# Patient Record
Sex: FEMALE | Race: WHITE | NOT HISPANIC OR LATINO
[De-identification: names, ages, dates, MRNs, and addresses within clinical notes are randomized per-mention and may not be internally consistent; named-entity substitution may affect disease eponyms.]

---

## 2017-01-16 ENCOUNTER — APPOINTMENT (OUTPATIENT)
Dept: PULMONOLOGY | Facility: CLINIC | Age: 62
End: 2017-01-16

## 2017-04-11 ENCOUNTER — APPOINTMENT (OUTPATIENT)
Dept: HEMATOLOGY ONCOLOGY | Facility: CLINIC | Age: 62
End: 2017-04-11

## 2017-04-11 ENCOUNTER — OUTPATIENT (OUTPATIENT)
Dept: OUTPATIENT SERVICES | Facility: HOSPITAL | Age: 62
LOS: 1 days | Discharge: HOME | End: 2017-04-11

## 2017-04-11 DIAGNOSIS — D36.9 BENIGN NEOPLASM, UNSPECIFIED SITE: Chronic | ICD-10-CM

## 2017-04-11 DIAGNOSIS — Z90.2 ACQUIRED ABSENCE OF LUNG [PART OF]: Chronic | ICD-10-CM

## 2017-04-11 DIAGNOSIS — Z98.89 OTHER SPECIFIED POSTPROCEDURAL STATES: Chronic | ICD-10-CM

## 2017-04-11 DIAGNOSIS — C34.92 MALIGNANT NEOPLASM OF UNSPECIFIED PART OF LEFT BRONCHUS OR LUNG: ICD-10-CM

## 2017-04-17 ENCOUNTER — FORM ENCOUNTER (OUTPATIENT)
Age: 62
End: 2017-04-17

## 2017-04-18 ENCOUNTER — OUTPATIENT (OUTPATIENT)
Dept: OUTPATIENT SERVICES | Facility: HOSPITAL | Age: 62
LOS: 1 days | End: 2017-04-18
Payer: COMMERCIAL

## 2017-04-18 ENCOUNTER — APPOINTMENT (OUTPATIENT)
Dept: CT IMAGING | Facility: CLINIC | Age: 62
End: 2017-04-18

## 2017-04-18 ENCOUNTER — APPOINTMENT (OUTPATIENT)
Dept: HEMATOLOGY ONCOLOGY | Facility: CLINIC | Age: 62
End: 2017-04-18

## 2017-04-18 DIAGNOSIS — C34.92 MALIGNANT NEOPLASM OF UNSPECIFIED PART OF LEFT BRONCHUS OR LUNG: ICD-10-CM

## 2017-04-18 DIAGNOSIS — D36.9 BENIGN NEOPLASM, UNSPECIFIED SITE: Chronic | ICD-10-CM

## 2017-04-18 DIAGNOSIS — Z98.89 OTHER SPECIFIED POSTPROCEDURAL STATES: Chronic | ICD-10-CM

## 2017-04-18 DIAGNOSIS — Z90.2 ACQUIRED ABSENCE OF LUNG [PART OF]: Chronic | ICD-10-CM

## 2017-04-18 PROCEDURE — 71250 CT THORAX DX C-: CPT

## 2017-04-27 ENCOUNTER — RESULT REVIEW (OUTPATIENT)
Age: 62
End: 2017-04-27

## 2017-04-28 ENCOUNTER — LABORATORY RESULT (OUTPATIENT)
Age: 62
End: 2017-04-28

## 2017-04-28 ENCOUNTER — APPOINTMENT (OUTPATIENT)
Dept: HEMATOLOGY ONCOLOGY | Facility: CLINIC | Age: 62
End: 2017-04-28

## 2017-04-28 VITALS
HEART RATE: 82 BPM | TEMPERATURE: 97.6 F | OXYGEN SATURATION: 96 % | WEIGHT: 133.38 LBS | DIASTOLIC BLOOD PRESSURE: 78 MMHG | BODY MASS INDEX: 22.17 KG/M2 | SYSTOLIC BLOOD PRESSURE: 122 MMHG | RESPIRATION RATE: 16 BRPM

## 2017-04-28 LAB
BASOPHILS # BLD AUTO: 0.1 K/UL — SIGNIFICANT CHANGE UP (ref 0–0.2)
BASOPHILS NFR BLD AUTO: 0.8 % — SIGNIFICANT CHANGE UP (ref 0–2)
EOSINOPHIL # BLD AUTO: 0.1 K/UL — SIGNIFICANT CHANGE UP (ref 0–0.5)
EOSINOPHIL NFR BLD AUTO: 1 % — SIGNIFICANT CHANGE UP (ref 0–6)
HCT VFR BLD CALC: 40 % — SIGNIFICANT CHANGE UP (ref 34.5–45)
HGB BLD-MCNC: 13.7 G/DL — SIGNIFICANT CHANGE UP (ref 11.5–15.5)
LYMPHOCYTES # BLD AUTO: 2.5 K/UL — SIGNIFICANT CHANGE UP (ref 1–3.3)
LYMPHOCYTES # BLD AUTO: 31.7 % — SIGNIFICANT CHANGE UP (ref 13–44)
MCHC RBC-ENTMCNC: 33 PG — SIGNIFICANT CHANGE UP (ref 27–34)
MCHC RBC-ENTMCNC: 34.1 G/DL — SIGNIFICANT CHANGE UP (ref 32–36)
MCV RBC AUTO: 96.7 FL — SIGNIFICANT CHANGE UP (ref 80–100)
MONOCYTES # BLD AUTO: 0.6 K/UL — SIGNIFICANT CHANGE UP (ref 0–0.9)
MONOCYTES NFR BLD AUTO: 7.2 % — SIGNIFICANT CHANGE UP (ref 2–14)
NEUTROPHILS # BLD AUTO: 4.6 K/UL — SIGNIFICANT CHANGE UP (ref 1.8–7.4)
NEUTROPHILS NFR BLD AUTO: 59.3 % — SIGNIFICANT CHANGE UP (ref 43–77)
PLATELET # BLD AUTO: 302 K/UL — SIGNIFICANT CHANGE UP (ref 150–400)
RBC # BLD: 4.14 M/UL — SIGNIFICANT CHANGE UP (ref 3.8–5.2)
RBC # FLD: 10.9 % — SIGNIFICANT CHANGE UP (ref 10.3–14.5)
WBC # BLD: 7.8 K/UL — SIGNIFICANT CHANGE UP (ref 3.8–10.5)
WBC # FLD AUTO: 7.8 K/UL — SIGNIFICANT CHANGE UP (ref 3.8–10.5)

## 2017-05-03 ENCOUNTER — FORM ENCOUNTER (OUTPATIENT)
Age: 62
End: 2017-05-03

## 2017-05-04 ENCOUNTER — OUTPATIENT (OUTPATIENT)
Dept: OUTPATIENT SERVICES | Facility: HOSPITAL | Age: 62
LOS: 1 days | End: 2017-05-04
Payer: COMMERCIAL

## 2017-05-04 ENCOUNTER — APPOINTMENT (OUTPATIENT)
Dept: MAMMOGRAPHY | Facility: IMAGING CENTER | Age: 62
End: 2017-05-04

## 2017-05-04 DIAGNOSIS — Z90.2 ACQUIRED ABSENCE OF LUNG [PART OF]: Chronic | ICD-10-CM

## 2017-05-04 DIAGNOSIS — D36.9 BENIGN NEOPLASM, UNSPECIFIED SITE: Chronic | ICD-10-CM

## 2017-05-04 DIAGNOSIS — Z00.8 ENCOUNTER FOR OTHER GENERAL EXAMINATION: ICD-10-CM

## 2017-05-04 DIAGNOSIS — Z98.89 OTHER SPECIFIED POSTPROCEDURAL STATES: Chronic | ICD-10-CM

## 2017-05-04 PROCEDURE — 77063 BREAST TOMOSYNTHESIS BI: CPT

## 2017-05-04 PROCEDURE — 77067 SCR MAMMO BI INCL CAD: CPT

## 2017-05-31 ENCOUNTER — APPOINTMENT (OUTPATIENT)
Dept: SURGICAL ONCOLOGY | Facility: CLINIC | Age: 62
End: 2017-05-31

## 2017-05-31 VITALS
HEIGHT: 65 IN | DIASTOLIC BLOOD PRESSURE: 67 MMHG | RESPIRATION RATE: 15 BRPM | SYSTOLIC BLOOD PRESSURE: 115 MMHG | BODY MASS INDEX: 21.66 KG/M2 | HEART RATE: 67 BPM | WEIGHT: 130 LBS

## 2017-06-21 ENCOUNTER — APPOINTMENT (OUTPATIENT)
Dept: PULMONOLOGY | Facility: CLINIC | Age: 62
End: 2017-06-21

## 2017-08-16 ENCOUNTER — OUTPATIENT (OUTPATIENT)
Dept: OUTPATIENT SERVICES | Facility: HOSPITAL | Age: 62
LOS: 1 days | End: 2017-08-16
Payer: COMMERCIAL

## 2017-08-16 DIAGNOSIS — Z98.89 OTHER SPECIFIED POSTPROCEDURAL STATES: Chronic | ICD-10-CM

## 2017-08-16 DIAGNOSIS — Z90.2 ACQUIRED ABSENCE OF LUNG [PART OF]: Chronic | ICD-10-CM

## 2017-08-16 DIAGNOSIS — D36.9 BENIGN NEOPLASM, UNSPECIFIED SITE: Chronic | ICD-10-CM

## 2017-08-16 PROCEDURE — 93010 ELECTROCARDIOGRAM REPORT: CPT | Mod: NC

## 2017-08-16 PROCEDURE — 93005 ELECTROCARDIOGRAM TRACING: CPT

## 2017-08-16 PROCEDURE — G0463: CPT

## 2017-08-16 PROCEDURE — 85025 COMPLETE CBC W/AUTO DIFF WBC: CPT

## 2017-08-16 PROCEDURE — 80048 BASIC METABOLIC PNL TOTAL CA: CPT

## 2017-08-16 PROCEDURE — 36415 COLL VENOUS BLD VENIPUNCTURE: CPT

## 2017-08-22 ENCOUNTER — APPOINTMENT (OUTPATIENT)
Dept: SURGICAL ONCOLOGY | Facility: HOSPITAL | Age: 62
End: 2017-08-22

## 2017-08-22 ENCOUNTER — RESULT REVIEW (OUTPATIENT)
Age: 62
End: 2017-08-22

## 2017-08-22 ENCOUNTER — OUTPATIENT (OUTPATIENT)
Dept: OUTPATIENT SERVICES | Facility: HOSPITAL | Age: 62
LOS: 1 days | Discharge: HOME | End: 2017-08-22
Payer: COMMERCIAL

## 2017-08-22 DIAGNOSIS — D36.9 BENIGN NEOPLASM, UNSPECIFIED SITE: Chronic | ICD-10-CM

## 2017-08-22 DIAGNOSIS — Z90.2 ACQUIRED ABSENCE OF LUNG [PART OF]: Chronic | ICD-10-CM

## 2017-08-22 DIAGNOSIS — Z98.89 OTHER SPECIFIED POSTPROCEDURAL STATES: Chronic | ICD-10-CM

## 2017-08-22 PROCEDURE — 88307 TISSUE EXAM BY PATHOLOGIST: CPT | Mod: 26

## 2017-08-22 PROCEDURE — 21558 RESECT NECK TUMOR 5 CM/>: CPT

## 2017-08-23 PROCEDURE — C1889: CPT

## 2017-08-23 PROCEDURE — 11401 EXC TR-EXT B9+MARG 0.6-1 CM: CPT

## 2017-08-23 PROCEDURE — 88307 TISSUE EXAM BY PATHOLOGIST: CPT

## 2017-08-24 ENCOUNTER — TRANSCRIPTION ENCOUNTER (OUTPATIENT)
Age: 62
End: 2017-08-24

## 2017-08-30 ENCOUNTER — APPOINTMENT (OUTPATIENT)
Dept: SURGICAL ONCOLOGY | Facility: CLINIC | Age: 62
End: 2017-08-30
Payer: COMMERCIAL

## 2017-08-30 VITALS
WEIGHT: 131 LBS | HEIGHT: 65 IN | RESPIRATION RATE: 16 BRPM | OXYGEN SATURATION: 98 % | BODY MASS INDEX: 21.83 KG/M2 | SYSTOLIC BLOOD PRESSURE: 134 MMHG | HEART RATE: 65 BPM | DIASTOLIC BLOOD PRESSURE: 87 MMHG | TEMPERATURE: 98.2 F

## 2017-08-30 PROCEDURE — 10140 I&D HMTMA SEROMA/FLUID COLLJ: CPT | Mod: 79

## 2017-08-30 PROCEDURE — 99024 POSTOP FOLLOW-UP VISIT: CPT

## 2017-09-06 ENCOUNTER — APPOINTMENT (OUTPATIENT)
Dept: SURGICAL ONCOLOGY | Facility: CLINIC | Age: 62
End: 2017-09-06
Payer: COMMERCIAL

## 2017-09-06 VITALS
DIASTOLIC BLOOD PRESSURE: 81 MMHG | HEIGHT: 65 IN | BODY MASS INDEX: 21.83 KG/M2 | OXYGEN SATURATION: 94 % | HEART RATE: 74 BPM | WEIGHT: 131 LBS | SYSTOLIC BLOOD PRESSURE: 120 MMHG

## 2017-09-06 DIAGNOSIS — R22.32 LOCALIZED SWELLING, MASS AND LUMP, LEFT UPPER LIMB: ICD-10-CM

## 2017-09-06 PROCEDURE — 99024 POSTOP FOLLOW-UP VISIT: CPT

## 2017-09-06 PROCEDURE — 10140 I&D HMTMA SEROMA/FLUID COLLJ: CPT | Mod: 79

## 2017-09-06 RX ORDER — DULOXETINE HYDROCHLORIDE 60 MG/1
60 CAPSULE, DELAYED RELEASE PELLETS ORAL
Qty: 30 | Refills: 0 | Status: DISCONTINUED | COMMUNITY
Start: 2017-05-17 | End: 2017-09-06

## 2017-09-06 RX ORDER — TRETINOIN 0.8 MG/G
0.08 GEL TOPICAL
Qty: 50 | Refills: 0 | Status: DISCONTINUED | COMMUNITY
Start: 2016-12-30 | End: 2017-09-06

## 2017-09-06 RX ORDER — SUMATRIPTAN 100 MG/1
100 TABLET, FILM COATED ORAL
Qty: 9 | Refills: 0 | Status: DISCONTINUED | COMMUNITY
Start: 2016-11-16 | End: 2017-09-06

## 2017-09-06 RX ORDER — BUPROPION HYDROCHLORIDE 75 MG/1
75 TABLET, FILM COATED ORAL
Qty: 30 | Refills: 0 | Status: DISCONTINUED | COMMUNITY
Start: 2017-04-10 | End: 2017-09-06

## 2017-09-06 RX ORDER — DULOXETINE HYDROCHLORIDE 40 MG/1
40 CAPSULE, DELAYED RELEASE PELLETS ORAL
Qty: 30 | Refills: 0 | Status: DISCONTINUED | COMMUNITY
Start: 2017-04-10 | End: 2017-09-06

## 2017-09-06 RX ORDER — BUPROPION HYDROCHLORIDE 150 MG/1
150 TABLET, FILM COATED, EXTENDED RELEASE ORAL
Qty: 30 | Refills: 0 | Status: DISCONTINUED | COMMUNITY
Start: 2016-12-28 | End: 2017-09-06

## 2017-09-08 PROBLEM — R22.32 AXILLARY MASS, LEFT: Status: ACTIVE | Noted: 2017-05-31

## 2017-09-13 ENCOUNTER — APPOINTMENT (OUTPATIENT)
Dept: SURGICAL ONCOLOGY | Facility: CLINIC | Age: 62
End: 2017-09-13
Payer: COMMERCIAL

## 2017-09-13 VITALS
BODY MASS INDEX: 21.83 KG/M2 | OXYGEN SATURATION: 98 % | HEIGHT: 65 IN | DIASTOLIC BLOOD PRESSURE: 79 MMHG | RESPIRATION RATE: 16 BRPM | WEIGHT: 131 LBS | SYSTOLIC BLOOD PRESSURE: 118 MMHG | HEART RATE: 74 BPM

## 2017-09-13 DIAGNOSIS — D17.9 BENIGN LIPOMATOUS NEOPLASM, UNSPECIFIED: ICD-10-CM

## 2017-09-13 PROCEDURE — 99024 POSTOP FOLLOW-UP VISIT: CPT

## 2017-10-23 ENCOUNTER — OTHER (OUTPATIENT)
Age: 62
End: 2017-10-23

## 2017-11-12 ENCOUNTER — FORM ENCOUNTER (OUTPATIENT)
Age: 62
End: 2017-11-12

## 2017-11-13 ENCOUNTER — APPOINTMENT (OUTPATIENT)
Dept: CT IMAGING | Facility: IMAGING CENTER | Age: 62
End: 2017-11-13
Payer: COMMERCIAL

## 2017-11-13 ENCOUNTER — OUTPATIENT (OUTPATIENT)
Dept: OUTPATIENT SERVICES | Facility: HOSPITAL | Age: 62
LOS: 1 days | End: 2017-11-13
Payer: COMMERCIAL

## 2017-11-13 DIAGNOSIS — Z98.89 OTHER SPECIFIED POSTPROCEDURAL STATES: Chronic | ICD-10-CM

## 2017-11-13 DIAGNOSIS — C34.92 MALIGNANT NEOPLASM OF UNSPECIFIED PART OF LEFT BRONCHUS OR LUNG: ICD-10-CM

## 2017-11-13 DIAGNOSIS — Z90.2 ACQUIRED ABSENCE OF LUNG [PART OF]: Chronic | ICD-10-CM

## 2017-11-13 DIAGNOSIS — D36.9 BENIGN NEOPLASM, UNSPECIFIED SITE: Chronic | ICD-10-CM

## 2017-11-13 PROCEDURE — 71250 CT THORAX DX C-: CPT | Mod: 26

## 2017-11-13 PROCEDURE — 71250 CT THORAX DX C-: CPT

## 2017-11-16 ENCOUNTER — APPOINTMENT (OUTPATIENT)
Dept: HEMATOLOGY ONCOLOGY | Facility: CLINIC | Age: 62
End: 2017-11-16
Payer: COMMERCIAL

## 2017-11-16 ENCOUNTER — OUTPATIENT (OUTPATIENT)
Dept: OUTPATIENT SERVICES | Facility: HOSPITAL | Age: 62
LOS: 1 days | Discharge: HOME | End: 2017-11-16

## 2017-11-16 VITALS
TEMPERATURE: 98.1 F | BODY MASS INDEX: 22.01 KG/M2 | OXYGEN SATURATION: 97 % | DIASTOLIC BLOOD PRESSURE: 84 MMHG | WEIGHT: 132.28 LBS | HEART RATE: 83 BPM | SYSTOLIC BLOOD PRESSURE: 126 MMHG | RESPIRATION RATE: 16 BRPM

## 2017-11-16 DIAGNOSIS — Z98.89 OTHER SPECIFIED POSTPROCEDURAL STATES: Chronic | ICD-10-CM

## 2017-11-16 DIAGNOSIS — D36.9 BENIGN NEOPLASM, UNSPECIFIED SITE: Chronic | ICD-10-CM

## 2017-11-16 DIAGNOSIS — C34.92 MALIGNANT NEOPLASM OF UNSPECIFIED PART OF LEFT BRONCHUS OR LUNG: ICD-10-CM

## 2017-11-16 DIAGNOSIS — Z90.2 ACQUIRED ABSENCE OF LUNG [PART OF]: Chronic | ICD-10-CM

## 2017-11-16 PROCEDURE — 99214 OFFICE O/P EST MOD 30 MIN: CPT

## 2018-02-08 ENCOUNTER — APPOINTMENT (OUTPATIENT)
Dept: PULMONOLOGY | Facility: CLINIC | Age: 63
End: 2018-02-08
Payer: COMMERCIAL

## 2018-02-08 VITALS
WEIGHT: 132 LBS | DIASTOLIC BLOOD PRESSURE: 65 MMHG | HEIGHT: 65 IN | OXYGEN SATURATION: 96 % | HEART RATE: 75 BPM | RESPIRATION RATE: 17 BRPM | BODY MASS INDEX: 21.99 KG/M2 | SYSTOLIC BLOOD PRESSURE: 110 MMHG

## 2018-02-08 DIAGNOSIS — Z72.0 TOBACCO USE: ICD-10-CM

## 2018-02-08 PROCEDURE — 94010 BREATHING CAPACITY TEST: CPT | Mod: 59

## 2018-02-08 PROCEDURE — 99214 OFFICE O/P EST MOD 30 MIN: CPT | Mod: 25

## 2018-02-08 PROCEDURE — 71046 X-RAY EXAM CHEST 2 VIEWS: CPT

## 2018-02-08 PROCEDURE — 94618 PULMONARY STRESS TESTING: CPT

## 2018-04-10 ENCOUNTER — OTHER (OUTPATIENT)
Age: 63
End: 2018-04-10

## 2018-04-11 ENCOUNTER — OTHER (OUTPATIENT)
Age: 63
End: 2018-04-11

## 2018-05-13 ENCOUNTER — FORM ENCOUNTER (OUTPATIENT)
Age: 63
End: 2018-05-13

## 2018-05-14 ENCOUNTER — APPOINTMENT (OUTPATIENT)
Dept: CT IMAGING | Facility: IMAGING CENTER | Age: 63
End: 2018-05-14
Payer: COMMERCIAL

## 2018-05-14 ENCOUNTER — OUTPATIENT (OUTPATIENT)
Dept: OUTPATIENT SERVICES | Facility: HOSPITAL | Age: 63
LOS: 1 days | End: 2018-05-14
Payer: COMMERCIAL

## 2018-05-14 DIAGNOSIS — R91.8 OTHER NONSPECIFIC ABNORMAL FINDING OF LUNG FIELD: ICD-10-CM

## 2018-05-14 DIAGNOSIS — Z90.2 ACQUIRED ABSENCE OF LUNG [PART OF]: Chronic | ICD-10-CM

## 2018-05-14 DIAGNOSIS — D36.9 BENIGN NEOPLASM, UNSPECIFIED SITE: Chronic | ICD-10-CM

## 2018-05-14 DIAGNOSIS — Z98.89 OTHER SPECIFIED POSTPROCEDURAL STATES: Chronic | ICD-10-CM

## 2018-05-14 PROCEDURE — 71250 CT THORAX DX C-: CPT

## 2018-05-14 PROCEDURE — 71250 CT THORAX DX C-: CPT | Mod: 26

## 2018-05-18 ENCOUNTER — OUTPATIENT (OUTPATIENT)
Dept: OUTPATIENT SERVICES | Facility: HOSPITAL | Age: 63
LOS: 1 days | Discharge: HOME | End: 2018-05-18

## 2018-05-18 DIAGNOSIS — Z90.2 ACQUIRED ABSENCE OF LUNG [PART OF]: Chronic | ICD-10-CM

## 2018-05-18 DIAGNOSIS — Z98.89 OTHER SPECIFIED POSTPROCEDURAL STATES: Chronic | ICD-10-CM

## 2018-05-18 DIAGNOSIS — D36.9 BENIGN NEOPLASM, UNSPECIFIED SITE: Chronic | ICD-10-CM

## 2018-05-18 DIAGNOSIS — C34.92 MALIGNANT NEOPLASM OF UNSPECIFIED PART OF LEFT BRONCHUS OR LUNG: ICD-10-CM

## 2018-05-23 ENCOUNTER — APPOINTMENT (OUTPATIENT)
Dept: HEMATOLOGY ONCOLOGY | Facility: CLINIC | Age: 63
End: 2018-05-23
Payer: COMMERCIAL

## 2018-05-23 VITALS
WEIGHT: 131.84 LBS | BODY MASS INDEX: 21.94 KG/M2 | OXYGEN SATURATION: 96 % | TEMPERATURE: 97 F | RESPIRATION RATE: 16 BRPM | HEART RATE: 86 BPM | SYSTOLIC BLOOD PRESSURE: 118 MMHG | DIASTOLIC BLOOD PRESSURE: 70 MMHG

## 2018-05-23 PROCEDURE — 99214 OFFICE O/P EST MOD 30 MIN: CPT

## 2018-07-09 ENCOUNTER — MEDICATION RENEWAL (OUTPATIENT)
Age: 63
End: 2018-07-09

## 2018-08-08 ENCOUNTER — NON-APPOINTMENT (OUTPATIENT)
Age: 63
End: 2018-08-08

## 2018-08-08 ENCOUNTER — APPOINTMENT (OUTPATIENT)
Dept: PULMONOLOGY | Facility: CLINIC | Age: 63
End: 2018-08-08
Payer: COMMERCIAL

## 2018-08-08 VITALS
OXYGEN SATURATION: 98 % | HEIGHT: 64 IN | WEIGHT: 133 LBS | DIASTOLIC BLOOD PRESSURE: 80 MMHG | HEART RATE: 71 BPM | SYSTOLIC BLOOD PRESSURE: 120 MMHG | BODY MASS INDEX: 22.71 KG/M2 | RESPIRATION RATE: 14 BRPM

## 2018-08-08 DIAGNOSIS — J43.9 EMPHYSEMA, UNSPECIFIED: ICD-10-CM

## 2018-08-08 DIAGNOSIS — Z72.820 SLEEP DEPRIVATION: ICD-10-CM

## 2018-08-08 PROCEDURE — 99214 OFFICE O/P EST MOD 30 MIN: CPT | Mod: 25

## 2018-08-08 PROCEDURE — 94010 BREATHING CAPACITY TEST: CPT | Mod: 59

## 2018-08-08 PROCEDURE — 94618 PULMONARY STRESS TESTING: CPT

## 2018-10-01 ENCOUNTER — OTHER (OUTPATIENT)
Age: 63
End: 2018-10-01

## 2018-10-02 ENCOUNTER — OTHER (OUTPATIENT)
Age: 63
End: 2018-10-02

## 2018-10-03 ENCOUNTER — OTHER (OUTPATIENT)
Age: 63
End: 2018-10-03

## 2018-10-21 ENCOUNTER — TRANSCRIPTION ENCOUNTER (OUTPATIENT)
Age: 63
End: 2018-10-21

## 2018-11-12 ENCOUNTER — APPOINTMENT (OUTPATIENT)
Dept: PULMONOLOGY | Facility: CLINIC | Age: 63
End: 2018-11-12
Payer: COMMERCIAL

## 2018-11-12 VITALS
RESPIRATION RATE: 18 BRPM | WEIGHT: 133 LBS | DIASTOLIC BLOOD PRESSURE: 70 MMHG | BODY MASS INDEX: 22.71 KG/M2 | OXYGEN SATURATION: 94 % | SYSTOLIC BLOOD PRESSURE: 130 MMHG | HEART RATE: 99 BPM | HEIGHT: 64 IN

## 2018-11-12 DIAGNOSIS — J06.9 ACUTE UPPER RESPIRATORY INFECTION, UNSPECIFIED: ICD-10-CM

## 2018-11-12 PROCEDURE — 99214 OFFICE O/P EST MOD 30 MIN: CPT

## 2018-11-13 ENCOUNTER — FORM ENCOUNTER (OUTPATIENT)
Age: 63
End: 2018-11-13

## 2018-11-14 ENCOUNTER — OUTPATIENT (OUTPATIENT)
Dept: OUTPATIENT SERVICES | Facility: HOSPITAL | Age: 63
LOS: 1 days | End: 2018-11-14
Payer: COMMERCIAL

## 2018-11-14 ENCOUNTER — APPOINTMENT (OUTPATIENT)
Dept: CT IMAGING | Facility: IMAGING CENTER | Age: 63
End: 2018-11-14
Payer: COMMERCIAL

## 2018-11-14 DIAGNOSIS — Z98.89 OTHER SPECIFIED POSTPROCEDURAL STATES: Chronic | ICD-10-CM

## 2018-11-14 DIAGNOSIS — D36.9 BENIGN NEOPLASM, UNSPECIFIED SITE: Chronic | ICD-10-CM

## 2018-11-14 DIAGNOSIS — C34.92 MALIGNANT NEOPLASM OF UNSPECIFIED PART OF LEFT BRONCHUS OR LUNG: ICD-10-CM

## 2018-11-14 DIAGNOSIS — Z90.2 ACQUIRED ABSENCE OF LUNG [PART OF]: Chronic | ICD-10-CM

## 2018-11-14 PROCEDURE — 71250 CT THORAX DX C-: CPT

## 2018-11-14 PROCEDURE — 71250 CT THORAX DX C-: CPT | Mod: 26

## 2018-11-19 ENCOUNTER — OUTPATIENT (OUTPATIENT)
Dept: OUTPATIENT SERVICES | Facility: HOSPITAL | Age: 63
LOS: 1 days | Discharge: HOME | End: 2018-11-19

## 2018-11-19 DIAGNOSIS — C34.92 MALIGNANT NEOPLASM OF UNSPECIFIED PART OF LEFT BRONCHUS OR LUNG: ICD-10-CM

## 2018-11-19 DIAGNOSIS — D36.9 BENIGN NEOPLASM, UNSPECIFIED SITE: Chronic | ICD-10-CM

## 2018-11-19 DIAGNOSIS — Z90.2 ACQUIRED ABSENCE OF LUNG [PART OF]: Chronic | ICD-10-CM

## 2018-11-19 DIAGNOSIS — Z98.89 OTHER SPECIFIED POSTPROCEDURAL STATES: Chronic | ICD-10-CM

## 2018-11-20 ENCOUNTER — APPOINTMENT (OUTPATIENT)
Dept: HEMATOLOGY ONCOLOGY | Facility: CLINIC | Age: 63
End: 2018-11-20
Payer: COMMERCIAL

## 2018-11-20 VITALS
SYSTOLIC BLOOD PRESSURE: 149 MMHG | RESPIRATION RATE: 16 BRPM | WEIGHT: 134.48 LBS | DIASTOLIC BLOOD PRESSURE: 89 MMHG | OXYGEN SATURATION: 97 % | TEMPERATURE: 98 F | HEART RATE: 87 BPM | BODY MASS INDEX: 23.08 KG/M2

## 2018-11-20 PROCEDURE — 99214 OFFICE O/P EST MOD 30 MIN: CPT

## 2018-11-20 NOTE — HISTORY OF PRESENT ILLNESS
[Disease: _____________________] : Disease: [unfilled] [AJCC Stage: ____] : AJCC Stage: [unfilled] [de-identified] : MS. Andrea is a 64 yo woman who is s/p resection of stage II lung cancer followed by adjuvant chemo with cisplatin and pemetrexed x 4 cycles which completed in June of 2016. \eugene Was enrolled in ALCHEMIST, EGFR and ALK testing of tumor- tumor WT for both. Was offered adjuvant nivolumab as part of the study but declined. \par Surveillance scans showed YASMEEN. She just had a CT scan on 11/15 which showed GGO and tree-in-bud findings b/l upper lobes suggestive of PNA and endobronchial spread of infection. She was just seen by Dr. Arora for lower resp tract symptoms and tx with abx (azithromycin), and steroids (currently at 20 mg daily). She is feeling a little better. She still has dry cough. No fever, chills, rigors.  [de-identified] : adenoca

## 2018-11-20 NOTE — PHYSICAL EXAM
[Restricted in physically strenuous activity but ambulatory and able to carry out work of a light or sedentary nature] : Status 1- Restricted in physically strenuous activity but ambulatory and able to carry out work of a light or sedentary nature, e.g., light house work, office work [Thin] : thin [Normal] : affect appropriate

## 2018-11-20 NOTE — REVIEW OF SYSTEMS
[Fatigue] : fatigue [Shortness Of Breath] : no shortness of breath [Cough] : cough [SOB on Exertion] : shortness of breath during exertion [Negative] : Allergic/Immunologic

## 2018-11-20 NOTE — ASSESSMENT
[FreeTextEntry1] : 61 yo woman with recently diagnosed stage IIA lung ca s/p LUlobectomy .\par Completed 4 cycles of cis/gem in June 2016-did great and tolerated well. \par Surveillance CTs all t CTs show YASMEEN. However, most recent scan from 11/15 suggestive of b/l pneumonitis/brinchitis. She is completing a course of steroids and took zithro for 5 day, Still has b/l wheeze and cough. Will scribe another course of abx- levofloxacin. Discussed repeating scan in 6 weeks. \par Recommend f/u with  Dr. Arora\par Cancer screening protocols for breast and colon per primary care\par OV after next CT scan [Curative] : Goals of care discussed with patient: Curative

## 2018-12-16 ENCOUNTER — FORM ENCOUNTER (OUTPATIENT)
Age: 63
End: 2018-12-16

## 2018-12-17 ENCOUNTER — APPOINTMENT (OUTPATIENT)
Dept: CT IMAGING | Facility: IMAGING CENTER | Age: 63
End: 2018-12-17
Payer: COMMERCIAL

## 2018-12-17 ENCOUNTER — OUTPATIENT (OUTPATIENT)
Dept: OUTPATIENT SERVICES | Facility: HOSPITAL | Age: 63
LOS: 1 days | End: 2018-12-17
Payer: COMMERCIAL

## 2018-12-17 DIAGNOSIS — Z98.89 OTHER SPECIFIED POSTPROCEDURAL STATES: Chronic | ICD-10-CM

## 2018-12-17 DIAGNOSIS — D36.9 BENIGN NEOPLASM, UNSPECIFIED SITE: Chronic | ICD-10-CM

## 2018-12-17 DIAGNOSIS — Z90.2 ACQUIRED ABSENCE OF LUNG [PART OF]: Chronic | ICD-10-CM

## 2018-12-17 DIAGNOSIS — C34.92 MALIGNANT NEOPLASM OF UNSPECIFIED PART OF LEFT BRONCHUS OR LUNG: ICD-10-CM

## 2018-12-17 PROCEDURE — 71250 CT THORAX DX C-: CPT | Mod: 26

## 2018-12-17 PROCEDURE — 71250 CT THORAX DX C-: CPT

## 2019-01-01 ENCOUNTER — APPOINTMENT (OUTPATIENT)
Dept: INFUSION THERAPY | Facility: HOSPITAL | Age: 64
End: 2019-01-01

## 2019-01-01 ENCOUNTER — LABORATORY RESULT (OUTPATIENT)
Age: 64
End: 2019-01-01

## 2019-01-01 ENCOUNTER — MEDICATION RENEWAL (OUTPATIENT)
Age: 64
End: 2019-01-01

## 2019-01-01 ENCOUNTER — APPOINTMENT (OUTPATIENT)
Dept: PULMONOLOGY | Facility: CLINIC | Age: 64
End: 2019-01-01

## 2019-01-01 ENCOUNTER — RESULT REVIEW (OUTPATIENT)
Age: 64
End: 2019-01-01

## 2019-01-01 ENCOUNTER — APPOINTMENT (OUTPATIENT)
Dept: GASTROENTEROLOGY | Facility: CLINIC | Age: 64
End: 2019-01-01

## 2019-01-01 DIAGNOSIS — R91.8 OTHER NONSPECIFIC ABNORMAL FINDING OF LUNG FIELD: ICD-10-CM

## 2019-01-01 DIAGNOSIS — Z51.11 ENCOUNTER FOR ANTINEOPLASTIC CHEMOTHERAPY: ICD-10-CM

## 2019-01-01 LAB
BASOPHILS # BLD AUTO: 0.1 K/UL — SIGNIFICANT CHANGE UP (ref 0–0.2)
BASOPHILS NFR BLD AUTO: 1.2 % — SIGNIFICANT CHANGE UP (ref 0–2)
EOSINOPHIL # BLD AUTO: 0.1 K/UL — SIGNIFICANT CHANGE UP (ref 0–0.5)
EOSINOPHIL NFR BLD AUTO: 1.2 % — SIGNIFICANT CHANGE UP (ref 0–6)
FOLATE SERPL-MCNC: >20 NG/ML
HCT VFR BLD CALC: 40 % — SIGNIFICANT CHANGE UP (ref 34.5–45)
HGB BLD-MCNC: 13.1 G/DL — SIGNIFICANT CHANGE UP (ref 11.5–15.5)
LYMPHOCYTES # BLD AUTO: 1.8 K/UL — SIGNIFICANT CHANGE UP (ref 1–3.3)
LYMPHOCYTES # BLD AUTO: 28.6 % — SIGNIFICANT CHANGE UP (ref 13–44)
MCHC RBC-ENTMCNC: 26 PG — LOW (ref 27–34)
MCHC RBC-ENTMCNC: 32.7 G/DL — SIGNIFICANT CHANGE UP (ref 32–36)
MCV RBC AUTO: 79.7 FL — LOW (ref 80–100)
MONOCYTES # BLD AUTO: 0.4 K/UL — SIGNIFICANT CHANGE UP (ref 0–0.9)
MONOCYTES NFR BLD AUTO: 7 % — SIGNIFICANT CHANGE UP (ref 2–14)
NEUTROPHILS # BLD AUTO: 3.9 K/UL — SIGNIFICANT CHANGE UP (ref 1.8–7.4)
NEUTROPHILS NFR BLD AUTO: 62 % — SIGNIFICANT CHANGE UP (ref 43–77)
PLATELET # BLD AUTO: 293 K/UL — SIGNIFICANT CHANGE UP (ref 150–400)
RBC # BLD: 5.03 M/UL — SIGNIFICANT CHANGE UP (ref 3.8–5.2)
RBC # FLD: 27 % — HIGH (ref 10.3–14.5)
VIT B12 SERPL-MCNC: 802 PG/ML
WBC # BLD: 6.3 K/UL — SIGNIFICANT CHANGE UP (ref 3.8–10.5)
WBC # FLD AUTO: 6.3 K/UL — SIGNIFICANT CHANGE UP (ref 3.8–10.5)

## 2019-01-01 RX ORDER — FLUTICASONE FUROATE, UMECLIDINIUM BROMIDE AND VILANTEROL TRIFENATATE 100; 62.5; 25 UG/1; UG/1; UG/1
100-62.5-25 POWDER RESPIRATORY (INHALATION)
Qty: 1 | Refills: 3 | Status: COMPLETED | COMMUNITY
Start: 2019-06-27 | End: 2019-01-01

## 2019-01-09 ENCOUNTER — OUTPATIENT (OUTPATIENT)
Dept: OUTPATIENT SERVICES | Facility: HOSPITAL | Age: 64
LOS: 1 days | Discharge: HOME | End: 2019-01-09

## 2019-01-09 DIAGNOSIS — Z90.2 ACQUIRED ABSENCE OF LUNG [PART OF]: Chronic | ICD-10-CM

## 2019-01-09 DIAGNOSIS — C34.92 MALIGNANT NEOPLASM OF UNSPECIFIED PART OF LEFT BRONCHUS OR LUNG: ICD-10-CM

## 2019-01-09 DIAGNOSIS — D36.9 BENIGN NEOPLASM, UNSPECIFIED SITE: Chronic | ICD-10-CM

## 2019-01-09 DIAGNOSIS — Z98.89 OTHER SPECIFIED POSTPROCEDURAL STATES: Chronic | ICD-10-CM

## 2019-01-15 ENCOUNTER — APPOINTMENT (OUTPATIENT)
Dept: HEMATOLOGY ONCOLOGY | Facility: CLINIC | Age: 64
End: 2019-01-15

## 2019-01-30 ENCOUNTER — APPOINTMENT (OUTPATIENT)
Dept: GASTROENTEROLOGY | Facility: CLINIC | Age: 64
End: 2019-01-30
Payer: COMMERCIAL

## 2019-01-30 VITALS
BODY MASS INDEX: 26.31 KG/M2 | TEMPERATURE: 97.3 F | HEIGHT: 60 IN | HEART RATE: 89 BPM | SYSTOLIC BLOOD PRESSURE: 120 MMHG | OXYGEN SATURATION: 98 % | WEIGHT: 134 LBS | DIASTOLIC BLOOD PRESSURE: 79 MMHG | RESPIRATION RATE: 17 BRPM

## 2019-01-30 PROCEDURE — 99244 OFF/OP CNSLTJ NEW/EST MOD 40: CPT

## 2019-01-30 NOTE — ASSESSMENT
[FreeTextEntry1] : Patient with history of lung cancer treated with chemotherapy and now with no evidence of recurrent disease. She is status post a bout of pneumonia about 6 weeks ago treated with antibiotics and steroids. She does have underlying COPD.\par The patient has history of gastritis and irritable bowel but over the past 2 week she has had epigastric pain, gassiness, and some irregular bowel movements. Patient will be treated with a probiotic and fiber supplements. She will be given a proton pump inhibitor to take daily for her epigastric pain and gastritis.\par Patient is scheduled to undergo an abdominal sonogram. If the pain persists, she will need an upper endoscopy. She should also have a screening colonoscopy. FOBT will be sent to the lab.

## 2019-01-30 NOTE — REVIEW OF SYSTEMS
[Feeling Poorly] : feeling poorly [Feeling Tired] : feeling tired [Shortness Of Breath] : shortness of breath [Cough] : cough [SOB on Exertion] : shortness of breath during exertion [As Noted in HPI] : as noted in HPI [Negative] : Heme/Lymph

## 2019-01-30 NOTE — HISTORY OF PRESENT ILLNESS
[FreeTextEntry1] : Patient is a 63-year-old female referred for evaluation of gastrointestinal symptoms. Patient does have a history of gastritis and complains of gassiness. Last week she had a bout of constipation followed by diarrhea and vomiting. The symptoms lasted less than 24 hours. Her normal bowel movement pattern is alternating loose bowel movements with constipation. The patient continues to have epigastric pain with burning at times in gassiness.\par She was treated for a bout of pneumonia about 6 weeks ago. Her last CAT scan revealed improvement of the pneumonia.\par Patient does have a history of lung cancer with a left upper lobectomy. This was treated with subsequent chemotherapy and at this point has no evidence of recurrent disease.

## 2019-01-30 NOTE — PHYSICAL EXAM
[General Appearance - Alert] : alert [General Appearance - In No Acute Distress] : in no acute distress [Sclera] : the sclera and conjunctiva were normal [PERRL With Normal Accommodation] : pupils were equal in size, round, and reactive to light [Extraocular Movements] : extraocular movements were intact [Outer Ear] : the ears and nose were normal in appearance [Oropharynx] : the oropharynx was normal [Neck Appearance] : the appearance of the neck was normal [Neck Cervical Mass (___cm)] : no neck mass was observed [Jugular Venous Distention Increased] : there was no jugular-venous distention [Thyroid Diffuse Enlargement] : the thyroid was not enlarged [Thyroid Nodule] : there were no palpable thyroid nodules [Auscultation Breath Sounds / Voice Sounds] : lungs were clear to auscultation bilaterally [FreeTextEntry1] : D [Heart Rate And Rhythm] : heart rate was normal and rhythm regular [Heart Sounds] : normal S1 and S2 [Heart Sounds Gallop] : no gallops [Murmurs] : no murmurs [Heart Sounds Pericardial Friction Rub] : no pericardial rub [Bowel Sounds] : normal bowel sounds [Abdomen Soft] : soft [Abdomen Tenderness] : non-tender [] : no hepato-splenomegaly [Abdomen Mass (___ Cm)] : no abdominal mass palpated [No CVA Tenderness] : no ~M costovertebral angle tenderness [No Spinal Tenderness] : no spinal tenderness [Abnormal Walk] : normal gait [Nail Clubbing] : no clubbing  or cyanosis of the fingernails [Musculoskeletal - Swelling] : no joint swelling seen [Motor Tone] : muscle strength and tone were normal [Oriented To Time, Place, And Person] : oriented to person, place, and time [Impaired Insight] : insight and judgment were intact [Affect] : the affect was normal

## 2019-02-04 ENCOUNTER — FORM ENCOUNTER (OUTPATIENT)
Age: 64
End: 2019-02-04

## 2019-02-05 ENCOUNTER — OUTPATIENT (OUTPATIENT)
Dept: OUTPATIENT SERVICES | Facility: HOSPITAL | Age: 64
LOS: 1 days | End: 2019-02-05
Payer: COMMERCIAL

## 2019-02-05 ENCOUNTER — MEDICATION RENEWAL (OUTPATIENT)
Age: 64
End: 2019-02-05

## 2019-02-05 ENCOUNTER — APPOINTMENT (OUTPATIENT)
Dept: ULTRASOUND IMAGING | Facility: CLINIC | Age: 64
End: 2019-02-05
Payer: COMMERCIAL

## 2019-02-05 DIAGNOSIS — Z90.2 ACQUIRED ABSENCE OF LUNG [PART OF]: Chronic | ICD-10-CM

## 2019-02-05 DIAGNOSIS — D36.9 BENIGN NEOPLASM, UNSPECIFIED SITE: Chronic | ICD-10-CM

## 2019-02-05 DIAGNOSIS — Z98.89 OTHER SPECIFIED POSTPROCEDURAL STATES: Chronic | ICD-10-CM

## 2019-02-05 DIAGNOSIS — R10.13 EPIGASTRIC PAIN: ICD-10-CM

## 2019-02-05 PROCEDURE — 76700 US EXAM ABDOM COMPLETE: CPT | Mod: 26

## 2019-02-05 PROCEDURE — 76700 US EXAM ABDOM COMPLETE: CPT

## 2019-02-13 ENCOUNTER — APPOINTMENT (OUTPATIENT)
Dept: PULMONOLOGY | Facility: CLINIC | Age: 64
End: 2019-02-13

## 2019-03-20 ENCOUNTER — APPOINTMENT (OUTPATIENT)
Dept: PULMONOLOGY | Facility: CLINIC | Age: 64
End: 2019-03-20

## 2019-04-02 ENCOUNTER — APPOINTMENT (OUTPATIENT)
Dept: PULMONOLOGY | Facility: CLINIC | Age: 64
End: 2019-04-02
Payer: COMMERCIAL

## 2019-04-02 VITALS
HEART RATE: 76 BPM | OXYGEN SATURATION: 97 % | WEIGHT: 134 LBS | BODY MASS INDEX: 26.31 KG/M2 | HEIGHT: 60 IN | RESPIRATION RATE: 16 BRPM | SYSTOLIC BLOOD PRESSURE: 124 MMHG | DIASTOLIC BLOOD PRESSURE: 80 MMHG

## 2019-04-02 DIAGNOSIS — J44.1 CHRONIC OBSTRUCTIVE PULMONARY DISEASE WITH (ACUTE) EXACERBATION: ICD-10-CM

## 2019-04-02 DIAGNOSIS — R06.02 SHORTNESS OF BREATH: ICD-10-CM

## 2019-04-02 PROCEDURE — 99214 OFFICE O/P EST MOD 30 MIN: CPT

## 2019-04-02 PROCEDURE — 71046 X-RAY EXAM CHEST 2 VIEWS: CPT

## 2019-04-02 NOTE — ASSESSMENT
[FreeTextEntry1] : The plan is as follows: \par \par Her SOB is multifactorial:\par -COPD/ emphysema w/ current exacerbation\par -restrictive lung disease\par -poor mechanics of breathing \par -out of shape \par -cardiac disease\par \par #1. s/p URI\par -finished course of zithromax 500 mg PO daily x 5 days\par \par #2.COPD/emphysema exacerbation\par -continue trelegy 1 puff daily rinse and gargle\par -add Qvar 80 2 puffs am and pm rinse and gargle- she does not tolerate steroids due to insomnia\par -add nebulizer with albuterol BID-TID PRN SOB. Take treatment before dinner to decrease jittery feeling prior to bed- discussed need for compliance with this. \par -Inhaler technique reviewed as well as oral hygiene techniques reviewed with patient. Avoidance of cold air, extremes of temperature, rescue inhaler should be used before exercise. Order of medication reviewed with patient. \par \par #3. poor breathing mechanics\par -Proper breathing techniques were reviewed with an emphasis of exhalation. Patient instructed to breath in for 1 second and out for four seconds. Patient was encouraged to not talk while walking.\par \par #4. restrictive lung disease\par -due to her prior lobectomy\par \par #5. non small cell stage two lung cancer\par -f/u with Dr. Pugh\par -CT scan follow up 6 months from last/discuss with Dr. Arora at June follow up\par \par #6.gas pain\par -otc simethicone\par \par #7.GERD- persistent symptoms despite PPI\par -continue pantoprazole 40 mg daily\par -add ranitidine 300 mg PO QHS\par \par Follow up in June with Dr. Arora.\par The patient was encouraged to call with any changes, concerns, or questions.\par She was also advised to give me an update in 1-2 weeks to discuss progress. She was agreeable.

## 2019-04-02 NOTE — HISTORY OF PRESENT ILLNESS
[FreeTextEntry1] : Ms. Andrea is a 63 year old female with a history of adenocarcinoma of left lung, TORRES, COPD, lung mass, poor sleep, pulmonary emphysema, tobacco use and SOB  presenting to the office today for an acute  visit. Her chief complaint is SOB.\par \par Pt reports she had been feeling well until she came back from a vacation. Her symptoms of shortness of breath and feeling winded occurred in early march. She feels that she is not improving. She is not worse but not finding relief from her inhaler.  She reports that she was wheezing upon return and feels breathy with exertion, long conversations and has chest heaviness. She finished zithromax as was prescribed by Dr. Arora. She also was given a pred taper but she could not tolerate it due to her severe insomnia.  She was only able to get 2 days of it in. \par \par She admits to gas pains, severe heartburn for which she was recently started on a PPI with some improvement but not complete improvement. \par \par She has not been nebulizing- she has the meds at home.  She is taking her trelegy daily.\par \par She states she is not waking up at night from feeling sick or from SOB. Her symptoms are mainly during the day. \par \par She reports headaches on and off. She admits that she could drink more water. She is also on a new antidepressant- unknown name. She otherwise denies fever, chills, cough, mucus, sore throat, ear pain, chest pain, her nausea from reflux has improved and is no longer vomiting from her reflux. She denies rashes, muscle cramps.\par \par -she denies any headaches, nausea, vomiting,  sweats, chest pain, chest pressure, diarrhea, constipation, dysphagia, dizziness, leg swelling, leg pain, itchy eyes, itchy ears, heartburn, reflux, or sour taste in the mouth, or issues with cough at night.

## 2019-04-02 NOTE — REVIEW OF SYSTEMS
[Fatigue] : fatigue [Nasal Congestion] : nasal congestion [As Noted in HPI] : as noted in HPI [Cough] : no cough [Sputum] : not coughing up ~M sputum [Dyspnea] : dyspnea [Chest Tightness] : chest tightness [Wheezing] : no wheezing [Reflux] : reflux [Indigestion] : indigestion [Abdominal Pain] : abdominal pain [Negative] : Pulmonary Hypertension [de-identified] : hx of insomnia and poor sleep hx

## 2019-04-02 NOTE — PHYSICAL EXAM
[General Appearance - Well Developed] : well developed [Normal Appearance] : normal appearance [Well Groomed] : well groomed [General Appearance - Well Nourished] : well nourished [No Deformities] : no deformities [General Appearance - In No Acute Distress] : no acute distress [Normal Conjunctiva] : the conjunctiva exhibited no abnormalities [Eyelids - No Xanthelasma] : the eyelids demonstrated no xanthelasmas [Normal Oropharynx] : normal oropharynx [II] : II [Neck Appearance] : the appearance of the neck was normal [Heart Rate And Rhythm] : heart rate and rhythm were normal [Heart Sounds] : normal S1 and S2 [Murmurs] : no murmurs present [Arterial Pulses Normal] : the arterial pulses were normal [Edema] : no peripheral edema present [Respiration, Rhythm And Depth] : normal respiratory rhythm and effort [Exaggerated Use Of Accessory Muscles For Inspiration] : no accessory muscle use [Auscultation Breath Sounds / Voice Sounds] : lungs were clear to auscultation bilaterally [Abdomen Soft] : soft [Abdomen Tenderness] : non-tender [Abdomen Mass (___ Cm)] : no abdominal mass palpated [Abnormal Walk] : normal gait [Gait - Sufficient For Exercise Testing] : the gait was sufficient for exercise testing [Nail Clubbing] : no clubbing of the fingernails [Cyanosis, Localized] : no localized cyanosis [Petechial Hemorrhages (___cm)] : no petechial hemorrhages [Skin Color & Pigmentation] : normal skin color and pigmentation [] : no rash [No Venous Stasis] : no venous stasis [Deep Tendon Reflexes (DTR)] : deep tendon reflexes were 2+ and symmetric [Sensation] : the sensory exam was normal to light touch and pinprick [No Focal Deficits] : no focal deficits [Oriented To Time, Place, And Person] : oriented to person, place, and time [Impaired Insight] : insight and judgment were intact [Affect] : the affect was normal [Mood] : the mood was normal

## 2019-04-02 NOTE — PROCEDURE
[FreeTextEntry1] : CXR: Interpreted by \par -CXR reveals normal sized heart, s/p left lobectomy no evidence of infiltrate or effusion

## 2019-06-06 ENCOUNTER — APPOINTMENT (OUTPATIENT)
Dept: PULMONOLOGY | Facility: CLINIC | Age: 64
End: 2019-06-06
Payer: SELF-PAY

## 2019-06-06 PROCEDURE — SNS01: CPT

## 2019-06-27 ENCOUNTER — APPOINTMENT (OUTPATIENT)
Dept: PULMONOLOGY | Facility: CLINIC | Age: 64
End: 2019-06-27
Payer: COMMERCIAL

## 2019-06-27 VITALS
BODY MASS INDEX: 26.11 KG/M2 | SYSTOLIC BLOOD PRESSURE: 120 MMHG | HEIGHT: 60 IN | DIASTOLIC BLOOD PRESSURE: 80 MMHG | RESPIRATION RATE: 15 BRPM | WEIGHT: 133 LBS | OXYGEN SATURATION: 98 % | HEART RATE: 79 BPM

## 2019-06-27 DIAGNOSIS — R06.09 OTHER FORMS OF DYSPNEA: ICD-10-CM

## 2019-06-27 DIAGNOSIS — K21.9 GASTRO-ESOPHAGEAL REFLUX DISEASE W/OUT ESOPHAGITIS: ICD-10-CM

## 2019-06-27 PROCEDURE — 99214 OFFICE O/P EST MOD 30 MIN: CPT

## 2019-06-27 NOTE — PHYSICAL EXAM
[General Appearance - Well Developed] : well developed [Normal Appearance] : normal appearance [Well Groomed] : well groomed [General Appearance - Well Nourished] : well nourished [No Deformities] : no deformities [General Appearance - In No Acute Distress] : no acute distress [Normal Conjunctiva] : the conjunctiva exhibited no abnormalities [Eyelids - No Xanthelasma] : the eyelids demonstrated no xanthelasmas [II] : II [Heart Rate And Rhythm] : heart rate and rhythm were normal [Neck Appearance] : the appearance of the neck was normal [Heart Sounds] : normal S1 and S2 [Murmurs] : no murmurs present [Arterial Pulses Normal] : the arterial pulses were normal [Edema] : no peripheral edema present [Exaggerated Use Of Accessory Muscles For Inspiration] : no accessory muscle use [Respiration, Rhythm And Depth] : normal respiratory rhythm and effort [Auscultation Breath Sounds / Voice Sounds] : lungs were clear to auscultation bilaterally [Abdomen Soft] : soft [Abnormal Walk] : normal gait [Gait - Sufficient For Exercise Testing] : the gait was sufficient for exercise testing [Cyanosis, Localized] : no localized cyanosis [Nail Clubbing] : no clubbing of the fingernails [Skin Color & Pigmentation] : normal skin color and pigmentation [Oriented To Time, Place, And Person] : oriented to person, place, and time [No Focal Deficits] : no focal deficits [Impaired Insight] : insight and judgment were intact [Affect] : the affect was normal [Mood] : the mood was normal [FreeTextEntry1] : scant white patches on tongue, irritated tongue, dry mouth, corners of mouth cracking [] : the neck was supple [Jugular Venous Distention Increased] : there was no jugular-venous distention [Neck Cervical Mass (___cm)] : no neck mass was observed

## 2019-06-27 NOTE — HISTORY OF PRESENT ILLNESS
[FreeTextEntry1] : Ms. Andrea is a 63 year old female with a history of adenocarcinoma of left lung, TORRES, COPD, lung mass, poor sleep, pulmonary emphysema, tobacco use and SOB  presenting to the office today for an acute  visit. Her chief complaint is SOB.\par \par Pt reports she had been feeling well until a few weeks ago. She has recently felt more short of breath with exertion/exercise. She admits she is non compliant with her inhalers- uses is sporadically. She also admits that she has poor inhaler hygiene. She has run out of her inhalers vs cannot find them. Her SOB is worst with taking the stairs or when she exercises. She reports "pushing through" and doing every thing despite how she is feeling.\par \par Aside from that, pt has also been experiencing sore sore throat and her mouth has been hurting her. She has seen some white patches on her tongue as well. She had some recent dental work and she still has planned dental work for implants.\par \par She reports dealing with some gas as well and has been using gas x. She was on probiotics at one point for this but has discontinued. \par \par She has not been nebulizing- she has the meds at home.  She takes trelegy when she remembers to.\par \par She states she is not waking up at night from feeling sick or from SOB. Her symptoms are mainly during the day. \par \par  She otherwise denies fever, chills, cough, mucus, sore throat, ear pain, chest pain, her nausea from reflux has improved and is no longer vomiting from her reflux. She reports no GERD symptoms. She denies rashes, muscle cramps. \par \par She quit smoking 3 years ago.

## 2019-06-27 NOTE — ASSESSMENT
[FreeTextEntry1] : The plan is as follows: \par \par Her SOB is multifactorial:\par -COPD/ emphysema w/ current exacerbation\par -restrictive lung disease\par -poor mechanics of breathing \par -out of shape \par -cardiac disease\par \par #1.COPD/emphysema active due to non compliance on meds\par -add nebulizer with albuterol BID-TID PRN SOB. Take treatment before dinner to decrease jittery feeling prior to bed- discussed need for compliance with this. \par -restart trelegy 1 puff daily rinse and gargle\par -add Qvar 80 2 puffs am and pm rinse and gargle if not enough improvement in 1 week\par -Inhaler technique reviewed as well as oral hygiene techniques reviewed with patient. Avoidance of cold air, extremes of temperature, rescue inhaler should be used before exercise. Order of medication reviewed with patient. \par \par #2. poor breathing mechanics\par -Proper breathing techniques were reviewed with an emphasis of exhalation. Patient instructed to breath in for 1 second and out for four seconds. Patient was encouraged to not talk while walking.\par \par #3. restrictive lung disease\par -due to her prior lobectomy\par \par #4.Oral thrush\par -nystatin swish and swallow BID x 7-10 days\par -discussed inhaler hygiene with her in detail\par \par #5. non small cell stage two lung cancer\par -f/u with Dr. Pugh\par -CT scan follow up 6 months from last- due now\par \par #6.gas pain\par -otc simethicone, walking, fiber needed, hydrate\par -add on probiotics\par \par #7.GERD- reports controlled\par -continue pantoprazole 40 mg daily\par -add ranitidine 300 mg PO QHS PRN if needed\par \par Follow up in 3 months with [facility]\par The patient was encouraged to call with any changes, concerns, or questions.\par She was also advised to give me an update in 1-2 weeks to discuss progress. She was agreeable.

## 2019-06-27 NOTE — REVIEW OF SYSTEMS
[Fatigue] : fatigue [Dyspnea] : dyspnea [Cough] : no cough [Sputum] : not coughing up ~M sputum [Wheezing] : no wheezing [As Noted in HPI] : as noted in HPI [Heartburn] : no heartburn [Indigestion] : no indigestion [Reflux] : no reflux [Constipation] : no constipation [Abdominal Pain] : no abdominal pain [FreeTextEntry7] : bloating [de-identified] : hx of insomnia and poor sleep hx [Negative] : HEENT

## 2019-08-29 ENCOUNTER — OUTPATIENT (OUTPATIENT)
Dept: OUTPATIENT SERVICES | Facility: HOSPITAL | Age: 64
LOS: 1 days | Discharge: HOME | End: 2019-08-29

## 2019-08-29 DIAGNOSIS — D36.9 BENIGN NEOPLASM, UNSPECIFIED SITE: Chronic | ICD-10-CM

## 2019-08-29 DIAGNOSIS — Z90.2 ACQUIRED ABSENCE OF LUNG [PART OF]: Chronic | ICD-10-CM

## 2019-08-29 DIAGNOSIS — Z98.89 OTHER SPECIFIED POSTPROCEDURAL STATES: Chronic | ICD-10-CM

## 2019-08-29 DIAGNOSIS — C34.92 MALIGNANT NEOPLASM OF UNSPECIFIED PART OF LEFT BRONCHUS OR LUNG: ICD-10-CM

## 2019-09-03 ENCOUNTER — RESULT REVIEW (OUTPATIENT)
Age: 64
End: 2019-09-03

## 2019-09-03 ENCOUNTER — APPOINTMENT (OUTPATIENT)
Dept: HEMATOLOGY ONCOLOGY | Facility: CLINIC | Age: 64
End: 2019-09-03
Payer: COMMERCIAL

## 2019-09-03 VITALS
WEIGHT: 134.48 LBS | SYSTOLIC BLOOD PRESSURE: 118 MMHG | OXYGEN SATURATION: 98 % | TEMPERATURE: 98.8 F | BODY MASS INDEX: 26.26 KG/M2 | HEART RATE: 80 BPM | DIASTOLIC BLOOD PRESSURE: 79 MMHG | RESPIRATION RATE: 18 BRPM

## 2019-09-03 LAB
BASOPHILS # BLD AUTO: 0.1 K/UL — SIGNIFICANT CHANGE UP (ref 0–0.2)
BASOPHILS NFR BLD AUTO: 0.8 % — SIGNIFICANT CHANGE UP (ref 0–2)
EOSINOPHIL # BLD AUTO: 0.1 K/UL — SIGNIFICANT CHANGE UP (ref 0–0.5)
EOSINOPHIL NFR BLD AUTO: 0.7 % — SIGNIFICANT CHANGE UP (ref 0–6)
HCT VFR BLD CALC: 28.8 % — LOW (ref 34.5–45)
HGB BLD-MCNC: 9 G/DL — LOW (ref 11.5–15.5)
LYMPHOCYTES # BLD AUTO: 2.2 K/UL — SIGNIFICANT CHANGE UP (ref 1–3.3)
LYMPHOCYTES # BLD AUTO: 25.5 % — SIGNIFICANT CHANGE UP (ref 13–44)
MCHC RBC-ENTMCNC: 21.6 PG — LOW (ref 27–34)
MCHC RBC-ENTMCNC: 31.3 G/DL — LOW (ref 32–36)
MCV RBC AUTO: 69.2 FL — LOW (ref 80–100)
MONOCYTES # BLD AUTO: 0.6 K/UL — SIGNIFICANT CHANGE UP (ref 0–0.9)
MONOCYTES NFR BLD AUTO: 7.4 % — SIGNIFICANT CHANGE UP (ref 2–14)
NEUTROPHILS # BLD AUTO: 5.6 K/UL — SIGNIFICANT CHANGE UP (ref 1.8–7.4)
NEUTROPHILS NFR BLD AUTO: 65.5 % — SIGNIFICANT CHANGE UP (ref 43–77)
PLATELET # BLD AUTO: 439 K/UL — HIGH (ref 150–400)
RBC # BLD: 4.16 M/UL — SIGNIFICANT CHANGE UP (ref 3.8–5.2)
RBC # FLD: 17.8 % — HIGH (ref 10.3–14.5)
WBC # BLD: 8.6 K/UL — SIGNIFICANT CHANGE UP (ref 3.8–10.5)
WBC # FLD AUTO: 8.6 K/UL — SIGNIFICANT CHANGE UP (ref 3.8–10.5)

## 2019-09-03 PROCEDURE — 99214 OFFICE O/P EST MOD 30 MIN: CPT

## 2019-09-03 NOTE — HISTORY OF PRESENT ILLNESS
[Disease: _____________________] : Disease: [unfilled] [AJCC Stage: ____] : AJCC Stage: [unfilled] [de-identified] : MS. Andrea is a 62 yo woman who is s/p resection of stage II lung cancer followed by adjuvant chemo with cisplatin and pemetrexed x 4 cycles which completed in June of 2016. \par Was enrolled in ALCHEMIST, EGFR and ALK testing of tumor- tumor WT for both. Was offered adjuvant nivolumab as part of the study but declined. \par Surveillance scans showed YASMEEN. She just had a CT scan on 11/15 which showed GGO and tree-in-bud findings b/l upper lobes suggestive of PNA and endobronchial spread of infection. She was just seen by Dr. Arora for lower resp tract symptoms and tx with abx (azithromycin), and steroids (currently at 20 mg daily). She is feeling a little better. She still has dry cough. No fever, chills, rigors. \par \par 9/3/19: Persistent dyspnea. Hurt her back after rowing last week. She otherwise denies fever, chills, cough, mucus, sore throat, ear pain, chest pain, her nausea from reflux has improved and is no longer vomiting from her reflux. She reports no GERD symptoms. She is no longer smoking.  [de-identified] : adenoca

## 2019-09-03 NOTE — ASSESSMENT
[FreeTextEntry1] : 65 yo woman with recently diagnosed stage IIA lung ca s/p LLobectomy .\par Completed 4 cycles of cis/gem in June 2016- did great and tolerated well. \par Surveillance CTs all CTs show YASMEEN. However, most recent scan from last year showed some inflammation. She has not had any scans since then. \par She has continued to f/u with  Dr. Arora.  \par Cancer screening protocols for breast and colon per primary care\par OV after next CT scan in 1 month [Curative] : Goals of care discussed with patient: Curative

## 2019-09-08 ENCOUNTER — TRANSCRIPTION ENCOUNTER (OUTPATIENT)
Age: 64
End: 2019-09-08

## 2019-09-16 LAB
ALBUMIN SERPL ELPH-MCNC: 4.5 G/DL
ALP BLD-CCNC: 89 U/L
ALT SERPL-CCNC: 18 U/L
ANION GAP SERPL CALC-SCNC: 17 MMOL/L
AST SERPL-CCNC: 24 U/L
BILIRUB SERPL-MCNC: <0.2 MG/DL
BUN SERPL-MCNC: 24 MG/DL
CALCIUM SERPL-MCNC: 9.7 MG/DL
CEA SERPL-MCNC: 3.7 NG/ML
CHLORIDE SERPL-SCNC: 105 MMOL/L
CO2 SERPL-SCNC: 22 MMOL/L
CREAT SERPL-MCNC: 0.9 MG/DL
GLUCOSE SERPL-MCNC: 101 MG/DL
POTASSIUM SERPL-SCNC: 4.9 MMOL/L
PROT SERPL-MCNC: 7.3 G/DL
SODIUM SERPL-SCNC: 144 MMOL/L

## 2019-09-18 ENCOUNTER — FORM ENCOUNTER (OUTPATIENT)
Age: 64
End: 2019-09-18

## 2019-09-19 ENCOUNTER — OUTPATIENT (OUTPATIENT)
Dept: OUTPATIENT SERVICES | Facility: HOSPITAL | Age: 64
LOS: 1 days | End: 2019-09-19
Payer: COMMERCIAL

## 2019-09-19 ENCOUNTER — APPOINTMENT (OUTPATIENT)
Dept: CT IMAGING | Facility: IMAGING CENTER | Age: 64
End: 2019-09-19
Payer: COMMERCIAL

## 2019-09-19 DIAGNOSIS — D36.9 BENIGN NEOPLASM, UNSPECIFIED SITE: Chronic | ICD-10-CM

## 2019-09-19 DIAGNOSIS — Z90.2 ACQUIRED ABSENCE OF LUNG [PART OF]: Chronic | ICD-10-CM

## 2019-09-19 DIAGNOSIS — Z98.89 OTHER SPECIFIED POSTPROCEDURAL STATES: Chronic | ICD-10-CM

## 2019-09-19 DIAGNOSIS — C34.92 MALIGNANT NEOPLASM OF UNSPECIFIED PART OF LEFT BRONCHUS OR LUNG: ICD-10-CM

## 2019-09-19 PROCEDURE — 71250 CT THORAX DX C-: CPT | Mod: 26

## 2019-09-19 PROCEDURE — 71250 CT THORAX DX C-: CPT

## 2019-09-27 ENCOUNTER — OUTPATIENT (OUTPATIENT)
Dept: OUTPATIENT SERVICES | Facility: HOSPITAL | Age: 64
LOS: 1 days | Discharge: HOME | End: 2019-09-27

## 2019-09-27 DIAGNOSIS — Z90.2 ACQUIRED ABSENCE OF LUNG [PART OF]: Chronic | ICD-10-CM

## 2019-09-27 DIAGNOSIS — Z98.89 OTHER SPECIFIED POSTPROCEDURAL STATES: Chronic | ICD-10-CM

## 2019-09-27 DIAGNOSIS — C34.92 MALIGNANT NEOPLASM OF UNSPECIFIED PART OF LEFT BRONCHUS OR LUNG: ICD-10-CM

## 2019-09-27 DIAGNOSIS — D50.9 IRON DEFICIENCY ANEMIA, UNSPECIFIED: ICD-10-CM

## 2019-09-27 DIAGNOSIS — D36.9 BENIGN NEOPLASM, UNSPECIFIED SITE: Chronic | ICD-10-CM

## 2019-09-29 ENCOUNTER — FORM ENCOUNTER (OUTPATIENT)
Age: 64
End: 2019-09-29

## 2019-09-30 ENCOUNTER — OUTPATIENT (OUTPATIENT)
Dept: OUTPATIENT SERVICES | Facility: HOSPITAL | Age: 64
LOS: 1 days | End: 2019-09-30
Payer: COMMERCIAL

## 2019-09-30 ENCOUNTER — APPOINTMENT (OUTPATIENT)
Dept: CT IMAGING | Facility: IMAGING CENTER | Age: 64
End: 2019-09-30
Payer: COMMERCIAL

## 2019-09-30 DIAGNOSIS — D36.9 BENIGN NEOPLASM, UNSPECIFIED SITE: Chronic | ICD-10-CM

## 2019-09-30 DIAGNOSIS — Z98.89 OTHER SPECIFIED POSTPROCEDURAL STATES: Chronic | ICD-10-CM

## 2019-09-30 DIAGNOSIS — Z90.2 ACQUIRED ABSENCE OF LUNG [PART OF]: Chronic | ICD-10-CM

## 2019-09-30 DIAGNOSIS — C34.92 MALIGNANT NEOPLASM OF UNSPECIFIED PART OF LEFT BRONCHUS OR LUNG: ICD-10-CM

## 2019-09-30 PROCEDURE — 74150 CT ABDOMEN W/O CONTRAST: CPT | Mod: 26

## 2019-09-30 PROCEDURE — 74150 CT ABDOMEN W/O CONTRAST: CPT

## 2019-10-01 ENCOUNTER — APPOINTMENT (OUTPATIENT)
Dept: HEMATOLOGY ONCOLOGY | Facility: CLINIC | Age: 64
End: 2019-10-01
Payer: COMMERCIAL

## 2019-10-01 VITALS
OXYGEN SATURATION: 99 % | BODY MASS INDEX: 26.48 KG/M2 | HEART RATE: 73 BPM | WEIGHT: 135.56 LBS | RESPIRATION RATE: 16 BRPM | SYSTOLIC BLOOD PRESSURE: 122 MMHG | TEMPERATURE: 98.3 F | DIASTOLIC BLOOD PRESSURE: 79 MMHG

## 2019-10-01 PROCEDURE — 99214 OFFICE O/P EST MOD 30 MIN: CPT

## 2019-10-01 NOTE — REVIEW OF SYSTEMS
[Shortness Of Breath] : shortness of breath [Fatigue] : fatigue [Cough] : cough [SOB on Exertion] : no shortness of breath during exertion [FreeTextEntry7] : bloating [Negative] : Allergic/Immunologic

## 2019-10-01 NOTE — ASSESSMENT
[FreeTextEntry1] : 65 yo woman with recently diagnosed stage IIA lung ca s/p LLobectomy .\par Completed 4 cycles of cis/gem in June 2016- did great and tolerated well. \par Surveillance CTs all CTs show YASMEEN. \par She has continued to f/u with  Dr. Arora.  \par Recommend GI referral\par Cancer screening protocols for breast and colon per primary care\par OV 1 year [Curative] : Goals of care discussed with patient: Curative

## 2019-10-01 NOTE — HISTORY OF PRESENT ILLNESS
[Disease: _____________________] : Disease: [unfilled] [AJCC Stage: ____] : AJCC Stage: [unfilled] [de-identified] : adenoca [de-identified] : MS. Andrea is a 62 yo woman who is s/p resection of stage II lung cancer followed by adjuvant chemo with cisplatin and pemetrexed x 4 cycles which completed in June of 2016. \par Was enrolled in ALCHEMIST, EGFR and ALK testing of tumor- tumor WT for both. Was offered adjuvant nivolumab as part of the study but declined. \par Surveillance scans showed YASMEEN. She just had a CT scan on 11/15 which showed GGO and tree-in-bud findings b/l upper lobes suggestive of PNA and endobronchial spread of infection. She was just seen by Dr. Arora for lower resp tract symptoms and tx with abx (azithromycin), and steroids (currently at 20 mg daily). She is feeling a little better. She still has dry cough. No fever, chills, rigors. \par \par 9/3/19: Persistent dyspnea. Hurt her back after rowing last week. She otherwise denies fever, chills, cough, mucus, sore throat, ear pain, chest pain, her nausea from reflux has improved and is no longer vomiting from her reflux. She reports no GERD symptoms. She is no longer smoking.  \par \par 10/1/19: Pt returns for follow up for discussion following scans. Pt complains of abdominal bloating and back discomfort. No other complaints. Remaining ROS unremarkable

## 2019-10-03 ENCOUNTER — APPOINTMENT (OUTPATIENT)
Dept: GASTROENTEROLOGY | Facility: CLINIC | Age: 64
End: 2019-10-03
Payer: COMMERCIAL

## 2019-10-03 ENCOUNTER — LABORATORY RESULT (OUTPATIENT)
Age: 64
End: 2019-10-03

## 2019-10-03 VITALS
HEART RATE: 71 BPM | DIASTOLIC BLOOD PRESSURE: 76 MMHG | HEIGHT: 60 IN | WEIGHT: 136 LBS | BODY MASS INDEX: 26.7 KG/M2 | SYSTOLIC BLOOD PRESSURE: 126 MMHG

## 2019-10-03 DIAGNOSIS — Z12.11 ENCOUNTER FOR SCREENING FOR MALIGNANT NEOPLASM OF COLON: ICD-10-CM

## 2019-10-03 DIAGNOSIS — R10.13 EPIGASTRIC PAIN: ICD-10-CM

## 2019-10-03 DIAGNOSIS — K59.00 CONSTIPATION, UNSPECIFIED: ICD-10-CM

## 2019-10-03 PROCEDURE — 36415 COLL VENOUS BLD VENIPUNCTURE: CPT

## 2019-10-03 PROCEDURE — 99244 OFF/OP CNSLTJ NEW/EST MOD 40: CPT | Mod: 25

## 2019-10-03 PROCEDURE — 82274 ASSAY TEST FOR BLOOD FECAL: CPT | Mod: QW

## 2019-10-03 RX ORDER — LACTOBACILLUS RHAMNOSUS GG 10B CELL
CAPSULE ORAL
Qty: 30 | Refills: 0 | Status: DISCONTINUED | OUTPATIENT
Start: 2019-01-30 | End: 2019-10-03

## 2019-10-03 RX ORDER — FLUCONAZOLE 100 MG/1
100 TABLET ORAL
Qty: 14 | Refills: 0 | Status: DISCONTINUED | COMMUNITY
Start: 2018-03-21 | End: 2019-10-03

## 2019-10-03 RX ORDER — DULOXETINE HYDROCHLORIDE 20 MG/1
20 CAPSULE, DELAYED RELEASE PELLETS ORAL
Qty: 25 | Refills: 0 | Status: DISCONTINUED | COMMUNITY
Start: 2018-08-03 | End: 2019-10-03

## 2019-10-03 RX ORDER — NYSTATIN 100000 [USP'U]/ML
100000 SUSPENSION ORAL
Qty: 360 | Refills: 0 | Status: DISCONTINUED | COMMUNITY
Start: 2019-06-27 | End: 2019-10-03

## 2019-10-03 RX ORDER — PERMETHRIN 50 MG/G
5 CREAM TOPICAL
Qty: 60 | Refills: 0 | Status: DISCONTINUED | COMMUNITY
Start: 2016-12-26 | End: 2019-10-03

## 2019-10-03 RX ORDER — SERTRALINE HYDROCHLORIDE 25 MG/1
TABLET, FILM COATED ORAL
Refills: 0 | Status: DISCONTINUED | COMMUNITY
End: 2019-10-03

## 2019-10-03 RX ORDER — METHYLCELLULOSE 2 G/10.2G
POWDER, FOR SOLUTION ORAL
Qty: 1 | Refills: 0 | Status: DISCONTINUED | OUTPATIENT
Start: 2019-01-30 | End: 2019-10-03

## 2019-10-03 RX ORDER — PREDNISONE 10 MG/1
10 TABLET ORAL DAILY
Qty: 40 | Refills: 0 | Status: DISCONTINUED | COMMUNITY
Start: 2018-11-12 | End: 2019-10-03

## 2019-10-03 RX ORDER — ESCITALOPRAM OXALATE 10 MG/1
10 TABLET ORAL
Qty: 30 | Refills: 0 | Status: DISCONTINUED | COMMUNITY
Start: 2018-04-26 | End: 2019-10-03

## 2019-10-03 RX ORDER — SERTRALINE HYDROCHLORIDE 100 MG/1
100 TABLET, FILM COATED ORAL
Refills: 0 | Status: ACTIVE | COMMUNITY

## 2019-10-03 NOTE — REVIEW OF SYSTEMS
[Constipation] : constipation [Abdominal Pain] : abdominal pain [Diarrhea] : diarrhea [Sleep Disturbances] : sleep disturbances [Negative] : Heme/Lymph

## 2019-10-04 ENCOUNTER — EMERGENCY (EMERGENCY)
Facility: HOSPITAL | Age: 64
LOS: 1 days | Discharge: HOME | End: 2019-10-04
Attending: STUDENT IN AN ORGANIZED HEALTH CARE EDUCATION/TRAINING PROGRAM | Admitting: EMERGENCY MEDICINE
Payer: COMMERCIAL

## 2019-10-04 VITALS
DIASTOLIC BLOOD PRESSURE: 35 MMHG | RESPIRATION RATE: 16 BRPM | OXYGEN SATURATION: 100 % | SYSTOLIC BLOOD PRESSURE: 113 MMHG | HEART RATE: 72 BPM

## 2019-10-04 VITALS
TEMPERATURE: 97 F | RESPIRATION RATE: 18 BRPM | DIASTOLIC BLOOD PRESSURE: 60 MMHG | OXYGEN SATURATION: 100 % | HEART RATE: 78 BPM | SYSTOLIC BLOOD PRESSURE: 115 MMHG

## 2019-10-04 DIAGNOSIS — Z98.89 OTHER SPECIFIED POSTPROCEDURAL STATES: Chronic | ICD-10-CM

## 2019-10-04 DIAGNOSIS — Z90.2 ACQUIRED ABSENCE OF LUNG [PART OF]: Chronic | ICD-10-CM

## 2019-10-04 DIAGNOSIS — D36.9 BENIGN NEOPLASM, UNSPECIFIED SITE: Chronic | ICD-10-CM

## 2019-10-04 LAB
ALBUMIN SERPL ELPH-MCNC: 4.3 G/DL — SIGNIFICANT CHANGE UP (ref 3.3–5)
ALBUMIN SERPL ELPH-MCNC: 4.6 G/DL
ALP BLD-CCNC: 83 U/L
ALP SERPL-CCNC: 77 U/L — SIGNIFICANT CHANGE UP (ref 40–120)
ALT FLD-CCNC: 17 U/L — SIGNIFICANT CHANGE UP (ref 4–33)
ALT SERPL-CCNC: 19 U/L
AMYLASE/CREAT SERPL: 106 U/L
ANION GAP SERPL CALC-SCNC: 12 MMOL/L
ANION GAP SERPL CALC-SCNC: 14 MMO/L — SIGNIFICANT CHANGE UP (ref 7–14)
ANISOCYTOSIS BLD QL: SIGNIFICANT CHANGE UP
APPEARANCE UR: CLEAR — SIGNIFICANT CHANGE UP
AST SERPL-CCNC: 26 U/L — SIGNIFICANT CHANGE UP (ref 4–32)
AST SERPL-CCNC: 27 U/L
BACTERIA # UR AUTO: SIGNIFICANT CHANGE UP
BASOPHILS # BLD AUTO: 0.05 K/UL — SIGNIFICANT CHANGE UP (ref 0–0.2)
BASOPHILS # BLD AUTO: 0.1 K/UL
BASOPHILS NFR BLD AUTO: 0.3 % — SIGNIFICANT CHANGE UP (ref 0–2)
BASOPHILS NFR BLD AUTO: 1.2 %
BASOPHILS NFR SPEC: 0 % — SIGNIFICANT CHANGE UP (ref 0–2)
BILIRUB DIRECT SERPL-MCNC: 0 MG/DL
BILIRUB SERPL-MCNC: < 0.2 MG/DL — LOW (ref 0.2–1.2)
BILIRUB SERPL-MCNC: <0.2 MG/DL
BILIRUB UR-MCNC: NEGATIVE — SIGNIFICANT CHANGE UP
BLASTS # FLD: 0 % — SIGNIFICANT CHANGE UP (ref 0–0)
BLOOD UR QL VISUAL: NEGATIVE — SIGNIFICANT CHANGE UP
BUN SERPL-MCNC: 18 MG/DL — SIGNIFICANT CHANGE UP (ref 7–23)
BUN SERPL-MCNC: 25 MG/DL
CALCIUM SERPL-MCNC: 9.4 MG/DL — SIGNIFICANT CHANGE UP (ref 8.4–10.5)
CALCIUM SERPL-MCNC: 9.8 MG/DL
CHLORIDE SERPL-SCNC: 103 MMOL/L — SIGNIFICANT CHANGE UP (ref 98–107)
CHLORIDE SERPL-SCNC: 106 MMOL/L
CO2 SERPL-SCNC: 24 MMOL/L — SIGNIFICANT CHANGE UP (ref 22–31)
CO2 SERPL-SCNC: 25 MMOL/L
COLOR SPEC: COLORLESS — SIGNIFICANT CHANGE UP
CREAT SERPL-MCNC: 0.82 MG/DL — SIGNIFICANT CHANGE UP (ref 0.5–1.3)
CREAT SERPL-MCNC: 0.92 MG/DL
CRP SERPL-MCNC: 0.11 MG/DL
EOSINOPHIL # BLD AUTO: 0.01 K/UL — SIGNIFICANT CHANGE UP (ref 0–0.5)
EOSINOPHIL # BLD AUTO: 0.22 K/UL
EOSINOPHIL NFR BLD AUTO: 0.1 % — SIGNIFICANT CHANGE UP (ref 0–6)
EOSINOPHIL NFR BLD AUTO: 2.7 %
EOSINOPHIL NFR FLD: 0 % — SIGNIFICANT CHANGE UP (ref 0–6)
ERYTHROCYTE [SEDIMENTATION RATE] IN BLOOD BY WESTERGREN METHOD: 61 MM/HR
ESTIMATED AVERAGE GLUCOSE: 108 MG/DL
FERRITIN SERPL-MCNC: 6 NG/ML
GGT SERPL-CCNC: 12 U/L
GIANT PLATELETS BLD QL SMEAR: PRESENT — SIGNIFICANT CHANGE UP
GLUCOSE SERPL-MCNC: 100 MG/DL — HIGH (ref 70–99)
GLUCOSE SERPL-MCNC: 98 MG/DL
GLUCOSE UR-MCNC: NEGATIVE — SIGNIFICANT CHANGE UP
HBA1C MFR BLD HPLC: 5.4 %
HCT VFR BLD CALC: 26.7 % — LOW (ref 34.5–45)
HCT VFR BLD CALC: 27.9 %
HGB BLD-MCNC: 7.8 G/DL — LOW (ref 11.5–15.5)
HGB BLD-MCNC: 8.1 G/DL
HYALINE CASTS # UR AUTO: NEGATIVE — SIGNIFICANT CHANGE UP
IMM GRANULOCYTES NFR BLD AUTO: 0.3 % — SIGNIFICANT CHANGE UP (ref 0–1.5)
IMM GRANULOCYTES NFR BLD AUTO: 0.4 %
IRON SATN MFR SERPL: 4 %
IRON SERPL-MCNC: 18 UG/DL
KETONES UR-MCNC: NEGATIVE — SIGNIFICANT CHANGE UP
LEUKOCYTE ESTERASE UR-ACNC: SIGNIFICANT CHANGE UP
LPL SERPL-CCNC: 61 U/L
LYMPHOCYTES # BLD AUTO: 0.81 K/UL — LOW (ref 1–3.3)
LYMPHOCYTES # BLD AUTO: 2.21 K/UL
LYMPHOCYTES # BLD AUTO: 5.5 % — LOW (ref 13–44)
LYMPHOCYTES NFR BLD AUTO: 27.2 %
LYMPHOCYTES NFR SPEC AUTO: 0.9 % — LOW (ref 13–44)
MAGNESIUM SERPL-MCNC: 1.8 MG/DL — SIGNIFICANT CHANGE UP (ref 1.6–2.6)
MAGNESIUM SERPL-MCNC: 2 MG/DL
MAN DIFF?: NORMAL
MCHC RBC-ENTMCNC: 20 PG
MCHC RBC-ENTMCNC: 20.1 PG — LOW (ref 27–34)
MCHC RBC-ENTMCNC: 29 GM/DL
MCHC RBC-ENTMCNC: 29.2 % — LOW (ref 32–36)
MCV RBC AUTO: 68.8 FL — LOW (ref 80–100)
MCV RBC AUTO: 69.1 FL
METAMYELOCYTES # FLD: 0 % — SIGNIFICANT CHANGE UP (ref 0–1)
MICROCYTES BLD QL: SIGNIFICANT CHANGE UP
MONOCYTES # BLD AUTO: 0.43 K/UL — SIGNIFICANT CHANGE UP (ref 0–0.9)
MONOCYTES # BLD AUTO: 0.57 K/UL
MONOCYTES NFR BLD AUTO: 2.9 % — SIGNIFICANT CHANGE UP (ref 2–14)
MONOCYTES NFR BLD AUTO: 7 %
MONOCYTES NFR BLD: 0 % — LOW (ref 2–9)
MYELOCYTES NFR BLD: 0 % — SIGNIFICANT CHANGE UP (ref 0–0)
NEUTROPHIL AB SER-ACNC: 98.2 % — HIGH (ref 43–77)
NEUTROPHILS # BLD AUTO: 13.25 K/UL — HIGH (ref 1.8–7.4)
NEUTROPHILS # BLD AUTO: 4.99 K/UL
NEUTROPHILS NFR BLD AUTO: 61.5 %
NEUTROPHILS NFR BLD AUTO: 90.9 % — HIGH (ref 43–77)
NEUTS BAND # BLD: 0 % — SIGNIFICANT CHANGE UP (ref 0–6)
NITRITE UR-MCNC: NEGATIVE — SIGNIFICANT CHANGE UP
NRBC # FLD: 0 K/UL — SIGNIFICANT CHANGE UP (ref 0–0)
OTHER - HEMATOLOGY %: 0.9 — SIGNIFICANT CHANGE UP
OVALOCYTES BLD QL SMEAR: SLIGHT — SIGNIFICANT CHANGE UP
PH UR: 7.5 — SIGNIFICANT CHANGE UP (ref 5–8)
PHOSPHATE SERPL-MCNC: 2.8 MG/DL — SIGNIFICANT CHANGE UP (ref 2.5–4.5)
PHOSPHATE SERPL-MCNC: 3.6 MG/DL
PLATELET # BLD AUTO: 487 K/UL — HIGH (ref 150–400)
PLATELET # BLD AUTO: 535 K/UL
PLATELET COUNT - ESTIMATE: NORMAL — SIGNIFICANT CHANGE UP
PMV BLD: 8.7 FL — SIGNIFICANT CHANGE UP (ref 7–13)
POIKILOCYTOSIS BLD QL AUTO: SLIGHT — SIGNIFICANT CHANGE UP
POTASSIUM SERPL-MCNC: 3.2 MMOL/L — LOW (ref 3.5–5.3)
POTASSIUM SERPL-SCNC: 3.2 MMOL/L — LOW (ref 3.5–5.3)
POTASSIUM SERPL-SCNC: 5.5 MMOL/L
PROMYELOCYTES # FLD: 0 % — SIGNIFICANT CHANGE UP (ref 0–0)
PROT SERPL-MCNC: 7.1 G/DL
PROT SERPL-MCNC: 7.3 G/DL — SIGNIFICANT CHANGE UP (ref 6–8.3)
PROT UR-MCNC: NEGATIVE — SIGNIFICANT CHANGE UP
RBC # BLD: 3.88 M/UL — SIGNIFICANT CHANGE UP (ref 3.8–5.2)
RBC # BLD: 4.04 M/UL
RBC # FLD: 18.5 % — HIGH (ref 10.3–14.5)
RBC # FLD: 18.8 %
RBC CASTS # UR COMP ASSIST: SIGNIFICANT CHANGE UP (ref 0–?)
SODIUM SERPL-SCNC: 141 MMOL/L — SIGNIFICANT CHANGE UP (ref 135–145)
SODIUM SERPL-SCNC: 143 MMOL/L
SP GR SPEC: 1.01 — SIGNIFICANT CHANGE UP (ref 1–1.04)
SQUAMOUS # UR AUTO: SIGNIFICANT CHANGE UP
T3 SERPL-MCNC: 75 NG/DL
T3RU NFR SERPL: 1.2 TBI
T4 FREE SERPL-MCNC: 0.9 NG/DL
T4 SERPL-MCNC: 5.3 UG/DL
TIBC SERPL-MCNC: 476 UG/DL
TSH SERPL-ACNC: 2.26 UIU/ML
UIBC SERPL-MCNC: 458 UG/DL
UROBILINOGEN FLD QL: NORMAL — SIGNIFICANT CHANGE UP
VARIANT LYMPHS # BLD: 0 % — SIGNIFICANT CHANGE UP
WBC # BLD: 14.6 K/UL — HIGH (ref 3.8–10.5)
WBC # FLD AUTO: 14.6 K/UL — HIGH (ref 3.8–10.5)
WBC # FLD AUTO: 8.12 K/UL
WBC UR QL: HIGH (ref 0–?)

## 2019-10-04 PROCEDURE — 99285 EMERGENCY DEPT VISIT HI MDM: CPT

## 2019-10-04 PROCEDURE — 73562 X-RAY EXAM OF KNEE 3: CPT | Mod: 26,LT

## 2019-10-04 PROCEDURE — 71046 X-RAY EXAM CHEST 2 VIEWS: CPT | Mod: 26

## 2019-10-04 RX ORDER — FAMOTIDINE 10 MG/ML
20 INJECTION INTRAVENOUS ONCE
Refills: 0 | Status: DISCONTINUED | OUTPATIENT
Start: 2019-10-04 | End: 2019-10-04

## 2019-10-04 RX ORDER — SODIUM CHLORIDE 9 MG/ML
1000 INJECTION INTRAMUSCULAR; INTRAVENOUS; SUBCUTANEOUS ONCE
Refills: 0 | Status: COMPLETED | OUTPATIENT
Start: 2019-10-04 | End: 2019-10-04

## 2019-10-04 RX ORDER — DOCUSATE SODIUM 100 MG
1 CAPSULE ORAL
Qty: 28 | Refills: 0
Start: 2019-10-04 | End: 2019-10-17

## 2019-10-04 RX ORDER — PANTOPRAZOLE SODIUM 20 MG/1
40 TABLET, DELAYED RELEASE ORAL ONCE
Refills: 0 | Status: COMPLETED | OUTPATIENT
Start: 2019-10-04 | End: 2019-10-04

## 2019-10-04 RX ORDER — FERROUS SULFATE 325(65) MG
1 TABLET ORAL
Qty: 60 | Refills: 0
Start: 2019-10-04 | End: 2019-11-02

## 2019-10-04 RX ADMIN — PANTOPRAZOLE SODIUM 40 MILLIGRAM(S): 20 TABLET, DELAYED RELEASE ORAL at 18:12

## 2019-10-04 RX ADMIN — SODIUM CHLORIDE 1000 MILLILITER(S): 9 INJECTION INTRAMUSCULAR; INTRAVENOUS; SUBCUTANEOUS at 16:17

## 2019-10-04 NOTE — ED PROVIDER NOTE - PROGRESS NOTE DETAILS
Patient labs returned with microcytic anemia, review of prior labs displays likely subacute process, patient requesting to be discharge, understands medical concern for intracranial pathology s/p traumatic fall however patient stating "I understand the risks of discharging before recommended studies, and I would still like to go home." Patient is cooperative and requesting discharge.

## 2019-10-04 NOTE — ED PROVIDER NOTE - OBJECTIVE STATEMENT
Jose Luna MD: Pt is a 63 yo F PMH Lung Cx in remission s/p lobectomy, COPD p/w syncope x today.  Pt states that she had a blood done this week and PMD called and left a message stating that she has iron deficiency anemia today. Pt states after hearing the message she went back to class sat down on her chair and felt lightheadedness. Pt states that the next thing she remembers was waking up on the floor. Pt report that her colleague saw her fall on her face. + LOC. Denies HA, n/v, CP, SOB, blood in stool or urine, dysuria     GI: Dr Louis Singletaryberg [phone]

## 2019-10-04 NOTE — ED ADULT NURSE NOTE - NSSUHOSCREENINGYN_ED_ALL_ED
HPI Comments: patient has a long history of difficulty swallowing at times. Approximate 5-6 years ago he had what sounds like esophageal dilatation done. Yesterday he was Chante Mickey stated desire things were slightly difficult to swallow but did not have a charles obstructive sensation. Approximate 4 hours after eating he went to drink and stated that he vomited after this. Talked to Dr. Brandt Shay advised him to go to the emergency room for this. Patient is a 67 y.o. male presenting with aphagia. The history is provided by the patient. Aphagia    This is a recurrent problem. The current episode started yesterday. The problem has been gradually worsening. There has been no fever. Associated symptoms include trouble swallowing. Pertinent negatives include no diarrhea, no vomiting, no shortness of breath, no swollen glands, no stiff neck and no cough. He has tried nothing for the symptoms. Past Medical History:   Diagnosis Date    Hypertension        Past Surgical History:   Procedure Laterality Date    HX GI      Esophagus dilatation         History reviewed. No pertinent family history. Social History     Social History    Marital status: SINGLE     Spouse name: N/A    Number of children: N/A    Years of education: N/A     Occupational History    Not on file. Social History Main Topics    Smoking status: Never Smoker    Smokeless tobacco: Not on file    Alcohol use No    Drug use: Not on file    Sexual activity: Not on file     Other Topics Concern    Not on file     Social History Narrative         ALLERGIES: Review of patient's allergies indicates no known allergies. Review of Systems   Constitutional: Negative for chills and fever. HENT: Positive for trouble swallowing. Respiratory: Negative. Negative for cough and shortness of breath. Cardiovascular: Negative. Gastrointestinal: Negative for diarrhea and vomiting.    All other systems reviewed and are negative. Vitals:    05/03/18 1626 05/03/18 1813   BP: (!) 144/92 153/83   Pulse: 80 68   Resp: 16    Temp: 98 °F (36.7 °C)    SpO2: 98%    Weight: 83.9 kg (185 lb)             Physical Exam   Constitutional: He appears well-developed and well-nourished. No distress. HENT:   Head: Atraumatic. Eyes: No scleral icterus. Neck: Neck supple. Cardiovascular: Normal rate. Pulmonary/Chest: Effort normal. No respiratory distress. Abdominal: Soft. He exhibits no distension. There is no tenderness. There is no rebound and no guarding. Neurological: He is alert. Skin: Skin is warm and dry. Psychiatric: Thought content normal.   Nursing note and vitals reviewed. MDM  Number of Diagnoses or Management Options  Esophageal dysphagia:   Diagnosis management comments: Progressive issue and no patient reported provoking event. Ate at mid-day and noticed hours later. Discussed with GI and they will see in AM       Amount and/or Complexity of Data Reviewed  Clinical lab tests: ordered and reviewed  Discuss the patient with other providers: yes (Discussed with DR Skinny Mcgrath)    Risk of Complications, Morbidity, and/or Mortality  Presenting problems: moderate  Diagnostic procedures: low  Management options: moderate    Patient Progress  Patient progress: stable        ED Course       Procedures    Recent Results (from the past 12 hour(s))   CBC WITH AUTOMATED DIFF    Collection Time: 05/03/18  4:50 PM   Result Value Ref Range    WBC 8.4 4.3 - 11.1 K/uL    RBC 4.81 4.23 - 5.67 M/uL    HGB 15.8 13.6 - 17.2 g/dL    HCT 46.6 41.1 - 50.3 %    MCV 96.9 79.6 - 97.8 FL    MCH 32.8 26.1 - 32.9 PG    MCHC 33.9 31.4 - 35.0 g/dL    RDW 12.5 11.9 - 14.6 %    PLATELET 825 170 - 957 K/uL    MPV 9.5 (L) 10.8 - 14.1 FL    DF AUTOMATED      NEUTROPHILS 75 43 - 78 %    LYMPHOCYTES 16 13 - 44 %    MONOCYTES 7 4.0 - 12.0 %    EOSINOPHILS 2 0.5 - 7.8 %    BASOPHILS 0 0.0 - 2.0 %    IMMATURE GRANULOCYTES 0 0.0 - 5.0 %    ABS.  NEUTROPHILS 6. 2 1.7 - 8.2 K/UL    ABS. LYMPHOCYTES 1.4 0.5 - 4.6 K/UL    ABS. MONOCYTES 0.6 0.1 - 1.3 K/UL    ABS. EOSINOPHILS 0.1 0.0 - 0.8 K/UL    ABS. BASOPHILS 0.0 0.0 - 0.2 K/UL    ABS. IMM. GRANS. 0.0 0.0 - 0.5 K/UL   METABOLIC PANEL, COMPREHENSIVE    Collection Time: 05/03/18  4:50 PM   Result Value Ref Range    Sodium 141 136 - 145 mmol/L    Potassium 3.6 3.5 - 5.1 mmol/L    Chloride 103 98 - 107 mmol/L    CO2 28 21 - 32 mmol/L    Anion gap 10 7 - 16 mmol/L    Glucose 94 65 - 100 mg/dL    BUN 22 8 - 23 MG/DL    Creatinine 1.02 0.8 - 1.5 MG/DL    GFR est AA >60 >60 ml/min/1.73m2    GFR est non-AA >60 >60 ml/min/1.73m2    Calcium 8.8 8.3 - 10.4 MG/DL    Bilirubin, total 0.9 0.2 - 1.1 MG/DL    ALT (SGPT) 27 12 - 65 U/L    AST (SGOT) 19 15 - 37 U/L    Alk.  phosphatase 91 50 - 136 U/L    Protein, total 7.4 6.3 - 8.2 g/dL    Albumin 3.7 3.2 - 4.6 g/dL    Globulin 3.7 (H) 2.3 - 3.5 g/dL    A-G Ratio 1.0 (L) 1.2 - 3.5 Yes - the patient is able to be screened

## 2019-10-04 NOTE — ED PROVIDER NOTE - PATIENT PORTAL LINK FT
You can access the FollowMyHealth Patient Portal offered by [facility] by registering at the following website: [email or web address]. By joining [facility]’s FollowMyHealth portal, you will also be able to view your health information using other applications (apps) compatible with our system.

## 2019-10-04 NOTE — ED PROVIDER NOTE - CLINICAL SUMMARY MEDICAL DECISION MAKING FREE TEXT BOX
Patient presenting with syncope, likely vasovagal, however will obtain labs, CBC, NCHCT, Xray, and dispo pending results.

## 2019-10-04 NOTE — ED PROVIDER NOTE - PHYSICAL EXAMINATION
A & O x 3, NAD, HEENT WNL and no facial asymmetry; lungs CTAB, heart with reg rhythm without murmur; abdomen soft NTND; extremities with no edema; neuro exam non focal with no motor or sensory deficits.  Right upper brow ecchymosis

## 2019-10-04 NOTE — ED PROVIDER NOTE - ATTENDING CONTRIBUTION TO CARE
Patient presenting with syncope, likely vasovagal, however will obtain labs, CBC, NCHCT, Xray, and dispo pending results. Currently in no acute distress, no focal neuro deficits, dispo pending results.     RAAD Sheehan: I have personally performed a face to face bedside history and physical examination of this patient. I have discussed the history, examination, review of systems, assessment and plan of management with the resident. I have reviewed the electronic medical record and amended it to reflect my history, review of systems, physical exam, assessment and plan.

## 2019-10-04 NOTE — ED ADULT NURSE NOTE - OBJECTIVE STATEMENT
64yof a&ox4 amb presents to ed c/o s/p syncopal episode. pt states she remembers feeling dizzy, then waking up on the floor. pt reports someone told her she hit her head. pt w./ noted ecchymosis to periorbital area on R side of face. pt reports poor PO intake, does not eat/drink much. pt denies cp, sob, dizziness, lightheadedness, fever/chills. breathing even/unlabored. abdomen soft, nontender, nondistended. skin warm, dry, and intact.

## 2019-10-06 LAB
BACTERIA UR CULT: SIGNIFICANT CHANGE UP
SPECIMEN SOURCE: SIGNIFICANT CHANGE UP

## 2019-10-07 ENCOUNTER — APPOINTMENT (OUTPATIENT)
Dept: GASTROENTEROLOGY | Facility: CLINIC | Age: 64
End: 2019-10-07
Payer: COMMERCIAL

## 2019-10-07 ENCOUNTER — APPOINTMENT (OUTPATIENT)
Dept: PULMONOLOGY | Facility: CLINIC | Age: 64
End: 2019-10-07

## 2019-10-07 VITALS
DIASTOLIC BLOOD PRESSURE: 78 MMHG | HEART RATE: 84 BPM | WEIGHT: 136 LBS | BODY MASS INDEX: 26.7 KG/M2 | HEIGHT: 60 IN | SYSTOLIC BLOOD PRESSURE: 134 MMHG

## 2019-10-07 DIAGNOSIS — D64.9 ANEMIA, UNSPECIFIED: ICD-10-CM

## 2019-10-07 DIAGNOSIS — R13.10 DYSPHAGIA, UNSPECIFIED: ICD-10-CM

## 2019-10-07 DIAGNOSIS — D50.9 IRON DEFICIENCY ANEMIA, UNSPECIFIED: ICD-10-CM

## 2019-10-07 DIAGNOSIS — R19.4 CHANGE IN BOWEL HABIT: ICD-10-CM

## 2019-10-07 DIAGNOSIS — R11.0 NAUSEA: ICD-10-CM

## 2019-10-07 DIAGNOSIS — R14.0 ABDOMINAL DISTENSION (GASEOUS): ICD-10-CM

## 2019-10-07 LAB
ENDOMYSIUM IGA SER QL: NEGATIVE
ENDOMYSIUM IGA TITR SER: NORMAL
GLIADIN IGA SER QL: 20.1 UNITS
GLIADIN IGG SER QL: <5 UNITS
GLIADIN PEPTIDE IGA SER-ACNC: ABNORMAL
GLIADIN PEPTIDE IGG SER-ACNC: NEGATIVE
IGA SER QL IEP: 277 MG/DL
TTG IGA SER IA-ACNC: <1.2 U/ML
TTG IGA SER-ACNC: NEGATIVE
TTG IGG SER IA-ACNC: 1.4 U/ML
TTG IGG SER IA-ACNC: NEGATIVE

## 2019-10-07 PROCEDURE — 99214 OFFICE O/P EST MOD 30 MIN: CPT

## 2019-10-07 NOTE — CONSULT LETTER
[Courtesy Letter:] : I had the pleasure of seeing your patient, [unfilled], in my office today. [Dear  ___] : Dear  [unfilled], [Please see my note below.] : Please see my note below. [Consult Closing:] : Thank you very much for allowing me to participate in the care of this patient.  If you have any questions, please do not hesitate to contact me. [Sincerely,] : Sincerely, [DrDoretha  ___] : Dr. LORENZANA [FreeTextEntry3] : Louis Pal M.D.\par  [___] : [unfilled]

## 2019-10-07 NOTE — HISTORY OF PRESENT ILLNESS
[FreeTextEntry1] : Bloodwork from 10/3 consultation here revealed moderate iron deficiency anemia (Hgb 8.1/Hct 27.9, MCV 69, Ferritin 6) with ESR 61 and weakly + gliadin-deamidated IgA. 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 She continues to note gassiness, pain, although her bowels have improved a probiotic daily (and after half bottle of citrate of magnesia). She has taken Reglan from time to time for nausea. She has never undergone colonoscopy, with EGD in the remote past reportedly negative.

## 2019-10-09 PROBLEM — D64.9 ANEMIA, UNSPECIFIED TYPE: Status: ACTIVE | Noted: 2019-10-03

## 2019-10-09 RX ORDER — POLYETHYLENE GLYCOL 3350 AND ELECTROLYTES WITH LEMON FLAVOR 236; 22.74; 6.74; 5.86; 2.97 G/4L; G/4L; G/4L; G/4L; G/4L
236 POWDER, FOR SOLUTION ORAL
Qty: 1 | Refills: 0 | Status: ACTIVE | COMMUNITY
Start: 2019-10-09 | End: 1900-01-01

## 2019-10-10 ENCOUNTER — RX CHANGE (OUTPATIENT)
Age: 64
End: 2019-10-10

## 2019-10-10 RX ORDER — PANTOPRAZOLE 40 MG/1
40 TABLET, DELAYED RELEASE ORAL DAILY
Qty: 1 | Refills: 3 | Status: ACTIVE | COMMUNITY
Start: 2019-10-03 | End: 1900-01-01

## 2019-10-10 NOTE — HISTORY OF PRESENT ILLNESS
[FreeTextEntry1] : Over the years, Natalie has had frequent issues with intermittent abdominal pain, gas, and bowels, variably diagnosed with gastritis, or ulcer, and/or IBS. In February, she had vomiting associated to viral infection. She notes frequent burping and nausea with bloating and constipation lately. Over the past three weeks, she has frequently needed to be in a fetal position because of discomfort and gas, but since starting probiotic, she has been defecating somewhat better and gas has improved. She tried Gas-X, without much improvement, although she did feel better after taking PPI this past winter. CT scan abdomen 9/30/19 was essentially negative. Abdominal ultrasound 2/5/19 was normal. EGD many years ago was reportedly negative, but she has never undergone colonoscopy. She recently received amoxicillin for dental work. Last Hgb 9.0. 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

## 2019-10-10 NOTE — ASSESSMENT
[FreeTextEntry1] : 1. Intermittent abdominal pain, gas, bloating, nausea with erratic bowels--statistically likely to have IBS, now in constipated phase. However, with recently noted moderate anemia, could she have underlying GI neoplasm? Possible celiac disease, IBD, etc.\par 2. Status post left lung resection and chemotherapy for stage II adenocarcinoma.\par 3. COPD; ex-smoker.\par \par \par Plan:\par 1. Medical records reviewed.\par 2. Extensive bloodwork, including repeat H/H with iron studies, drawn by me this afternoon.\par 3. Mg citrate 1 bottle this PM to see what fraction of her GI symptoms improve.\par 4. Rechallenge with PPI, such as pantoprazole 40 mg, daily on awakening.\par 5. Return here within 4-6 weeks. Certainly, if evidence of iron deficiency anemia, then panendoscopy will be advised soon thereafter.\par 6. Other recommendations to follow.

## 2019-10-10 NOTE — PHYSICAL EXAM
[General Appearance - Alert] : alert [General Appearance - In No Acute Distress] : in no acute distress [General Appearance - Well Developed] : well developed [General Appearance - Well Nourished] : well nourished [Sclera] : the sclera and conjunctiva were normal [Outer Ear] : the ears and nose were normal in appearance [Jugular Venous Distention Increased] : there was no jugular-venous distention [Neck Appearance] : the appearance of the neck was normal [Neck Cervical Mass (___cm)] : no neck mass was observed [Thyroid Diffuse Enlargement] : the thyroid was not enlarged [Thyroid Nodule] : there were no palpable thyroid nodules [Auscultation Breath Sounds / Voice Sounds] : lungs were clear to auscultation bilaterally [Heart Rate And Rhythm] : heart rate was normal and rhythm regular [Heart Sounds] : normal S1 and S2 [Murmurs] : no murmurs [Heart Sounds Gallop] : no gallops [Heart Sounds Pericardial Friction Rub] : no pericardial rub [Edema] : there was no peripheral edema [Full Pulse] : the pedal pulses are present [Bowel Sounds] : normal bowel sounds [Abdomen Soft] : soft [Abdomen Tenderness] : non-tender [Abdomen Mass (___ Cm)] : no abdominal mass palpated [Normal Sphincter Tone] : normal sphincter tone [No Rectal Mass] : no rectal mass [Internal Hemorrhoid] : internal hemorrhoids [Cervical Lymph Nodes Enlarged Anterior Bilaterally] : anterior cervical [Cervical Lymph Nodes Enlarged Posterior Bilaterally] : posterior cervical [Supraclavicular Lymph Nodes Enlarged Bilaterally] : supraclavicular [Axillary Lymph Nodes Enlarged Bilaterally] : axillary [Femoral Lymph Nodes Enlarged Bilaterally] : femoral [Inguinal Lymph Nodes Enlarged Bilaterally] : inguinal [No CVA Tenderness] : no ~M costovertebral angle tenderness [No Spinal Tenderness] : no spinal tenderness [Abnormal Walk] : normal gait [Nail Clubbing] : no clubbing  or cyanosis of the fingernails [Motor Tone] : muscle strength and tone were normal [Musculoskeletal - Swelling] : no joint swelling seen [Skin Color & Pigmentation] : normal skin color and pigmentation [Skin Turgor] : normal skin turgor [Oriented To Time, Place, And Person] : oriented to person, place, and time [] : no rash [Affect] : the affect was normal [Impaired Insight] : insight and judgment were intact [External Hemorrhoid] : no external hemorrhoids [Occult Blood Positive] : stool was negative for occult blood [FreeTextEntry1] : FIT negative; large amount of scybalous brown stool

## 2019-10-10 NOTE — CONSULT LETTER
[Dear  ___] : Dear  [unfilled], [Consult Letter:] : I had the pleasure of evaluating your patient, [unfilled]. [Please see my note below.] : Please see my note below. [Consult Closing:] : Thank you very much for allowing me to participate in the care of this patient.  If you have any questions, please do not hesitate to contact me. [Sincerely,] : Sincerely, [DrDoretha  ___] : Dr. LORENZANA [___] : [unfilled] [FreeTextEntry3] : Louis Pal M.D.\par

## 2019-10-11 ENCOUNTER — APPOINTMENT (OUTPATIENT)
Dept: GASTROENTEROLOGY | Facility: CLINIC | Age: 64
End: 2019-10-11
Payer: COMMERCIAL

## 2019-10-11 ENCOUNTER — LABORATORY RESULT (OUTPATIENT)
Age: 64
End: 2019-10-11

## 2019-10-11 PROCEDURE — 43239 EGD BIOPSY SINGLE/MULTIPLE: CPT

## 2019-10-14 ENCOUNTER — LABORATORY RESULT (OUTPATIENT)
Age: 64
End: 2019-10-14

## 2019-10-15 ENCOUNTER — APPOINTMENT (OUTPATIENT)
Dept: GASTROENTEROLOGY | Facility: CLINIC | Age: 64
End: 2019-10-15
Payer: COMMERCIAL

## 2019-10-15 PROCEDURE — 45385 COLONOSCOPY W/LESION REMOVAL: CPT

## 2019-10-30 ENCOUNTER — RX CHANGE (OUTPATIENT)
Age: 64
End: 2019-10-30

## 2019-10-31 ENCOUNTER — CLINICAL ADVICE (OUTPATIENT)
Age: 64
End: 2019-10-31

## 2019-10-31 ENCOUNTER — OUTPATIENT (OUTPATIENT)
Dept: OUTPATIENT SERVICES | Facility: HOSPITAL | Age: 64
LOS: 1 days | End: 2019-10-31
Payer: COMMERCIAL

## 2019-10-31 ENCOUNTER — RESULT REVIEW (OUTPATIENT)
Age: 64
End: 2019-10-31

## 2019-10-31 ENCOUNTER — APPOINTMENT (OUTPATIENT)
Dept: HEMATOLOGY ONCOLOGY | Facility: CLINIC | Age: 64
End: 2019-10-31
Payer: COMMERCIAL

## 2019-10-31 ENCOUNTER — FORM ENCOUNTER (OUTPATIENT)
Age: 64
End: 2019-10-31

## 2019-10-31 ENCOUNTER — APPOINTMENT (OUTPATIENT)
Dept: HEMATOLOGY ONCOLOGY | Facility: CLINIC | Age: 64
End: 2019-10-31

## 2019-10-31 ENCOUNTER — OUTPATIENT (OUTPATIENT)
Dept: OUTPATIENT SERVICES | Facility: HOSPITAL | Age: 64
LOS: 1 days | Discharge: HOME | End: 2019-10-31

## 2019-10-31 VITALS
DIASTOLIC BLOOD PRESSURE: 81 MMHG | OXYGEN SATURATION: 98 % | RESPIRATION RATE: 16 BRPM | BODY MASS INDEX: 26.48 KG/M2 | TEMPERATURE: 97.5 F | WEIGHT: 135.58 LBS | SYSTOLIC BLOOD PRESSURE: 152 MMHG | HEART RATE: 72 BPM

## 2019-10-31 DIAGNOSIS — Z90.2 ACQUIRED ABSENCE OF LUNG [PART OF]: Chronic | ICD-10-CM

## 2019-10-31 DIAGNOSIS — D64.9 ANEMIA, UNSPECIFIED: ICD-10-CM

## 2019-10-31 DIAGNOSIS — D36.9 BENIGN NEOPLASM, UNSPECIFIED SITE: Chronic | ICD-10-CM

## 2019-10-31 DIAGNOSIS — Z98.89 OTHER SPECIFIED POSTPROCEDURAL STATES: Chronic | ICD-10-CM

## 2019-10-31 DIAGNOSIS — C34.92 MALIGNANT NEOPLASM OF UNSPECIFIED PART OF LEFT BRONCHUS OR LUNG: ICD-10-CM

## 2019-10-31 LAB
BASOPHILS # BLD AUTO: 0.1 K/UL — SIGNIFICANT CHANGE UP (ref 0–0.2)
BASOPHILS NFR BLD AUTO: 0.7 % — SIGNIFICANT CHANGE UP (ref 0–2)
BLD GP AB SCN SERPL QL: NEGATIVE — SIGNIFICANT CHANGE UP
EOSINOPHIL # BLD AUTO: 0.1 K/UL — SIGNIFICANT CHANGE UP (ref 0–0.5)
EOSINOPHIL NFR BLD AUTO: 0.5 % — SIGNIFICANT CHANGE UP (ref 0–6)
HCT VFR BLD CALC: 22.8 % — LOW (ref 34.5–45)
HGB BLD-MCNC: 7.7 G/DL — LOW (ref 11.5–15.5)
LYMPHOCYTES # BLD AUTO: 1.9 K/UL — SIGNIFICANT CHANGE UP (ref 1–3.3)
LYMPHOCYTES # BLD AUTO: 14.7 % — SIGNIFICANT CHANGE UP (ref 13–44)
MCHC RBC-ENTMCNC: 22.6 PG — LOW (ref 27–34)
MCHC RBC-ENTMCNC: 33.7 G/DL — SIGNIFICANT CHANGE UP (ref 32–36)
MCV RBC AUTO: 67 FL — LOW (ref 80–100)
MONOCYTES # BLD AUTO: 0.8 K/UL — SIGNIFICANT CHANGE UP (ref 0–0.9)
MONOCYTES NFR BLD AUTO: 6.1 % — SIGNIFICANT CHANGE UP (ref 2–14)
NEUTROPHILS # BLD AUTO: 9.9 K/UL — HIGH (ref 1.8–7.4)
NEUTROPHILS NFR BLD AUTO: 78 % — HIGH (ref 43–77)
PLATELET # BLD AUTO: 519 K/UL — HIGH (ref 150–400)
RBC # BLD: 3.41 M/UL — LOW (ref 3.8–5.2)
RBC # FLD: 17.2 % — HIGH (ref 10.3–14.5)
RH IG SCN BLD-IMP: POSITIVE — SIGNIFICANT CHANGE UP
RH IG SCN BLD-IMP: POSITIVE — SIGNIFICANT CHANGE UP
WBC # BLD: 12.7 K/UL — HIGH (ref 3.8–10.5)
WBC # FLD AUTO: 12.7 K/UL — HIGH (ref 3.8–10.5)

## 2019-10-31 PROCEDURE — 86850 RBC ANTIBODY SCREEN: CPT

## 2019-10-31 PROCEDURE — 99214 OFFICE O/P EST MOD 30 MIN: CPT

## 2019-10-31 PROCEDURE — 86923 COMPATIBILITY TEST ELECTRIC: CPT

## 2019-10-31 PROCEDURE — 86901 BLOOD TYPING SEROLOGIC RH(D): CPT

## 2019-10-31 PROCEDURE — 86900 BLOOD TYPING SEROLOGIC ABO: CPT

## 2019-10-31 NOTE — ASSESSMENT
[Curative] : Goals of care discussed with patient: Curative [FreeTextEntry1] : 65 yo woman with recently diagnosed stage IIA lung ca s/p LLobectomy .\par Completed 4 cycles of cis/gem in June 2016- did great and tolerated well. \par Surveillance CTs all CTs show YASMEEN. \par She has continued to f/u with  Dr. Arora.  \par \par Anemia\par -profound iron deficiency. Will treat with fereheme 510mg weekly x4\par -Transfuse tomorrow for hgb 7.7\par -Check B12 and folate.\par -G/U with GI. \par -Check retic

## 2019-10-31 NOTE — HISTORY OF PRESENT ILLNESS
[Disease: _____________________] : Disease: [unfilled] [AJCC Stage: ____] : AJCC Stage: [unfilled] [de-identified] : MS. Andrea is a 62 yo woman who is s/p resection of stage II lung cancer followed by adjuvant chemo with cisplatin and pemetrexed x 4 cycles which completed in June of 2016. \par Was enrolled in ALCHEMIST, EGFR and ALK testing of tumor- tumor WT for both. Was offered adjuvant nivolumab as part of the study but declined. \par Surveillance scans showed YASMEEN. She just had a CT scan on 11/15 which showed GGO and tree-in-bud findings b/l upper lobes suggestive of PNA and endobronchial spread of infection. She was just seen by Dr. Arora for lower resp tract symptoms and tx with abx (azithromycin), and steroids (currently at 20 mg daily). She is feeling a little better. She still has dry cough. No fever, chills, rigors. \par \par 9/3/19: Persistent dyspnea. Hurt her back after rowing last week. She otherwise denies fever, chills, cough, mucus, sore throat, ear pain, chest pain, her nausea from reflux has improved and is no longer vomiting from her reflux. She reports no GERD symptoms. She is no longer smoking.  \par \par 10/1/19: Pt returns for follow up for discussion following scans. Pt complains of abdominal bloating and back discomfort. No other complaints. Remaining ROS unremarkable\par \par 10/31/19: Pt seen today as urgent visit. Called this am stating that she feels terrible. She is c/o sob/ profound fatigue and occasional dizziness. She denies fever/chills. She recently underwent colonoscopy and EGD which did not reveal evidence of bleeding or reason for new worsening anemia.  [de-identified] : adenoca

## 2019-10-31 NOTE — PHYSICAL EXAM
[Restricted in physically strenuous activity but ambulatory and able to carry out work of a light or sedentary nature] : Status 1- Restricted in physically strenuous activity but ambulatory and able to carry out work of a light or sedentary nature, e.g., light house work, office work [Thin] : thin [Normal] : affect appropriate [de-identified] : (+) pallor

## 2019-10-31 NOTE — REVIEW OF SYSTEMS
[Fatigue] : fatigue [Shortness Of Breath] : shortness of breath [Cough] : cough [SOB on Exertion] : no shortness of breath during exertion [Dizziness] : dizziness [Negative] : Gastrointestinal

## 2019-11-01 ENCOUNTER — APPOINTMENT (OUTPATIENT)
Dept: INFUSION THERAPY | Facility: HOSPITAL | Age: 64
End: 2019-11-01

## 2019-11-01 ENCOUNTER — OUTPATIENT (OUTPATIENT)
Dept: OUTPATIENT SERVICES | Facility: HOSPITAL | Age: 64
LOS: 1 days | End: 2019-11-01
Payer: COMMERCIAL

## 2019-11-01 ENCOUNTER — APPOINTMENT (OUTPATIENT)
Dept: ULTRASOUND IMAGING | Facility: IMAGING CENTER | Age: 64
End: 2019-11-01
Payer: COMMERCIAL

## 2019-11-01 ENCOUNTER — OTHER (OUTPATIENT)
Age: 64
End: 2019-11-01

## 2019-11-01 DIAGNOSIS — D36.9 BENIGN NEOPLASM, UNSPECIFIED SITE: Chronic | ICD-10-CM

## 2019-11-01 DIAGNOSIS — C34.92 MALIGNANT NEOPLASM OF UNSPECIFIED PART OF LEFT BRONCHUS OR LUNG: ICD-10-CM

## 2019-11-01 DIAGNOSIS — Z90.2 ACQUIRED ABSENCE OF LUNG [PART OF]: Chronic | ICD-10-CM

## 2019-11-01 DIAGNOSIS — Z98.89 OTHER SPECIFIED POSTPROCEDURAL STATES: Chronic | ICD-10-CM

## 2019-11-01 PROCEDURE — 93970 EXTREMITY STUDY: CPT

## 2019-11-01 PROCEDURE — 93970 EXTREMITY STUDY: CPT | Mod: 26

## 2019-11-04 DIAGNOSIS — Z51.89 ENCOUNTER FOR OTHER SPECIFIED AFTERCARE: ICD-10-CM

## 2020-01-01 ENCOUNTER — RESULT REVIEW (OUTPATIENT)
Age: 65
End: 2020-01-01

## 2020-01-01 ENCOUNTER — TRANSCRIPTION ENCOUNTER (OUTPATIENT)
Age: 65
End: 2020-01-01

## 2020-01-01 ENCOUNTER — OUTPATIENT (OUTPATIENT)
Dept: OUTPATIENT SERVICES | Facility: HOSPITAL | Age: 65
LOS: 1 days | Discharge: HOME | End: 2020-01-01

## 2020-01-01 ENCOUNTER — LABORATORY RESULT (OUTPATIENT)
Age: 65
End: 2020-01-01

## 2020-01-01 ENCOUNTER — APPOINTMENT (OUTPATIENT)
Dept: HEMATOLOGY ONCOLOGY | Facility: CLINIC | Age: 65
End: 2020-01-01

## 2020-01-01 ENCOUNTER — OUTPATIENT (OUTPATIENT)
Dept: OUTPATIENT SERVICES | Facility: HOSPITAL | Age: 65
LOS: 1 days | End: 2020-01-01
Payer: COMMERCIAL

## 2020-01-01 ENCOUNTER — INPATIENT (INPATIENT)
Facility: HOSPITAL | Age: 65
LOS: 0 days | Discharge: HOME | DRG: 948 | End: 2020-09-19
Attending: PSYCHIATRY & NEUROLOGY | Admitting: PSYCHIATRY & NEUROLOGY
Payer: COMMERCIAL

## 2020-01-01 ENCOUNTER — APPOINTMENT (OUTPATIENT)
Dept: HEMATOLOGY ONCOLOGY | Facility: CLINIC | Age: 65
End: 2020-01-01
Payer: COMMERCIAL

## 2020-01-01 ENCOUNTER — OUTPATIENT (OUTPATIENT)
Dept: OUTPATIENT SERVICES | Facility: HOSPITAL | Age: 65
LOS: 1 days | End: 2020-01-01

## 2020-01-01 ENCOUNTER — APPOINTMENT (OUTPATIENT)
Dept: PULMONOLOGY | Facility: CLINIC | Age: 65
End: 2020-01-01

## 2020-01-01 ENCOUNTER — RECORD ABSTRACTING (OUTPATIENT)
Age: 65
End: 2020-01-01

## 2020-01-01 ENCOUNTER — INPATIENT (INPATIENT)
Facility: HOSPITAL | Age: 65
LOS: 3 days | Discharge: HOME | DRG: 25 | End: 2020-03-17
Attending: STUDENT IN AN ORGANIZED HEALTH CARE EDUCATION/TRAINING PROGRAM | Admitting: SPECIALIST
Payer: COMMERCIAL

## 2020-01-01 ENCOUNTER — APPOINTMENT (OUTPATIENT)
Dept: NEUROLOGY | Facility: CLINIC | Age: 65
End: 2020-01-01

## 2020-01-01 ENCOUNTER — INPATIENT (INPATIENT)
Facility: HOSPITAL | Age: 65
LOS: 4 days | Discharge: HOME | DRG: 99 | End: 2020-09-26
Attending: PSYCHIATRY & NEUROLOGY | Admitting: PSYCHIATRY & NEUROLOGY
Payer: COMMERCIAL

## 2020-01-01 ENCOUNTER — OUTPATIENT (OUTPATIENT)
Dept: OUTPATIENT SERVICES | Facility: HOSPITAL | Age: 65
LOS: 1 days | Discharge: HOME | End: 2020-01-01
Payer: COMMERCIAL

## 2020-01-01 ENCOUNTER — APPOINTMENT (OUTPATIENT)
Dept: INFUSION THERAPY | Facility: HOSPITAL | Age: 65
End: 2020-01-01

## 2020-01-01 ENCOUNTER — NON-APPOINTMENT (OUTPATIENT)
Age: 65
End: 2020-01-01

## 2020-01-01 ENCOUNTER — APPOINTMENT (OUTPATIENT)
Dept: RADIATION ONCOLOGY | Facility: CLINIC | Age: 65
End: 2020-01-01
Payer: COMMERCIAL

## 2020-01-01 ENCOUNTER — APPOINTMENT (OUTPATIENT)
Dept: NEUROSURGERY | Facility: CLINIC | Age: 65
End: 2020-01-01

## 2020-01-01 ENCOUNTER — APPOINTMENT (OUTPATIENT)
Dept: NEUROLOGY | Facility: CLINIC | Age: 65
End: 2020-01-01
Payer: COMMERCIAL

## 2020-01-01 ENCOUNTER — APPOINTMENT (OUTPATIENT)
Dept: NUCLEAR MEDICINE | Facility: IMAGING CENTER | Age: 65
End: 2020-01-01
Payer: COMMERCIAL

## 2020-01-01 ENCOUNTER — APPOINTMENT (OUTPATIENT)
Dept: PULMONOLOGY | Facility: CLINIC | Age: 65
End: 2020-01-01
Payer: COMMERCIAL

## 2020-01-01 ENCOUNTER — INPATIENT (INPATIENT)
Facility: HOSPITAL | Age: 65
LOS: 2 days | Discharge: HOME | DRG: 644 | End: 2020-09-06
Attending: PSYCHIATRY & NEUROLOGY | Admitting: HOSPITALIST
Payer: COMMERCIAL

## 2020-01-01 ENCOUNTER — APPOINTMENT (OUTPATIENT)
Dept: CT IMAGING | Facility: CLINIC | Age: 65
End: 2020-01-01

## 2020-01-01 ENCOUNTER — APPOINTMENT (OUTPATIENT)
Dept: MRI IMAGING | Facility: IMAGING CENTER | Age: 65
End: 2020-01-01
Payer: COMMERCIAL

## 2020-01-01 ENCOUNTER — APPOINTMENT (OUTPATIENT)
Dept: MRI IMAGING | Facility: IMAGING CENTER | Age: 65
End: 2020-01-01

## 2020-01-01 ENCOUNTER — INPATIENT (INPATIENT)
Facility: HOSPITAL | Age: 65
LOS: 4 days | Discharge: HOME | End: 2020-08-30
Attending: HOSPITALIST | Admitting: HOSPITALIST
Payer: COMMERCIAL

## 2020-01-01 ENCOUNTER — APPOINTMENT (OUTPATIENT)
Dept: NEUROSURGERY | Facility: CLINIC | Age: 65
End: 2020-01-01
Payer: COMMERCIAL

## 2020-01-01 ENCOUNTER — APPOINTMENT (OUTPATIENT)
Dept: ULTRASOUND IMAGING | Facility: CLINIC | Age: 65
End: 2020-01-01
Payer: COMMERCIAL

## 2020-01-01 VITALS
RESPIRATION RATE: 16 BRPM | SYSTOLIC BLOOD PRESSURE: 139 MMHG | OXYGEN SATURATION: 92 % | HEART RATE: 76 BPM | TEMPERATURE: 99 F | DIASTOLIC BLOOD PRESSURE: 81 MMHG

## 2020-01-01 VITALS
HEART RATE: 72 BPM | WEIGHT: 133 LBS | HEIGHT: 65 IN | DIASTOLIC BLOOD PRESSURE: 85 MMHG | BODY MASS INDEX: 22.16 KG/M2 | TEMPERATURE: 98.4 F | SYSTOLIC BLOOD PRESSURE: 142 MMHG | RESPIRATION RATE: 16 BRPM

## 2020-01-01 VITALS
RESPIRATION RATE: 20 BRPM | SYSTOLIC BLOOD PRESSURE: 130 MMHG | HEART RATE: 56 BPM | OXYGEN SATURATION: 98 % | TEMPERATURE: 98 F | DIASTOLIC BLOOD PRESSURE: 77 MMHG

## 2020-01-01 VITALS
RESPIRATION RATE: 18 BRPM | WEIGHT: 139.99 LBS | HEART RATE: 74 BPM | DIASTOLIC BLOOD PRESSURE: 82 MMHG | SYSTOLIC BLOOD PRESSURE: 124 MMHG | TEMPERATURE: 98 F | HEIGHT: 63 IN | OXYGEN SATURATION: 98 %

## 2020-01-01 VITALS
WEIGHT: 133 LBS | SYSTOLIC BLOOD PRESSURE: 136 MMHG | RESPIRATION RATE: 16 BRPM | BODY MASS INDEX: 22.16 KG/M2 | OXYGEN SATURATION: 97 % | DIASTOLIC BLOOD PRESSURE: 87 MMHG | HEART RATE: 69 BPM | HEIGHT: 65 IN

## 2020-01-01 VITALS
HEART RATE: 131 BPM | TEMPERATURE: 98 F | HEIGHT: 64 IN | SYSTOLIC BLOOD PRESSURE: 178 MMHG | RESPIRATION RATE: 100 BRPM | WEIGHT: 130.07 LBS | OXYGEN SATURATION: 100 % | DIASTOLIC BLOOD PRESSURE: 111 MMHG

## 2020-01-01 VITALS
RESPIRATION RATE: 16 BRPM | OXYGEN SATURATION: 97 % | WEIGHT: 134.92 LBS | TEMPERATURE: 99 F | SYSTOLIC BLOOD PRESSURE: 117 MMHG | HEART RATE: 78 BPM | DIASTOLIC BLOOD PRESSURE: 80 MMHG | HEIGHT: 65 IN

## 2020-01-01 VITALS
DIASTOLIC BLOOD PRESSURE: 83 MMHG | TEMPERATURE: 98 F | HEART RATE: 73 BPM | RESPIRATION RATE: 18 BRPM | SYSTOLIC BLOOD PRESSURE: 140 MMHG | OXYGEN SATURATION: 94 %

## 2020-01-01 VITALS
SYSTOLIC BLOOD PRESSURE: 142 MMHG | HEART RATE: 71 BPM | DIASTOLIC BLOOD PRESSURE: 81 MMHG | TEMPERATURE: 98 F | RESPIRATION RATE: 18 BRPM | WEIGHT: 130.07 LBS | OXYGEN SATURATION: 95 % | HEIGHT: 65 IN

## 2020-01-01 VITALS
HEIGHT: 65 IN | DIASTOLIC BLOOD PRESSURE: 76 MMHG | WEIGHT: 135 LBS | BODY MASS INDEX: 22.49 KG/M2 | SYSTOLIC BLOOD PRESSURE: 120 MMHG | HEART RATE: 84 BPM

## 2020-01-01 VITALS
HEART RATE: 72 BPM | HEIGHT: 65 IN | DIASTOLIC BLOOD PRESSURE: 82 MMHG | BODY MASS INDEX: 22.16 KG/M2 | WEIGHT: 133 LBS | SYSTOLIC BLOOD PRESSURE: 125 MMHG

## 2020-01-01 VITALS
DIASTOLIC BLOOD PRESSURE: 87 MMHG | SYSTOLIC BLOOD PRESSURE: 151 MMHG | RESPIRATION RATE: 18 BRPM | HEART RATE: 80 BPM | OXYGEN SATURATION: 97 % | TEMPERATURE: 98 F

## 2020-01-01 VITALS
WEIGHT: 131 LBS | OXYGEN SATURATION: 98 % | HEART RATE: 87 BPM | RESPIRATION RATE: 16 BRPM | SYSTOLIC BLOOD PRESSURE: 120 MMHG | BODY MASS INDEX: 21.83 KG/M2 | DIASTOLIC BLOOD PRESSURE: 78 MMHG | HEIGHT: 65 IN

## 2020-01-01 VITALS
RESPIRATION RATE: 18 BRPM | OXYGEN SATURATION: 95 % | DIASTOLIC BLOOD PRESSURE: 80 MMHG | TEMPERATURE: 98 F | SYSTOLIC BLOOD PRESSURE: 141 MMHG | HEART RATE: 66 BPM

## 2020-01-01 VITALS
HEART RATE: 69 BPM | DIASTOLIC BLOOD PRESSURE: 91 MMHG | TEMPERATURE: 98.1 F | RESPIRATION RATE: 16 BRPM | SYSTOLIC BLOOD PRESSURE: 149 MMHG

## 2020-01-01 VITALS
OXYGEN SATURATION: 98 % | SYSTOLIC BLOOD PRESSURE: 149 MMHG | RESPIRATION RATE: 17 BRPM | DIASTOLIC BLOOD PRESSURE: 92 MMHG | HEART RATE: 65 BPM | TEMPERATURE: 98 F

## 2020-01-01 VITALS — TEMPERATURE: 97.4 F

## 2020-01-01 VITALS — TEMPERATURE: 98 F

## 2020-01-01 DIAGNOSIS — R53.1 WEAKNESS: ICD-10-CM

## 2020-01-01 DIAGNOSIS — Z98.89 OTHER SPECIFIED POSTPROCEDURAL STATES: Chronic | ICD-10-CM

## 2020-01-01 DIAGNOSIS — R14.0 ABDOMINAL DISTENSION (GASEOUS): ICD-10-CM

## 2020-01-01 DIAGNOSIS — G93.89 OTHER SPECIFIED DISORDERS OF BRAIN: ICD-10-CM

## 2020-01-01 DIAGNOSIS — Z90.2 ACQUIRED ABSENCE OF LUNG [PART OF]: Chronic | ICD-10-CM

## 2020-01-01 DIAGNOSIS — C79.31 SECONDARY MALIGNANT NEOPLASM OF BRAIN: ICD-10-CM

## 2020-01-01 DIAGNOSIS — C34.92 MALIGNANT NEOPLASM OF UNSPECIFIED PART OF LEFT BRONCHUS OR LUNG: ICD-10-CM

## 2020-01-01 DIAGNOSIS — D36.9 BENIGN NEOPLASM, UNSPECIFIED SITE: Chronic | ICD-10-CM

## 2020-01-01 DIAGNOSIS — Z98.890 OTHER SPECIFIED POSTPROCEDURAL STATES: Chronic | ICD-10-CM

## 2020-01-01 DIAGNOSIS — G93.40 ENCEPHALOPATHY, UNSPECIFIED: ICD-10-CM

## 2020-01-01 DIAGNOSIS — J44.9 CHRONIC OBSTRUCTIVE PULMONARY DISEASE, UNSPECIFIED: ICD-10-CM

## 2020-01-01 DIAGNOSIS — Z86.19 PERSONAL HISTORY OF OTHER INFECTIOUS AND PARASITIC DISEASES: ICD-10-CM

## 2020-01-01 DIAGNOSIS — G40.901 EPILEPSY, UNSPECIFIED, NOT INTRACTABLE, WITH STATUS EPILEPTICUS: ICD-10-CM

## 2020-01-01 DIAGNOSIS — Z00.8 ENCOUNTER FOR OTHER GENERAL EXAMINATION: ICD-10-CM

## 2020-01-01 DIAGNOSIS — K21.9 GASTRO-ESOPHAGEAL REFLUX DISEASE WITHOUT ESOPHAGITIS: ICD-10-CM

## 2020-01-01 DIAGNOSIS — R41.0 DISORIENTATION, UNSPECIFIED: ICD-10-CM

## 2020-01-01 DIAGNOSIS — E03.9 HYPOTHYROIDISM, UNSPECIFIED: ICD-10-CM

## 2020-01-01 DIAGNOSIS — R11.2 NAUSEA WITH VOMITING, UNSPECIFIED: ICD-10-CM

## 2020-01-01 DIAGNOSIS — I62.9 NONTRAUMATIC INTRACRANIAL HEMORRHAGE, UNSPECIFIED: ICD-10-CM

## 2020-01-01 DIAGNOSIS — R53.82 CHRONIC FATIGUE, UNSPECIFIED: ICD-10-CM

## 2020-01-01 DIAGNOSIS — Z51.11 ENCOUNTER FOR ANTINEOPLASTIC CHEMOTHERAPY: ICD-10-CM

## 2020-01-01 DIAGNOSIS — Z02.9 ENCOUNTER FOR ADMINISTRATIVE EXAMINATIONS, UNSPECIFIED: ICD-10-CM

## 2020-01-01 DIAGNOSIS — K58.9 IRRITABLE BOWEL SYNDROME W/OUT DIARRHEA: ICD-10-CM

## 2020-01-01 DIAGNOSIS — Z71.89 OTHER SPECIFIED COUNSELING: ICD-10-CM

## 2020-01-01 DIAGNOSIS — N39.0 URINARY TRACT INFECTION, SITE NOT SPECIFIED: ICD-10-CM

## 2020-01-01 DIAGNOSIS — R56.9 UNSPECIFIED CONVULSIONS: ICD-10-CM

## 2020-01-01 DIAGNOSIS — G40.909 EPILEPSY, UNSPECIFIED, NOT INTRACTABLE, WITHOUT STATUS EPILEPTICUS: ICD-10-CM

## 2020-01-01 DIAGNOSIS — N17.9 ACUTE KIDNEY FAILURE, UNSPECIFIED: ICD-10-CM

## 2020-01-01 DIAGNOSIS — F41.9 ANXIETY DISORDER, UNSPECIFIED: ICD-10-CM

## 2020-01-01 DIAGNOSIS — K21.9 GASTRO-ESOPHAGEAL REFLUX DISEASE W/OUT ESOPHAGITIS: ICD-10-CM

## 2020-01-01 DIAGNOSIS — G43.909 MIGRAINE, UNSPECIFIED, NOT INTRACTABLE, WITHOUT STATUS MIGRAINOSUS: ICD-10-CM

## 2020-01-01 DIAGNOSIS — T88.7XXA UNSPECIFIED ADVERSE EFFECT OF DRUG OR MEDICAMENT, INITIAL ENCOUNTER: ICD-10-CM

## 2020-01-01 DIAGNOSIS — Z87.891 PERSONAL HISTORY OF NICOTINE DEPENDENCE: ICD-10-CM

## 2020-01-01 DIAGNOSIS — R14.1 GAS PAIN: ICD-10-CM

## 2020-01-01 DIAGNOSIS — Z85.118 PERSONAL HISTORY OF OTHER MALIGNANT NEOPLASM OF BRONCHUS AND LUNG: ICD-10-CM

## 2020-01-01 DIAGNOSIS — K29.50 UNSPECIFIED CHRONIC GASTRITIS W/OUT BLEEDING: ICD-10-CM

## 2020-01-01 DIAGNOSIS — Z29.9 ENCOUNTER FOR PROPHYLACTIC MEASURES, UNSPECIFIED: ICD-10-CM

## 2020-01-01 DIAGNOSIS — R41.82 ALTERED MENTAL STATUS, UNSPECIFIED: ICD-10-CM

## 2020-01-01 DIAGNOSIS — Z00.00 ENCOUNTER FOR GENERAL ADULT MEDICAL EXAMINATION W/OUT ABNORMAL FINDINGS: ICD-10-CM

## 2020-01-01 DIAGNOSIS — G40.109 LOCALIZATION-RELATED (FOCAL) (PARTIAL) SYMPTOMATIC EPILEPSY AND EPILEPTIC SYNDROMES WITH SIMPLE PARTIAL SEIZURES, NOT INTRACTABLE, W/OUT STATUS EPILEPTICUS: ICD-10-CM

## 2020-01-01 DIAGNOSIS — G04.81 OTHER ENCEPHALITIS AND ENCEPHALOMYELITIS: ICD-10-CM

## 2020-01-01 DIAGNOSIS — R59.0 LOCALIZED ENLARGED LYMPH NODES: ICD-10-CM

## 2020-01-01 DIAGNOSIS — G43.109 MIGRAINE WITH AURA, NOT INTRACTABLE, WITHOUT STATUS MIGRAINOSUS: ICD-10-CM

## 2020-01-01 DIAGNOSIS — Z91.14 PATIENT'S OTHER NONCOMPLIANCE WITH MEDICATION REGIMEN: ICD-10-CM

## 2020-01-01 LAB
-  AMIKACIN: SIGNIFICANT CHANGE UP
-  AMIKACIN: SIGNIFICANT CHANGE UP
-  AMOXICILLIN/CLAVULANIC ACID: SIGNIFICANT CHANGE UP
-  AMOXICILLIN/CLAVULANIC ACID: SIGNIFICANT CHANGE UP
-  AMPICILLIN/SULBACTAM: SIGNIFICANT CHANGE UP
-  AMPICILLIN/SULBACTAM: SIGNIFICANT CHANGE UP
-  AMPICILLIN: SIGNIFICANT CHANGE UP
-  AMPICILLIN: SIGNIFICANT CHANGE UP
-  AZTREONAM: SIGNIFICANT CHANGE UP
-  AZTREONAM: SIGNIFICANT CHANGE UP
-  CEFAZOLIN: SIGNIFICANT CHANGE UP
-  CEFAZOLIN: SIGNIFICANT CHANGE UP
-  CEFEPIME: SIGNIFICANT CHANGE UP
-  CEFEPIME: SIGNIFICANT CHANGE UP
-  CEFOXITIN: SIGNIFICANT CHANGE UP
-  CEFOXITIN: SIGNIFICANT CHANGE UP
-  CEFTRIAXONE: SIGNIFICANT CHANGE UP
-  CEFTRIAXONE: SIGNIFICANT CHANGE UP
-  CIPROFLOXACIN: SIGNIFICANT CHANGE UP
-  CIPROFLOXACIN: SIGNIFICANT CHANGE UP
-  ERTAPENEM: SIGNIFICANT CHANGE UP
-  ERTAPENEM: SIGNIFICANT CHANGE UP
-  GENTAMICIN: SIGNIFICANT CHANGE UP
-  GENTAMICIN: SIGNIFICANT CHANGE UP
-  IMIPENEM: SIGNIFICANT CHANGE UP
-  IMIPENEM: SIGNIFICANT CHANGE UP
-  LEVOFLOXACIN: SIGNIFICANT CHANGE UP
-  LEVOFLOXACIN: SIGNIFICANT CHANGE UP
-  MEROPENEM: SIGNIFICANT CHANGE UP
-  MEROPENEM: SIGNIFICANT CHANGE UP
-  NITROFURANTOIN: SIGNIFICANT CHANGE UP
-  NITROFURANTOIN: SIGNIFICANT CHANGE UP
-  PIPERACILLIN/TAZOBACTAM: SIGNIFICANT CHANGE UP
-  PIPERACILLIN/TAZOBACTAM: SIGNIFICANT CHANGE UP
-  TIGECYCLINE: SIGNIFICANT CHANGE UP
-  TIGECYCLINE: SIGNIFICANT CHANGE UP
-  TOBRAMYCIN: SIGNIFICANT CHANGE UP
-  TOBRAMYCIN: SIGNIFICANT CHANGE UP
-  TRIMETHOPRIM/SULFAMETHOXAZOLE: SIGNIFICANT CHANGE UP
-  TRIMETHOPRIM/SULFAMETHOXAZOLE: SIGNIFICANT CHANGE UP
24R-OH-CALCIDIOL SERPL-MCNC: 64.5 PG/ML
A1C WITH ESTIMATED AVERAGE GLUCOSE RESULT: 5.1 % — SIGNIFICANT CHANGE UP (ref 4–5.6)
ACTH STIM ACTH BASELINE: 11.9 PG/ML — SIGNIFICANT CHANGE UP (ref 7.2–63.3)
ACTH STIM CORTISOL BASELINE: 12.9 UG/DL — SIGNIFICANT CHANGE UP (ref 6–18.4)
ALBUMIN SERPL ELPH-MCNC: 3.3 G/DL — SIGNIFICANT CHANGE UP (ref 3.3–5)
ALBUMIN SERPL ELPH-MCNC: 3.4 G/DL — SIGNIFICANT CHANGE UP (ref 3.3–5)
ALBUMIN SERPL ELPH-MCNC: 3.4 G/DL — SIGNIFICANT CHANGE UP (ref 3.3–5)
ALBUMIN SERPL ELPH-MCNC: 3.5 G/DL — SIGNIFICANT CHANGE UP (ref 3.3–5)
ALBUMIN SERPL ELPH-MCNC: 3.6 G/DL — SIGNIFICANT CHANGE UP (ref 3.3–5)
ALBUMIN SERPL ELPH-MCNC: 3.8 G/DL — SIGNIFICANT CHANGE UP (ref 3.3–5)
ALBUMIN SERPL ELPH-MCNC: 4 G/DL — SIGNIFICANT CHANGE UP (ref 3.3–5)
ALBUMIN SERPL ELPH-MCNC: 4.3 G/DL — SIGNIFICANT CHANGE UP (ref 3.3–5)
ALBUMIN SERPL ELPH-MCNC: 4.3 G/DL — SIGNIFICANT CHANGE UP (ref 3.3–5)
ALBUMIN SERPL ELPH-MCNC: 4.4 G/DL — SIGNIFICANT CHANGE UP (ref 3.3–5)
ALBUMIN SERPL ELPH-MCNC: 4.5 G/DL
ALBUMIN SERPL ELPH-MCNC: 4.6 G/DL
ALBUMIN SERPL ELPH-MCNC: 5 G/DL — SIGNIFICANT CHANGE UP (ref 3.3–5)
ALP BLD-CCNC: 77 U/L
ALP BLD-CCNC: 90 U/L
ALP SERPL-CCNC: 55 U/L — SIGNIFICANT CHANGE UP (ref 40–120)
ALP SERPL-CCNC: 56 U/L — SIGNIFICANT CHANGE UP (ref 40–120)
ALP SERPL-CCNC: 57 U/L — SIGNIFICANT CHANGE UP (ref 40–120)
ALP SERPL-CCNC: 57 U/L — SIGNIFICANT CHANGE UP (ref 40–120)
ALP SERPL-CCNC: 60 U/L — SIGNIFICANT CHANGE UP (ref 40–120)
ALP SERPL-CCNC: 62 U/L — SIGNIFICANT CHANGE UP (ref 40–120)
ALP SERPL-CCNC: 65 U/L — SIGNIFICANT CHANGE UP (ref 40–120)
ALP SERPL-CCNC: 66 U/L — SIGNIFICANT CHANGE UP (ref 40–120)
ALP SERPL-CCNC: 67 U/L — SIGNIFICANT CHANGE UP (ref 40–120)
ALP SERPL-CCNC: 74 U/L — SIGNIFICANT CHANGE UP (ref 40–120)
ALP SERPL-CCNC: 77 U/L — SIGNIFICANT CHANGE UP (ref 40–120)
ALT FLD-CCNC: 16 U/L — SIGNIFICANT CHANGE UP (ref 10–45)
ALT FLD-CCNC: 18 U/L — SIGNIFICANT CHANGE UP (ref 10–45)
ALT FLD-CCNC: 21 U/L — SIGNIFICANT CHANGE UP (ref 10–45)
ALT FLD-CCNC: 21 U/L — SIGNIFICANT CHANGE UP (ref 10–45)
ALT FLD-CCNC: 23 U/L — SIGNIFICANT CHANGE UP (ref 4–33)
ALT FLD-CCNC: 23 U/L — SIGNIFICANT CHANGE UP (ref 4–33)
ALT FLD-CCNC: 27 U/L — SIGNIFICANT CHANGE UP (ref 4–33)
ALT FLD-CCNC: 28 U/L — SIGNIFICANT CHANGE UP (ref 4–33)
ALT FLD-CCNC: 29 U/L — SIGNIFICANT CHANGE UP (ref 10–45)
ALT FLD-CCNC: 29 U/L — SIGNIFICANT CHANGE UP (ref 4–33)
ALT FLD-CCNC: 31 U/L — SIGNIFICANT CHANGE UP (ref 10–45)
ALT FLD-CCNC: 33 U/L — SIGNIFICANT CHANGE UP (ref 4–33)
ALT FLD-CCNC: 34 U/L — SIGNIFICANT CHANGE UP (ref 10–45)
ALT SERPL-CCNC: 22 U/L
ALT SERPL-CCNC: 23 U/L
AMMONIA BLD-MCNC: 20 UMOL/L — SIGNIFICANT CHANGE UP (ref 11–55)
AMMONIA BLD-MCNC: 56 UMOL/L — HIGH (ref 11–55)
AMPHET UR-MCNC: NEGATIVE — SIGNIFICANT CHANGE UP
ANION GAP SERPL CALC-SCNC: 10 MMO/L — SIGNIFICANT CHANGE UP (ref 7–14)
ANION GAP SERPL CALC-SCNC: 10 MMO/L — SIGNIFICANT CHANGE UP (ref 7–14)
ANION GAP SERPL CALC-SCNC: 10 MMOL/L — SIGNIFICANT CHANGE UP (ref 5–17)
ANION GAP SERPL CALC-SCNC: 11 MMO/L — SIGNIFICANT CHANGE UP (ref 7–14)
ANION GAP SERPL CALC-SCNC: 11 MMOL/L — SIGNIFICANT CHANGE UP (ref 5–17)
ANION GAP SERPL CALC-SCNC: 12 MMOL/L — SIGNIFICANT CHANGE UP (ref 5–17)
ANION GAP SERPL CALC-SCNC: 12 MMOL/L — SIGNIFICANT CHANGE UP (ref 5–17)
ANION GAP SERPL CALC-SCNC: 13 MMOL/L
ANION GAP SERPL CALC-SCNC: 13 MMOL/L — SIGNIFICANT CHANGE UP (ref 5–17)
ANION GAP SERPL CALC-SCNC: 15 MMO/L — HIGH (ref 7–14)
ANION GAP SERPL CALC-SCNC: 15 MMOL/L
ANION GAP SERPL CALC-SCNC: 15 MMOL/L — SIGNIFICANT CHANGE UP (ref 5–17)
ANION GAP SERPL CALC-SCNC: 17 MMO/L — HIGH (ref 7–14)
APAP SERPL-MCNC: < 15 UG/ML — LOW (ref 15–25)
APAP SERPL-MCNC: <15 UG/ML — SIGNIFICANT CHANGE UP (ref 10–30)
APPEARANCE CSF: CLEAR — SIGNIFICANT CHANGE UP
APPEARANCE SPUN FLD: COLORLESS — SIGNIFICANT CHANGE UP
APPEARANCE UR: ABNORMAL
APPEARANCE UR: CLEAR — SIGNIFICANT CHANGE UP
APTT BLD: 27.4 SEC — LOW (ref 27.5–36.3)
APTT BLD: 28.8 SEC — SIGNIFICANT CHANGE UP (ref 27.5–35.5)
APTT BLD: 31 SEC — SIGNIFICANT CHANGE UP (ref 27.5–36.3)
AST SERPL-CCNC: 13 U/L — SIGNIFICANT CHANGE UP (ref 10–40)
AST SERPL-CCNC: 22 U/L
AST SERPL-CCNC: 23 U/L
AST SERPL-CCNC: 23 U/L — SIGNIFICANT CHANGE UP (ref 10–40)
AST SERPL-CCNC: 30 U/L — SIGNIFICANT CHANGE UP (ref 10–40)
AST SERPL-CCNC: 37 U/L — SIGNIFICANT CHANGE UP (ref 10–40)
AST SERPL-CCNC: 37 U/L — SIGNIFICANT CHANGE UP (ref 10–40)
AST SERPL-CCNC: 40 U/L — HIGH (ref 4–32)
AST SERPL-CCNC: 40 U/L — HIGH (ref 4–32)
AST SERPL-CCNC: 43 U/L — HIGH (ref 10–40)
AST SERPL-CCNC: 43 U/L — HIGH (ref 4–32)
AST SERPL-CCNC: 50 U/L — HIGH (ref 4–32)
AST SERPL-CCNC: 53 U/L — HIGH (ref 4–32)
AST SERPL-CCNC: 58 U/L — HIGH (ref 10–40)
AST SERPL-CCNC: 7 U/L — SIGNIFICANT CHANGE UP (ref 4–32)
B BURGDOR DNA SPEC QL NAA+PROBE: NEGATIVE — SIGNIFICANT CHANGE UP
BACTERIA # UR AUTO: ABNORMAL
BACTERIA # UR AUTO: NEGATIVE — SIGNIFICANT CHANGE UP
BARBITURATES UR SCN-MCNC: NEGATIVE — SIGNIFICANT CHANGE UP
BASE EXCESS BLDA CALC-SCNC: -0.6 MMOL/L — SIGNIFICANT CHANGE UP
BASE EXCESS BLDV CALC-SCNC: 0.3 MMOL/L — SIGNIFICANT CHANGE UP
BASE EXCESS BLDV CALC-SCNC: 2.7 MMOL/L — SIGNIFICANT CHANGE UP
BASE EXCESS BLDV CALC-SCNC: 3.2 MMOL/L — SIGNIFICANT CHANGE UP
BASE EXCESS BLDV CALC-SCNC: 7.7 MMOL/L — HIGH (ref -2–2)
BASE EXCESS BLDV CALC-SCNC: 7.7 MMOL/L — HIGH (ref -2–2)
BASOPHILS # BLD AUTO: 0.01 K/UL — SIGNIFICANT CHANGE UP (ref 0–0.2)
BASOPHILS # BLD AUTO: 0.03 K/UL — SIGNIFICANT CHANGE UP (ref 0–0.2)
BASOPHILS # BLD AUTO: 0.04 K/UL — SIGNIFICANT CHANGE UP (ref 0–0.2)
BASOPHILS # BLD AUTO: 0.04 K/UL — SIGNIFICANT CHANGE UP (ref 0–0.2)
BASOPHILS # BLD AUTO: 0.05 K/UL — SIGNIFICANT CHANGE UP (ref 0–0.2)
BASOPHILS # BLD AUTO: 0.06 K/UL — SIGNIFICANT CHANGE UP (ref 0–0.2)
BASOPHILS # BLD AUTO: 0.06 K/UL — SIGNIFICANT CHANGE UP (ref 0–0.2)
BASOPHILS # BLD AUTO: 0.07 K/UL — SIGNIFICANT CHANGE UP (ref 0–0.2)
BASOPHILS # BLD AUTO: 0.07 K/UL — SIGNIFICANT CHANGE UP (ref 0–0.2)
BASOPHILS # BLD AUTO: 0.08 K/UL — SIGNIFICANT CHANGE UP (ref 0–0.2)
BASOPHILS # BLD AUTO: 0.1 K/UL — SIGNIFICANT CHANGE UP (ref 0–0.2)
BASOPHILS NFR BLD AUTO: 0.1 % — SIGNIFICANT CHANGE UP (ref 0–2)
BASOPHILS NFR BLD AUTO: 0.3 % — SIGNIFICANT CHANGE UP (ref 0–2)
BASOPHILS NFR BLD AUTO: 0.4 % — SIGNIFICANT CHANGE UP (ref 0–2)
BASOPHILS NFR BLD AUTO: 0.5 % — SIGNIFICANT CHANGE UP (ref 0–2)
BASOPHILS NFR BLD AUTO: 0.5 % — SIGNIFICANT CHANGE UP (ref 0–2)
BASOPHILS NFR BLD AUTO: 0.6 % — SIGNIFICANT CHANGE UP (ref 0–2)
BASOPHILS NFR BLD AUTO: 0.7 % — SIGNIFICANT CHANGE UP (ref 0–2)
BASOPHILS NFR BLD AUTO: 0.8 % — SIGNIFICANT CHANGE UP (ref 0–2)
BASOPHILS NFR BLD AUTO: 0.9 % — SIGNIFICANT CHANGE UP (ref 0–2)
BASOPHILS NFR BLD AUTO: 1 % — SIGNIFICANT CHANGE UP (ref 0–2)
BASOPHILS NFR BLD AUTO: 1.2 % — SIGNIFICANT CHANGE UP (ref 0–2)
BENZODIAZ UR-MCNC: NEGATIVE — SIGNIFICANT CHANGE UP
BILIRUB DIRECT SERPL-MCNC: <0.1 MG/DL — SIGNIFICANT CHANGE UP (ref 0–0.2)
BILIRUB INDIRECT FLD-MCNC: >0.1 MG/DL — LOW (ref 0.2–1)
BILIRUB SERPL-MCNC: 0.2 MG/DL
BILIRUB SERPL-MCNC: 0.2 MG/DL — SIGNIFICANT CHANGE UP (ref 0.2–1.2)
BILIRUB SERPL-MCNC: 0.3 MG/DL — SIGNIFICANT CHANGE UP (ref 0.2–1.2)
BILIRUB SERPL-MCNC: 0.4 MG/DL
BILIRUB SERPL-MCNC: 0.4 MG/DL — SIGNIFICANT CHANGE UP (ref 0.2–1.2)
BILIRUB SERPL-MCNC: 0.5 MG/DL — SIGNIFICANT CHANGE UP (ref 0.2–1.2)
BILIRUB SERPL-MCNC: 0.5 MG/DL — SIGNIFICANT CHANGE UP (ref 0.2–1.2)
BILIRUB UR-MCNC: NEGATIVE — SIGNIFICANT CHANGE UP
BLD GP AB SCN SERPL QL: NEGATIVE — SIGNIFICANT CHANGE UP
BLOOD GAS VENOUS - CREATININE: 1.03 MG/DL — SIGNIFICANT CHANGE UP (ref 0.5–1.3)
BLOOD GAS VENOUS - CREATININE: 1.11 MG/DL — SIGNIFICANT CHANGE UP (ref 0.5–1.3)
BLOOD UR QL VISUAL: SIGNIFICANT CHANGE UP
BUN SERPL-MCNC: 10 MG/DL — SIGNIFICANT CHANGE UP (ref 7–23)
BUN SERPL-MCNC: 11 MG/DL — SIGNIFICANT CHANGE UP (ref 7–23)
BUN SERPL-MCNC: 12 MG/DL — SIGNIFICANT CHANGE UP (ref 7–23)
BUN SERPL-MCNC: 13 MG/DL — SIGNIFICANT CHANGE UP (ref 7–23)
BUN SERPL-MCNC: 16 MG/DL — SIGNIFICANT CHANGE UP (ref 7–23)
BUN SERPL-MCNC: 17 MG/DL — SIGNIFICANT CHANGE UP (ref 7–23)
BUN SERPL-MCNC: 17 MG/DL — SIGNIFICANT CHANGE UP (ref 7–23)
BUN SERPL-MCNC: 18 MG/DL — SIGNIFICANT CHANGE UP (ref 7–23)
BUN SERPL-MCNC: 18 MG/DL — SIGNIFICANT CHANGE UP (ref 7–23)
BUN SERPL-MCNC: 19 MG/DL
BUN SERPL-MCNC: 19 MG/DL — SIGNIFICANT CHANGE UP (ref 7–23)
BUN SERPL-MCNC: 21 MG/DL
BUN SERPL-MCNC: 21 MG/DL — SIGNIFICANT CHANGE UP (ref 7–23)
BUN SERPL-MCNC: 22 MG/DL — SIGNIFICANT CHANGE UP (ref 7–23)
BUN SERPL-MCNC: 22 MG/DL — SIGNIFICANT CHANGE UP (ref 7–23)
BUN SERPL-MCNC: 24 MG/DL — HIGH (ref 7–23)
BUN SERPL-MCNC: 26 MG/DL — HIGH (ref 7–23)
BUN SERPL-MCNC: 26 MG/DL — HIGH (ref 7–23)
BUN SERPL-MCNC: 8 MG/DL — SIGNIFICANT CHANGE UP (ref 7–23)
C NEOFORM RRNA SPEC NAA+PROBE-ACNC: SIGNIFICANT CHANGE UP
CA-I SERPL-SCNC: 1.13 MMOL/L — SIGNIFICANT CHANGE UP (ref 1.12–1.3)
CA-I SERPL-SCNC: 1.14 MMOL/L — SIGNIFICANT CHANGE UP (ref 1.12–1.3)
CALCIUM SERPL-MCNC: 10 MG/DL
CALCIUM SERPL-MCNC: 10.1 MG/DL
CALCIUM SERPL-MCNC: 10.1 MG/DL — SIGNIFICANT CHANGE UP (ref 8.4–10.5)
CALCIUM SERPL-MCNC: 8 MG/DL — LOW (ref 8.4–10.5)
CALCIUM SERPL-MCNC: 8.2 MG/DL — LOW (ref 8.4–10.5)
CALCIUM SERPL-MCNC: 8.3 MG/DL — LOW (ref 8.4–10.5)
CALCIUM SERPL-MCNC: 8.6 MG/DL — SIGNIFICANT CHANGE UP (ref 8.4–10.5)
CALCIUM SERPL-MCNC: 8.6 MG/DL — SIGNIFICANT CHANGE UP (ref 8.4–10.5)
CALCIUM SERPL-MCNC: 8.7 MG/DL — SIGNIFICANT CHANGE UP (ref 8.4–10.5)
CALCIUM SERPL-MCNC: 8.7 MG/DL — SIGNIFICANT CHANGE UP (ref 8.4–10.5)
CALCIUM SERPL-MCNC: 8.8 MG/DL — SIGNIFICANT CHANGE UP (ref 8.4–10.5)
CALCIUM SERPL-MCNC: 8.9 MG/DL — SIGNIFICANT CHANGE UP (ref 8.4–10.5)
CALCIUM SERPL-MCNC: 8.9 MG/DL — SIGNIFICANT CHANGE UP (ref 8.4–10.5)
CALCIUM SERPL-MCNC: 9.1 MG/DL — SIGNIFICANT CHANGE UP (ref 8.4–10.5)
CALCIUM SERPL-MCNC: 9.1 MG/DL — SIGNIFICANT CHANGE UP (ref 8.4–10.5)
CALCIUM SERPL-MCNC: 9.2 MG/DL — SIGNIFICANT CHANGE UP (ref 8.4–10.5)
CALCIUM SERPL-MCNC: 9.3 MG/DL — SIGNIFICANT CHANGE UP (ref 8.4–10.5)
CALCIUM SERPL-MCNC: 9.4 MG/DL — SIGNIFICANT CHANGE UP (ref 8.4–10.5)
CALCIUM SERPL-MCNC: 9.5 MG/DL — SIGNIFICANT CHANGE UP (ref 8.4–10.5)
CALCIUM SERPL-MCNC: 9.5 MG/DL — SIGNIFICANT CHANGE UP (ref 8.4–10.5)
CALCIUM SERPL-MCNC: 9.6 MG/DL — SIGNIFICANT CHANGE UP (ref 8.4–10.5)
CANNABINOIDS UR-MCNC: NEGATIVE — SIGNIFICANT CHANGE UP
CEA SERPL-MCNC: 4 NG/ML
CHLORIDE BLDV-SCNC: 104 MMOL/L — SIGNIFICANT CHANGE UP (ref 96–108)
CHLORIDE BLDV-SCNC: 104 MMOL/L — SIGNIFICANT CHANGE UP (ref 96–108)
CHLORIDE BLDV-SCNC: 107 MMOL/L — SIGNIFICANT CHANGE UP (ref 96–108)
CHLORIDE BLDV-SCNC: 107 MMOL/L — SIGNIFICANT CHANGE UP (ref 96–108)
CHLORIDE SERPL-SCNC: 101 MMOL/L — SIGNIFICANT CHANGE UP (ref 96–108)
CHLORIDE SERPL-SCNC: 101 MMOL/L — SIGNIFICANT CHANGE UP (ref 98–107)
CHLORIDE SERPL-SCNC: 101 MMOL/L — SIGNIFICANT CHANGE UP (ref 98–107)
CHLORIDE SERPL-SCNC: 102 MMOL/L — SIGNIFICANT CHANGE UP (ref 96–108)
CHLORIDE SERPL-SCNC: 103 MMOL/L
CHLORIDE SERPL-SCNC: 103 MMOL/L — SIGNIFICANT CHANGE UP (ref 96–108)
CHLORIDE SERPL-SCNC: 103 MMOL/L — SIGNIFICANT CHANGE UP (ref 96–108)
CHLORIDE SERPL-SCNC: 104 MMOL/L
CHLORIDE SERPL-SCNC: 104 MMOL/L — SIGNIFICANT CHANGE UP (ref 96–108)
CHLORIDE SERPL-SCNC: 104 MMOL/L — SIGNIFICANT CHANGE UP (ref 96–108)
CHLORIDE SERPL-SCNC: 105 MMOL/L — SIGNIFICANT CHANGE UP (ref 96–108)
CHLORIDE SERPL-SCNC: 105 MMOL/L — SIGNIFICANT CHANGE UP (ref 96–108)
CHLORIDE SERPL-SCNC: 109 MMOL/L — HIGH (ref 98–107)
CHLORIDE SERPL-SCNC: 109 MMOL/L — HIGH (ref 98–107)
CHLORIDE SERPL-SCNC: 110 MMOL/L — HIGH (ref 98–107)
CHLORIDE SERPL-SCNC: 112 MMOL/L — HIGH (ref 98–107)
CHLORIDE SERPL-SCNC: 112 MMOL/L — HIGH (ref 98–107)
CHLORIDE SERPL-SCNC: 99 MMOL/L — SIGNIFICANT CHANGE UP (ref 96–108)
CHOLEST SERPL-MCNC: 249 MG/DL — HIGH (ref 10–199)
CK MB CFR SERPL CALC: 2.2 NG/ML — SIGNIFICANT CHANGE UP (ref 0–3.8)
CK SERPL-CCNC: 22 U/L — LOW (ref 25–170)
CK SERPL-CCNC: 73 U/L — SIGNIFICANT CHANGE UP (ref 25–170)
CMV DNA # UR NAA DL=200: SIGNIFICANT CHANGE UP IU/ML
CMV DNA CSF QL NAA+PROBE: SIGNIFICANT CHANGE UP
CO2 BLDV-SCNC: 34 MMOL/L — HIGH (ref 22–30)
CO2 BLDV-SCNC: 35 MMOL/L — HIGH (ref 22–30)
CO2 SERPL-SCNC: 19 MMOL/L — LOW (ref 22–31)
CO2 SERPL-SCNC: 20 MMOL/L — LOW (ref 22–31)
CO2 SERPL-SCNC: 20 MMOL/L — LOW (ref 22–31)
CO2 SERPL-SCNC: 21 MMOL/L — LOW (ref 22–31)
CO2 SERPL-SCNC: 21 MMOL/L — LOW (ref 22–31)
CO2 SERPL-SCNC: 22 MMOL/L — SIGNIFICANT CHANGE UP (ref 22–31)
CO2 SERPL-SCNC: 23 MMOL/L
CO2 SERPL-SCNC: 23 MMOL/L — SIGNIFICANT CHANGE UP (ref 22–31)
CO2 SERPL-SCNC: 23 MMOL/L — SIGNIFICANT CHANGE UP (ref 22–31)
CO2 SERPL-SCNC: 24 MMOL/L — SIGNIFICANT CHANGE UP (ref 22–31)
CO2 SERPL-SCNC: 24 MMOL/L — SIGNIFICANT CHANGE UP (ref 22–31)
CO2 SERPL-SCNC: 25 MMOL/L — SIGNIFICANT CHANGE UP (ref 22–31)
CO2 SERPL-SCNC: 26 MMOL/L
CO2 SERPL-SCNC: 26 MMOL/L — SIGNIFICANT CHANGE UP (ref 22–31)
CO2 SERPL-SCNC: 27 MMOL/L — SIGNIFICANT CHANGE UP (ref 22–31)
CO2 SERPL-SCNC: 27 MMOL/L — SIGNIFICANT CHANGE UP (ref 22–31)
CO2 SERPL-SCNC: 29 MMOL/L — SIGNIFICANT CHANGE UP (ref 22–31)
CO2 SERPL-SCNC: 30 MMOL/L — SIGNIFICANT CHANGE UP (ref 22–31)
COCAINE METAB.OTHER UR-MCNC: NEGATIVE — SIGNIFICANT CHANGE UP
COLOR CSF: SIGNIFICANT CHANGE UP
COLOR SPEC: SIGNIFICANT CHANGE UP
COLOR SPEC: YELLOW — SIGNIFICANT CHANGE UP
COLOR SPEC: YELLOW — SIGNIFICANT CHANGE UP
CORTICOSTEROID BINDING GLOBULIN RESULT: 2.8 MG/DL — SIGNIFICANT CHANGE UP
CORTIS F/TOTAL MFR SERPL: 5 % — SIGNIFICANT CHANGE UP
CORTIS SERPL-MCNC: 13 UG/DL — SIGNIFICANT CHANGE UP
CORTISOL, FREE RESULT: 0.65 UG/DL — SIGNIFICANT CHANGE UP
CREAT SERPL-MCNC: 0.63 MG/DL — SIGNIFICANT CHANGE UP (ref 0.5–1.3)
CREAT SERPL-MCNC: 0.66 MG/DL — SIGNIFICANT CHANGE UP (ref 0.5–1.3)
CREAT SERPL-MCNC: 0.8 MG/DL — SIGNIFICANT CHANGE UP (ref 0.5–1.3)
CREAT SERPL-MCNC: 0.83 MG/DL — SIGNIFICANT CHANGE UP (ref 0.5–1.3)
CREAT SERPL-MCNC: 0.84 MG/DL — SIGNIFICANT CHANGE UP (ref 0.5–1.3)
CREAT SERPL-MCNC: 0.84 MG/DL — SIGNIFICANT CHANGE UP (ref 0.5–1.3)
CREAT SERPL-MCNC: 0.88 MG/DL — SIGNIFICANT CHANGE UP (ref 0.5–1.3)
CREAT SERPL-MCNC: 0.9 MG/DL
CREAT SERPL-MCNC: 0.92 MG/DL
CREAT SERPL-MCNC: 0.93 MG/DL — SIGNIFICANT CHANGE UP (ref 0.5–1.3)
CREAT SERPL-MCNC: 0.95 MG/DL — SIGNIFICANT CHANGE UP (ref 0.5–1.3)
CREAT SERPL-MCNC: 0.98 MG/DL — SIGNIFICANT CHANGE UP (ref 0.5–1.3)
CREAT SERPL-MCNC: 0.98 MG/DL — SIGNIFICANT CHANGE UP (ref 0.5–1.3)
CREAT SERPL-MCNC: 1.09 MG/DL — SIGNIFICANT CHANGE UP (ref 0.5–1.3)
CREAT SERPL-MCNC: 1.14 MG/DL — SIGNIFICANT CHANGE UP (ref 0.5–1.3)
CREAT SERPL-MCNC: 1.15 MG/DL — SIGNIFICANT CHANGE UP (ref 0.5–1.3)
CREAT SERPL-MCNC: 1.21 MG/DL — SIGNIFICANT CHANGE UP (ref 0.5–1.3)
CREAT SERPL-MCNC: 1.22 MG/DL — SIGNIFICANT CHANGE UP (ref 0.5–1.3)
CREAT SERPL-MCNC: 1.23 MG/DL — SIGNIFICANT CHANGE UP (ref 0.5–1.3)
CREAT SERPL-MCNC: 1.25 MG/DL — SIGNIFICANT CHANGE UP (ref 0.5–1.3)
CREAT SERPL-MCNC: 1.26 MG/DL — SIGNIFICANT CHANGE UP (ref 0.5–1.3)
CREAT SERPL-MCNC: 1.38 MG/DL — HIGH (ref 0.5–1.3)
CREAT SERPL-MCNC: 1.43 MG/DL — HIGH (ref 0.5–1.3)
CRYPTOC AG CSF-ACNC: NEGATIVE — SIGNIFICANT CHANGE UP
CSF PCR RESULT: SIGNIFICANT CHANGE UP
CULTURE RESULTS: SIGNIFICANT CHANGE UP
D DIMER BLD IA.RAPID-MCNC: 245 NG/ML DDU — HIGH
DESMETHYL LACOSAMIDE: 1 UG/ML — SIGNIFICANT CHANGE UP
DESMETHYL LACOSAMIDE: 1.3 UG/ML — SIGNIFICANT CHANGE UP
DIFF PNL FLD: ABNORMAL
DIFF PNL FLD: NEGATIVE — SIGNIFICANT CHANGE UP
E COLI K1 DNA CSF QL NAA+NON-PROBE: SIGNIFICANT CHANGE UP
EBV PCR: SIGNIFICANT CHANGE UP IU/ML
EOSINOPHIL # BLD AUTO: 0 K/UL — SIGNIFICANT CHANGE UP (ref 0–0.5)
EOSINOPHIL # BLD AUTO: 0.06 K/UL — SIGNIFICANT CHANGE UP (ref 0–0.5)
EOSINOPHIL # BLD AUTO: 0.07 K/UL — SIGNIFICANT CHANGE UP (ref 0–0.5)
EOSINOPHIL # BLD AUTO: 0.09 K/UL — SIGNIFICANT CHANGE UP (ref 0–0.5)
EOSINOPHIL # BLD AUTO: 0.1 K/UL — SIGNIFICANT CHANGE UP (ref 0–0.5)
EOSINOPHIL # BLD AUTO: 0.1 K/UL — SIGNIFICANT CHANGE UP (ref 0–0.5)
EOSINOPHIL # BLD AUTO: 0.12 K/UL — SIGNIFICANT CHANGE UP (ref 0–0.5)
EOSINOPHIL # BLD AUTO: 0.13 K/UL — SIGNIFICANT CHANGE UP (ref 0–0.5)
EOSINOPHIL # BLD AUTO: 0.14 K/UL — SIGNIFICANT CHANGE UP (ref 0–0.5)
EOSINOPHIL # BLD AUTO: 0.15 K/UL — SIGNIFICANT CHANGE UP (ref 0–0.5)
EOSINOPHIL # BLD AUTO: 0.16 K/UL — SIGNIFICANT CHANGE UP (ref 0–0.5)
EOSINOPHIL # BLD AUTO: 0.19 K/UL — SIGNIFICANT CHANGE UP (ref 0–0.5)
EOSINOPHIL # BLD AUTO: 0.19 K/UL — SIGNIFICANT CHANGE UP (ref 0–0.5)
EOSINOPHIL # BLD AUTO: 0.2 K/UL — SIGNIFICANT CHANGE UP (ref 0–0.5)
EOSINOPHIL # BLD AUTO: 0.23 K/UL — SIGNIFICANT CHANGE UP (ref 0–0.5)
EOSINOPHIL # BLD AUTO: 0.24 K/UL — SIGNIFICANT CHANGE UP (ref 0–0.5)
EOSINOPHIL NFR BLD AUTO: 0 % — SIGNIFICANT CHANGE UP (ref 0–6)
EOSINOPHIL NFR BLD AUTO: 0.3 % — SIGNIFICANT CHANGE UP (ref 0–6)
EOSINOPHIL NFR BLD AUTO: 0.6 % — SIGNIFICANT CHANGE UP (ref 0–6)
EOSINOPHIL NFR BLD AUTO: 1.1 % — SIGNIFICANT CHANGE UP (ref 0–6)
EOSINOPHIL NFR BLD AUTO: 1.3 % — SIGNIFICANT CHANGE UP (ref 0–6)
EOSINOPHIL NFR BLD AUTO: 1.4 % — SIGNIFICANT CHANGE UP (ref 0–6)
EOSINOPHIL NFR BLD AUTO: 1.5 % — SIGNIFICANT CHANGE UP (ref 0–6)
EOSINOPHIL NFR BLD AUTO: 1.8 % — SIGNIFICANT CHANGE UP (ref 0–6)
EOSINOPHIL NFR BLD AUTO: 1.8 % — SIGNIFICANT CHANGE UP (ref 0–6)
EOSINOPHIL NFR BLD AUTO: 1.9 % — SIGNIFICANT CHANGE UP (ref 0–6)
EOSINOPHIL NFR BLD AUTO: 2.3 % — SIGNIFICANT CHANGE UP (ref 0–6)
EOSINOPHIL NFR BLD AUTO: 2.5 % — SIGNIFICANT CHANGE UP (ref 0–6)
EOSINOPHIL NFR BLD AUTO: 2.9 % — SIGNIFICANT CHANGE UP (ref 0–6)
EOSINOPHIL NFR BLD AUTO: 3.1 % — SIGNIFICANT CHANGE UP (ref 0–6)
EPI CELLS # UR: 1 /HPF — SIGNIFICANT CHANGE UP
EPI CELLS # UR: 1 /HPF — SIGNIFICANT CHANGE UP
EPI CELLS # UR: 8 /HPF — HIGH
EPI CELLS # UR: 9 /HPF — HIGH
ESCHERICHIA COLI K1: SIGNIFICANT CHANGE UP
ESTIMATED AVERAGE GLUCOSE: 100 MG/DL — SIGNIFICANT CHANGE UP (ref 68–114)
ETHANOL BLD-MCNC: < 10 MG/DL — SIGNIFICANT CHANGE UP
ETHANOL SERPL-MCNC: SIGNIFICANT CHANGE UP MG/DL (ref 0–10)
EV RNA CSF QL NAA+PROBE: SIGNIFICANT CHANGE UP
FERRITIN SERPL-MCNC: 590 NG/ML
FOLATE SERPL-MCNC: 15.6 NG/ML — SIGNIFICANT CHANGE UP
FOLATE SERPL-MCNC: 17.8 NG/ML — SIGNIFICANT CHANGE UP
FOLATE SERPL-MCNC: 19.4 NG/ML
FOLATE SERPL-MCNC: >20 NG/ML
GAS PNL BLDV: 138 MMOL/L — SIGNIFICANT CHANGE UP (ref 136–146)
GAS PNL BLDV: 141 MMOL/L — SIGNIFICANT CHANGE UP (ref 135–145)
GAS PNL BLDV: 141 MMOL/L — SIGNIFICANT CHANGE UP (ref 136–146)
GAS PNL BLDV: 142 MMOL/L — SIGNIFICANT CHANGE UP (ref 135–145)
GAS PNL BLDV: 142 MMOL/L — SIGNIFICANT CHANGE UP (ref 136–146)
GAS PNL BLDV: SIGNIFICANT CHANGE UP
GLUCOSE BLDA-MCNC: 120 MG/DL — HIGH (ref 70–99)
GLUCOSE BLDC GLUCOMTR-MCNC: 107 MG/DL — HIGH (ref 70–99)
GLUCOSE BLDC GLUCOMTR-MCNC: 113 MG/DL — HIGH (ref 70–99)
GLUCOSE BLDC GLUCOMTR-MCNC: 119 MG/DL — HIGH (ref 70–99)
GLUCOSE BLDC GLUCOMTR-MCNC: 120 MG/DL — HIGH (ref 70–99)
GLUCOSE BLDC GLUCOMTR-MCNC: 129 MG/DL — HIGH (ref 70–99)
GLUCOSE BLDC GLUCOMTR-MCNC: 131 MG/DL — HIGH (ref 70–99)
GLUCOSE BLDC GLUCOMTR-MCNC: 134 MG/DL — HIGH (ref 70–99)
GLUCOSE BLDC GLUCOMTR-MCNC: 153 MG/DL — HIGH (ref 70–99)
GLUCOSE BLDC GLUCOMTR-MCNC: 158 MG/DL — HIGH (ref 70–99)
GLUCOSE BLDC GLUCOMTR-MCNC: 175 MG/DL — HIGH (ref 70–99)
GLUCOSE BLDC GLUCOMTR-MCNC: 175 MG/DL — HIGH (ref 70–99)
GLUCOSE BLDC GLUCOMTR-MCNC: 192 MG/DL — HIGH (ref 70–99)
GLUCOSE BLDC GLUCOMTR-MCNC: 84 MG/DL — SIGNIFICANT CHANGE UP (ref 70–99)
GLUCOSE BLDC GLUCOMTR-MCNC: 90 MG/DL — SIGNIFICANT CHANGE UP (ref 70–99)
GLUCOSE BLDC GLUCOMTR-MCNC: 90 MG/DL — SIGNIFICANT CHANGE UP (ref 70–99)
GLUCOSE BLDC GLUCOMTR-MCNC: 93 MG/DL — SIGNIFICANT CHANGE UP (ref 70–99)
GLUCOSE BLDC GLUCOMTR-MCNC: 95 MG/DL — SIGNIFICANT CHANGE UP (ref 70–99)
GLUCOSE BLDC GLUCOMTR-MCNC: 99 MG/DL — SIGNIFICANT CHANGE UP (ref 70–99)
GLUCOSE BLDC GLUCOMTR-MCNC: 99 MG/DL — SIGNIFICANT CHANGE UP (ref 70–99)
GLUCOSE BLDV-MCNC: 100 MG/DL — HIGH (ref 70–99)
GLUCOSE BLDV-MCNC: 117 MG/DL — HIGH (ref 70–99)
GLUCOSE BLDV-MCNC: 132 MG/DL — HIGH (ref 70–99)
GLUCOSE BLDV-MCNC: 145 MG/DL — HIGH (ref 70–99)
GLUCOSE BLDV-MCNC: 90 MG/DL — SIGNIFICANT CHANGE UP (ref 70–99)
GLUCOSE CSF-MCNC: 61 MG/DL — SIGNIFICANT CHANGE UP (ref 40–70)
GLUCOSE SERPL-MCNC: 100 MG/DL
GLUCOSE SERPL-MCNC: 103 MG/DL — HIGH (ref 70–99)
GLUCOSE SERPL-MCNC: 104 MG/DL — HIGH (ref 70–99)
GLUCOSE SERPL-MCNC: 107 MG/DL — HIGH (ref 70–99)
GLUCOSE SERPL-MCNC: 112 MG/DL — HIGH (ref 70–99)
GLUCOSE SERPL-MCNC: 120 MG/DL — HIGH (ref 70–99)
GLUCOSE SERPL-MCNC: 123 MG/DL — HIGH (ref 70–99)
GLUCOSE SERPL-MCNC: 125 MG/DL — HIGH (ref 70–99)
GLUCOSE SERPL-MCNC: 126 MG/DL — HIGH (ref 70–99)
GLUCOSE SERPL-MCNC: 134 MG/DL — HIGH (ref 70–99)
GLUCOSE SERPL-MCNC: 146 MG/DL — HIGH (ref 70–99)
GLUCOSE SERPL-MCNC: 155 MG/DL — HIGH (ref 70–99)
GLUCOSE SERPL-MCNC: 79 MG/DL — SIGNIFICANT CHANGE UP (ref 70–99)
GLUCOSE SERPL-MCNC: 83 MG/DL — SIGNIFICANT CHANGE UP (ref 70–99)
GLUCOSE SERPL-MCNC: 89 MG/DL — SIGNIFICANT CHANGE UP (ref 70–99)
GLUCOSE SERPL-MCNC: 92 MG/DL — SIGNIFICANT CHANGE UP (ref 70–99)
GLUCOSE SERPL-MCNC: 93 MG/DL — SIGNIFICANT CHANGE UP (ref 70–99)
GLUCOSE SERPL-MCNC: 96 MG/DL
GLUCOSE SERPL-MCNC: 96 MG/DL — SIGNIFICANT CHANGE UP (ref 70–99)
GLUCOSE SERPL-MCNC: 96 MG/DL — SIGNIFICANT CHANGE UP (ref 70–99)
GLUCOSE SERPL-MCNC: 97 MG/DL — SIGNIFICANT CHANGE UP (ref 70–99)
GLUCOSE UR QL: NEGATIVE — SIGNIFICANT CHANGE UP
GLUCOSE UR-MCNC: NEGATIVE — SIGNIFICANT CHANGE UP
GP B STREP DNA SPEC QL NAA+PROBE: SIGNIFICANT CHANGE UP
GRAM STN FLD: SIGNIFICANT CHANGE UP
HAEM INFLU DNA SPEC QL NAA+PROBE: SIGNIFICANT CHANGE UP
HBA1C BLD-MCNC: 5.3 % — SIGNIFICANT CHANGE UP (ref 4–5.6)
HCO3 BLDA-SCNC: 24 MMOL/L — SIGNIFICANT CHANGE UP (ref 22–26)
HCO3 BLDV-SCNC: 23 MMOL/L — SIGNIFICANT CHANGE UP (ref 20–27)
HCO3 BLDV-SCNC: 24 MMOL/L — SIGNIFICANT CHANGE UP (ref 20–27)
HCO3 BLDV-SCNC: 25 MMOL/L — SIGNIFICANT CHANGE UP (ref 20–27)
HCO3 BLDV-SCNC: 32 MMOL/L — HIGH (ref 21–29)
HCO3 BLDV-SCNC: 33 MMOL/L — HIGH (ref 21–29)
HCT VFR BLD CALC: 34.2 % — LOW (ref 34.5–45)
HCT VFR BLD CALC: 35 % — SIGNIFICANT CHANGE UP (ref 34.5–45)
HCT VFR BLD CALC: 35.1 % — SIGNIFICANT CHANGE UP (ref 34.5–45)
HCT VFR BLD CALC: 35.5 % — SIGNIFICANT CHANGE UP (ref 34.5–45)
HCT VFR BLD CALC: 35.7 % — SIGNIFICANT CHANGE UP (ref 34.5–45)
HCT VFR BLD CALC: 36.2 % — SIGNIFICANT CHANGE UP (ref 34.5–45)
HCT VFR BLD CALC: 36.4 % — SIGNIFICANT CHANGE UP (ref 34.5–45)
HCT VFR BLD CALC: 36.6 % — SIGNIFICANT CHANGE UP (ref 34.5–45)
HCT VFR BLD CALC: 36.8 % — SIGNIFICANT CHANGE UP (ref 34.5–45)
HCT VFR BLD CALC: 37.7 % — SIGNIFICANT CHANGE UP (ref 34.5–45)
HCT VFR BLD CALC: 38 % — SIGNIFICANT CHANGE UP (ref 34.5–45)
HCT VFR BLD CALC: 38.3 % — SIGNIFICANT CHANGE UP (ref 34.5–45)
HCT VFR BLD CALC: 38.6 % — SIGNIFICANT CHANGE UP (ref 34.5–45)
HCT VFR BLD CALC: 39 % — SIGNIFICANT CHANGE UP (ref 34.5–45)
HCT VFR BLD CALC: 40.2 % — SIGNIFICANT CHANGE UP (ref 34.5–45)
HCT VFR BLD CALC: 40.8 % — SIGNIFICANT CHANGE UP (ref 34.5–45)
HCT VFR BLD CALC: 41.7 % — SIGNIFICANT CHANGE UP (ref 34.5–45)
HCT VFR BLD CALC: 42.1 % — SIGNIFICANT CHANGE UP (ref 34.5–45)
HCT VFR BLD CALC: 42.1 % — SIGNIFICANT CHANGE UP (ref 34.5–45)
HCT VFR BLD CALC: 42.2 % — SIGNIFICANT CHANGE UP (ref 34.5–45)
HCT VFR BLD CALC: 42.4 % — SIGNIFICANT CHANGE UP (ref 34.5–45)
HCT VFR BLD CALC: 43.1 % — SIGNIFICANT CHANGE UP (ref 34.5–45)
HCT VFR BLD CALC: 43.7 % — SIGNIFICANT CHANGE UP (ref 34.5–45)
HCT VFR BLD CALC: 44 % — SIGNIFICANT CHANGE UP (ref 34.5–45)
HCT VFR BLDA CALC: 32 % — LOW (ref 39–50)
HCT VFR BLDA CALC: 36 % — LOW (ref 39–50)
HCT VFR BLDA CALC: 48 % — HIGH (ref 34.5–46.5)
HCT VFR BLDV CALC: 42.3 % — SIGNIFICANT CHANGE UP (ref 34.5–45)
HCT VFR BLDV CALC: 46.6 % — HIGH (ref 34.5–45)
HCT VFR BLDV CALC: 47.9 % — HIGH (ref 34.5–45)
HCV AB S/CO SERPL IA: 0.34 S/CO — SIGNIFICANT CHANGE UP (ref 0–0.99)
HCV AB SERPL-IMP: SIGNIFICANT CHANGE UP
HCYS SERPL-MCNC: 12.1 UMOL/L — SIGNIFICANT CHANGE UP
HDLC SERPL-MCNC: 29 MG/DL — LOW
HGB BLD CALC-MCNC: 10.4 G/DL — LOW (ref 11.5–15.5)
HGB BLD CALC-MCNC: 11.7 G/DL — SIGNIFICANT CHANGE UP (ref 11.5–15.5)
HGB BLD-MCNC: 11.2 G/DL — LOW (ref 11.5–15.5)
HGB BLD-MCNC: 11.4 G/DL — LOW (ref 11.5–15.5)
HGB BLD-MCNC: 11.5 G/DL — SIGNIFICANT CHANGE UP (ref 11.5–15.5)
HGB BLD-MCNC: 11.5 G/DL — SIGNIFICANT CHANGE UP (ref 11.5–15.5)
HGB BLD-MCNC: 11.9 G/DL — SIGNIFICANT CHANGE UP (ref 11.5–15.5)
HGB BLD-MCNC: 12.1 G/DL — SIGNIFICANT CHANGE UP (ref 11.5–15.5)
HGB BLD-MCNC: 12.2 G/DL — SIGNIFICANT CHANGE UP (ref 11.5–15.5)
HGB BLD-MCNC: 12.2 G/DL — SIGNIFICANT CHANGE UP (ref 11.5–15.5)
HGB BLD-MCNC: 12.5 G/DL — SIGNIFICANT CHANGE UP (ref 11.5–15.5)
HGB BLD-MCNC: 12.5 G/DL — SIGNIFICANT CHANGE UP (ref 11.5–15.5)
HGB BLD-MCNC: 12.6 G/DL — SIGNIFICANT CHANGE UP (ref 11.5–15.5)
HGB BLD-MCNC: 12.7 G/DL — SIGNIFICANT CHANGE UP (ref 11.5–15.5)
HGB BLD-MCNC: 12.8 G/DL — SIGNIFICANT CHANGE UP (ref 11.5–15.5)
HGB BLD-MCNC: 13.2 G/DL — SIGNIFICANT CHANGE UP (ref 11.5–15.5)
HGB BLD-MCNC: 13.5 G/DL — SIGNIFICANT CHANGE UP (ref 11.5–15.5)
HGB BLD-MCNC: 13.6 G/DL — SIGNIFICANT CHANGE UP (ref 11.5–15.5)
HGB BLD-MCNC: 13.8 G/DL — SIGNIFICANT CHANGE UP (ref 11.5–15.5)
HGB BLD-MCNC: 14 G/DL — SIGNIFICANT CHANGE UP (ref 11.5–15.5)
HGB BLD-MCNC: 14.3 G/DL — SIGNIFICANT CHANGE UP (ref 11.5–15.5)
HGB BLD-MCNC: 14.4 G/DL — SIGNIFICANT CHANGE UP (ref 11.5–15.5)
HGB BLD-MCNC: 14.6 G/DL — SIGNIFICANT CHANGE UP (ref 11.5–15.5)
HGB BLD-MCNC: 14.7 G/DL — SIGNIFICANT CHANGE UP (ref 11.5–15.5)
HGB BLD-MCNC: 14.8 G/DL — SIGNIFICANT CHANGE UP (ref 11.5–15.5)
HGB BLD-MCNC: 14.8 G/DL — SIGNIFICANT CHANGE UP (ref 11.5–15.5)
HGB BLDA-MCNC: 15.7 G/DL — HIGH (ref 11.5–15.5)
HGB BLDV-MCNC: 13.8 G/DL — SIGNIFICANT CHANGE UP (ref 11.5–15.5)
HGB BLDV-MCNC: 15.2 G/DL — SIGNIFICANT CHANGE UP (ref 11.5–15.5)
HGB BLDV-MCNC: 15.6 G/DL — HIGH (ref 11.5–15.5)
HHV6 DNA CSF QL NAA+PROBE: SIGNIFICANT CHANGE UP
HSV1 DNA CSF QL NAA+PROBE: SIGNIFICANT CHANGE UP
HSV2 DNA CSF QL NAA+PROBE: SIGNIFICANT CHANGE UP
HYALINE CASTS # UR AUTO: 0 /LPF — SIGNIFICANT CHANGE UP (ref 0–2)
HYALINE CASTS # UR AUTO: 0 /LPF — SIGNIFICANT CHANGE UP (ref 0–2)
HYALINE CASTS # UR AUTO: 1 /LPF — SIGNIFICANT CHANGE UP (ref 0–2)
HYALINE CASTS # UR AUTO: 3 /LPF — HIGH (ref 0–2)
HYALINE CASTS # UR AUTO: NEGATIVE — SIGNIFICANT CHANGE UP
IMM GRANULOCYTES NFR BLD AUTO: 0.3 % — SIGNIFICANT CHANGE UP (ref 0–1.5)
IMM GRANULOCYTES NFR BLD AUTO: 0.3 % — SIGNIFICANT CHANGE UP (ref 0–1.5)
IMM GRANULOCYTES NFR BLD AUTO: 0.4 % — SIGNIFICANT CHANGE UP (ref 0–1.5)
IMM GRANULOCYTES NFR BLD AUTO: 0.4 % — SIGNIFICANT CHANGE UP (ref 0–1.5)
IMM GRANULOCYTES NFR BLD AUTO: 0.5 % — SIGNIFICANT CHANGE UP (ref 0–1.5)
IMM GRANULOCYTES NFR BLD AUTO: 0.6 % — SIGNIFICANT CHANGE UP (ref 0–1.5)
IMM GRANULOCYTES NFR BLD AUTO: 0.6 % — SIGNIFICANT CHANGE UP (ref 0–1.5)
IMM GRANULOCYTES NFR BLD AUTO: 0.7 % — SIGNIFICANT CHANGE UP (ref 0–1.5)
IMM GRANULOCYTES NFR BLD AUTO: 0.8 % — SIGNIFICANT CHANGE UP (ref 0–1.5)
IMM GRANULOCYTES NFR BLD AUTO: 0.9 % — SIGNIFICANT CHANGE UP (ref 0–1.5)
IMM GRANULOCYTES NFR BLD AUTO: 1.1 % — SIGNIFICANT CHANGE UP (ref 0–1.5)
IMM GRANULOCYTES NFR BLD AUTO: 1.2 % — SIGNIFICANT CHANGE UP (ref 0–1.5)
IMM GRANULOCYTES NFR BLD AUTO: 1.3 % — SIGNIFICANT CHANGE UP (ref 0–1.5)
IMM GRANULOCYTES NFR BLD AUTO: 1.4 % — SIGNIFICANT CHANGE UP (ref 0–1.5)
IMMUNOLOGIST REVIEW: SIGNIFICANT CHANGE UP
INR BLD: 0.97 RATIO — SIGNIFICANT CHANGE UP (ref 0.88–1.16)
INR BLD: 0.99 RATIO — SIGNIFICANT CHANGE UP (ref 0.88–1.16)
INR BLD: 1.12 RATIO — SIGNIFICANT CHANGE UP (ref 0.88–1.16)
IRON SATN MFR SERPL: 29 %
IRON SERPL-MCNC: 82 UG/DL
KETONES UR-MCNC: NEGATIVE — SIGNIFICANT CHANGE UP
L MONOCYTOG DNA SPEC QL NAA+PROBE: SIGNIFICANT CHANGE UP
LABORATORY COMMENT REPORT: SIGNIFICANT CHANGE UP
LACOSAMIDE (VIMPAT) RESULT: 5.3 UG/ML — SIGNIFICANT CHANGE UP (ref 1–10)
LACOSAMIDE (VIMPAT) RESULT: 5.7 UG/ML — SIGNIFICANT CHANGE UP (ref 1–10)
LACTATE BLDV-MCNC: 1.2 MMOL/L — SIGNIFICANT CHANGE UP (ref 0.7–2)
LACTATE BLDV-MCNC: 1.3 MMOL/L — SIGNIFICANT CHANGE UP (ref 0.7–2)
LACTATE BLDV-MCNC: 1.4 MMOL/L — SIGNIFICANT CHANGE UP (ref 0.5–2)
LACTATE BLDV-MCNC: 1.5 MMOL/L — SIGNIFICANT CHANGE UP (ref 0.5–2)
LACTATE SERPL-SCNC: 2.2 MMOL/L — HIGH (ref 0.7–2)
LEUKOCYTE ESTERASE UR-ACNC: ABNORMAL
LEUKOCYTE ESTERASE UR-ACNC: ABNORMAL
LEUKOCYTE ESTERASE UR-ACNC: NEGATIVE — SIGNIFICANT CHANGE UP
LEVETIRACETAM SERPL-MCNC: 22.4 MCG/ML — SIGNIFICANT CHANGE UP (ref 12–46)
LEVETIRACETAM SERPL-MCNC: 39.9 MCG/ML — SIGNIFICANT CHANGE UP (ref 12–46)
LIPID PNL WITH DIRECT LDL SERPL: 171 MG/DL — HIGH
LYMPHOCYTES # BLD AUTO: 0.77 K/UL — LOW (ref 1–3.3)
LYMPHOCYTES # BLD AUTO: 1.03 K/UL — SIGNIFICANT CHANGE UP (ref 1–3.3)
LYMPHOCYTES # BLD AUTO: 1.17 K/UL — SIGNIFICANT CHANGE UP (ref 1–3.3)
LYMPHOCYTES # BLD AUTO: 1.42 K/UL — SIGNIFICANT CHANGE UP (ref 1–3.3)
LYMPHOCYTES # BLD AUTO: 1.43 K/UL — SIGNIFICANT CHANGE UP (ref 1–3.3)
LYMPHOCYTES # BLD AUTO: 1.44 K/UL — SIGNIFICANT CHANGE UP (ref 1–3.3)
LYMPHOCYTES # BLD AUTO: 1.54 K/UL — SIGNIFICANT CHANGE UP (ref 1–3.3)
LYMPHOCYTES # BLD AUTO: 1.56 K/UL — SIGNIFICANT CHANGE UP (ref 1–3.3)
LYMPHOCYTES # BLD AUTO: 1.56 K/UL — SIGNIFICANT CHANGE UP (ref 1–3.3)
LYMPHOCYTES # BLD AUTO: 1.62 K/UL — SIGNIFICANT CHANGE UP (ref 1–3.3)
LYMPHOCYTES # BLD AUTO: 1.68 K/UL — SIGNIFICANT CHANGE UP (ref 1–3.3)
LYMPHOCYTES # BLD AUTO: 1.73 K/UL — SIGNIFICANT CHANGE UP (ref 1–3.3)
LYMPHOCYTES # BLD AUTO: 1.76 K/UL — SIGNIFICANT CHANGE UP (ref 1–3.3)
LYMPHOCYTES # BLD AUTO: 1.76 K/UL — SIGNIFICANT CHANGE UP (ref 1–3.3)
LYMPHOCYTES # BLD AUTO: 1.78 K/UL — SIGNIFICANT CHANGE UP (ref 1–3.3)
LYMPHOCYTES # BLD AUTO: 1.92 K/UL — SIGNIFICANT CHANGE UP (ref 1–3.3)
LYMPHOCYTES # BLD AUTO: 12 % — LOW (ref 13–44)
LYMPHOCYTES # BLD AUTO: 15.8 % — SIGNIFICANT CHANGE UP (ref 13–44)
LYMPHOCYTES # BLD AUTO: 17 % — SIGNIFICANT CHANGE UP (ref 13–44)
LYMPHOCYTES # BLD AUTO: 17.2 % — SIGNIFICANT CHANGE UP (ref 13–44)
LYMPHOCYTES # BLD AUTO: 18.6 % — SIGNIFICANT CHANGE UP (ref 13–44)
LYMPHOCYTES # BLD AUTO: 18.7 % — SIGNIFICANT CHANGE UP (ref 13–44)
LYMPHOCYTES # BLD AUTO: 20.3 % — SIGNIFICANT CHANGE UP (ref 13–44)
LYMPHOCYTES # BLD AUTO: 21.1 % — SIGNIFICANT CHANGE UP (ref 13–44)
LYMPHOCYTES # BLD AUTO: 21.3 % — SIGNIFICANT CHANGE UP (ref 13–44)
LYMPHOCYTES # BLD AUTO: 22.7 % — SIGNIFICANT CHANGE UP (ref 13–44)
LYMPHOCYTES # BLD AUTO: 22.9 % — SIGNIFICANT CHANGE UP (ref 13–44)
LYMPHOCYTES # BLD AUTO: 23.8 % — SIGNIFICANT CHANGE UP (ref 13–44)
LYMPHOCYTES # BLD AUTO: 24.7 % — SIGNIFICANT CHANGE UP (ref 13–44)
LYMPHOCYTES # BLD AUTO: 4.8 % — LOW (ref 13–44)
LYMPHOCYTES # BLD AUTO: 6 % — LOW (ref 13–44)
LYMPHOCYTES # BLD AUTO: 9.4 % — LOW (ref 13–44)
LYMPHOCYTES # CSF: 89 % — HIGH (ref 40–80)
MAGNESIUM SERPL-MCNC: 1.6 MG/DL — SIGNIFICANT CHANGE UP (ref 1.6–2.6)
MAGNESIUM SERPL-MCNC: 1.7 MG/DL — SIGNIFICANT CHANGE UP (ref 1.6–2.6)
MAGNESIUM SERPL-MCNC: 1.8 MG/DL — SIGNIFICANT CHANGE UP (ref 1.6–2.6)
MAGNESIUM SERPL-MCNC: 1.8 MG/DL — SIGNIFICANT CHANGE UP (ref 1.6–2.6)
MAGNESIUM SERPL-MCNC: 1.9 MG/DL
MAGNESIUM SERPL-MCNC: 1.9 MG/DL — SIGNIFICANT CHANGE UP (ref 1.6–2.6)
MAGNESIUM SERPL-MCNC: 2 MG/DL — SIGNIFICANT CHANGE UP (ref 1.6–2.6)
MAGNESIUM SERPL-MCNC: 2 MG/DL — SIGNIFICANT CHANGE UP (ref 1.6–2.6)
MAGNESIUM SERPL-MCNC: 2.3 MG/DL — SIGNIFICANT CHANGE UP (ref 1.6–2.6)
MAGNESIUM SERPL-MCNC: 2.4 MG/DL — SIGNIFICANT CHANGE UP (ref 1.6–2.6)
MCHC RBC-ENTMCNC: 31.4 PG — SIGNIFICANT CHANGE UP (ref 27–34)
MCHC RBC-ENTMCNC: 31.4 PG — SIGNIFICANT CHANGE UP (ref 27–34)
MCHC RBC-ENTMCNC: 31.7 PG — SIGNIFICANT CHANGE UP (ref 27–34)
MCHC RBC-ENTMCNC: 31.8 PG — SIGNIFICANT CHANGE UP (ref 27–34)
MCHC RBC-ENTMCNC: 31.9 PG — SIGNIFICANT CHANGE UP (ref 27–34)
MCHC RBC-ENTMCNC: 31.9 PG — SIGNIFICANT CHANGE UP (ref 27–34)
MCHC RBC-ENTMCNC: 32.1 PG — SIGNIFICANT CHANGE UP (ref 27–34)
MCHC RBC-ENTMCNC: 32.2 PG — SIGNIFICANT CHANGE UP (ref 27–34)
MCHC RBC-ENTMCNC: 32.2 PG — SIGNIFICANT CHANGE UP (ref 27–34)
MCHC RBC-ENTMCNC: 32.3 PG — SIGNIFICANT CHANGE UP (ref 27–34)
MCHC RBC-ENTMCNC: 32.4 GM/DL — SIGNIFICANT CHANGE UP (ref 32–36)
MCHC RBC-ENTMCNC: 32.4 PG — SIGNIFICANT CHANGE UP (ref 27–34)
MCHC RBC-ENTMCNC: 32.5 PG — SIGNIFICANT CHANGE UP (ref 27–34)
MCHC RBC-ENTMCNC: 32.6 % — SIGNIFICANT CHANGE UP (ref 32–36)
MCHC RBC-ENTMCNC: 32.6 GM/DL — SIGNIFICANT CHANGE UP (ref 32–36)
MCHC RBC-ENTMCNC: 32.6 GM/DL — SIGNIFICANT CHANGE UP (ref 32–36)
MCHC RBC-ENTMCNC: 32.6 PG — SIGNIFICANT CHANGE UP (ref 27–34)
MCHC RBC-ENTMCNC: 32.7 GM/DL — SIGNIFICANT CHANGE UP (ref 32–36)
MCHC RBC-ENTMCNC: 32.7 PG — SIGNIFICANT CHANGE UP (ref 27–34)
MCHC RBC-ENTMCNC: 32.7 PG — SIGNIFICANT CHANGE UP (ref 27–34)
MCHC RBC-ENTMCNC: 32.8 % — SIGNIFICANT CHANGE UP (ref 32–36)
MCHC RBC-ENTMCNC: 32.8 GM/DL — SIGNIFICANT CHANGE UP (ref 32–36)
MCHC RBC-ENTMCNC: 32.9 GM/DL — SIGNIFICANT CHANGE UP (ref 32–36)
MCHC RBC-ENTMCNC: 32.9 PG — SIGNIFICANT CHANGE UP (ref 27–34)
MCHC RBC-ENTMCNC: 32.9 PG — SIGNIFICANT CHANGE UP (ref 27–34)
MCHC RBC-ENTMCNC: 33 PG — SIGNIFICANT CHANGE UP (ref 27–34)
MCHC RBC-ENTMCNC: 33.1 GM/DL — SIGNIFICANT CHANGE UP (ref 32–36)
MCHC RBC-ENTMCNC: 33.1 GM/DL — SIGNIFICANT CHANGE UP (ref 32–36)
MCHC RBC-ENTMCNC: 33.1 PG — SIGNIFICANT CHANGE UP (ref 27–34)
MCHC RBC-ENTMCNC: 33.2 GM/DL — SIGNIFICANT CHANGE UP (ref 32–36)
MCHC RBC-ENTMCNC: 33.3 GM/DL — SIGNIFICANT CHANGE UP (ref 32–36)
MCHC RBC-ENTMCNC: 33.3 GM/DL — SIGNIFICANT CHANGE UP (ref 32–36)
MCHC RBC-ENTMCNC: 33.4 GM/DL — SIGNIFICANT CHANGE UP (ref 32–36)
MCHC RBC-ENTMCNC: 33.4 PG — SIGNIFICANT CHANGE UP (ref 27–34)
MCHC RBC-ENTMCNC: 33.5 % — SIGNIFICANT CHANGE UP (ref 32–36)
MCHC RBC-ENTMCNC: 33.6 % — SIGNIFICANT CHANGE UP (ref 32–36)
MCHC RBC-ENTMCNC: 33.7 GM/DL — SIGNIFICANT CHANGE UP (ref 32–36)
MCHC RBC-ENTMCNC: 34 GM/DL — SIGNIFICANT CHANGE UP (ref 32–36)
MCHC RBC-ENTMCNC: 34.1 GM/DL — SIGNIFICANT CHANGE UP (ref 32–36)
MCHC RBC-ENTMCNC: 34.2 GM/DL — SIGNIFICANT CHANGE UP (ref 32–36)
MCHC RBC-ENTMCNC: 34.3 % — SIGNIFICANT CHANGE UP (ref 32–36)
MCHC RBC-ENTMCNC: 34.3 GM/DL — SIGNIFICANT CHANGE UP (ref 32–36)
MCHC RBC-ENTMCNC: 34.7 GM/DL — SIGNIFICANT CHANGE UP (ref 32–36)
MCHC RBC-ENTMCNC: 34.8 % — SIGNIFICANT CHANGE UP (ref 32–36)
MCV RBC AUTO: 100 FL — SIGNIFICANT CHANGE UP (ref 80–100)
MCV RBC AUTO: 93.8 FL — SIGNIFICANT CHANGE UP (ref 80–100)
MCV RBC AUTO: 94.8 FL — SIGNIFICANT CHANGE UP (ref 80–100)
MCV RBC AUTO: 94.9 FL — SIGNIFICANT CHANGE UP (ref 80–100)
MCV RBC AUTO: 95 FL — SIGNIFICANT CHANGE UP (ref 80–100)
MCV RBC AUTO: 95 FL — SIGNIFICANT CHANGE UP (ref 80–100)
MCV RBC AUTO: 95.2 FL — SIGNIFICANT CHANGE UP (ref 80–100)
MCV RBC AUTO: 95.3 FL — SIGNIFICANT CHANGE UP (ref 80–100)
MCV RBC AUTO: 95.5 FL — SIGNIFICANT CHANGE UP (ref 80–100)
MCV RBC AUTO: 95.6 FL — SIGNIFICANT CHANGE UP (ref 80–100)
MCV RBC AUTO: 95.8 FL — SIGNIFICANT CHANGE UP (ref 80–100)
MCV RBC AUTO: 95.8 FL — SIGNIFICANT CHANGE UP (ref 80–100)
MCV RBC AUTO: 95.9 FL — SIGNIFICANT CHANGE UP (ref 80–100)
MCV RBC AUTO: 96.3 FL — SIGNIFICANT CHANGE UP (ref 80–100)
MCV RBC AUTO: 96.4 FL — SIGNIFICANT CHANGE UP (ref 80–100)
MCV RBC AUTO: 96.7 FL — SIGNIFICANT CHANGE UP (ref 80–100)
MCV RBC AUTO: 97.4 FL — SIGNIFICANT CHANGE UP (ref 80–100)
MCV RBC AUTO: 97.7 FL — SIGNIFICANT CHANGE UP (ref 80–100)
MCV RBC AUTO: 97.8 FL — SIGNIFICANT CHANGE UP (ref 80–100)
MCV RBC AUTO: 98.1 FL — SIGNIFICANT CHANGE UP (ref 80–100)
MCV RBC AUTO: 98.3 FL — SIGNIFICANT CHANGE UP (ref 80–100)
MCV RBC AUTO: 98.6 FL — SIGNIFICANT CHANGE UP (ref 80–100)
MCV RBC AUTO: 98.8 FL — SIGNIFICANT CHANGE UP (ref 80–100)
MCV RBC AUTO: 98.8 FL — SIGNIFICANT CHANGE UP (ref 80–100)
METHADONE UR-MCNC: NEGATIVE — SIGNIFICANT CHANGE UP
METHOD TYPE: SIGNIFICANT CHANGE UP
METHOD TYPE: SIGNIFICANT CHANGE UP
METHYLMALONATE SERPL-SCNC: 150 NMOL/L — SIGNIFICANT CHANGE UP (ref 0–378)
MONOCYTES # BLD AUTO: 0.28 K/UL — SIGNIFICANT CHANGE UP (ref 0–0.9)
MONOCYTES # BLD AUTO: 0.4 K/UL — SIGNIFICANT CHANGE UP (ref 0–0.9)
MONOCYTES # BLD AUTO: 0.5 K/UL — SIGNIFICANT CHANGE UP (ref 0–0.9)
MONOCYTES # BLD AUTO: 0.56 K/UL — SIGNIFICANT CHANGE UP (ref 0–0.9)
MONOCYTES # BLD AUTO: 0.57 K/UL — SIGNIFICANT CHANGE UP (ref 0–0.9)
MONOCYTES # BLD AUTO: 0.58 K/UL — SIGNIFICANT CHANGE UP (ref 0–0.9)
MONOCYTES # BLD AUTO: 0.66 K/UL — SIGNIFICANT CHANGE UP (ref 0–0.9)
MONOCYTES # BLD AUTO: 0.67 K/UL — SIGNIFICANT CHANGE UP (ref 0–0.9)
MONOCYTES # BLD AUTO: 0.67 K/UL — SIGNIFICANT CHANGE UP (ref 0–0.9)
MONOCYTES # BLD AUTO: 0.69 K/UL — SIGNIFICANT CHANGE UP (ref 0–0.9)
MONOCYTES # BLD AUTO: 0.69 K/UL — SIGNIFICANT CHANGE UP (ref 0–0.9)
MONOCYTES # BLD AUTO: 0.7 K/UL — SIGNIFICANT CHANGE UP (ref 0–0.9)
MONOCYTES # BLD AUTO: 0.72 K/UL — SIGNIFICANT CHANGE UP (ref 0–0.9)
MONOCYTES # BLD AUTO: 0.73 K/UL — SIGNIFICANT CHANGE UP (ref 0–0.9)
MONOCYTES # BLD AUTO: 0.9 K/UL — SIGNIFICANT CHANGE UP (ref 0–0.9)
MONOCYTES # BLD AUTO: 1.15 K/UL — HIGH (ref 0–0.9)
MONOCYTES NFR BLD AUTO: 1.8 % — LOW (ref 2–14)
MONOCYTES NFR BLD AUTO: 13.7 % — SIGNIFICANT CHANGE UP (ref 2–14)
MONOCYTES NFR BLD AUTO: 13.9 % — SIGNIFICANT CHANGE UP (ref 2–14)
MONOCYTES NFR BLD AUTO: 2.9 % — SIGNIFICANT CHANGE UP (ref 2–14)
MONOCYTES NFR BLD AUTO: 3.4 % — SIGNIFICANT CHANGE UP (ref 2–14)
MONOCYTES NFR BLD AUTO: 5.8 % — SIGNIFICANT CHANGE UP (ref 2–14)
MONOCYTES NFR BLD AUTO: 6.2 % — SIGNIFICANT CHANGE UP (ref 2–14)
MONOCYTES NFR BLD AUTO: 6.5 % — SIGNIFICANT CHANGE UP (ref 2–14)
MONOCYTES NFR BLD AUTO: 6.8 % — SIGNIFICANT CHANGE UP (ref 2–14)
MONOCYTES NFR BLD AUTO: 7.2 % — SIGNIFICANT CHANGE UP (ref 2–14)
MONOCYTES NFR BLD AUTO: 7.5 % — SIGNIFICANT CHANGE UP (ref 2–14)
MONOCYTES NFR BLD AUTO: 8.5 % — SIGNIFICANT CHANGE UP (ref 2–14)
MONOCYTES NFR BLD AUTO: 8.7 % — SIGNIFICANT CHANGE UP (ref 2–14)
MONOCYTES NFR BLD AUTO: 8.9 % — SIGNIFICANT CHANGE UP (ref 2–14)
MONOCYTES NFR BLD AUTO: 9.2 % — SIGNIFICANT CHANGE UP (ref 2–14)
MONOCYTES NFR BLD AUTO: 9.3 % — SIGNIFICANT CHANGE UP (ref 2–14)
MONOS+MACROS NFR CSF: 8 % — LOW (ref 15–45)
N MEN DNA SPEC QL NAA+PROBE: SIGNIFICANT CHANGE UP
NEUTROPHILS # BLD AUTO: 10.23 K/UL — HIGH (ref 1.8–7.4)
NEUTROPHILS # BLD AUTO: 14.79 K/UL — HIGH (ref 1.8–7.4)
NEUTROPHILS # BLD AUTO: 15.4 K/UL — HIGH (ref 1.8–7.4)
NEUTROPHILS # BLD AUTO: 3.68 K/UL — SIGNIFICANT CHANGE UP (ref 1.8–7.4)
NEUTROPHILS # BLD AUTO: 3.98 K/UL — SIGNIFICANT CHANGE UP (ref 1.8–7.4)
NEUTROPHILS # BLD AUTO: 4.7 K/UL — SIGNIFICANT CHANGE UP (ref 1.8–7.4)
NEUTROPHILS # BLD AUTO: 5.09 K/UL — SIGNIFICANT CHANGE UP (ref 1.8–7.4)
NEUTROPHILS # BLD AUTO: 5.2 K/UL — SIGNIFICANT CHANGE UP (ref 1.8–7.4)
NEUTROPHILS # BLD AUTO: 5.28 K/UL — SIGNIFICANT CHANGE UP (ref 1.8–7.4)
NEUTROPHILS # BLD AUTO: 5.69 K/UL — SIGNIFICANT CHANGE UP (ref 1.8–7.4)
NEUTROPHILS # BLD AUTO: 6.22 K/UL — SIGNIFICANT CHANGE UP (ref 1.8–7.4)
NEUTROPHILS # BLD AUTO: 6.57 K/UL — SIGNIFICANT CHANGE UP (ref 1.8–7.4)
NEUTROPHILS # BLD AUTO: 6.59 K/UL — SIGNIFICANT CHANGE UP (ref 1.8–7.4)
NEUTROPHILS # BLD AUTO: 6.79 K/UL — SIGNIFICANT CHANGE UP (ref 1.8–7.4)
NEUTROPHILS # BLD AUTO: 7.28 K/UL — SIGNIFICANT CHANGE UP (ref 1.8–7.4)
NEUTROPHILS # BLD AUTO: 9.9 K/UL — HIGH (ref 1.8–7.4)
NEUTROPHILS # CSF: 3 % — SIGNIFICANT CHANGE UP (ref 0–6)
NEUTROPHILS NFR BLD AUTO: 56.2 % — SIGNIFICANT CHANGE UP (ref 43–77)
NEUTROPHILS NFR BLD AUTO: 62.8 % — SIGNIFICANT CHANGE UP (ref 43–77)
NEUTROPHILS NFR BLD AUTO: 64.1 % — SIGNIFICANT CHANGE UP (ref 43–77)
NEUTROPHILS NFR BLD AUTO: 64.6 % — SIGNIFICANT CHANGE UP (ref 43–77)
NEUTROPHILS NFR BLD AUTO: 65.6 % — SIGNIFICANT CHANGE UP (ref 43–77)
NEUTROPHILS NFR BLD AUTO: 67.2 % — SIGNIFICANT CHANGE UP (ref 43–77)
NEUTROPHILS NFR BLD AUTO: 68 % — SIGNIFICANT CHANGE UP (ref 43–77)
NEUTROPHILS NFR BLD AUTO: 70.8 % — SIGNIFICANT CHANGE UP (ref 43–77)
NEUTROPHILS NFR BLD AUTO: 70.9 % — SIGNIFICANT CHANGE UP (ref 43–77)
NEUTROPHILS NFR BLD AUTO: 71.9 % — SIGNIFICANT CHANGE UP (ref 43–77)
NEUTROPHILS NFR BLD AUTO: 72.8 % — SIGNIFICANT CHANGE UP (ref 43–77)
NEUTROPHILS NFR BLD AUTO: 74.7 % — SIGNIFICANT CHANGE UP (ref 43–77)
NEUTROPHILS NFR BLD AUTO: 82.2 % — HIGH (ref 43–77)
NEUTROPHILS NFR BLD AUTO: 83.2 % — HIGH (ref 43–77)
NEUTROPHILS NFR BLD AUTO: 89.2 % — HIGH (ref 43–77)
NEUTROPHILS NFR BLD AUTO: 92.8 % — HIGH (ref 43–77)
NIGHT BLUE STAIN TISS: SIGNIFICANT CHANGE UP
NITRITE UR-MCNC: NEGATIVE — SIGNIFICANT CHANGE UP
NMDAR IGG TITR CSF IF: SIGNIFICANT CHANGE UP
NON-GYNECOLOGICAL CYTOLOGY STUDY: SIGNIFICANT CHANGE UP
NRBC # BLD: 0 /100 WBCS — SIGNIFICANT CHANGE UP (ref 0–0)
NRBC # FLD: 0 K/UL — SIGNIFICANT CHANGE UP (ref 0–0)
NRBC NFR CSF: 3 /UL — SIGNIFICANT CHANGE UP (ref 0–5)
NT-PROBNP SERPL-SCNC: 220 PG/ML — SIGNIFICANT CHANGE UP (ref 0–300)
NT-PROBNP SERPL-SCNC: 296 PG/ML — SIGNIFICANT CHANGE UP (ref 0–300)
OPIATES UR-MCNC: NEGATIVE — SIGNIFICANT CHANGE UP
ORGANISM # SPEC MICROSCOPIC CNT: SIGNIFICANT CHANGE UP
OXYCODONE UR-MCNC: NEGATIVE — SIGNIFICANT CHANGE UP
PARECHOVIRUS A RNA SPEC QL NAA+PROBE: SIGNIFICANT CHANGE UP
PCO2 BLDA: 41 MMHG — SIGNIFICANT CHANGE UP (ref 32–48)
PCO2 BLDV: 38 MMHG — LOW (ref 41–51)
PCO2 BLDV: 47 MMHG — SIGNIFICANT CHANGE UP (ref 35–50)
PCO2 BLDV: 53 MMHG — HIGH (ref 35–50)
PCO2 BLDV: 63 MMHG — HIGH (ref 41–51)
PCO2 BLDV: 67 MMHG — HIGH (ref 41–51)
PCP UR-MCNC: NEGATIVE — SIGNIFICANT CHANGE UP
PH BLDA: 7.38 PH — SIGNIFICANT CHANGE UP (ref 7.35–7.45)
PH BLDV: 7.27 PH — LOW (ref 7.32–7.43)
PH BLDV: 7.28 PH — LOW (ref 7.32–7.43)
PH BLDV: 7.41 — SIGNIFICANT CHANGE UP (ref 7.35–7.45)
PH BLDV: 7.42 PH — SIGNIFICANT CHANGE UP (ref 7.32–7.43)
PH BLDV: 7.45 — SIGNIFICANT CHANGE UP (ref 7.35–7.45)
PH UR: 6 — SIGNIFICANT CHANGE UP (ref 5–8)
PH UR: 6.5 — SIGNIFICANT CHANGE UP (ref 5–8)
PH UR: 6.5 — SIGNIFICANT CHANGE UP (ref 5–8)
PH UR: 7 — SIGNIFICANT CHANGE UP (ref 5–8)
PH UR: 8 — SIGNIFICANT CHANGE UP (ref 5–8)
PHOSPHATE SERPL-MCNC: 2.7 MG/DL — SIGNIFICANT CHANGE UP (ref 2.5–4.5)
PHOSPHATE SERPL-MCNC: 2.8 MG/DL — SIGNIFICANT CHANGE UP (ref 2.5–4.5)
PHOSPHATE SERPL-MCNC: 2.8 MG/DL — SIGNIFICANT CHANGE UP (ref 2.5–4.5)
PHOSPHATE SERPL-MCNC: 3 MG/DL — SIGNIFICANT CHANGE UP (ref 2.5–4.5)
PHOSPHATE SERPL-MCNC: 3.1 MG/DL — SIGNIFICANT CHANGE UP (ref 2.5–4.5)
PHOSPHATE SERPL-MCNC: 3.2 MG/DL — SIGNIFICANT CHANGE UP (ref 2.5–4.5)
PHOSPHATE SERPL-MCNC: 3.3 MG/DL — SIGNIFICANT CHANGE UP (ref 2.5–4.5)
PHOSPHATE SERPL-MCNC: 3.3 MG/DL — SIGNIFICANT CHANGE UP (ref 2.5–4.5)
PLATELET # BLD AUTO: 195 K/UL — SIGNIFICANT CHANGE UP (ref 150–400)
PLATELET # BLD AUTO: 202 K/UL — SIGNIFICANT CHANGE UP (ref 150–400)
PLATELET # BLD AUTO: 215 K/UL — SIGNIFICANT CHANGE UP (ref 150–400)
PLATELET # BLD AUTO: 215 K/UL — SIGNIFICANT CHANGE UP (ref 150–400)
PLATELET # BLD AUTO: 225 K/UL — SIGNIFICANT CHANGE UP (ref 150–400)
PLATELET # BLD AUTO: 231 K/UL — SIGNIFICANT CHANGE UP (ref 150–400)
PLATELET # BLD AUTO: 244 K/UL — SIGNIFICANT CHANGE UP (ref 150–400)
PLATELET # BLD AUTO: 257 K/UL — SIGNIFICANT CHANGE UP (ref 150–400)
PLATELET # BLD AUTO: 260 K/UL — SIGNIFICANT CHANGE UP (ref 150–400)
PLATELET # BLD AUTO: 261 K/UL — SIGNIFICANT CHANGE UP (ref 150–400)
PLATELET # BLD AUTO: 263 K/UL — SIGNIFICANT CHANGE UP (ref 150–400)
PLATELET # BLD AUTO: 275 K/UL — SIGNIFICANT CHANGE UP (ref 150–400)
PLATELET # BLD AUTO: 277 K/UL — SIGNIFICANT CHANGE UP (ref 150–400)
PLATELET # BLD AUTO: 285 K/UL — SIGNIFICANT CHANGE UP (ref 150–400)
PLATELET # BLD AUTO: 287 K/UL — SIGNIFICANT CHANGE UP (ref 150–400)
PLATELET # BLD AUTO: 296 K/UL — SIGNIFICANT CHANGE UP (ref 150–400)
PLATELET # BLD AUTO: 309 K/UL — SIGNIFICANT CHANGE UP (ref 150–400)
PLATELET # BLD AUTO: 327 K/UL — SIGNIFICANT CHANGE UP (ref 150–400)
PLATELET # BLD AUTO: 331 K/UL — SIGNIFICANT CHANGE UP (ref 150–400)
PLATELET # BLD AUTO: 332 K/UL — SIGNIFICANT CHANGE UP (ref 150–400)
PLATELET # BLD AUTO: 335 K/UL — SIGNIFICANT CHANGE UP (ref 150–400)
PLATELET # BLD AUTO: 395 K/UL — SIGNIFICANT CHANGE UP (ref 150–400)
PLATELET # BLD AUTO: 404 K/UL — HIGH (ref 150–400)
PLATELET # BLD AUTO: 426 K/UL — HIGH (ref 150–400)
PMV BLD: 9.3 FL — SIGNIFICANT CHANGE UP (ref 7–13)
PMV BLD: 9.3 FL — SIGNIFICANT CHANGE UP (ref 7–13)
PMV BLD: 9.5 FL — SIGNIFICANT CHANGE UP (ref 7–13)
PMV BLD: 9.9 FL — SIGNIFICANT CHANGE UP (ref 7–13)
PO2 BLDA: 82 MMHG — LOW (ref 83–108)
PO2 BLDV: 24 MMHG — LOW (ref 25–45)
PO2 BLDV: 33 MMHG — LOW (ref 35–40)
PO2 BLDV: 43 MMHG — SIGNIFICANT CHANGE UP (ref 25–45)
PO2 BLDV: 79 MMHG — HIGH (ref 35–40)
PO2 BLDV: < 24 MMHG — LOW (ref 35–40)
POTASSIUM BLDA-SCNC: 3.5 MMOL/L — SIGNIFICANT CHANGE UP (ref 3.4–4.5)
POTASSIUM BLDV-SCNC: 2.7 MMOL/L — CRITICAL LOW (ref 3.4–4.5)
POTASSIUM BLDV-SCNC: 2.7 MMOL/L — CRITICAL LOW (ref 3.5–5.3)
POTASSIUM BLDV-SCNC: 2.9 MMOL/L — CRITICAL LOW (ref 3.5–5.3)
POTASSIUM BLDV-SCNC: 3.4 MMOL/L — SIGNIFICANT CHANGE UP (ref 3.4–4.5)
POTASSIUM BLDV-SCNC: 3.6 MMOL/L — SIGNIFICANT CHANGE UP (ref 3.4–4.5)
POTASSIUM SERPL-MCNC: 2.8 MMOL/L — CRITICAL LOW (ref 3.5–5.3)
POTASSIUM SERPL-MCNC: 2.9 MMOL/L — CRITICAL LOW (ref 3.5–5.3)
POTASSIUM SERPL-MCNC: 3 MMOL/L — LOW (ref 3.5–5.3)
POTASSIUM SERPL-MCNC: 3.2 MMOL/L — LOW (ref 3.5–5.3)
POTASSIUM SERPL-MCNC: 3.2 MMOL/L — LOW (ref 3.5–5.3)
POTASSIUM SERPL-MCNC: 3.3 MMOL/L — LOW (ref 3.5–5.3)
POTASSIUM SERPL-MCNC: 3.4 MMOL/L — LOW (ref 3.5–5.3)
POTASSIUM SERPL-MCNC: 3.5 MMOL/L — SIGNIFICANT CHANGE UP (ref 3.5–5.3)
POTASSIUM SERPL-MCNC: 3.6 MMOL/L — SIGNIFICANT CHANGE UP (ref 3.5–5.3)
POTASSIUM SERPL-MCNC: 3.7 MMOL/L — SIGNIFICANT CHANGE UP (ref 3.5–5.3)
POTASSIUM SERPL-MCNC: 3.8 MMOL/L — SIGNIFICANT CHANGE UP (ref 3.5–5.3)
POTASSIUM SERPL-MCNC: 3.9 MMOL/L — SIGNIFICANT CHANGE UP (ref 3.5–5.3)
POTASSIUM SERPL-MCNC: 3.9 MMOL/L — SIGNIFICANT CHANGE UP (ref 3.5–5.3)
POTASSIUM SERPL-MCNC: 4 MMOL/L — SIGNIFICANT CHANGE UP (ref 3.5–5.3)
POTASSIUM SERPL-MCNC: 4.2 MMOL/L — SIGNIFICANT CHANGE UP (ref 3.5–5.3)
POTASSIUM SERPL-SCNC: 2.8 MMOL/L — CRITICAL LOW (ref 3.5–5.3)
POTASSIUM SERPL-SCNC: 2.9 MMOL/L — CRITICAL LOW (ref 3.5–5.3)
POTASSIUM SERPL-SCNC: 3 MMOL/L — LOW (ref 3.5–5.3)
POTASSIUM SERPL-SCNC: 3.2 MMOL/L — LOW (ref 3.5–5.3)
POTASSIUM SERPL-SCNC: 3.2 MMOL/L — LOW (ref 3.5–5.3)
POTASSIUM SERPL-SCNC: 3.3 MMOL/L — LOW (ref 3.5–5.3)
POTASSIUM SERPL-SCNC: 3.4 MMOL/L — LOW (ref 3.5–5.3)
POTASSIUM SERPL-SCNC: 3.5 MMOL/L — SIGNIFICANT CHANGE UP (ref 3.5–5.3)
POTASSIUM SERPL-SCNC: 3.6 MMOL/L — SIGNIFICANT CHANGE UP (ref 3.5–5.3)
POTASSIUM SERPL-SCNC: 3.7 MMOL/L — SIGNIFICANT CHANGE UP (ref 3.5–5.3)
POTASSIUM SERPL-SCNC: 3.8 MMOL/L — SIGNIFICANT CHANGE UP (ref 3.5–5.3)
POTASSIUM SERPL-SCNC: 3.9 MMOL/L — SIGNIFICANT CHANGE UP (ref 3.5–5.3)
POTASSIUM SERPL-SCNC: 3.9 MMOL/L — SIGNIFICANT CHANGE UP (ref 3.5–5.3)
POTASSIUM SERPL-SCNC: 4 MMOL/L — SIGNIFICANT CHANGE UP (ref 3.5–5.3)
POTASSIUM SERPL-SCNC: 4.2 MMOL/L — SIGNIFICANT CHANGE UP (ref 3.5–5.3)
POTASSIUM SERPL-SCNC: 4.4 MMOL/L
POTASSIUM SERPL-SCNC: 4.7 MMOL/L
PROCALCITONIN SERPL-MCNC: 0.17 NG/ML — HIGH (ref 0.02–0.1)
PROT CSF-MCNC: 67 MG/DL — HIGH (ref 15–45)
PROT SERPL-MCNC: 5.5 G/DL — LOW (ref 6–8.3)
PROT SERPL-MCNC: 5.9 G/DL — LOW (ref 6–8.3)
PROT SERPL-MCNC: 6 G/DL — SIGNIFICANT CHANGE UP (ref 6–8.3)
PROT SERPL-MCNC: 6 G/DL — SIGNIFICANT CHANGE UP (ref 6–8.3)
PROT SERPL-MCNC: 6.2 G/DL — SIGNIFICANT CHANGE UP (ref 6–8.3)
PROT SERPL-MCNC: 6.4 G/DL — SIGNIFICANT CHANGE UP (ref 6–8.3)
PROT SERPL-MCNC: 6.5 G/DL — SIGNIFICANT CHANGE UP (ref 6–8.3)
PROT SERPL-MCNC: 6.6 G/DL — SIGNIFICANT CHANGE UP (ref 6–8.3)
PROT SERPL-MCNC: 6.7 G/DL
PROT SERPL-MCNC: 7.3 G/DL
PROT SERPL-MCNC: 7.4 G/DL — SIGNIFICANT CHANGE UP (ref 6–8.3)
PROT SERPL-MCNC: 7.5 G/DL — SIGNIFICANT CHANGE UP (ref 6–8.3)
PROT SERPL-MCNC: 8 G/DL — SIGNIFICANT CHANGE UP (ref 6–8.3)
PROT UR-MCNC: 30 — SIGNIFICANT CHANGE UP
PROT UR-MCNC: ABNORMAL
PROT UR-MCNC: ABNORMAL
PROT UR-MCNC: SIGNIFICANT CHANGE UP
PROT UR-MCNC: SIGNIFICANT CHANGE UP
PROTHROM AB SERPL-ACNC: 11.2 SEC — SIGNIFICANT CHANGE UP (ref 10–13.1)
PROTHROM AB SERPL-ACNC: 11.3 SEC — SIGNIFICANT CHANGE UP (ref 10–12.9)
PROTHROM AB SERPL-ACNC: 13.3 SEC — SIGNIFICANT CHANGE UP (ref 10.6–13.6)
PYRIDOXAL PHOS SERPL-MCNC: 24.7 UG/L — SIGNIFICANT CHANGE UP (ref 2–32.8)
RBC # BLD: 3.46 M/UL — LOW (ref 3.8–5.2)
RBC # BLD: 3.57 M/UL — LOW (ref 3.8–5.2)
RBC # BLD: 3.58 M/UL — LOW (ref 3.8–5.2)
RBC # BLD: 3.6 M/UL — LOW (ref 3.8–5.2)
RBC # BLD: 3.64 M/UL — LOW (ref 3.8–5.2)
RBC # BLD: 3.8 M/UL — SIGNIFICANT CHANGE UP (ref 3.8–5.2)
RBC # BLD: 3.82 M/UL — SIGNIFICANT CHANGE UP (ref 3.8–5.2)
RBC # BLD: 3.84 M/UL — SIGNIFICANT CHANGE UP (ref 3.8–5.2)
RBC # BLD: 3.88 M/UL — SIGNIFICANT CHANGE UP (ref 3.8–5.2)
RBC # BLD: 3.92 M/UL — SIGNIFICANT CHANGE UP (ref 3.8–5.2)
RBC # BLD: 3.93 M/UL — SIGNIFICANT CHANGE UP (ref 3.8–5.2)
RBC # BLD: 3.96 M/UL — SIGNIFICANT CHANGE UP (ref 3.8–5.2)
RBC # BLD: 4.04 M/UL — SIGNIFICANT CHANGE UP (ref 3.8–5.2)
RBC # BLD: 4.08 M/UL — SIGNIFICANT CHANGE UP (ref 3.8–5.2)
RBC # BLD: 4.23 M/UL — SIGNIFICANT CHANGE UP (ref 3.8–5.2)
RBC # BLD: 4.26 M/UL — SIGNIFICANT CHANGE UP (ref 3.8–5.2)
RBC # BLD: 4.26 M/UL — SIGNIFICANT CHANGE UP (ref 3.8–5.2)
RBC # BLD: 4.28 M/UL — SIGNIFICANT CHANGE UP (ref 3.8–5.2)
RBC # BLD: 4.37 M/UL — SIGNIFICANT CHANGE UP (ref 3.8–5.2)
RBC # BLD: 4.43 M/UL — SIGNIFICANT CHANGE UP (ref 3.8–5.2)
RBC # BLD: 4.43 M/UL — SIGNIFICANT CHANGE UP (ref 3.8–5.2)
RBC # BLD: 4.47 M/UL — SIGNIFICANT CHANGE UP (ref 3.8–5.2)
RBC # BLD: 4.47 M/UL — SIGNIFICANT CHANGE UP (ref 3.8–5.2)
RBC # BLD: 4.59 M/UL — SIGNIFICANT CHANGE UP (ref 3.8–5.2)
RBC # CSF: 0 /UL — SIGNIFICANT CHANGE UP (ref 0–0)
RBC # FLD: 11.4 % — SIGNIFICANT CHANGE UP (ref 10.3–14.5)
RBC # FLD: 11.4 % — SIGNIFICANT CHANGE UP (ref 10.3–14.5)
RBC # FLD: 11.7 % — SIGNIFICANT CHANGE UP (ref 10.3–14.5)
RBC # FLD: 11.9 % — SIGNIFICANT CHANGE UP (ref 10.3–14.5)
RBC # FLD: 12 % — SIGNIFICANT CHANGE UP (ref 10.3–14.5)
RBC # FLD: 12.1 % — SIGNIFICANT CHANGE UP (ref 10.3–14.5)
RBC # FLD: 12.4 % — SIGNIFICANT CHANGE UP (ref 10.3–14.5)
RBC # FLD: 12.6 % — SIGNIFICANT CHANGE UP (ref 10.3–14.5)
RBC # FLD: 12.7 % — SIGNIFICANT CHANGE UP (ref 10.3–14.5)
RBC # FLD: 13.2 % — SIGNIFICANT CHANGE UP (ref 10.3–14.5)
RBC # FLD: 13.3 % — SIGNIFICANT CHANGE UP (ref 10.3–14.5)
RBC # FLD: 13.5 % — SIGNIFICANT CHANGE UP (ref 10.3–14.5)
RBC # FLD: 13.7 % — SIGNIFICANT CHANGE UP (ref 10.3–14.5)
RBC # FLD: 13.8 % — SIGNIFICANT CHANGE UP (ref 10.3–14.5)
RBC # FLD: 14 % — SIGNIFICANT CHANGE UP (ref 10.3–14.5)
RBC # FLD: 14 % — SIGNIFICANT CHANGE UP (ref 10.3–14.5)
RBC # FLD: 14.1 % — SIGNIFICANT CHANGE UP (ref 10.3–14.5)
RBC # FLD: 14.1 % — SIGNIFICANT CHANGE UP (ref 10.3–14.5)
RBC # FLD: 14.3 % — SIGNIFICANT CHANGE UP (ref 10.3–14.5)
RBC # FLD: 14.6 % — HIGH (ref 10.3–14.5)
RBC CASTS # UR COMP ASSIST: 10 /HPF — HIGH (ref 0–4)
RBC CASTS # UR COMP ASSIST: 23 /HPF — HIGH (ref 0–4)
RBC CASTS # UR COMP ASSIST: 6 /HPF — HIGH (ref 0–4)
RBC CASTS # UR COMP ASSIST: 7 /HPF — HIGH (ref 0–4)
RBC CASTS # UR COMP ASSIST: SIGNIFICANT CHANGE UP (ref 0–?)
RH IG SCN BLD-IMP: POSITIVE — SIGNIFICANT CHANGE UP
S PNEUM DNA SPEC QL NAA+PROBE: SIGNIFICANT CHANGE UP
SALICYLATES SERPL-MCNC: < 5 MG/DL — LOW (ref 15–30)
SALICYLATES SERPL-MCNC: <2 MG/DL — LOW (ref 15–30)
SAO2 % BLDA: 96.1 % — SIGNIFICANT CHANGE UP (ref 95–99)
SAO2 % BLDV: 18 % — LOW (ref 60–85)
SAO2 % BLDV: 37 % — LOW (ref 67–88)
SAO2 % BLDV: 52.2 % — LOW (ref 60–85)
SAO2 % BLDV: 78 % — SIGNIFICANT CHANGE UP (ref 67–88)
SAO2 % BLDV: 96.3 % — HIGH (ref 60–85)
SARS-COV-2 IGG SERPL QL IA: NEGATIVE — SIGNIFICANT CHANGE UP
SARS-COV-2 IGM SERPL IA-ACNC: 0.13 INDEX — SIGNIFICANT CHANGE UP
SARS-COV-2 IGM SERPL IA-ACNC: 0.32 INDEX — SIGNIFICANT CHANGE UP
SARS-COV-2 IGM SERPL IA-ACNC: <3.8 AU/ML — SIGNIFICANT CHANGE UP
SARS-COV-2 N GENE NPH QL NAA+PROBE: NOT DETECTED
SARS-COV-2 RNA SPEC QL NAA+PROBE: SIGNIFICANT CHANGE UP
SODIUM BLDA-SCNC: 138 MMOL/L — SIGNIFICANT CHANGE UP (ref 136–146)
SODIUM SERPL-SCNC: 138 MMOL/L — SIGNIFICANT CHANGE UP (ref 135–145)
SODIUM SERPL-SCNC: 138 MMOL/L — SIGNIFICANT CHANGE UP (ref 135–145)
SODIUM SERPL-SCNC: 139 MMOL/L — SIGNIFICANT CHANGE UP (ref 135–145)
SODIUM SERPL-SCNC: 140 MMOL/L — SIGNIFICANT CHANGE UP (ref 135–145)
SODIUM SERPL-SCNC: 141 MMOL/L
SODIUM SERPL-SCNC: 141 MMOL/L — SIGNIFICANT CHANGE UP (ref 135–145)
SODIUM SERPL-SCNC: 142 MMOL/L
SODIUM SERPL-SCNC: 142 MMOL/L — SIGNIFICANT CHANGE UP (ref 135–145)
SODIUM SERPL-SCNC: 142 MMOL/L — SIGNIFICANT CHANGE UP (ref 135–145)
SODIUM SERPL-SCNC: 143 MMOL/L — SIGNIFICANT CHANGE UP (ref 135–145)
SODIUM SERPL-SCNC: 143 MMOL/L — SIGNIFICANT CHANGE UP (ref 135–145)
SODIUM SERPL-SCNC: 144 MMOL/L — SIGNIFICANT CHANGE UP (ref 135–145)
SODIUM SERPL-SCNC: 144 MMOL/L — SIGNIFICANT CHANGE UP (ref 135–145)
SOURCE HSV 1/2: SIGNIFICANT CHANGE UP
SP GR SPEC: 1.01 — SIGNIFICANT CHANGE UP (ref 1.01–1.02)
SP GR SPEC: 1.02 — SIGNIFICANT CHANGE UP (ref 1.01–1.02)
SP GR SPEC: 1.02 — SIGNIFICANT CHANGE UP (ref 1–1.04)
SP GR SPEC: 1.04 — HIGH (ref 1.01–1.02)
SP GR SPEC: >1.05 (ref 1.01–1.02)
SPECIMEN SOURCE: SIGNIFICANT CHANGE UP
SQUAMOUS # UR AUTO: SIGNIFICANT CHANGE UP
T3 SERPL-MCNC: 24 NG/DL — LOW (ref 80–200)
T3FREE SERPL-MCNC: 3.3 PG/ML
T4 AB SER-ACNC: 1.1 UG/DL — LOW (ref 4.6–12)
T4 FREE SERPL-MCNC: 0.1 NG/DL — LOW (ref 0.9–1.8)
T4 FREE SERPL-MCNC: 0.6 NG/DL — LOW (ref 0.9–1.8)
T4 FREE SERPL-MCNC: 1.2 NG/DL
TIBC SERPL-MCNC: 282 UG/DL
TM INTERPRETATION: SIGNIFICANT CHANGE UP
TOTAL CHOLESTEROL/HDL RATIO MEASUREMENT: 8.5 RATIO — HIGH (ref 3.3–7.1)
TRIGL SERPL-MCNC: 245 MG/DL — HIGH (ref 10–149)
TROPONIN T, HIGH SENSITIVITY RESULT: <6 NG/L — SIGNIFICANT CHANGE UP (ref 0–51)
TSH SERPL-ACNC: 0.02 UIU/ML
TSH SERPL-MCNC: 27 UIU/ML — HIGH (ref 0.27–4.2)
TSH SERPL-MCNC: 29.5 UIU/ML — HIGH (ref 0.27–4.2)
TSH SERPL-MCNC: 8.18 UIU/ML — HIGH (ref 0.27–4.2)
TSH SERPL-MCNC: 82.8 UIU/ML — HIGH (ref 0.27–4.2)
TSH SERPL-MCNC: 97.9 UIU/ML — HIGH (ref 0.27–4.2)
TUBE TYPE: SIGNIFICANT CHANGE UP
UIBC SERPL-MCNC: 200 UG/DL
UROBILINOGEN FLD QL: NEGATIVE — SIGNIFICANT CHANGE UP
UROBILINOGEN FLD QL: NORMAL — SIGNIFICANT CHANGE UP
VALPROATE SERPL-MCNC: 44 UG/ML — LOW (ref 50–100)
VALPROATE SERPL-MCNC: 47 UG/ML — LOW (ref 50–100)
VALPROATE SERPL-MCNC: 64 UG/ML — SIGNIFICANT CHANGE UP (ref 50–100)
VALPROATE SERPL-MCNC: 77 UG/ML — SIGNIFICANT CHANGE UP (ref 50–100)
VALPROATE SERPL-MCNC: 94 UG/ML — SIGNIFICANT CHANGE UP (ref 50–100)
VANCOMYCIN FLD-MCNC: 20 UG/ML — SIGNIFICANT CHANGE UP
VANCOMYCIN TROUGH SERPL-MCNC: 31.8 UG/ML — CRITICAL HIGH (ref 10–20)
VDRL CSF-TITR: NEGATIVE — SIGNIFICANT CHANGE UP
VIT B12 SERPL-MCNC: 1157 PG/ML — SIGNIFICANT CHANGE UP (ref 232–1245)
VIT B12 SERPL-MCNC: 1341 PG/ML — HIGH (ref 232–1245)
VIT B12 SERPL-MCNC: 816 PG/ML
VIT B12 SERPL-MCNC: 829 PG/ML
VZV DNA CSF QL NAA+PROBE: SIGNIFICANT CHANGE UP
WBC # BLD: 10.28 K/UL — SIGNIFICANT CHANGE UP (ref 3.8–10.5)
WBC # BLD: 11.15 K/UL — HIGH (ref 3.8–10.5)
WBC # BLD: 11.9 K/UL — HIGH (ref 3.8–10.5)
WBC # BLD: 12.44 K/UL — HIGH (ref 3.8–10.5)
WBC # BLD: 12.93 K/UL — HIGH (ref 3.8–10.5)
WBC # BLD: 15.69 K/UL — HIGH (ref 3.8–10.5)
WBC # BLD: 15.93 K/UL — HIGH (ref 3.8–10.5)
WBC # BLD: 16.09 K/UL — HIGH (ref 3.8–10.5)
WBC # BLD: 17.28 K/UL — HIGH (ref 3.8–10.5)
WBC # BLD: 6.21 K/UL — SIGNIFICANT CHANGE UP (ref 3.8–10.5)
WBC # BLD: 6.55 K/UL — SIGNIFICANT CHANGE UP (ref 3.8–10.5)
WBC # BLD: 7.27 K/UL — SIGNIFICANT CHANGE UP (ref 3.8–10.5)
WBC # BLD: 7.44 K/UL — SIGNIFICANT CHANGE UP (ref 3.8–10.5)
WBC # BLD: 7.46 K/UL — SIGNIFICANT CHANGE UP (ref 3.8–10.5)
WBC # BLD: 7.76 K/UL — SIGNIFICANT CHANGE UP (ref 3.8–10.5)
WBC # BLD: 7.87 K/UL — SIGNIFICANT CHANGE UP (ref 3.8–10.5)
WBC # BLD: 8.28 K/UL — SIGNIFICANT CHANGE UP (ref 3.8–10.5)
WBC # BLD: 8.36 K/UL — SIGNIFICANT CHANGE UP (ref 3.8–10.5)
WBC # BLD: 8.77 K/UL — SIGNIFICANT CHANGE UP (ref 3.8–10.5)
WBC # BLD: 8.82 K/UL — SIGNIFICANT CHANGE UP (ref 3.8–10.5)
WBC # BLD: 9.05 K/UL — SIGNIFICANT CHANGE UP (ref 3.8–10.5)
WBC # BLD: 9.11 K/UL — SIGNIFICANT CHANGE UP (ref 3.8–10.5)
WBC # BLD: 9.14 K/UL — SIGNIFICANT CHANGE UP (ref 3.8–10.5)
WBC # BLD: 9.15 K/UL — SIGNIFICANT CHANGE UP (ref 3.8–10.5)
WBC # FLD AUTO: 10.28 K/UL — SIGNIFICANT CHANGE UP (ref 3.8–10.5)
WBC # FLD AUTO: 11.15 K/UL — HIGH (ref 3.8–10.5)
WBC # FLD AUTO: 11.9 K/UL — HIGH (ref 3.8–10.5)
WBC # FLD AUTO: 12.44 K/UL — HIGH (ref 3.8–10.5)
WBC # FLD AUTO: 12.93 K/UL — HIGH (ref 3.8–10.5)
WBC # FLD AUTO: 15.69 K/UL — HIGH (ref 3.8–10.5)
WBC # FLD AUTO: 15.93 K/UL — HIGH (ref 3.8–10.5)
WBC # FLD AUTO: 16.09 K/UL — HIGH (ref 3.8–10.5)
WBC # FLD AUTO: 17.28 K/UL — HIGH (ref 3.8–10.5)
WBC # FLD AUTO: 6.21 K/UL — SIGNIFICANT CHANGE UP (ref 3.8–10.5)
WBC # FLD AUTO: 6.55 K/UL — SIGNIFICANT CHANGE UP (ref 3.8–10.5)
WBC # FLD AUTO: 7.27 K/UL — SIGNIFICANT CHANGE UP (ref 3.8–10.5)
WBC # FLD AUTO: 7.44 K/UL — SIGNIFICANT CHANGE UP (ref 3.8–10.5)
WBC # FLD AUTO: 7.46 K/UL — SIGNIFICANT CHANGE UP (ref 3.8–10.5)
WBC # FLD AUTO: 7.76 K/UL — SIGNIFICANT CHANGE UP (ref 3.8–10.5)
WBC # FLD AUTO: 7.87 K/UL — SIGNIFICANT CHANGE UP (ref 3.8–10.5)
WBC # FLD AUTO: 8.28 K/UL — SIGNIFICANT CHANGE UP (ref 3.8–10.5)
WBC # FLD AUTO: 8.36 K/UL — SIGNIFICANT CHANGE UP (ref 3.8–10.5)
WBC # FLD AUTO: 8.77 K/UL — SIGNIFICANT CHANGE UP (ref 3.8–10.5)
WBC # FLD AUTO: 8.82 K/UL — SIGNIFICANT CHANGE UP (ref 3.8–10.5)
WBC # FLD AUTO: 9.05 K/UL — SIGNIFICANT CHANGE UP (ref 3.8–10.5)
WBC # FLD AUTO: 9.11 K/UL — SIGNIFICANT CHANGE UP (ref 3.8–10.5)
WBC # FLD AUTO: 9.14 K/UL — SIGNIFICANT CHANGE UP (ref 3.8–10.5)
WBC # FLD AUTO: 9.15 K/UL — SIGNIFICANT CHANGE UP (ref 3.8–10.5)
WBC UR QL: 2 /HPF — SIGNIFICANT CHANGE UP (ref 0–5)
WBC UR QL: 2 /HPF — SIGNIFICANT CHANGE UP (ref 0–5)
WBC UR QL: 3 /HPF — SIGNIFICANT CHANGE UP (ref 0–5)
WBC UR QL: 7 /HPF — HIGH (ref 0–5)
WBC UR QL: SIGNIFICANT CHANGE UP (ref 0–?)
WNV IGG CSF IA-ACNC: NEGATIVE — SIGNIFICANT CHANGE UP
WNV IGM CSF IA-ACNC: NEGATIVE — SIGNIFICANT CHANGE UP

## 2020-01-01 PROCEDURE — 70553 MRI BRAIN STEM W/O & W/DYE: CPT

## 2020-01-01 PROCEDURE — 95720 EEG PHY/QHP EA INCR W/VEEG: CPT

## 2020-01-01 PROCEDURE — 88307 TISSUE EXAM BY PATHOLOGIST: CPT | Mod: 26

## 2020-01-01 PROCEDURE — 70450 CT HEAD/BRAIN W/O DYE: CPT | Mod: 26

## 2020-01-01 PROCEDURE — 80177 DRUG SCRN QUAN LEVETIRACETAM: CPT

## 2020-01-01 PROCEDURE — 78815 PET IMAGE W/CT SKULL-THIGH: CPT

## 2020-01-01 PROCEDURE — 99254 IP/OBS CNSLTJ NEW/EST MOD 60: CPT

## 2020-01-01 PROCEDURE — 83036 HEMOGLOBIN GLYCOSYLATED A1C: CPT

## 2020-01-01 PROCEDURE — 81001 URINALYSIS AUTO W/SCOPE: CPT

## 2020-01-01 PROCEDURE — 87086 URINE CULTURE/COLONY COUNT: CPT

## 2020-01-01 PROCEDURE — 99223 1ST HOSP IP/OBS HIGH 75: CPT | Mod: AI

## 2020-01-01 PROCEDURE — 80235 DRUG ASSAY LACOSAMIDE: CPT

## 2020-01-01 PROCEDURE — 97166 OT EVAL MOD COMPLEX 45 MIN: CPT

## 2020-01-01 PROCEDURE — 80164 ASSAY DIPROPYLACETIC ACD TOT: CPT

## 2020-01-01 PROCEDURE — 74177 CT ABD & PELVIS W/CONTRAST: CPT

## 2020-01-01 PROCEDURE — 80061 LIPID PANEL: CPT

## 2020-01-01 PROCEDURE — 70553 MRI BRAIN STEM W/O & W/DYE: CPT | Mod: 26

## 2020-01-01 PROCEDURE — 99223 1ST HOSP IP/OBS HIGH 75: CPT

## 2020-01-01 PROCEDURE — 93010 ELECTROCARDIOGRAM REPORT: CPT

## 2020-01-01 PROCEDURE — 99285 EMERGENCY DEPT VISIT HI MDM: CPT

## 2020-01-01 PROCEDURE — 99254 IP/OBS CNSLTJ NEW/EST MOD 60: CPT | Mod: GC

## 2020-01-01 PROCEDURE — 82803 BLOOD GASES ANY COMBINATION: CPT

## 2020-01-01 PROCEDURE — 71260 CT THORAX DX C+: CPT | Mod: 26

## 2020-01-01 PROCEDURE — 95718 EEG PHYS/QHP 2-12 HR W/VEEG: CPT

## 2020-01-01 PROCEDURE — 82962 GLUCOSE BLOOD TEST: CPT

## 2020-01-01 PROCEDURE — 99214 OFFICE O/P EST MOD 30 MIN: CPT | Mod: 95

## 2020-01-01 PROCEDURE — 80307 DRUG TEST PRSMV CHEM ANLYZR: CPT

## 2020-01-01 PROCEDURE — 88108 CYTOPATH CONCENTRATE TECH: CPT

## 2020-01-01 PROCEDURE — 78815 PET IMAGE W/CT SKULL-THIGH: CPT | Mod: 26,PS

## 2020-01-01 PROCEDURE — 86788 WEST NILE VIRUS AB IGM: CPT

## 2020-01-01 PROCEDURE — 85027 COMPLETE CBC AUTOMATED: CPT

## 2020-01-01 PROCEDURE — 93970 EXTREMITY STUDY: CPT

## 2020-01-01 PROCEDURE — 82330 ASSAY OF CALCIUM: CPT

## 2020-01-01 PROCEDURE — 82040 ASSAY OF SERUM ALBUMIN: CPT

## 2020-01-01 PROCEDURE — C9254: CPT

## 2020-01-01 PROCEDURE — 71260 CT THORAX DX C+: CPT

## 2020-01-01 PROCEDURE — 80053 COMPREHEN METABOLIC PANEL: CPT

## 2020-01-01 PROCEDURE — 88360 TUMOR IMMUNOHISTOCHEM/MANUAL: CPT | Mod: 26

## 2020-01-01 PROCEDURE — U0003: CPT

## 2020-01-01 PROCEDURE — 88185 FLOWCYTOMETRY/TC ADD-ON: CPT

## 2020-01-01 PROCEDURE — 85014 HEMATOCRIT: CPT

## 2020-01-01 PROCEDURE — 85025 COMPLETE CBC W/AUTO DIFF WBC: CPT

## 2020-01-01 PROCEDURE — 86255 FLUORESCENT ANTIBODY SCREEN: CPT

## 2020-01-01 PROCEDURE — 99423 OL DIG E/M SVC 21+ MIN: CPT

## 2020-01-01 PROCEDURE — 99233 SBSQ HOSP IP/OBS HIGH 50: CPT

## 2020-01-01 PROCEDURE — 88342 IMHCHEM/IMCYTCHM 1ST ANTB: CPT

## 2020-01-01 PROCEDURE — 85730 THROMBOPLASTIN TIME PARTIAL: CPT

## 2020-01-01 PROCEDURE — 83090 ASSAY OF HOMOCYSTEINE: CPT

## 2020-01-01 PROCEDURE — 95816 EEG AWAKE AND DROWSY: CPT | Mod: 26

## 2020-01-01 PROCEDURE — 93005 ELECTROCARDIOGRAM TRACING: CPT

## 2020-01-01 PROCEDURE — 99233 SBSQ HOSP IP/OBS HIGH 50: CPT | Mod: GC

## 2020-01-01 PROCEDURE — 0042T: CPT

## 2020-01-01 PROCEDURE — 84439 ASSAY OF FREE THYROXINE: CPT

## 2020-01-01 PROCEDURE — 88188 FLOWCYTOMETRY/READ 9-15: CPT

## 2020-01-01 PROCEDURE — 76498 UNLISTED MR PROCEDURE: CPT

## 2020-01-01 PROCEDURE — 87476 LYME DIS DNA AMP PROBE: CPT

## 2020-01-01 PROCEDURE — 92610 EVALUATE SWALLOWING FUNCTION: CPT

## 2020-01-01 PROCEDURE — 99496 TRANSJ CARE MGMT HIGH F2F 7D: CPT

## 2020-01-01 PROCEDURE — 86900 BLOOD TYPING SEROLOGIC ABO: CPT

## 2020-01-01 PROCEDURE — 88307 TISSUE EXAM BY PATHOLOGIST: CPT

## 2020-01-01 PROCEDURE — 85018 HEMOGLOBIN: CPT

## 2020-01-01 PROCEDURE — 82550 ASSAY OF CK (CPK): CPT

## 2020-01-01 PROCEDURE — 99203 OFFICE O/P NEW LOW 30 MIN: CPT

## 2020-01-01 PROCEDURE — 84145 PROCALCITONIN (PCT): CPT

## 2020-01-01 PROCEDURE — 99291 CRITICAL CARE FIRST HOUR: CPT

## 2020-01-01 PROCEDURE — 87116 MYCOBACTERIA CULTURE: CPT

## 2020-01-01 PROCEDURE — 77334 RADIATION TREATMENT AID(S): CPT | Mod: 26

## 2020-01-01 PROCEDURE — 83735 ASSAY OF MAGNESIUM: CPT

## 2020-01-01 PROCEDURE — 97129 THER IVNTJ 1ST 15 MIN: CPT

## 2020-01-01 PROCEDURE — 97110 THERAPEUTIC EXERCISES: CPT

## 2020-01-01 PROCEDURE — C1889: CPT

## 2020-01-01 PROCEDURE — 80076 HEPATIC FUNCTION PANEL: CPT

## 2020-01-01 PROCEDURE — 82746 ASSAY OF FOLIC ACID SERUM: CPT

## 2020-01-01 PROCEDURE — 82945 GLUCOSE OTHER FLUID: CPT

## 2020-01-01 PROCEDURE — 71045 X-RAY EXAM CHEST 1 VIEW: CPT | Mod: 26

## 2020-01-01 PROCEDURE — 82553 CREATINE MB FRACTION: CPT

## 2020-01-01 PROCEDURE — 88341 IMHCHEM/IMCYTCHM EA ADD ANTB: CPT | Mod: 26,59

## 2020-01-01 PROCEDURE — 88108 CYTOPATH CONCENTRATE TECH: CPT | Mod: 26

## 2020-01-01 PROCEDURE — 99223 1ST HOSP IP/OBS HIGH 75: CPT | Mod: GC

## 2020-01-01 PROCEDURE — 71045 X-RAY EXAM CHEST 1 VIEW: CPT

## 2020-01-01 PROCEDURE — 99239 HOSP IP/OBS DSCHRG MGMT >30: CPT

## 2020-01-01 PROCEDURE — ZZZZZ: CPT

## 2020-01-01 PROCEDURE — A9552: CPT

## 2020-01-01 PROCEDURE — 93306 TTE W/DOPPLER COMPLETE: CPT | Mod: 26

## 2020-01-01 PROCEDURE — 95700 EEG CONT REC W/VID EEG TECH: CPT

## 2020-01-01 PROCEDURE — 84443 ASSAY THYROID STIM HORMONE: CPT

## 2020-01-01 PROCEDURE — 87040 BLOOD CULTURE FOR BACTERIA: CPT

## 2020-01-01 PROCEDURE — 99214 OFFICE O/P EST MOD 30 MIN: CPT | Mod: 25

## 2020-01-01 PROCEDURE — 84484 ASSAY OF TROPONIN QUANT: CPT

## 2020-01-01 PROCEDURE — 97161 PT EVAL LOW COMPLEX 20 MIN: CPT

## 2020-01-01 PROCEDURE — 82533 TOTAL CORTISOL: CPT

## 2020-01-01 PROCEDURE — 99291 CRITICAL CARE FIRST HOUR: CPT | Mod: 25

## 2020-01-01 PROCEDURE — 87102 FUNGUS ISOLATION CULTURE: CPT

## 2020-01-01 PROCEDURE — 99253 IP/OBS CNSLTJ NEW/EST LOW 45: CPT | Mod: GC

## 2020-01-01 PROCEDURE — 77432 STEREOTACTIC RADIATION TRMT: CPT

## 2020-01-01 PROCEDURE — 84436 ASSAY OF TOTAL THYROXINE: CPT

## 2020-01-01 PROCEDURE — A9585: CPT

## 2020-01-01 PROCEDURE — 84100 ASSAY OF PHOSPHORUS: CPT

## 2020-01-01 PROCEDURE — 70498 CT ANGIOGRAPHY NECK: CPT

## 2020-01-01 PROCEDURE — 97116 GAIT TRAINING THERAPY: CPT

## 2020-01-01 PROCEDURE — 85610 PROTHROMBIN TIME: CPT

## 2020-01-01 PROCEDURE — 87070 CULTURE OTHR SPECIMN AEROBIC: CPT

## 2020-01-01 PROCEDURE — 94640 AIRWAY INHALATION TREATMENT: CPT

## 2020-01-01 PROCEDURE — 76604 US EXAM CHEST: CPT | Mod: 26,GC

## 2020-01-01 PROCEDURE — 99214 OFFICE O/P EST MOD 30 MIN: CPT

## 2020-01-01 PROCEDURE — 99239 HOSP IP/OBS DSCHRG MGMT >30: CPT | Mod: GC

## 2020-01-01 PROCEDURE — 77263 THER RADIOLOGY TX PLNG CPLX: CPT

## 2020-01-01 PROCEDURE — 80048 BASIC METABOLIC PNL TOTAL CA: CPT

## 2020-01-01 PROCEDURE — 82140 ASSAY OF AMMONIA: CPT

## 2020-01-01 PROCEDURE — 86341 ISLET CELL ANTIBODY: CPT

## 2020-01-01 PROCEDURE — 82947 ASSAY GLUCOSE BLOOD QUANT: CPT

## 2020-01-01 PROCEDURE — 93306 TTE W/DOPPLER COMPLETE: CPT

## 2020-01-01 PROCEDURE — 74177 CT ABD & PELVIS W/CONTRAST: CPT | Mod: 26

## 2020-01-01 PROCEDURE — 86901 BLOOD TYPING SEROLOGIC RH(D): CPT

## 2020-01-01 PROCEDURE — 88360 TUMOR IMMUNOHISTOCHEM/MANUAL: CPT

## 2020-01-01 PROCEDURE — 82435 ASSAY OF BLOOD CHLORIDE: CPT

## 2020-01-01 PROCEDURE — 95715 VEEG EA 12-26HR INTMT MNTR: CPT

## 2020-01-01 PROCEDURE — 95816 EEG AWAKE AND DROWSY: CPT

## 2020-01-01 PROCEDURE — 84480 ASSAY TRIIODOTHYRONINE (T3): CPT

## 2020-01-01 PROCEDURE — 84207 ASSAY OF VITAMIN B-6: CPT

## 2020-01-01 PROCEDURE — 85379 FIBRIN DEGRADATION QUANT: CPT

## 2020-01-01 PROCEDURE — 61781 SCAN PROC CRANIAL INTRA: CPT

## 2020-01-01 PROCEDURE — 83921 ORGANIC ACID SINGLE QUANT: CPT

## 2020-01-01 PROCEDURE — 61510 CRNEC TREPH EXC BRN TUM STTL: CPT

## 2020-01-01 PROCEDURE — 96365 THER/PROPH/DIAG IV INF INIT: CPT

## 2020-01-01 PROCEDURE — 70498 CT ANGIOGRAPHY NECK: CPT | Mod: 26

## 2020-01-01 PROCEDURE — 86592 SYPHILIS TEST NON-TREP QUAL: CPT

## 2020-01-01 PROCEDURE — 97130 THER IVNTJ EA ADDL 15 MIN: CPT

## 2020-01-01 PROCEDURE — 86769 SARS-COV-2 COVID-19 ANTIBODY: CPT

## 2020-01-01 PROCEDURE — 82024 ASSAY OF ACTH: CPT

## 2020-01-01 PROCEDURE — 99215 OFFICE O/P EST HI 40 MIN: CPT

## 2020-01-01 PROCEDURE — 94010 BREATHING CAPACITY TEST: CPT

## 2020-01-01 PROCEDURE — 83519 RIA NONANTIBODY: CPT

## 2020-01-01 PROCEDURE — 77290 THER RAD SIMULAJ FIELD CPLX: CPT | Mod: 26

## 2020-01-01 PROCEDURE — 84295 ASSAY OF SERUM SODIUM: CPT

## 2020-01-01 PROCEDURE — 99443: CPT

## 2020-01-01 PROCEDURE — 97530 THERAPEUTIC ACTIVITIES: CPT

## 2020-01-01 PROCEDURE — 70450 CT HEAD/BRAIN W/O DYE: CPT | Mod: 26,59

## 2020-01-01 PROCEDURE — 99215 OFFICE O/P EST HI 40 MIN: CPT | Mod: 95

## 2020-01-01 PROCEDURE — 70496 CT ANGIOGRAPHY HEAD: CPT | Mod: 26

## 2020-01-01 PROCEDURE — 99222 1ST HOSP IP/OBS MODERATE 55: CPT | Mod: GC

## 2020-01-01 PROCEDURE — 89051 BODY FLUID CELL COUNT: CPT

## 2020-01-01 PROCEDURE — 76536 US EXAM OF HEAD AND NECK: CPT

## 2020-01-01 PROCEDURE — 77300 RADIATION THERAPY DOSE PLAN: CPT | Mod: 26

## 2020-01-01 PROCEDURE — C1769: CPT

## 2020-01-01 PROCEDURE — 86789 WEST NILE VIRUS ANTIBODY: CPT

## 2020-01-01 PROCEDURE — 88342 IMHCHEM/IMCYTCHM 1ST ANTB: CPT | Mod: 26,59

## 2020-01-01 PROCEDURE — 99285 EMERGENCY DEPT VISIT HI MDM: CPT | Mod: 25

## 2020-01-01 PROCEDURE — 82607 VITAMIN B-12: CPT

## 2020-01-01 PROCEDURE — 86850 RBC ANTIBODY SCREEN: CPT

## 2020-01-01 PROCEDURE — 86803 HEPATITIS C AB TEST: CPT

## 2020-01-01 PROCEDURE — 88341 IMHCHEM/IMCYTCHM EA ADD ANTB: CPT

## 2020-01-01 PROCEDURE — C1713: CPT

## 2020-01-01 PROCEDURE — 95714 VEEG EA 12-26 HR UNMNTR: CPT

## 2020-01-01 PROCEDURE — 99221 1ST HOSP IP/OBS SF/LOW 40: CPT | Mod: AI,GC

## 2020-01-01 PROCEDURE — 83880 ASSAY OF NATRIURETIC PEPTIDE: CPT

## 2020-01-01 PROCEDURE — 87529 HSV DNA AMP PROBE: CPT

## 2020-01-01 PROCEDURE — 93308 TTE F-UP OR LMTD: CPT | Mod: 26,GC

## 2020-01-01 PROCEDURE — 87799 DETECT AGENT NOS DNA QUANT: CPT

## 2020-01-01 PROCEDURE — 99213 OFFICE O/P EST LOW 20 MIN: CPT | Mod: 95

## 2020-01-01 PROCEDURE — 70450 CT HEAD/BRAIN W/O DYE: CPT

## 2020-01-01 PROCEDURE — 70496 CT ANGIOGRAPHY HEAD: CPT

## 2020-01-01 PROCEDURE — 87205 SMEAR GRAM STAIN: CPT

## 2020-01-01 PROCEDURE — 77295 3-D RADIOTHERAPY PLAN: CPT | Mod: 26

## 2020-01-01 PROCEDURE — 99232 SBSQ HOSP IP/OBS MODERATE 35: CPT

## 2020-01-01 PROCEDURE — 87483 CNS DNA AMP PROBE TYPE 12-25: CPT

## 2020-01-01 PROCEDURE — 76536 US EXAM OF HEAD AND NECK: CPT | Mod: 26

## 2020-01-01 PROCEDURE — 87496 CYTOMEG DNA AMP PROBE: CPT

## 2020-01-01 PROCEDURE — 84157 ASSAY OF PROTEIN OTHER: CPT

## 2020-01-01 PROCEDURE — 87186 SC STD MICRODIL/AGAR DIL: CPT

## 2020-01-01 PROCEDURE — 61796 SRS CRANIAL LESION SIMPLE: CPT | Mod: 58

## 2020-01-01 PROCEDURE — 86403 PARTICLE AGGLUT ANTBDY SCRN: CPT

## 2020-01-01 PROCEDURE — 84132 ASSAY OF SERUM POTASSIUM: CPT

## 2020-01-01 PROCEDURE — 95711 VEEG 2-12 HR UNMONITORED: CPT

## 2020-01-01 PROCEDURE — 83605 ASSAY OF LACTIC ACID: CPT

## 2020-01-01 PROCEDURE — 93970 EXTREMITY STUDY: CPT | Mod: 26

## 2020-01-01 PROCEDURE — 97162 PT EVAL MOD COMPLEX 30 MIN: CPT

## 2020-01-01 PROCEDURE — 99024 POSTOP FOLLOW-UP VISIT: CPT

## 2020-01-01 PROCEDURE — 88184 FLOWCYTOMETRY/ TC 1 MARKER: CPT

## 2020-01-01 RX ORDER — LACOSAMIDE 50 MG/1
1 TABLET ORAL
Qty: 0 | Refills: 0 | DISCHARGE
Start: 2020-01-01

## 2020-01-01 RX ORDER — SUMATRIPTAN SUCCINATE 4 MG/.5ML
100 INJECTION, SOLUTION SUBCUTANEOUS ONCE
Refills: 0 | Status: DISCONTINUED | OUTPATIENT
Start: 2020-01-01 | End: 2020-01-01

## 2020-01-01 RX ORDER — LEVOTHYROXINE SODIUM 125 MCG
100 TABLET ORAL DAILY
Refills: 0 | Status: DISCONTINUED | OUTPATIENT
Start: 2020-01-01 | End: 2020-01-01

## 2020-01-01 RX ORDER — VALPROIC ACID (AS SODIUM SALT) 250 MG/5ML
750 SOLUTION, ORAL ORAL ONCE
Refills: 0 | Status: COMPLETED | OUTPATIENT
Start: 2020-01-01 | End: 2020-01-01

## 2020-01-01 RX ORDER — ACETAMINOPHEN 500 MG
650 TABLET ORAL EVERY 6 HOURS
Refills: 0 | Status: DISCONTINUED | OUTPATIENT
Start: 2020-01-01 | End: 2020-01-01

## 2020-01-01 RX ORDER — VALPROIC ACID (AS SODIUM SALT) 250 MG/5ML
500 SOLUTION, ORAL ORAL ONCE
Refills: 0 | Status: DISCONTINUED | OUTPATIENT
Start: 2020-01-01 | End: 2020-01-01

## 2020-01-01 RX ORDER — LEVETIRACETAM 250 MG/1
1000 TABLET, FILM COATED ORAL EVERY 12 HOURS
Refills: 0 | Status: DISCONTINUED | OUTPATIENT
Start: 2020-01-01 | End: 2020-01-01

## 2020-01-01 RX ORDER — HYDROCORTISONE 1 %
1 OINTMENT (GRAM) TOPICAL THREE TIMES A DAY
Refills: 0 | Status: DISCONTINUED | OUTPATIENT
Start: 2020-01-01 | End: 2020-01-01

## 2020-01-01 RX ORDER — ALPRAZOLAM 0.25 MG
0.25 TABLET ORAL ONCE
Refills: 0 | Status: DISCONTINUED | OUTPATIENT
Start: 2020-01-01 | End: 2020-01-01

## 2020-01-01 RX ORDER — LACOSAMIDE 50 MG/1
150 TABLET ORAL
Refills: 0 | Status: DISCONTINUED | OUTPATIENT
Start: 2020-01-01 | End: 2020-01-01

## 2020-01-01 RX ORDER — SODIUM CHLORIDE 9 MG/ML
500 INJECTION INTRAMUSCULAR; INTRAVENOUS; SUBCUTANEOUS ONCE
Refills: 0 | Status: COMPLETED | OUTPATIENT
Start: 2020-01-01 | End: 2020-01-01

## 2020-01-01 RX ORDER — TIOTROPIUM BROMIDE 18 UG/1
1 CAPSULE ORAL; RESPIRATORY (INHALATION)
Qty: 0 | Refills: 0 | DISCHARGE
Start: 2020-01-01

## 2020-01-01 RX ORDER — VANCOMYCIN HCL 1 G
1000 VIAL (EA) INTRAVENOUS ONCE
Refills: 0 | Status: COMPLETED | OUTPATIENT
Start: 2020-01-01 | End: 2020-01-01

## 2020-01-01 RX ORDER — ALPRAZOLAM 0.25 MG
0.25 TABLET ORAL EVERY 12 HOURS
Refills: 0 | Status: DISCONTINUED | OUTPATIENT
Start: 2020-01-01 | End: 2020-01-01

## 2020-01-01 RX ORDER — TIOTROPIUM BROMIDE 18 UG/1
1 CAPSULE ORAL; RESPIRATORY (INHALATION) DAILY
Refills: 0 | Status: DISCONTINUED | OUTPATIENT
Start: 2020-01-01 | End: 2020-01-01

## 2020-01-01 RX ORDER — DEXTROSE MONOHYDRATE, SODIUM CHLORIDE, AND POTASSIUM CHLORIDE 50; .745; 4.5 G/1000ML; G/1000ML; G/1000ML
1000 INJECTION, SOLUTION INTRAVENOUS
Refills: 0 | Status: DISCONTINUED | OUTPATIENT
Start: 2020-01-01 | End: 2020-01-01

## 2020-01-01 RX ORDER — LACOSAMIDE 50 MG/1
200 TABLET ORAL ONCE
Refills: 0 | Status: DISCONTINUED | OUTPATIENT
Start: 2020-01-01 | End: 2020-01-01

## 2020-01-01 RX ORDER — ONDANSETRON 4 MG/1
4 TABLET, ORALLY DISINTEGRATING ORAL EVERY 8 HOURS
Qty: 6 | Refills: 0 | Status: DISCONTINUED | COMMUNITY
Start: 2019-10-11 | End: 2020-01-01

## 2020-01-01 RX ORDER — ENOXAPARIN SODIUM 100 MG/ML
40 INJECTION SUBCUTANEOUS DAILY
Refills: 0 | Status: DISCONTINUED | OUTPATIENT
Start: 2020-01-01 | End: 2020-01-01

## 2020-01-01 RX ORDER — LEVETIRACETAM 250 MG/1
500 TABLET, FILM COATED ORAL
Refills: 0 | Status: DISCONTINUED | OUTPATIENT
Start: 2020-01-01 | End: 2020-01-01

## 2020-01-01 RX ORDER — MAGNESIUM OXIDE 400 MG ORAL TABLET 241.3 MG
1 TABLET ORAL
Qty: 30 | Refills: 0
Start: 2020-01-01 | End: 2020-01-01

## 2020-01-01 RX ORDER — LEVETIRACETAM 250 MG/1
1000 TABLET, FILM COATED ORAL ONCE
Refills: 0 | Status: COMPLETED | OUTPATIENT
Start: 2020-01-01 | End: 2020-01-01

## 2020-01-01 RX ORDER — PANTOPRAZOLE SODIUM 20 MG/1
40 TABLET, DELAYED RELEASE ORAL
Refills: 0 | Status: DISCONTINUED | OUTPATIENT
Start: 2020-01-01 | End: 2020-01-01

## 2020-01-01 RX ORDER — POTASSIUM CHLORIDE 20 MEQ
40 PACKET (EA) ORAL ONCE
Refills: 0 | Status: COMPLETED | OUTPATIENT
Start: 2020-01-01 | End: 2020-01-01

## 2020-01-01 RX ORDER — DEXAMETHASONE 0.5 MG/5ML
4 ELIXIR ORAL EVERY 6 HOURS
Refills: 0 | Status: DISCONTINUED | OUTPATIENT
Start: 2020-01-01 | End: 2020-01-01

## 2020-01-01 RX ORDER — OXYCODONE HYDROCHLORIDE 5 MG/1
2.5 TABLET ORAL ONCE
Refills: 0 | Status: DISCONTINUED | OUTPATIENT
Start: 2020-01-01 | End: 2020-01-01

## 2020-01-01 RX ORDER — SODIUM CHLORIDE 9 MG/ML
1000 INJECTION INTRAMUSCULAR; INTRAVENOUS; SUBCUTANEOUS
Refills: 0 | Status: DISCONTINUED | OUTPATIENT
Start: 2020-01-01 | End: 2020-01-01

## 2020-01-01 RX ORDER — ACETAMINOPHEN 500 MG
1000 TABLET ORAL ONCE
Refills: 0 | Status: COMPLETED | OUTPATIENT
Start: 2020-01-01 | End: 2020-01-01

## 2020-01-01 RX ORDER — GABAPENTIN 400 MG/1
1 CAPSULE ORAL
Qty: 60 | Refills: 0
Start: 2020-01-01 | End: 2020-01-01

## 2020-01-01 RX ORDER — ONDANSETRON 8 MG/1
4 TABLET, FILM COATED ORAL EVERY 8 HOURS
Refills: 0 | Status: DISCONTINUED | OUTPATIENT
Start: 2020-01-01 | End: 2020-01-01

## 2020-01-01 RX ORDER — SUMATRIPTAN SUCCINATE 4 MG/.5ML
100 INJECTION, SOLUTION SUBCUTANEOUS ONCE
Refills: 0 | Status: COMPLETED | OUTPATIENT
Start: 2020-01-01 | End: 2020-01-01

## 2020-01-01 RX ORDER — LEVOTHYROXINE SODIUM 125 MCG
1 TABLET ORAL
Qty: 30 | Refills: 1
Start: 2020-01-01 | End: 2020-01-01

## 2020-01-01 RX ORDER — PROPOFOL 10 MG/ML
20 INJECTION, EMULSION INTRAVENOUS
Qty: 1000 | Refills: 0 | Status: DISCONTINUED | OUTPATIENT
Start: 2020-01-01 | End: 2020-01-01

## 2020-01-01 RX ORDER — VALPROIC ACID (AS SODIUM SALT) 250 MG/5ML
2 SOLUTION, ORAL ORAL
Qty: 0 | Refills: 0 | DISCHARGE
Start: 2020-01-01

## 2020-01-01 RX ORDER — MAGNESIUM OXIDE 400 MG ORAL TABLET 241.3 MG
400 TABLET ORAL DAILY
Refills: 0 | Status: DISCONTINUED | OUTPATIENT
Start: 2020-01-01 | End: 2020-01-01

## 2020-01-01 RX ORDER — GLYCOPYRROLATE AND FORMOTEROL FUMARATE 9; 4.8 UG/1; UG/1
9-4.8 AEROSOL, METERED RESPIRATORY (INHALATION)
Qty: 1 | Refills: 3 | Status: DISCONTINUED | COMMUNITY
Start: 2019-10-30 | End: 2020-01-01

## 2020-01-01 RX ORDER — SERTRALINE 25 MG/1
150 TABLET, FILM COATED ORAL DAILY
Refills: 0 | Status: DISCONTINUED | OUTPATIENT
Start: 2020-01-01 | End: 2020-01-01

## 2020-01-01 RX ORDER — ALPRAZOLAM 0.25 MG
1 TABLET ORAL
Qty: 0 | Refills: 0 | DISCHARGE
Start: 2020-01-01

## 2020-01-01 RX ORDER — DEXTROSE 50 % IN WATER 50 %
15 SYRINGE (ML) INTRAVENOUS ONCE
Refills: 0 | Status: DISCONTINUED | OUTPATIENT
Start: 2020-01-01 | End: 2020-01-01

## 2020-01-01 RX ORDER — DEXAMETHASONE 0.5 MG/5ML
10 ELIXIR ORAL ONCE
Refills: 0 | Status: COMPLETED | OUTPATIENT
Start: 2020-01-01 | End: 2020-01-01

## 2020-01-01 RX ORDER — ACETAMINOPHEN 500 MG
975 TABLET ORAL ONCE
Refills: 0 | Status: COMPLETED | OUTPATIENT
Start: 2020-01-01 | End: 2020-01-01

## 2020-01-01 RX ORDER — INSULIN LISPRO 100/ML
VIAL (ML) SUBCUTANEOUS AT BEDTIME
Refills: 0 | Status: DISCONTINUED | OUTPATIENT
Start: 2020-01-01 | End: 2020-01-01

## 2020-01-01 RX ORDER — ONDANSETRON 8 MG/1
4 TABLET, FILM COATED ORAL ONCE
Refills: 0 | Status: COMPLETED | OUTPATIENT
Start: 2020-01-01 | End: 2020-01-01

## 2020-01-01 RX ORDER — MIDAZOLAM HYDROCHLORIDE 1 MG/ML
4 INJECTION, SOLUTION INTRAMUSCULAR; INTRAVENOUS ONCE
Refills: 0 | Status: DISCONTINUED | OUTPATIENT
Start: 2020-01-01 | End: 2020-01-01

## 2020-01-01 RX ORDER — SODIUM CHLORIDE 9 MG/ML
1000 INJECTION, SOLUTION INTRAVENOUS
Refills: 0 | Status: DISCONTINUED | OUTPATIENT
Start: 2020-01-01 | End: 2020-01-01

## 2020-01-01 RX ORDER — DEXTROSE 50 % IN WATER 50 %
25 SYRINGE (ML) INTRAVENOUS ONCE
Refills: 0 | Status: DISCONTINUED | OUTPATIENT
Start: 2020-01-01 | End: 2020-01-01

## 2020-01-01 RX ORDER — OXYCODONE HYDROCHLORIDE 5 MG/1
5 TABLET ORAL EVERY 4 HOURS
Refills: 0 | Status: DISCONTINUED | OUTPATIENT
Start: 2020-01-01 | End: 2020-01-01

## 2020-01-01 RX ORDER — ALBUTEROL 90 UG/1
2.5 AEROSOL, METERED ORAL ONCE
Refills: 0 | Status: COMPLETED | OUTPATIENT
Start: 2020-01-01 | End: 2020-01-01

## 2020-01-01 RX ORDER — LEVETIRACETAM 1000 MG/1
1000 TABLET, FILM COATED ORAL TWICE DAILY
Qty: 60 | Refills: 6 | Status: DISCONTINUED | COMMUNITY
Start: 2020-01-01 | End: 2020-01-01

## 2020-01-01 RX ORDER — GABAPENTIN 400 MG/1
100 CAPSULE ORAL
Refills: 0 | Status: DISCONTINUED | OUTPATIENT
Start: 2020-01-01 | End: 2020-01-01

## 2020-01-01 RX ORDER — SERTRALINE 25 MG/1
150 TABLET, FILM COATED ORAL AT BEDTIME
Refills: 0 | Status: DISCONTINUED | OUTPATIENT
Start: 2020-01-01 | End: 2020-01-01

## 2020-01-01 RX ORDER — SUMATRIPTAN SUCCINATE 4 MG/.5ML
1 INJECTION, SOLUTION SUBCUTANEOUS
Qty: 0 | Refills: 0 | DISCHARGE

## 2020-01-01 RX ORDER — ERTAPENEM SODIUM 1 G/1
1000 INJECTION, POWDER, LYOPHILIZED, FOR SOLUTION INTRAMUSCULAR; INTRAVENOUS EVERY 24 HOURS
Refills: 0 | Status: DISCONTINUED | OUTPATIENT
Start: 2020-01-01 | End: 2020-01-01

## 2020-01-01 RX ORDER — CHLORHEXIDINE GLUCONATE 213 G/1000ML
1 SOLUTION TOPICAL
Refills: 0 | Status: DISCONTINUED | OUTPATIENT
Start: 2020-01-01 | End: 2020-01-01

## 2020-01-01 RX ORDER — ACETAMINOPHEN 500 MG
1000 TABLET ORAL ONCE
Refills: 0 | Status: DISCONTINUED | OUTPATIENT
Start: 2020-01-01 | End: 2020-01-01

## 2020-01-01 RX ORDER — MORPHINE SULFATE 50 MG/1
2 CAPSULE, EXTENDED RELEASE ORAL EVERY 6 HOURS
Refills: 0 | Status: DISCONTINUED | OUTPATIENT
Start: 2020-01-01 | End: 2020-01-01

## 2020-01-01 RX ORDER — ALBUTEROL 90 UG/1
2 AEROSOL, METERED ORAL
Qty: 0 | Refills: 0 | DISCHARGE
Start: 2020-01-01

## 2020-01-01 RX ORDER — VALPROIC ACID (AS SODIUM SALT) 250 MG/5ML
3 SOLUTION, ORAL ORAL
Qty: 180 | Refills: 1
Start: 2020-01-01 | End: 2020-01-01

## 2020-01-01 RX ORDER — SODIUM CHLORIDE 9 MG/ML
1000 INJECTION, SOLUTION INTRAVENOUS ONCE
Refills: 0 | Status: COMPLETED | OUTPATIENT
Start: 2020-01-01 | End: 2020-01-01

## 2020-01-01 RX ORDER — BUDESONIDE AND FORMOTEROL FUMARATE DIHYDRATE 160; 4.5 UG/1; UG/1
2 AEROSOL RESPIRATORY (INHALATION)
Refills: 0 | Status: DISCONTINUED | OUTPATIENT
Start: 2020-01-01 | End: 2020-01-01

## 2020-01-01 RX ORDER — NOREPINEPHRINE BITARTRATE/D5W 8 MG/250ML
0.05 PLASTIC BAG, INJECTION (ML) INTRAVENOUS
Qty: 8 | Refills: 0 | Status: DISCONTINUED | OUTPATIENT
Start: 2020-01-01 | End: 2020-01-01

## 2020-01-01 RX ORDER — MORPHINE SULFATE 50 MG/1
2 CAPSULE, EXTENDED RELEASE ORAL ONCE
Refills: 0 | Status: DISCONTINUED | OUTPATIENT
Start: 2020-01-01 | End: 2020-01-01

## 2020-01-01 RX ORDER — POTASSIUM CHLORIDE 20 MEQ
20 PACKET (EA) ORAL ONCE
Refills: 0 | Status: COMPLETED | OUTPATIENT
Start: 2020-01-01 | End: 2020-01-01

## 2020-01-01 RX ORDER — VALPROIC ACID (AS SODIUM SALT) 250 MG/5ML
500 SOLUTION, ORAL ORAL
Refills: 0 | Status: DISCONTINUED | OUTPATIENT
Start: 2020-01-01 | End: 2020-01-01

## 2020-01-01 RX ORDER — LACOSAMIDE 50 MG/1
150 TABLET ORAL ONCE
Refills: 0 | Status: DISCONTINUED | OUTPATIENT
Start: 2020-01-01 | End: 2020-01-01

## 2020-01-01 RX ORDER — MAGNESIUM SULFATE 500 MG/ML
2 VIAL (ML) INJECTION ONCE
Refills: 0 | Status: COMPLETED | OUTPATIENT
Start: 2020-01-01 | End: 2020-01-01

## 2020-01-01 RX ORDER — METOCLOPRAMIDE 10 MG/1
10 TABLET ORAL
Qty: 10 | Refills: 1 | Status: DISCONTINUED | COMMUNITY
Start: 2019-10-07 | End: 2020-01-01

## 2020-01-01 RX ORDER — MORPHINE SULFATE 50 MG/1
0.5 CAPSULE, EXTENDED RELEASE ORAL ONCE
Refills: 0 | Status: DISCONTINUED | OUTPATIENT
Start: 2020-01-01 | End: 2020-01-01

## 2020-01-01 RX ORDER — LACOSAMIDE 50 MG/1
100 TABLET ORAL EVERY 12 HOURS
Refills: 0 | Status: DISCONTINUED | OUTPATIENT
Start: 2020-01-01 | End: 2020-01-01

## 2020-01-01 RX ORDER — LEVETIRACETAM 250 MG/1
1 TABLET, FILM COATED ORAL
Qty: 0 | Refills: 0 | DISCHARGE

## 2020-01-01 RX ORDER — SERTRALINE 25 MG/1
100 TABLET, FILM COATED ORAL DAILY
Refills: 0 | Status: DISCONTINUED | OUTPATIENT
Start: 2020-01-01 | End: 2020-01-01

## 2020-01-01 RX ORDER — INSULIN LISPRO 100/ML
VIAL (ML) SUBCUTANEOUS
Refills: 0 | Status: DISCONTINUED | OUTPATIENT
Start: 2020-01-01 | End: 2020-01-01

## 2020-01-01 RX ORDER — DIPHENHYDRAMINE HCL 50 MG
25 CAPSULE ORAL ONCE
Refills: 0 | Status: DISCONTINUED | OUTPATIENT
Start: 2020-01-01 | End: 2020-01-01

## 2020-01-01 RX ORDER — ENOXAPARIN SODIUM 100 MG/ML
40 INJECTION SUBCUTANEOUS AT BEDTIME
Refills: 0 | Status: DISCONTINUED | OUTPATIENT
Start: 2020-01-01 | End: 2020-01-01

## 2020-01-01 RX ORDER — PANTOPRAZOLE SODIUM 20 MG/1
1 TABLET, DELAYED RELEASE ORAL
Qty: 8 | Refills: 0
Start: 2020-01-01 | End: 2020-01-01

## 2020-01-01 RX ORDER — VANCOMYCIN HCL 1 G
VIAL (EA) INTRAVENOUS
Refills: 0 | Status: DISCONTINUED | OUTPATIENT
Start: 2020-01-01 | End: 2020-01-01

## 2020-01-01 RX ORDER — VALPROIC ACID (AS SODIUM SALT) 250 MG/5ML
15 SOLUTION, ORAL ORAL
Qty: 0 | Refills: 0 | DISCHARGE
Start: 2020-01-01

## 2020-01-01 RX ORDER — FENTANYL CITRATE 50 UG/ML
100 INJECTION INTRAVENOUS ONCE
Refills: 0 | Status: DISCONTINUED | OUTPATIENT
Start: 2020-01-01 | End: 2020-01-01

## 2020-01-01 RX ORDER — MIDAZOLAM HYDROCHLORIDE 1 MG/ML
2 INJECTION, SOLUTION INTRAMUSCULAR; INTRAVENOUS ONCE
Refills: 0 | Status: DISCONTINUED | OUTPATIENT
Start: 2020-01-01 | End: 2020-01-01

## 2020-01-01 RX ORDER — OXYCODONE HYDROCHLORIDE 5 MG/1
10 TABLET ORAL EVERY 4 HOURS
Refills: 0 | Status: DISCONTINUED | OUTPATIENT
Start: 2020-01-01 | End: 2020-01-01

## 2020-01-01 RX ORDER — ACETAMINOPHEN 500 MG
650 TABLET ORAL ONCE
Refills: 0 | Status: COMPLETED | OUTPATIENT
Start: 2020-01-01 | End: 2020-01-01

## 2020-01-01 RX ORDER — ALBUTEROL 90 UG/1
2 AEROSOL, METERED ORAL EVERY 6 HOURS
Refills: 0 | Status: DISCONTINUED | OUTPATIENT
Start: 2020-01-01 | End: 2020-01-01

## 2020-01-01 RX ORDER — GLUCAGON INJECTION, SOLUTION 0.5 MG/.1ML
1 INJECTION, SOLUTION SUBCUTANEOUS ONCE
Refills: 0 | Status: DISCONTINUED | OUTPATIENT
Start: 2020-01-01 | End: 2020-01-01

## 2020-01-01 RX ORDER — DEXAMETHASONE 0.5 MG/5ML
1 ELIXIR ORAL
Qty: 24 | Refills: 0
Start: 2020-01-01 | End: 2020-01-01

## 2020-01-01 RX ORDER — ATORVASTATIN CALCIUM 80 MG/1
1 TABLET, FILM COATED ORAL
Qty: 30 | Refills: 0
Start: 2020-01-01 | End: 2020-01-01

## 2020-01-01 RX ORDER — VALPROIC ACID 250 MG/1
250 CAPSULE, LIQUID FILLED ORAL TWICE DAILY
Qty: 180 | Refills: 0 | Status: ACTIVE | COMMUNITY
Start: 2020-01-01

## 2020-01-01 RX ORDER — LACOSAMIDE 100 MG/1
100 TABLET, FILM COATED ORAL TWICE DAILY
Qty: 60 | Refills: 1 | Status: DISCONTINUED | COMMUNITY
Start: 2020-01-01 | End: 2020-01-01

## 2020-01-01 RX ORDER — SERTRALINE 25 MG/1
3 TABLET, FILM COATED ORAL
Qty: 0 | Refills: 0 | DISCHARGE
Start: 2020-01-01

## 2020-01-01 RX ORDER — VALPROIC ACID (AS SODIUM SALT) 250 MG/5ML
750 SOLUTION, ORAL ORAL
Refills: 0 | Status: DISCONTINUED | OUTPATIENT
Start: 2020-01-01 | End: 2020-01-01

## 2020-01-01 RX ORDER — DEXTROSE 50 % IN WATER 50 %
12.5 SYRINGE (ML) INTRAVENOUS ONCE
Refills: 0 | Status: DISCONTINUED | OUTPATIENT
Start: 2020-01-01 | End: 2020-01-01

## 2020-01-01 RX ORDER — LACOSAMIDE 100 MG/1
100 TABLET, FILM COATED ORAL
Qty: 60 | Refills: 0 | Status: ACTIVE | COMMUNITY
Start: 2020-01-01 | End: 1900-01-01

## 2020-01-01 RX ORDER — LANOLIN ALCOHOL/MO/W.PET/CERES
5 CREAM (GRAM) TOPICAL ONCE
Refills: 0 | Status: COMPLETED | OUTPATIENT
Start: 2020-01-01 | End: 2020-01-01

## 2020-01-01 RX ORDER — ATORVASTATIN CALCIUM 80 MG/1
80 TABLET, FILM COATED ORAL AT BEDTIME
Refills: 0 | Status: DISCONTINUED | OUTPATIENT
Start: 2020-01-01 | End: 2020-01-01

## 2020-01-01 RX ORDER — LEVETIRACETAM 250 MG/1
1 TABLET, FILM COATED ORAL
Qty: 60 | Refills: 0
Start: 2020-01-01 | End: 2020-01-01

## 2020-01-01 RX ORDER — PIPERACILLIN AND TAZOBACTAM 4; .5 G/20ML; G/20ML
3.38 INJECTION, POWDER, LYOPHILIZED, FOR SOLUTION INTRAVENOUS EVERY 8 HOURS
Refills: 0 | Status: DISCONTINUED | OUTPATIENT
Start: 2020-01-01 | End: 2020-01-01

## 2020-01-01 RX ORDER — SODIUM,POTASSIUM PHOSPHATES 278-250MG
1 POWDER IN PACKET (EA) ORAL ONCE
Refills: 0 | Status: COMPLETED | OUTPATIENT
Start: 2020-01-01 | End: 2020-01-01

## 2020-01-01 RX ORDER — POTASSIUM CHLORIDE 20 MEQ
10 PACKET (EA) ORAL
Refills: 0 | Status: COMPLETED | OUTPATIENT
Start: 2020-01-01 | End: 2020-01-01

## 2020-01-01 RX ORDER — LEVETIRACETAM 250 MG/1
500 TABLET, FILM COATED ORAL EVERY 12 HOURS
Refills: 0 | Status: DISCONTINUED | OUTPATIENT
Start: 2020-01-01 | End: 2020-01-01

## 2020-01-01 RX ORDER — ERTAPENEM SODIUM 1 G/1
1 INJECTION, POWDER, LYOPHILIZED, FOR SOLUTION INTRAMUSCULAR; INTRAVENOUS EVERY 24 HOURS
Refills: 0 | Status: DISCONTINUED | OUTPATIENT
Start: 2020-01-01 | End: 2020-01-01

## 2020-01-01 RX ORDER — OXYCODONE HYDROCHLORIDE 5 MG/1
1 TABLET ORAL
Qty: 30 | Refills: 0
Start: 2020-01-01 | End: 2020-01-01

## 2020-01-01 RX ORDER — DIAZEPAM 5 MG
5 TABLET ORAL ONCE
Refills: 0 | Status: DISCONTINUED | OUTPATIENT
Start: 2020-01-01 | End: 2020-01-01

## 2020-01-01 RX ORDER — LIDOCAINE HCL 20 MG/ML
30 VIAL (ML) INJECTION ONCE
Refills: 0 | Status: COMPLETED | OUTPATIENT
Start: 2020-01-01 | End: 2020-01-01

## 2020-01-01 RX ORDER — ACETAMINOPHEN 500 MG
2 TABLET ORAL
Qty: 0 | Refills: 0 | DISCHARGE
Start: 2020-01-01

## 2020-01-01 RX ORDER — VALPROIC ACID (AS SODIUM SALT) 250 MG/5ML
1750 SOLUTION, ORAL ORAL ONCE
Refills: 0 | Status: COMPLETED | OUTPATIENT
Start: 2020-01-01 | End: 2020-01-01

## 2020-01-01 RX ORDER — PIPERACILLIN AND TAZOBACTAM 4; .5 G/20ML; G/20ML
3.38 INJECTION, POWDER, LYOPHILIZED, FOR SOLUTION INTRAVENOUS ONCE
Refills: 0 | Status: COMPLETED | OUTPATIENT
Start: 2020-01-01 | End: 2020-01-01

## 2020-01-01 RX ORDER — VALPROIC ACID (AS SODIUM SALT) 250 MG/5ML
1500 SOLUTION, ORAL ORAL ONCE
Refills: 0 | Status: DISCONTINUED | OUTPATIENT
Start: 2020-01-01 | End: 2020-01-01

## 2020-01-01 RX ORDER — ALPRAZOLAM 0.25 MG
0.25 TABLET ORAL AT BEDTIME
Refills: 0 | Status: DISCONTINUED | OUTPATIENT
Start: 2020-01-01 | End: 2020-01-01

## 2020-01-01 RX ORDER — DEXMEDETOMIDINE HYDROCHLORIDE IN 0.9% SODIUM CHLORIDE 4 UG/ML
1 INJECTION INTRAVENOUS
Qty: 400 | Refills: 0 | Status: DISCONTINUED | OUTPATIENT
Start: 2020-01-01 | End: 2020-01-01

## 2020-01-01 RX ORDER — LANOLIN ALCOHOL/MO/W.PET/CERES
6 CREAM (GRAM) TOPICAL AT BEDTIME
Refills: 0 | Status: DISCONTINUED | OUTPATIENT
Start: 2020-01-01 | End: 2020-01-01

## 2020-01-01 RX ORDER — LACOSAMIDE 50 MG/1
250 TABLET ORAL ONCE
Refills: 0 | Status: DISCONTINUED | OUTPATIENT
Start: 2020-01-01 | End: 2020-01-01

## 2020-01-01 RX ORDER — POTASSIUM CHLORIDE 20 MEQ
40 PACKET (EA) ORAL ONCE
Refills: 0 | Status: DISCONTINUED | OUTPATIENT
Start: 2020-01-01 | End: 2020-01-01

## 2020-01-01 RX ORDER — ALPRAZOLAM 0.25 MG
0.5 TABLET ORAL EVERY 12 HOURS
Refills: 0 | Status: DISCONTINUED | OUTPATIENT
Start: 2020-01-01 | End: 2020-01-01

## 2020-01-01 RX ORDER — CHLORHEXIDINE GLUCONATE 213 G/1000ML
15 SOLUTION TOPICAL EVERY 12 HOURS
Refills: 0 | Status: DISCONTINUED | OUTPATIENT
Start: 2020-01-01 | End: 2020-01-01

## 2020-01-01 RX ORDER — LACOSAMIDE 50 MG/1
1 TABLET ORAL
Qty: 30 | Refills: 0
Start: 2020-01-01 | End: 2020-01-01

## 2020-01-01 RX ORDER — LACOSAMIDE 50 MG/1
1 TABLET ORAL
Qty: 0 | Refills: 0 | DISCHARGE

## 2020-01-01 RX ORDER — ARIPIPRAZOLE 15 MG/1
1 TABLET ORAL
Qty: 0 | Refills: 0 | DISCHARGE

## 2020-01-01 RX ORDER — ALPRAZOLAM 0.25 MG
0.5 TABLET ORAL DAILY
Refills: 0 | Status: DISCONTINUED | OUTPATIENT
Start: 2020-01-01 | End: 2020-01-01

## 2020-01-01 RX ORDER — LACOSAMIDE 50 MG/1
1 TABLET ORAL
Qty: 30 | Refills: 1
Start: 2020-01-01 | End: 2020-01-01

## 2020-01-01 RX ORDER — METOCLOPRAMIDE HCL 10 MG
10 TABLET ORAL EVERY 8 HOURS
Refills: 0 | Status: DISCONTINUED | OUTPATIENT
Start: 2020-01-01 | End: 2020-01-01

## 2020-01-01 RX ORDER — SENNA PLUS 8.6 MG/1
2 TABLET ORAL AT BEDTIME
Refills: 0 | Status: DISCONTINUED | OUTPATIENT
Start: 2020-01-01 | End: 2020-01-01

## 2020-01-01 RX ORDER — IMMUNE GLOBULIN INFUSION (HUMAN) 100 MG/ML
30 INJECTION, SOLUTION INTRAVENOUS; SUBCUTANEOUS
Qty: 5 | Refills: 12 | Status: ACTIVE | COMMUNITY
Start: 2020-01-01 | End: 1900-01-01

## 2020-01-01 RX ORDER — VANCOMYCIN HCL 1 G
1000 VIAL (EA) INTRAVENOUS EVERY 12 HOURS
Refills: 0 | Status: DISCONTINUED | OUTPATIENT
Start: 2020-01-01 | End: 2020-01-01

## 2020-01-01 RX ORDER — LACOSAMIDE 150 MG/1
150 TABLET, FILM COATED ORAL
Qty: 60 | Refills: 3 | Status: ACTIVE | COMMUNITY
Start: 2020-01-01 | End: 1900-01-01

## 2020-01-01 RX ORDER — VALPROIC ACID (AS SODIUM SALT) 250 MG/5ML
250 SOLUTION, ORAL ORAL
Refills: 0 | Status: DISCONTINUED | OUTPATIENT
Start: 2020-01-01 | End: 2020-01-01

## 2020-01-01 RX ORDER — POLYETHYLENE GLYCOL 3350 17 G/17G
17 POWDER, FOR SOLUTION ORAL DAILY
Refills: 0 | Status: DISCONTINUED | OUTPATIENT
Start: 2020-01-01 | End: 2020-01-01

## 2020-01-01 RX ADMIN — Medication 40 MILLIEQUIVALENT(S): at 04:48

## 2020-01-01 RX ADMIN — SERTRALINE 150 MILLIGRAM(S): 25 TABLET, FILM COATED ORAL at 11:02

## 2020-01-01 RX ADMIN — Medication 650 MILLIGRAM(S): at 12:35

## 2020-01-01 RX ADMIN — SODIUM CHLORIDE 75 MILLILITER(S): 9 INJECTION INTRAMUSCULAR; INTRAVENOUS; SUBCUTANEOUS at 00:34

## 2020-01-01 RX ADMIN — MIDAZOLAM HYDROCHLORIDE 2 MILLIGRAM(S): 1 INJECTION, SOLUTION INTRAMUSCULAR; INTRAVENOUS at 10:02

## 2020-01-01 RX ADMIN — Medication 100 MICROGRAM(S): at 05:29

## 2020-01-01 RX ADMIN — OXYCODONE HYDROCHLORIDE 10 MILLIGRAM(S): 5 TABLET ORAL at 18:40

## 2020-01-01 RX ADMIN — ENOXAPARIN SODIUM 40 MILLIGRAM(S): 100 INJECTION SUBCUTANEOUS at 11:26

## 2020-01-01 RX ADMIN — SODIUM CHLORIDE 1000 MILLILITER(S): 9 INJECTION, SOLUTION INTRAVENOUS at 19:01

## 2020-01-01 RX ADMIN — SUMATRIPTAN SUCCINATE 100 MILLIGRAM(S): 4 INJECTION, SOLUTION SUBCUTANEOUS at 16:00

## 2020-01-01 RX ADMIN — PIPERACILLIN AND TAZOBACTAM 25 GRAM(S): 4; .5 INJECTION, POWDER, LYOPHILIZED, FOR SOLUTION INTRAVENOUS at 07:53

## 2020-01-01 RX ADMIN — SODIUM CHLORIDE 75 MILLILITER(S): 9 INJECTION INTRAMUSCULAR; INTRAVENOUS; SUBCUTANEOUS at 06:33

## 2020-01-01 RX ADMIN — Medication 10 MILLIGRAM(S): at 15:59

## 2020-01-01 RX ADMIN — BUDESONIDE AND FORMOTEROL FUMARATE DIHYDRATE 2 PUFF(S): 160; 4.5 AEROSOL RESPIRATORY (INHALATION) at 06:17

## 2020-01-01 RX ADMIN — Medication 400 MILLIGRAM(S): at 22:42

## 2020-01-01 RX ADMIN — OXYCODONE HYDROCHLORIDE 10 MILLIGRAM(S): 5 TABLET ORAL at 13:48

## 2020-01-01 RX ADMIN — LEVETIRACETAM 400 MILLIGRAM(S): 250 TABLET, FILM COATED ORAL at 21:53

## 2020-01-01 RX ADMIN — Medication 58 MILLIGRAM(S): at 06:26

## 2020-01-01 RX ADMIN — PROPOFOL 7.8 MICROGRAM(S)/KG/MIN: 10 INJECTION, EMULSION INTRAVENOUS at 07:52

## 2020-01-01 RX ADMIN — PANTOPRAZOLE SODIUM 40 MILLIGRAM(S): 20 TABLET, DELAYED RELEASE ORAL at 05:24

## 2020-01-01 RX ADMIN — Medication 4 MILLIGRAM(S): at 06:17

## 2020-01-01 RX ADMIN — DEXMEDETOMIDINE HYDROCHLORIDE IN 0.9% SODIUM CHLORIDE 16.3 MICROGRAM(S)/KG/HR: 4 INJECTION INTRAVENOUS at 03:10

## 2020-01-01 RX ADMIN — Medication 25 MILLIGRAM(S): at 16:49

## 2020-01-01 RX ADMIN — Medication 400 MILLIGRAM(S): at 05:15

## 2020-01-01 RX ADMIN — LEVETIRACETAM 400 MILLIGRAM(S): 250 TABLET, FILM COATED ORAL at 20:36

## 2020-01-01 RX ADMIN — SERTRALINE 150 MILLIGRAM(S): 25 TABLET, FILM COATED ORAL at 13:15

## 2020-01-01 RX ADMIN — Medication 20 MILLIEQUIVALENT(S): at 12:58

## 2020-01-01 RX ADMIN — Medication 100 MICROGRAM(S): at 11:15

## 2020-01-01 RX ADMIN — ERTAPENEM SODIUM 120 MILLIGRAM(S): 1 INJECTION, POWDER, LYOPHILIZED, FOR SOLUTION INTRAMUSCULAR; INTRAVENOUS at 10:40

## 2020-01-01 RX ADMIN — Medication 4 MILLIGRAM(S): at 05:20

## 2020-01-01 RX ADMIN — Medication 2 MILLIGRAM(S): at 20:30

## 2020-01-01 RX ADMIN — MORPHINE SULFATE 2 MILLIGRAM(S): 50 CAPSULE, EXTENDED RELEASE ORAL at 20:15

## 2020-01-01 RX ADMIN — SERTRALINE 150 MILLIGRAM(S): 25 TABLET, FILM COATED ORAL at 12:59

## 2020-01-01 RX ADMIN — Medication 4 MILLIGRAM(S): at 05:50

## 2020-01-01 RX ADMIN — Medication 40 MILLIEQUIVALENT(S): at 09:07

## 2020-01-01 RX ADMIN — Medication 250 MILLIGRAM(S): at 17:57

## 2020-01-01 RX ADMIN — LEVETIRACETAM 400 MILLIGRAM(S): 250 TABLET, FILM COATED ORAL at 09:06

## 2020-01-01 RX ADMIN — Medication 1000 MILLIGRAM(S): at 15:44

## 2020-01-01 RX ADMIN — PIPERACILLIN AND TAZOBACTAM 25 GRAM(S): 4; .5 INJECTION, POWDER, LYOPHILIZED, FOR SOLUTION INTRAVENOUS at 00:01

## 2020-01-01 RX ADMIN — Medication 1000 MILLIGRAM(S): at 05:01

## 2020-01-01 RX ADMIN — Medication 650 MILLIGRAM(S): at 09:37

## 2020-01-01 RX ADMIN — LACOSAMIDE 150 MILLIGRAM(S): 50 TABLET ORAL at 21:21

## 2020-01-01 RX ADMIN — LACOSAMIDE 150 MILLIGRAM(S): 50 TABLET ORAL at 17:07

## 2020-01-01 RX ADMIN — Medication 250 MILLIGRAM(S): at 05:31

## 2020-01-01 RX ADMIN — ENOXAPARIN SODIUM 40 MILLIGRAM(S): 100 INJECTION SUBCUTANEOUS at 12:58

## 2020-01-01 RX ADMIN — OXYCODONE HYDROCHLORIDE 10 MILLIGRAM(S): 5 TABLET ORAL at 23:45

## 2020-01-01 RX ADMIN — Medication 750 MILLIGRAM(S): at 22:07

## 2020-01-01 RX ADMIN — Medication 500 MILLIGRAM(S): at 05:25

## 2020-01-01 RX ADMIN — SERTRALINE 100 MILLIGRAM(S): 25 TABLET, FILM COATED ORAL at 13:17

## 2020-01-01 RX ADMIN — LEVETIRACETAM 400 MILLIGRAM(S): 250 TABLET, FILM COATED ORAL at 09:07

## 2020-01-01 RX ADMIN — POLYETHYLENE GLYCOL 3350 17 GRAM(S): 17 POWDER, FOR SOLUTION ORAL at 17:19

## 2020-01-01 RX ADMIN — Medication 250 MILLIGRAM(S): at 00:01

## 2020-01-01 RX ADMIN — Medication 40 MILLIEQUIVALENT(S): at 19:02

## 2020-01-01 RX ADMIN — Medication 650 MILLIGRAM(S): at 00:20

## 2020-01-01 RX ADMIN — Medication 0.5 MILLIGRAM(S): at 21:38

## 2020-01-01 RX ADMIN — PANTOPRAZOLE SODIUM 40 MILLIGRAM(S): 20 TABLET, DELAYED RELEASE ORAL at 08:00

## 2020-01-01 RX ADMIN — DEXTROSE MONOHYDRATE, SODIUM CHLORIDE, AND POTASSIUM CHLORIDE 75 MILLILITER(S): 50; .745; 4.5 INJECTION, SOLUTION INTRAVENOUS at 16:40

## 2020-01-01 RX ADMIN — LEVETIRACETAM 500 MILLIGRAM(S): 250 TABLET, FILM COATED ORAL at 17:58

## 2020-01-01 RX ADMIN — LACOSAMIDE 150 MILLIGRAM(S): 50 TABLET ORAL at 17:28

## 2020-01-01 RX ADMIN — LEVETIRACETAM 1000 MILLIGRAM(S): 250 TABLET, FILM COATED ORAL at 21:31

## 2020-01-01 RX ADMIN — TIOTROPIUM BROMIDE 1 CAPSULE(S): 18 CAPSULE ORAL; RESPIRATORY (INHALATION) at 11:55

## 2020-01-01 RX ADMIN — OXYCODONE HYDROCHLORIDE 10 MILLIGRAM(S): 5 TABLET ORAL at 19:10

## 2020-01-01 RX ADMIN — SERTRALINE 100 MILLIGRAM(S): 25 TABLET, FILM COATED ORAL at 11:24

## 2020-01-01 RX ADMIN — Medication 100 MILLIEQUIVALENT(S): at 06:40

## 2020-01-01 RX ADMIN — SODIUM CHLORIDE 50 MILLILITER(S): 9 INJECTION, SOLUTION INTRAVENOUS at 01:31

## 2020-01-01 RX ADMIN — Medication 100 MICROGRAM(S): at 05:41

## 2020-01-01 RX ADMIN — Medication 4 MILLIGRAM(S): at 17:58

## 2020-01-01 RX ADMIN — Medication 40 MILLIEQUIVALENT(S): at 19:01

## 2020-01-01 RX ADMIN — SERTRALINE 150 MILLIGRAM(S): 25 TABLET, FILM COATED ORAL at 11:54

## 2020-01-01 RX ADMIN — TIOTROPIUM BROMIDE 1 CAPSULE(S): 18 CAPSULE ORAL; RESPIRATORY (INHALATION) at 12:59

## 2020-01-01 RX ADMIN — LEVETIRACETAM 400 MILLIGRAM(S): 250 TABLET, FILM COATED ORAL at 07:53

## 2020-01-01 RX ADMIN — SUMATRIPTAN SUCCINATE 100 MILLIGRAM(S): 4 INJECTION, SOLUTION SUBCUTANEOUS at 15:53

## 2020-01-01 RX ADMIN — Medication 10 MILLIGRAM(S): at 09:53

## 2020-01-01 RX ADMIN — FENTANYL CITRATE 100 MICROGRAM(S): 50 INJECTION INTRAVENOUS at 22:05

## 2020-01-01 RX ADMIN — SODIUM CHLORIDE 50 MILLILITER(S): 9 INJECTION, SOLUTION INTRAVENOUS at 22:21

## 2020-01-01 RX ADMIN — PANTOPRAZOLE SODIUM 40 MILLIGRAM(S): 20 TABLET, DELAYED RELEASE ORAL at 06:30

## 2020-01-01 RX ADMIN — PIPERACILLIN AND TAZOBACTAM 25 GRAM(S): 4; .5 INJECTION, POWDER, LYOPHILIZED, FOR SOLUTION INTRAVENOUS at 15:54

## 2020-01-01 RX ADMIN — SODIUM CHLORIDE 75 MILLILITER(S): 9 INJECTION INTRAMUSCULAR; INTRAVENOUS; SUBCUTANEOUS at 06:04

## 2020-01-01 RX ADMIN — DEXMEDETOMIDINE HYDROCHLORIDE IN 0.9% SODIUM CHLORIDE 16.3 MICROGRAM(S)/KG/HR: 4 INJECTION INTRAVENOUS at 07:19

## 2020-01-01 RX ADMIN — Medication 1000 MILLIGRAM(S): at 23:22

## 2020-01-01 RX ADMIN — Medication 500 MILLIGRAM(S): at 05:29

## 2020-01-01 RX ADMIN — TIOTROPIUM BROMIDE 1 CAPSULE(S): 18 CAPSULE ORAL; RESPIRATORY (INHALATION) at 00:23

## 2020-01-01 RX ADMIN — Medication 1: at 12:57

## 2020-01-01 RX ADMIN — PIPERACILLIN AND TAZOBACTAM 25 GRAM(S): 4; .5 INJECTION, POWDER, LYOPHILIZED, FOR SOLUTION INTRAVENOUS at 13:01

## 2020-01-01 RX ADMIN — LEVETIRACETAM 400 MILLIGRAM(S): 250 TABLET, FILM COATED ORAL at 20:20

## 2020-01-01 RX ADMIN — SERTRALINE 150 MILLIGRAM(S): 25 TABLET, FILM COATED ORAL at 21:34

## 2020-01-01 RX ADMIN — Medication 250 MILLIGRAM(S): at 12:44

## 2020-01-01 RX ADMIN — MORPHINE SULFATE 0.5 MILLIGRAM(S): 50 CAPSULE, EXTENDED RELEASE ORAL at 02:40

## 2020-01-01 RX ADMIN — OXYCODONE HYDROCHLORIDE 10 MILLIGRAM(S): 5 TABLET ORAL at 17:58

## 2020-01-01 RX ADMIN — ENOXAPARIN SODIUM 40 MILLIGRAM(S): 100 INJECTION SUBCUTANEOUS at 11:17

## 2020-01-01 RX ADMIN — PIPERACILLIN AND TAZOBACTAM 25 GRAM(S): 4; .5 INJECTION, POWDER, LYOPHILIZED, FOR SOLUTION INTRAVENOUS at 06:38

## 2020-01-01 RX ADMIN — Medication 4 MILLIGRAM(S): at 23:45

## 2020-01-01 RX ADMIN — Medication 100 MICROGRAM(S): at 05:44

## 2020-01-01 RX ADMIN — Medication 650 MILLIGRAM(S): at 22:37

## 2020-01-01 RX ADMIN — Medication 1 MILLIGRAM(S): at 16:25

## 2020-01-01 RX ADMIN — Medication 4 MILLIGRAM(S): at 05:03

## 2020-01-01 RX ADMIN — Medication 5 MILLIGRAM(S): at 23:28

## 2020-01-01 RX ADMIN — GABAPENTIN 100 MILLIGRAM(S): 400 CAPSULE ORAL at 05:28

## 2020-01-01 RX ADMIN — Medication 100 MILLIEQUIVALENT(S): at 04:48

## 2020-01-01 RX ADMIN — CHLORHEXIDINE GLUCONATE 1 APPLICATION(S): 213 SOLUTION TOPICAL at 10:03

## 2020-01-01 RX ADMIN — PANTOPRAZOLE SODIUM 40 MILLIGRAM(S): 20 TABLET, DELAYED RELEASE ORAL at 08:49

## 2020-01-01 RX ADMIN — Medication 650 MILLIGRAM(S): at 06:15

## 2020-01-01 RX ADMIN — Medication 1 APPLICATION(S): at 05:51

## 2020-01-01 RX ADMIN — MAGNESIUM OXIDE 400 MG ORAL TABLET 400 MILLIGRAM(S): 241.3 TABLET ORAL at 13:25

## 2020-01-01 RX ADMIN — LEVETIRACETAM 400 MILLIGRAM(S): 250 TABLET, FILM COATED ORAL at 08:48

## 2020-01-01 RX ADMIN — Medication 650 MILLIGRAM(S): at 13:35

## 2020-01-01 RX ADMIN — Medication 500 MILLIGRAM(S): at 17:28

## 2020-01-01 RX ADMIN — SERTRALINE 150 MILLIGRAM(S): 25 TABLET, FILM COATED ORAL at 13:07

## 2020-01-01 RX ADMIN — LEVETIRACETAM 500 MILLIGRAM(S): 250 TABLET, FILM COATED ORAL at 05:03

## 2020-01-01 RX ADMIN — ONDANSETRON 4 MILLIGRAM(S): 8 TABLET, FILM COATED ORAL at 14:24

## 2020-01-01 RX ADMIN — PANTOPRAZOLE SODIUM 40 MILLIGRAM(S): 20 TABLET, DELAYED RELEASE ORAL at 08:45

## 2020-01-01 RX ADMIN — FENTANYL CITRATE 100 MICROGRAM(S): 50 INJECTION INTRAVENOUS at 18:59

## 2020-01-01 RX ADMIN — OXYCODONE HYDROCHLORIDE 10 MILLIGRAM(S): 5 TABLET ORAL at 18:45

## 2020-01-01 RX ADMIN — ALBUTEROL 2 PUFF(S): 90 AEROSOL, METERED ORAL at 05:24

## 2020-01-01 RX ADMIN — TIOTROPIUM BROMIDE 1 CAPSULE(S): 18 CAPSULE ORAL; RESPIRATORY (INHALATION) at 14:57

## 2020-01-01 RX ADMIN — CHLORHEXIDINE GLUCONATE 15 MILLILITER(S): 213 SOLUTION TOPICAL at 18:32

## 2020-01-01 RX ADMIN — SERTRALINE 150 MILLIGRAM(S): 25 TABLET, FILM COATED ORAL at 11:26

## 2020-01-01 RX ADMIN — Medication 100 MILLIEQUIVALENT(S): at 08:48

## 2020-01-01 RX ADMIN — PIPERACILLIN AND TAZOBACTAM 200 GRAM(S): 4; .5 INJECTION, POWDER, LYOPHILIZED, FOR SOLUTION INTRAVENOUS at 00:34

## 2020-01-01 RX ADMIN — Medication 100 MILLIEQUIVALENT(S): at 06:00

## 2020-01-01 RX ADMIN — Medication 500 MILLIGRAM(S): at 17:13

## 2020-01-01 RX ADMIN — ONDANSETRON 4 MILLIGRAM(S): 8 TABLET, FILM COATED ORAL at 15:45

## 2020-01-01 RX ADMIN — Medication 1: at 13:07

## 2020-01-01 RX ADMIN — TIOTROPIUM BROMIDE 1 CAPSULE(S): 18 CAPSULE ORAL; RESPIRATORY (INHALATION) at 11:01

## 2020-01-01 RX ADMIN — PANTOPRAZOLE SODIUM 40 MILLIGRAM(S): 20 TABLET, DELAYED RELEASE ORAL at 09:19

## 2020-01-01 RX ADMIN — OXYCODONE HYDROCHLORIDE 2.5 MILLIGRAM(S): 5 TABLET ORAL at 08:17

## 2020-01-01 RX ADMIN — Medication 975 MILLIGRAM(S): at 19:00

## 2020-01-01 RX ADMIN — Medication 400 MILLIGRAM(S): at 15:29

## 2020-01-01 RX ADMIN — ATORVASTATIN CALCIUM 80 MILLIGRAM(S): 80 TABLET, FILM COATED ORAL at 21:17

## 2020-01-01 RX ADMIN — PANTOPRAZOLE SODIUM 40 MILLIGRAM(S): 20 TABLET, DELAYED RELEASE ORAL at 05:20

## 2020-01-01 RX ADMIN — Medication 50 MILLIEQUIVALENT(S): at 08:11

## 2020-01-01 RX ADMIN — Medication 0.25 MILLIGRAM(S): at 21:17

## 2020-01-01 RX ADMIN — FENTANYL CITRATE 100 MICROGRAM(S): 50 INJECTION INTRAVENOUS at 04:30

## 2020-01-01 RX ADMIN — TIOTROPIUM BROMIDE 1 CAPSULE(S): 18 CAPSULE ORAL; RESPIRATORY (INHALATION) at 11:24

## 2020-01-01 RX ADMIN — Medication 100 MICROGRAM(S): at 06:26

## 2020-01-01 RX ADMIN — OXYCODONE HYDROCHLORIDE 2.5 MILLIGRAM(S): 5 TABLET ORAL at 08:50

## 2020-01-01 RX ADMIN — LEVETIRACETAM 400 MILLIGRAM(S): 250 TABLET, FILM COATED ORAL at 08:50

## 2020-01-01 RX ADMIN — OXYCODONE HYDROCHLORIDE 10 MILLIGRAM(S): 5 TABLET ORAL at 00:15

## 2020-01-01 RX ADMIN — POLYETHYLENE GLYCOL 3350 17 GRAM(S): 17 POWDER, FOR SOLUTION ORAL at 11:17

## 2020-01-01 RX ADMIN — Medication 650 MILLIGRAM(S): at 03:10

## 2020-01-01 RX ADMIN — CHLORHEXIDINE GLUCONATE 15 MILLILITER(S): 213 SOLUTION TOPICAL at 05:02

## 2020-01-01 RX ADMIN — LEVETIRACETAM 500 MILLIGRAM(S): 250 TABLET, FILM COATED ORAL at 17:38

## 2020-01-01 RX ADMIN — PANTOPRAZOLE SODIUM 40 MILLIGRAM(S): 20 TABLET, DELAYED RELEASE ORAL at 11:01

## 2020-01-01 RX ADMIN — GABAPENTIN 100 MILLIGRAM(S): 400 CAPSULE ORAL at 05:24

## 2020-01-01 RX ADMIN — SENNA PLUS 2 TABLET(S): 8.6 TABLET ORAL at 21:17

## 2020-01-01 RX ADMIN — Medication 58 MILLIGRAM(S): at 16:15

## 2020-01-01 RX ADMIN — Medication 1000 MILLIGRAM(S): at 23:00

## 2020-01-01 RX ADMIN — OXYCODONE HYDROCHLORIDE 5 MILLIGRAM(S): 5 TABLET ORAL at 05:20

## 2020-01-01 RX ADMIN — Medication 10 MILLIGRAM(S): at 01:28

## 2020-01-01 RX ADMIN — SODIUM CHLORIDE 2000 MILLILITER(S): 9 INJECTION, SOLUTION INTRAVENOUS at 18:31

## 2020-01-01 RX ADMIN — Medication 100 MICROGRAM(S): at 05:32

## 2020-01-01 RX ADMIN — Medication 1 MILLIGRAM(S): at 19:52

## 2020-01-01 RX ADMIN — Medication 58 MILLIGRAM(S): at 05:32

## 2020-01-01 RX ADMIN — Medication 50 GRAM(S): at 04:48

## 2020-01-01 RX ADMIN — Medication 650 MILLIGRAM(S): at 21:59

## 2020-01-01 RX ADMIN — BUDESONIDE AND FORMOTEROL FUMARATE DIHYDRATE 2 PUFF(S): 160; 4.5 AEROSOL RESPIRATORY (INHALATION) at 17:41

## 2020-01-01 RX ADMIN — LACOSAMIDE 150 MILLIGRAM(S): 50 TABLET ORAL at 22:07

## 2020-01-01 RX ADMIN — Medication 102 MILLIGRAM(S): at 00:42

## 2020-01-01 RX ADMIN — Medication 25 MILLIGRAM(S): at 05:43

## 2020-01-01 RX ADMIN — MAGNESIUM OXIDE 400 MG ORAL TABLET 400 MILLIGRAM(S): 241.3 TABLET ORAL at 11:14

## 2020-01-01 RX ADMIN — FENTANYL CITRATE 100 MICROGRAM(S): 50 INJECTION INTRAVENOUS at 03:45

## 2020-01-01 RX ADMIN — Medication 4 MILLIGRAM(S): at 13:17

## 2020-01-01 RX ADMIN — LACOSAMIDE 150 MILLIGRAM(S): 50 TABLET ORAL at 06:25

## 2020-01-01 RX ADMIN — LACOSAMIDE 150 MILLIGRAM(S): 50 TABLET ORAL at 17:19

## 2020-01-01 RX ADMIN — GABAPENTIN 100 MILLIGRAM(S): 400 CAPSULE ORAL at 00:23

## 2020-01-01 RX ADMIN — ONDANSETRON 4 MILLIGRAM(S): 8 TABLET, FILM COATED ORAL at 13:23

## 2020-01-01 RX ADMIN — ENOXAPARIN SODIUM 40 MILLIGRAM(S): 100 INJECTION SUBCUTANEOUS at 11:02

## 2020-01-01 RX ADMIN — LEVETIRACETAM 400 MILLIGRAM(S): 250 TABLET, FILM COATED ORAL at 19:49

## 2020-01-01 RX ADMIN — DEXMEDETOMIDINE HYDROCHLORIDE IN 0.9% SODIUM CHLORIDE 16.3 MICROGRAM(S)/KG/HR: 4 INJECTION INTRAVENOUS at 23:05

## 2020-01-01 RX ADMIN — SODIUM CHLORIDE 1000 MILLILITER(S): 9 INJECTION INTRAMUSCULAR; INTRAVENOUS; SUBCUTANEOUS at 16:00

## 2020-01-01 RX ADMIN — Medication 1 APPLICATION(S): at 20:25

## 2020-01-01 RX ADMIN — LACOSAMIDE 150 MILLIGRAM(S): 50 TABLET ORAL at 05:41

## 2020-01-01 RX ADMIN — Medication 25 MILLIGRAM(S): at 18:40

## 2020-01-01 RX ADMIN — LACOSAMIDE 150 MILLIGRAM(S): 50 TABLET ORAL at 17:13

## 2020-01-01 RX ADMIN — LEVETIRACETAM 500 MILLIGRAM(S): 250 TABLET, FILM COATED ORAL at 05:50

## 2020-01-01 RX ADMIN — TIOTROPIUM BROMIDE 1 CAPSULE(S): 18 CAPSULE ORAL; RESPIRATORY (INHALATION) at 12:36

## 2020-01-01 RX ADMIN — Medication 400 MILLIGRAM(S): at 16:00

## 2020-01-01 RX ADMIN — LACOSAMIDE 150 MILLIGRAM(S): 50 TABLET ORAL at 17:57

## 2020-01-01 RX ADMIN — Medication 30 MILLILITER(S): at 11:19

## 2020-01-01 RX ADMIN — SERTRALINE 150 MILLIGRAM(S): 25 TABLET, FILM COATED ORAL at 11:16

## 2020-01-01 RX ADMIN — OXYCODONE HYDROCHLORIDE 2.5 MILLIGRAM(S): 5 TABLET ORAL at 20:48

## 2020-01-01 RX ADMIN — POLYETHYLENE GLYCOL 3350 17 GRAM(S): 17 POWDER, FOR SOLUTION ORAL at 11:25

## 2020-01-01 RX ADMIN — Medication 650 MILLIGRAM(S): at 05:37

## 2020-01-01 RX ADMIN — OXYCODONE HYDROCHLORIDE 5 MILLIGRAM(S): 5 TABLET ORAL at 05:50

## 2020-01-01 RX ADMIN — ENOXAPARIN SODIUM 40 MILLIGRAM(S): 100 INJECTION SUBCUTANEOUS at 11:54

## 2020-01-01 RX ADMIN — Medication 5 MILLIGRAM(S): at 03:25

## 2020-01-01 RX ADMIN — TIOTROPIUM BROMIDE 1 CAPSULE(S): 18 CAPSULE ORAL; RESPIRATORY (INHALATION) at 11:26

## 2020-01-01 RX ADMIN — OXYCODONE HYDROCHLORIDE 2.5 MILLIGRAM(S): 5 TABLET ORAL at 00:24

## 2020-01-01 RX ADMIN — TIOTROPIUM BROMIDE 1 CAPSULE(S): 18 CAPSULE ORAL; RESPIRATORY (INHALATION) at 11:17

## 2020-01-01 RX ADMIN — BUDESONIDE AND FORMOTEROL FUMARATE DIHYDRATE 2 PUFF(S): 160; 4.5 AEROSOL RESPIRATORY (INHALATION) at 05:07

## 2020-01-01 RX ADMIN — PROPOFOL 7.8 MICROGRAM(S)/KG/MIN: 10 INJECTION, EMULSION INTRAVENOUS at 00:34

## 2020-01-01 RX ADMIN — GABAPENTIN 100 MILLIGRAM(S): 400 CAPSULE ORAL at 17:12

## 2020-01-01 RX ADMIN — Medication 100 MILLIEQUIVALENT(S): at 11:03

## 2020-01-01 RX ADMIN — ENOXAPARIN SODIUM 40 MILLIGRAM(S): 100 INJECTION SUBCUTANEOUS at 11:40

## 2020-01-01 RX ADMIN — MORPHINE SULFATE 2 MILLIGRAM(S): 50 CAPSULE, EXTENDED RELEASE ORAL at 01:40

## 2020-01-01 RX ADMIN — Medication 650 MILLIGRAM(S): at 09:07

## 2020-01-01 RX ADMIN — PANTOPRAZOLE SODIUM 40 MILLIGRAM(S): 20 TABLET, DELAYED RELEASE ORAL at 06:11

## 2020-01-01 RX ADMIN — PROPOFOL 7.8 MICROGRAM(S)/KG/MIN: 10 INJECTION, EMULSION INTRAVENOUS at 21:38

## 2020-01-01 RX ADMIN — ERTAPENEM SODIUM 120 MILLIGRAM(S): 1 INJECTION, POWDER, LYOPHILIZED, FOR SOLUTION INTRAMUSCULAR; INTRAVENOUS at 11:58

## 2020-01-01 RX ADMIN — Medication 100 MICROGRAM(S): at 05:24

## 2020-01-01 RX ADMIN — ALBUTEROL 2.5 MILLIGRAM(S): 90 AEROSOL, METERED ORAL at 11:36

## 2020-01-01 RX ADMIN — LEVETIRACETAM 400 MILLIGRAM(S): 250 TABLET, FILM COATED ORAL at 19:35

## 2020-01-01 RX ADMIN — DEXTROSE MONOHYDRATE, SODIUM CHLORIDE, AND POTASSIUM CHLORIDE 75 MILLILITER(S): 50; .745; 4.5 INJECTION, SOLUTION INTRAVENOUS at 21:44

## 2020-01-01 RX ADMIN — Medication 6.12 MICROGRAM(S)/KG/MIN: at 03:45

## 2020-01-01 RX ADMIN — CHLORHEXIDINE GLUCONATE 15 MILLILITER(S): 213 SOLUTION TOPICAL at 05:17

## 2020-01-01 RX ADMIN — GABAPENTIN 100 MILLIGRAM(S): 400 CAPSULE ORAL at 17:08

## 2020-01-01 RX ADMIN — Medication 1: at 12:46

## 2020-01-01 RX ADMIN — Medication 400 MILLIGRAM(S): at 22:08

## 2020-01-01 RX ADMIN — LACOSAMIDE 120 MILLIGRAM(S): 50 TABLET ORAL at 05:42

## 2020-01-01 RX ADMIN — SERTRALINE 150 MILLIGRAM(S): 25 TABLET, FILM COATED ORAL at 11:17

## 2020-01-01 RX ADMIN — SERTRALINE 150 MILLIGRAM(S): 25 TABLET, FILM COATED ORAL at 11:40

## 2020-01-01 RX ADMIN — SODIUM CHLORIDE 1000 MILLILITER(S): 9 INJECTION INTRAMUSCULAR; INTRAVENOUS; SUBCUTANEOUS at 05:03

## 2020-01-01 RX ADMIN — MIDAZOLAM HYDROCHLORIDE 4 MILLIGRAM(S): 1 INJECTION, SOLUTION INTRAMUSCULAR; INTRAVENOUS at 21:50

## 2020-01-01 RX ADMIN — CHLORHEXIDINE GLUCONATE 1 APPLICATION(S): 213 SOLUTION TOPICAL at 12:45

## 2020-01-01 RX ADMIN — OXYCODONE HYDROCHLORIDE 10 MILLIGRAM(S): 5 TABLET ORAL at 13:18

## 2020-01-01 RX ADMIN — Medication 0.25 MILLIGRAM(S): at 21:33

## 2020-01-01 RX ADMIN — TIOTROPIUM BROMIDE 1 CAPSULE(S): 18 CAPSULE ORAL; RESPIRATORY (INHALATION) at 13:06

## 2020-01-01 RX ADMIN — LACOSAMIDE 150 MILLIGRAM(S): 50 TABLET ORAL at 05:32

## 2020-01-01 RX ADMIN — Medication 650 MILLIGRAM(S): at 23:32

## 2020-01-01 RX ADMIN — MORPHINE SULFATE 2 MILLIGRAM(S): 50 CAPSULE, EXTENDED RELEASE ORAL at 18:35

## 2020-01-01 RX ADMIN — Medication 1: at 17:56

## 2020-01-01 RX ADMIN — MORPHINE SULFATE 0.5 MILLIGRAM(S): 50 CAPSULE, EXTENDED RELEASE ORAL at 01:00

## 2020-01-01 RX ADMIN — Medication 0.25 MILLIGRAM(S): at 00:16

## 2020-01-01 RX ADMIN — Medication 1 APPLICATION(S): at 05:17

## 2020-01-01 RX ADMIN — Medication 0.25 MILLIGRAM(S): at 21:52

## 2020-01-01 RX ADMIN — Medication 650 MILLIGRAM(S): at 02:15

## 2020-01-01 RX ADMIN — Medication 4 MILLIGRAM(S): at 18:41

## 2020-01-01 RX ADMIN — LACOSAMIDE 120 MILLIGRAM(S): 50 TABLET ORAL at 05:12

## 2020-01-01 RX ADMIN — TIOTROPIUM BROMIDE 1 CAPSULE(S): 18 CAPSULE ORAL; RESPIRATORY (INHALATION) at 13:49

## 2020-01-01 RX ADMIN — PIPERACILLIN AND TAZOBACTAM 25 GRAM(S): 4; .5 INJECTION, POWDER, LYOPHILIZED, FOR SOLUTION INTRAVENOUS at 21:16

## 2020-01-01 RX ADMIN — SODIUM CHLORIDE 100 MILLILITER(S): 9 INJECTION, SOLUTION INTRAVENOUS at 05:40

## 2020-01-01 RX ADMIN — Medication 500 MILLIGRAM(S): at 05:41

## 2020-01-01 RX ADMIN — GABAPENTIN 100 MILLIGRAM(S): 400 CAPSULE ORAL at 06:26

## 2020-01-01 RX ADMIN — Medication 250 MILLIGRAM(S): at 00:54

## 2020-01-01 RX ADMIN — LACOSAMIDE 150 MILLIGRAM(S): 50 TABLET ORAL at 05:26

## 2020-01-01 RX ADMIN — PANTOPRAZOLE SODIUM 40 MILLIGRAM(S): 20 TABLET, DELAYED RELEASE ORAL at 09:10

## 2020-01-01 RX ADMIN — OXYCODONE HYDROCHLORIDE 10 MILLIGRAM(S): 5 TABLET ORAL at 06:10

## 2020-01-01 RX ADMIN — Medication 4 MILLIGRAM(S): at 12:50

## 2020-01-01 RX ADMIN — ENOXAPARIN SODIUM 40 MILLIGRAM(S): 100 INJECTION SUBCUTANEOUS at 22:10

## 2020-01-01 RX ADMIN — LACOSAMIDE 120 MILLIGRAM(S): 50 TABLET ORAL at 17:55

## 2020-01-01 RX ADMIN — Medication 1 APPLICATION(S): at 18:33

## 2020-01-01 RX ADMIN — LEVETIRACETAM 500 MILLIGRAM(S): 250 TABLET, FILM COATED ORAL at 18:41

## 2020-01-01 RX ADMIN — PANTOPRAZOLE SODIUM 40 MILLIGRAM(S): 20 TABLET, DELAYED RELEASE ORAL at 05:42

## 2020-01-01 RX ADMIN — SODIUM CHLORIDE 75 MILLILITER(S): 9 INJECTION INTRAMUSCULAR; INTRAVENOUS; SUBCUTANEOUS at 07:53

## 2020-01-01 RX ADMIN — ONDANSETRON 4 MILLIGRAM(S): 8 TABLET, FILM COATED ORAL at 00:26

## 2020-01-01 RX ADMIN — Medication 400 MILLIGRAM(S): at 03:45

## 2020-01-01 RX ADMIN — Medication 6.12 MICROGRAM(S)/KG/MIN: at 07:53

## 2020-01-01 RX ADMIN — LEVETIRACETAM 400 MILLIGRAM(S): 250 TABLET, FILM COATED ORAL at 19:40

## 2020-01-01 RX ADMIN — Medication 25 MILLIGRAM(S): at 05:13

## 2020-01-01 RX ADMIN — Medication 975 MILLIGRAM(S): at 21:29

## 2020-01-01 RX ADMIN — PANTOPRAZOLE SODIUM 40 MILLIGRAM(S): 20 TABLET, DELAYED RELEASE ORAL at 05:03

## 2020-01-01 RX ADMIN — MORPHINE SULFATE 2 MILLIGRAM(S): 50 CAPSULE, EXTENDED RELEASE ORAL at 19:34

## 2020-01-01 RX ADMIN — Medication 4 MILLIGRAM(S): at 11:24

## 2020-01-01 RX ADMIN — TIOTROPIUM BROMIDE 1 CAPSULE(S): 18 CAPSULE ORAL; RESPIRATORY (INHALATION) at 12:49

## 2020-01-01 RX ADMIN — PANTOPRAZOLE SODIUM 40 MILLIGRAM(S): 20 TABLET, DELAYED RELEASE ORAL at 05:44

## 2020-01-01 RX ADMIN — MORPHINE SULFATE 2 MILLIGRAM(S): 50 CAPSULE, EXTENDED RELEASE ORAL at 18:50

## 2020-01-01 RX ADMIN — ALBUTEROL 2 PUFF(S): 90 AEROSOL, METERED ORAL at 03:16

## 2020-01-01 RX ADMIN — Medication 4 MILLIGRAM(S): at 00:16

## 2020-01-01 RX ADMIN — Medication 1 PACKET(S): at 10:04

## 2020-01-01 RX ADMIN — Medication 25 MILLIGRAM(S): at 05:42

## 2020-01-01 RX ADMIN — Medication 650 MILLIGRAM(S): at 22:34

## 2020-01-01 RX ADMIN — LEVETIRACETAM 500 MILLIGRAM(S): 250 TABLET, FILM COATED ORAL at 05:20

## 2020-01-01 RX ADMIN — Medication 5 MILLIGRAM(S): at 13:25

## 2020-01-01 RX ADMIN — Medication 0.25 MILLIGRAM(S): at 22:10

## 2020-01-01 RX ADMIN — LEVETIRACETAM 400 MILLIGRAM(S): 250 TABLET, FILM COATED ORAL at 21:01

## 2020-01-01 RX ADMIN — ENOXAPARIN SODIUM 40 MILLIGRAM(S): 100 INJECTION SUBCUTANEOUS at 13:06

## 2020-01-01 RX ADMIN — OXYCODONE HYDROCHLORIDE 10 MILLIGRAM(S): 5 TABLET ORAL at 05:50

## 2020-01-01 RX ADMIN — SERTRALINE 150 MILLIGRAM(S): 25 TABLET, FILM COATED ORAL at 21:53

## 2020-01-01 RX ADMIN — Medication 250 MILLIGRAM(S): at 05:18

## 2020-01-01 RX ADMIN — LACOSAMIDE 150 MILLIGRAM(S): 50 TABLET ORAL at 05:24

## 2020-01-01 RX ADMIN — Medication 100 MILLIEQUIVALENT(S): at 10:03

## 2020-01-01 RX ADMIN — PIPERACILLIN AND TAZOBACTAM 25 GRAM(S): 4; .5 INJECTION, POWDER, LYOPHILIZED, FOR SOLUTION INTRAVENOUS at 06:37

## 2020-01-01 RX ADMIN — Medication 100 MICROGRAM(S): at 05:14

## 2020-01-01 RX ADMIN — Medication 650 MILLIGRAM(S): at 23:00

## 2020-01-01 RX ADMIN — SERTRALINE 150 MILLIGRAM(S): 25 TABLET, FILM COATED ORAL at 00:23

## 2020-01-01 RX ADMIN — Medication 0.25 MILLIGRAM(S): at 02:48

## 2020-01-01 RX ADMIN — Medication 58 MILLIGRAM(S): at 05:24

## 2020-01-01 RX ADMIN — Medication 400 MILLIGRAM(S): at 05:43

## 2020-01-01 RX ADMIN — PANTOPRAZOLE SODIUM 40 MILLIGRAM(S): 20 TABLET, DELAYED RELEASE ORAL at 18:41

## 2020-01-01 RX ADMIN — Medication 4 MILLIGRAM(S): at 17:37

## 2020-01-01 RX ADMIN — MORPHINE SULFATE 2 MILLIGRAM(S): 50 CAPSULE, EXTENDED RELEASE ORAL at 01:09

## 2020-01-01 RX ADMIN — LACOSAMIDE 150 MILLIGRAM(S): 50 TABLET ORAL at 05:29

## 2020-01-01 RX ADMIN — Medication 975 MILLIGRAM(S): at 21:55

## 2020-01-01 RX ADMIN — PANTOPRAZOLE SODIUM 40 MILLIGRAM(S): 20 TABLET, DELAYED RELEASE ORAL at 05:41

## 2020-02-20 PROBLEM — Z91.14 NON COMPLIANCE W MEDICATION REGIMEN: Status: ACTIVE | Noted: 2019-06-27

## 2020-02-20 PROBLEM — R14.1 GAS PAIN: Status: RESOLVED | Noted: 2019-04-02 | Resolved: 2020-01-01

## 2020-02-20 PROBLEM — Z86.19 HISTORY OF CANDIDIASIS OF MOUTH: Status: RESOLVED | Noted: 2019-06-27 | Resolved: 2020-01-01

## 2020-02-20 PROBLEM — R14.0 ABDOMINAL BLOATING: Status: ACTIVE | Noted: 2020-01-01

## 2020-02-20 PROBLEM — K21.9 GASTROESOPHAGEAL REFLUX DISEASE, ESOPHAGITIS PRESENCE NOT SPECIFIED: Status: ACTIVE | Noted: 2019-04-02

## 2020-02-20 NOTE — HISTORY OF PRESENT ILLNESS
[FreeTextEntry1] : Ms. Andrea is a 64 year old female with a history of adenocarcinoma of left lung, TORRES, COPD, lung mass, poor sleep, pulmonary emphysema, tobacco use and SOB  presenting to the office today for an acute  visit. Her chief complaint is SOB.\par \par She reports she was diagnosed with iron def anemia and has needed iron supplementation and blood transfusion for the diagnosis. She underwent colonoscopy and EGD which did not reveal evidence of bleeding or reason for new worsening anemia. She reports feeling really "awful" while all of this was being diagnosed/worked up with extreme fatigue and SOB/TORRES. \par \par Since the treatments/transfusions- she reports she feels she is back to her baseline SOB. 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 She exercises at least 5x a week. She forgets to use her rescue inhaler prior to exercising. \par She would like to ensure she is using her inhalers properly.  She only restarted her Bevespi this week. She is not on Qvar. \par She has been dealing with more GERD symptoms recently and went back on pantoprazole. She is not on famotidine. \par \par She has been dealing with stomach bloating, gas and fullness. She feel probiotics help her greatly. \par No recent illnesses. \par Remaining ROS unremarkable\par Pt reports she had been feeling well until a few weeks ago. She has recently felt more short of breath with exertion/exercise. She admits she is non compliant with her inhalers- uses is sporadically. She also admits that she has poor inhaler hygiene. She has run out of her inhalers vs cannot find them. Her SOB is worst with taking the stairs or when she exercises. She reports "pushing through" and doing every thing despite how she is feeling.\par \par Aside from that, pt has also been experiencing sore sore throat and her mouth has been hurting her. She has seen some white patches on her tongue as well. She had some recent dental work and she still has planned dental work for implants.\par \par She reports dealing with some gas as well and has been using gas x. She was on probiotics at one point for this but has discontinued. \par \par She has not been nebulizing- she has the meds at home.  She takes trelegy when she remembers to.\par \par She states she is not waking up at night from feeling sick or from SOB. Her symptoms are mainly during the day. \par \par  She otherwise denies fever, chills, cough, mucus, sore throat, ear pain, chest pain, her nausea from reflux has improved and is no longer vomiting from her reflux. She reports no GERD symptoms. She denies rashes, muscle cramps. \par \par She quit smoking 3 years ago.

## 2020-02-20 NOTE — ASSESSMENT
[FreeTextEntry1] : The plan is as follows: \par \par Her SOB is multifactorial:\par -COPD/ emphysema w/ current exacerbation\par -restrictive lung disease\par -poor mechanics of breathing \par -out of shape \par -cardiac disease\par \par #1.COPD/emphysema active due to non compliance on meds\par -add nebulizer with albuterol BID-TID PRN SOB. Take treatment before dinner to decrease jittery feeling prior to bed- discussed need for compliance with this. \par -ok to restart Bevespi 2 puffs in am and pm\par -add Qvar 80 2 puffs am and pm rinse and gargle\par -Proair rescue inhaler for acute sob or prior to exercise 2 puffs Q 6 hours PRN\par -Inhaler technique reviewed as well as oral hygiene techniques reviewed with patient. Avoidance of cold air, extremes of temperature, rescue inhaler should be used before exercise. Order of medication reviewed with patient. \par \par #2. poor breathing mechanics\par -Proper breathing techniques were reviewed with an emphasis of exhalation. Patient instructed to breath in for 1 second and out for four seconds. Patient was encouraged to not talk while walking.\par \par #3. restrictive lung disease\par -due to her prior lobectomy\par \par #4.Oral thrush hx\par -discussed inhaler hygiene with her in detail\par \par #5. non small cell stage two lung cancer\par -f/u with Dr. Pugh/Maximino\par -most recent surveillance CT from 9/2019- stable , follow up CT 9/2020\par \par #6. Abdominal complaints\par -add on probiotics\par \par #7.GERD- reports controlled\par -continue pantoprazole 40 mg daily\par \par Follow up in 3 months with [facility]\par The patient was encouraged to call with any changes, concerns, or questions.\par She was also advised to give me an update in 1-2 weeks to discuss progress. She was agreeable.

## 2020-02-20 NOTE — REVIEW OF SYSTEMS
[Fatigue] : fatigue [Cough] : no cough [Sputum] : not coughing up ~M sputum [Dyspnea] : dyspnea [Wheezing] : no wheezing [As Noted in HPI] : as noted in HPI [Heartburn] : no heartburn [Reflux] : no reflux [Indigestion] : no indigestion [Constipation] : no constipation [Abdominal Pain] : no abdominal pain [Negative] : Pulmonary Hypertension [FreeTextEntry7] : bloating [de-identified] : hx of insomnia and poor sleep hx

## 2020-02-20 NOTE — PHYSICAL EXAM
[General Appearance - Well Developed] : well developed [Normal Appearance] : normal appearance [Well Groomed] : well groomed [General Appearance - Well Nourished] : well nourished [No Deformities] : no deformities [General Appearance - In No Acute Distress] : no acute distress [Normal Conjunctiva] : the conjunctiva exhibited no abnormalities [Eyelids - No Xanthelasma] : the eyelids demonstrated no xanthelasmas [II] : II [FreeTextEntry1] : dry mouth, corners of mouth cracking [Neck Appearance] : the appearance of the neck was normal [Neck Cervical Mass (___cm)] : no neck mass was observed [Jugular Venous Distention Increased] : there was no jugular-venous distention [Heart Rate And Rhythm] : heart rate and rhythm were normal [Heart Sounds] : normal S1 and S2 [Murmurs] : no murmurs present [Arterial Pulses Normal] : the arterial pulses were normal [Edema] : no peripheral edema present [Respiration, Rhythm And Depth] : normal respiratory rhythm and effort [Exaggerated Use Of Accessory Muscles For Inspiration] : no accessory muscle use [Auscultation Breath Sounds / Voice Sounds] : lungs were clear to auscultation bilaterally [Abdomen Soft] : soft [Abnormal Walk] : normal gait [Gait - Sufficient For Exercise Testing] : the gait was sufficient for exercise testing [Nail Clubbing] : no clubbing of the fingernails [Cyanosis, Localized] : no localized cyanosis [Skin Color & Pigmentation] : normal skin color and pigmentation [] : no rash [No Focal Deficits] : no focal deficits [Oriented To Time, Place, And Person] : oriented to person, place, and time [Impaired Insight] : insight and judgment were intact [Affect] : the affect was normal [Mood] : the mood was normal

## 2020-03-13 NOTE — ED ADULT TRIAGE NOTE - AS HEIGHT TYPE
Subjective   Mr. Joshua Power is a 43 y.o. male who presents to the ED with complaints of abdominal pain. He reports that around an hour ago he developed severe lower abdominal pain, which prompted presentation to the ED. He states that the pain is worse on the right side and radiates into groin. His pain has improved since onset and he currently rates his pain an 8/10. He also complains of chills but he denies nausea, vomiting, a fever, dysuria, frequency, and urgency. He has a history of diverticulitis, HTN, and DM. He does not have a history of abdominal surgery. He does not smoke, drink alcohol, or use illicit drugs. No other acute complaints at this time.         History provided by:  Patient  Abdominal Pain   Pain location:  RLQ and LLQ  Pain radiates to:  Groin  Pain severity:  Severe  Duration:  1 hour  Timing:  Constant  Progression:  Improving  Chronicity:  New  Associated symptoms: chills and fatigue    Associated symptoms: no chest pain, no diarrhea, no dysuria, no nausea, no shortness of breath and no vomiting        Review of Systems   Constitutional: Positive for chills and fatigue.   Respiratory: Negative for shortness of breath.    Cardiovascular: Negative for chest pain.   Gastrointestinal: Positive for abdominal pain. Negative for diarrhea, nausea and vomiting.   Genitourinary: Negative for dysuria, frequency and urgency.   Neurological: Negative for dizziness and weakness.   All other systems reviewed and are negative.      Past Medical History:   Diagnosis Date   • Anemia    • Arthritis    • Diabetes mellitus (CMS/HCC)    • DISH (diffuse idiopathic skeletal hyperostosis)    • Headache    • Hypertension    • Injury of back        Allergies   Allergen Reactions   • Nsaids GI Bleeding       History reviewed. No pertinent surgical history.    Family History   Problem Relation Age of Onset   • Diabetes Father    • Stroke Father    • Kidney failure Father        Social History     Socioeconomic  History   • Marital status:      Spouse name: Not on file   • Number of children: Not on file   • Years of education: Not on file   • Highest education level: Not on file   Tobacco Use   • Smoking status: Never Smoker   • Smokeless tobacco: Never Used   Substance and Sexual Activity   • Alcohol use: No   • Drug use: No   • Sexual activity: Defer         Objective   Physical Exam   Constitutional: He is oriented to person, place, and time. He appears well-developed and well-nourished. He is cooperative.  Non-toxic appearance.   Appears uncomfortable   HENT:   Head: Normocephalic and atraumatic.   Mouth/Throat: Oropharynx is clear and moist. No oropharyngeal exudate.   Eyes: Conjunctivae, EOM and lids are normal. Pupils are equal, round, and reactive to light.   Neck: Trachea normal, normal range of motion and full passive range of motion without pain.   Cardiovascular: Regular rhythm, normal heart sounds, intact distal pulses and normal pulses.   Pulmonary/Chest: Effort normal and breath sounds normal. No respiratory distress. He has no decreased breath sounds. He has no wheezes. He has no rhonchi. He has no rales.   Abdominal: Soft. Normal appearance and bowel sounds are normal. There is tenderness in the right lower quadrant and left lower quadrant.   Musculoskeletal: Normal range of motion.   Neurological: He is alert and oriented to person, place, and time. He has normal strength. No cranial nerve deficit.   Skin: Skin is warm, dry and intact. No rash noted.   Psychiatric: He has a normal mood and affect. His speech is normal and behavior is normal.   Nursing note and vitals reviewed.      Procedures         ED Course  ED Course as of Mar 18 0345   Sun Mar 17, 2019   2143 WBC: (!) 14.09 [KG]   2143 Glucose: (!) 253 [KG]   2144 Lipase: (!) 63 [KG]   2311 Julia Pearson contacted at this time agrees with treatment plan.  Hospitalist to admit the patient. He will see the patient.   [KG]   2322 Spoke with   Lindsay, hospitalist, who agrees to admit the patient. -KCG  [AT]      ED Course User Index  [AT] AlexMikki  [KG] Leidy Osborne APRN       Recent Results (from the past 24 hour(s))   Comprehensive Metabolic Panel    Collection Time: 03/17/19  8:42 PM   Result Value Ref Range    Glucose 253 (H) 70 - 100 mg/dL    BUN 15 9 - 23 mg/dL    Creatinine 1.13 0.60 - 1.30 mg/dL    Sodium 132 132 - 146 mmol/L    Potassium 5.5 3.5 - 5.5 mmol/L    Chloride 98 (L) 99 - 109 mmol/L    CO2 23.0 20.0 - 31.0 mmol/L    Calcium 8.8 8.7 - 10.4 mg/dL    Total Protein 7.8 5.7 - 8.2 g/dL    Albumin 5.04 (H) 3.20 - 4.80 g/dL    ALT (SGPT) 53 (H) 7 - 40 U/L    AST (SGOT) 137 (H) 0 - 33 U/L    Alkaline Phosphatase 47 25 - 100 U/L    Total Bilirubin 0.5 0.3 - 1.2 mg/dL    eGFR Non African Amer 70 >60 mL/min/1.73    Globulin 2.8 gm/dL    A/G Ratio 1.8 1.5 - 2.5 g/dL    BUN/Creatinine Ratio 13.3 7.0 - 25.0    Anion Gap 11.0 3.0 - 11.0 mmol/L   Lipase    Collection Time: 03/17/19  8:42 PM   Result Value Ref Range    Lipase 63 (H) 6 - 51 U/L   CBC Auto Differential    Collection Time: 03/17/19  8:42 PM   Result Value Ref Range    WBC 14.09 (H) 3.50 - 10.80 10*3/mm3    RBC 4.70 4.20 - 5.76 10*6/mm3    Hemoglobin 15.6 13.1 - 17.5 g/dL    Hematocrit 44.6 38.9 - 50.9 %    MCV 94.9 80.0 - 99.0 fL    MCH 33.2 (H) 27.0 - 31.0 pg    MCHC 35.0 32.0 - 36.0 g/dL    RDW 13.8 11.3 - 14.5 %    RDW-SD 47.7 37.0 - 54.0 fl    MPV 10.8 6.0 - 12.0 fL    Platelets 179 150 - 450 10*3/mm3    Neutrophil % 72.0 (H) 41.0 - 71.0 %    Lymphocyte % 22.0 (L) 24.0 - 44.0 %    Monocyte % 5.5 0.0 - 12.0 %    Eosinophil % 0.4 0.0 - 3.0 %    Basophil % 0.1 0.0 - 1.0 %    Immature Grans % 0.6 0.0 - 0.6 %    Neutrophils, Absolute 10.16 (H) 1.50 - 8.30 10*3/mm3    Lymphocytes, Absolute 3.10 0.60 - 4.80 10*3/mm3    Monocytes, Absolute 0.77 0.00 - 1.00 10*3/mm3    Eosinophils, Absolute 0.05 0.00 - 0.30 10*3/mm3    Basophils, Absolute 0.01 0.00 - 0.20 10*3/mm3    Immature  Grans, Absolute 0.08 (H) 0.00 - 0.05 10*3/mm3   Lactic Acid, Plasma    Collection Time: 03/17/19 11:25 PM   Result Value Ref Range    Lactate 3.2 (C) 0.5 - 2.0 mmol/L   Procalcitonin    Collection Time: 03/17/19 11:25 PM   Result Value Ref Range    Procalcitonin <0.05 <=0.25 ng/mL   Lactic Acid, Reflex Timer (This will reflex a repeat order 3-3:15 hours after ordered.)    Collection Time: 03/17/19 11:25 PM   Result Value Ref Range    Extra Tube Hold for add-ons.    Urinalysis With Microscopic If Indicated (No Culture) - Urine, Clean Catch    Collection Time: 03/17/19 11:38 PM   Result Value Ref Range    Color, UA Yellow Yellow, Straw    Appearance, UA Clear Clear    pH, UA 6.5 5.0 - 8.0    Specific Gravity, UA 1.029 1.001 - 1.030    Glucose, UA >=1000 mg/dL (3+) (A) Negative    Ketones, UA Negative Negative    Bilirubin, UA Negative Negative    Blood, UA Negative Negative    Protein, UA Negative Negative    Leuk Esterase, UA Negative Negative    Nitrite, UA Negative Negative    Urobilinogen, UA 0.2 E.U./dL 0.2 - 1.0 E.U./dL   POC Glucose Once    Collection Time: 03/18/19 12:45 AM   Result Value Ref Range    Glucose 200 (H) 70 - 130 mg/dL     Note: In addition to lab results from this visit, the labs listed above may include labs taken at another facility or during a different encounter within the last 24 hours. Please correlate lab times with ED admission and discharge times for further clarification of the services performed during this visit.    CT Abdomen Pelvis With Contrast   Final Result   Acute perforated diverticulitis at the junction of the sigmoid and descending    colon. No abscess.      THIS DOCUMENT HAS BEEN ELECTRONICALLY SIGNED BY JOSE G TREVIZO MD        Vitals:    03/17/19 2130 03/18/19 0002 03/18/19 0020 03/18/19 0037   BP: 124/79 122/82 122/81 140/80   BP Location:   Left arm Left arm   Patient Position:   Lying Lying   Pulse: 89 110 107 105   Resp:   16 18   Temp:   98.1 °F (36.7 °C) 99 °F (37.2 °C)  "  TempSrc:   Oral Oral   SpO2: 98% 96% 96% 97%   Weight:    91.2 kg (201 lb 2 oz)   Height:    175.3 cm (69\")     Medications   sodium chloride 0.9 % flush 10 mL (not administered)   HYDROmorphone (DILAUDID) injection 0.5 mg (not administered)   sodium chloride 0.9 % infusion (125 mL/hr Intravenous New Bag 3/18/19 0135)   dextrose (GLUTOSE) oral gel 15 g (not administered)   dextrose (D50W) 25 g/ 50mL Intravenous Solution 25 g (not administered)   glucagon (human recombinant) (GLUCAGEN DIAGNOSTIC) injection 1 mg (not administered)   LORazepam (ATIVAN) injection 0.5 mg (0.5 mg Intravenous Given 3/18/19 0328)   piperacillin-tazobactam (ZOSYN) 3.375 g in iso-osmotic dextrose 50 ml (premix) (not administered)   insulin lispro (humaLOG) injection 0-7 Units (3 Units Subcutaneous Given 3/18/19 0143)   ! Home medication stored in [facility] (not administered)   sodium chloride 0.9 % bolus 1,000 mL (0 mL Intravenous Stopped 3/17/19 2147)   ondansetron (ZOFRAN) injection 4 mg (4 mg Intravenous Given 3/17/19 2047)   Morphine sulfate (PF) injection 4 mg (4 mg Intravenous Given 3/17/19 2057)   iopamidol (ISOVUE-300) 61 % injection 100 mL (100 mL Intravenous Given 3/17/19 2151)   piperacillin-tazobactam (ZOSYN) 4.5 g in iso-osmotic dextrose 100 mL IVPB (premix) (0 g Intravenous Stopped 3/18/19 0032)   HYDROmorphone (DILAUDID) injection 1 mg (1 mg Intravenous Given 3/17/19 2322)   insulin detemir (LEVEMIR) injection 15 Units (15 Units Subcutaneous Given 3/18/19 0133)   sodium chloride 0.9 % bolus 1,000 mL (1,000 mL Intravenous New Bag 3/18/19 0135)     ECG/EMG Results (last 24 hours)     ** No results found for the last 24 hours. **        No orders to display                     MDM    Final diagnoses:   Perforated diverticulum       Documentation assistance provided by thierry Johnson.  Information recorded by the scribe was done at my direction and has been verified and validated by me.     Mikki Johnson  03/17/19 " 2055       Leidy Osborne, APRN  03/18/19 7932     stated

## 2020-03-13 NOTE — H&P ADULT - NSHPPHYSICALEXAM_GEN_ALL_CORE
T(C): 36.8 (03-13-20 @ 20:09), Max: 37.1 (03-13-20 @ 18:06)  HR: 86 (03-13-20 @ 20:09) (81 - 131)  BP: 145/87 (03-13-20 @ 20:09) (119/76 - 178/111)  RR: 18 (03-13-20 @ 20:09) (18 - 100)  SpO2: 100% (03-13-20 @ 20:09) (97% - 100%)  Wt(kg): --    PHYSICAL EXAM:  GENERAL: visibly anxious, well-groomed, well-developed  HEAD:  Atraumatic, Normocephalic  EYES: EOMI, PERRLA, conjunctiva and sclera clear  ENMT: No oropharyngeal exudates, erythema or lesions,  Moist mucous membranes  NECK: Supple, no cervical lymphadenopathy  NERVOUS SYSTEM:  Alert & Oriented X3, CN II-XII intact, 5/5 BUE and BLE motor strength, full sensation to light touch, Left hand finger-to-nose is impaired   CHEST/LUNG: Clear to auscultation bilaterally; No rales, no  rhonchi, no wheezing  HEART: Regular rate and rhythm; No murmurs, rubs, or gallops  ABDOMEN: Soft, Nontender, Nondistended  EXTREMITIES:  2+ radial Pulses, No cyanosis or edema  SKIN: warm, dry negative...

## 2020-03-13 NOTE — H&P ADULT - NSICDXPASTMEDICALHX_GEN_ALL_CORE_FT
PAST MEDICAL HISTORY:  COPD (chronic obstructive pulmonary disease)     Iron deficiency anemia underwent endoscopy and colonoscopy, reportedly fine    Lung cancer     Migraine

## 2020-03-13 NOTE — ED PROVIDER NOTE - CLINICAL SUMMARY MEDICAL DECISION MAKING FREE TEXT BOX
GERARDOATEL: 63 y/o female with hx of lung cancer, COPD, BIB daughter after pt started having loss of vision from her L eye, pt woke up with headache this morning but at 3Pm started having visual changes and speech changes, here pt with visual defect, and altered behavior,  no fever, no chills, no vomiting, no abdominal pain, no sick contacts, code stroke, EKG, CBC, CMP, PT/PTT, U/A,

## 2020-03-13 NOTE — H&P ADULT - NSHPLABSRESULTS_GEN_ALL_CORE
Labs, imaging  personally reviewed by me.   CBC found elevated WBC of 11.9.  UA positive for bacteria.      LABS:                        13.6   11.90 )-----------( 285      ( 13 Mar 2020 18:24 )             41.7     Hgb Trend: 13.6<--  03-13    139  |  104  |  24<H>  ----------------------------<  155<H>  3.8   |  24  |  0.80    Ca    9.6      13 Mar 2020 18:24    TPro  7.4  /  Alb  4.4  /  TBili  0.3  /  DBili  x   /  AST  23  /  ALT  21  /  AlkPhos  77  03-13    Creatinine Trend: 0.80<--  PT/INR - ( 13 Mar 2020 18:24 )   PT: 11.3 sec;   INR: 0.99 ratio         PTT - ( 13 Mar 2020 18:24 )  PTT:31.0 sec  Urinalysis Basic - ( 13 Mar 2020 19:35 )    Color: Light Yellow / Appearance: Clear / SG: >1.050 / pH: x  Gluc: x / Ketone: Negative  / Bili: Negative / Urobili: Negative   Blood: x / Protein: Trace / Nitrite: Negative   Leuk Esterase: Negative / RBC: 10 /hpf / WBC 2 /HPF   Sq Epi: x / Non Sq Epi: 1 /hpf / Bacteria: Many    < from: CT Brain Stroke Protocol (03.13.20 @ 18:33) >    FINDINGS:     New 1.8 x 1.6 cm acute hemorrhage with surrounding vasogenic edema within the right parietal lobe. No extra-axial fluid collection, hydrocephalus or midline shift. The basal cisterns are patent.    Ventricles and sulci are normal in size for the patient's age.     Paranasal sinuses and mastoid air cells are clear. Calvarium is intact.     IMPRESSION:     New 1.8 x 1.6 cm acute hemorrhage with surrounding vasogenic edema within the right parietal lobe. Findings are concerning for metastatic lesion with hemorrhage given history of lung cancer.    No acute intracranial hemorrhage,extra-axial fluid collection, hydrocephalus, mass effect or midline shift.    Dr. Lemon discussed these findings with Dr. Mondragon on 3/13/2020 6:30 PM, with read back.  < end of copied text >    < from: CT Angio Neck w/ IV Cont (03.13.20 @ 18:50) >    FINDINGS:     CT ANGIOGRAPHY NECK:     There is no evidence for significant stenosis or major vessel occlusion involving the bilateral carotid arteries.     Origins of the bilateral vertebral arteries are unremarkable. There is no evidence for significant stenosis, major vessel occlusion or dissection of the intracranial vertebral arteries. Carotid bifurcations are unremarkable.    The soft tissues of the neck are unremarkable. Evaluation of the bilateral lung apices demonstrates emphysema.     The visualized skeletal structures are unremarkable.     CT ANGIOGRAPHY BRAIN:     There is good opacification of the intracranial internal carotid arties, basilar artery and [place].     Bilateral anterior, middle and posterior cerebral arteries are unremarkable. There is no evidence for significant stenosis, major vessel occlusion, or aneurysm.    No enlarged vascular lesions or clusters of abnormal vessels are noted to suggest an arterial venous malformation.    The visualized portions of the superficial and deep venous systems are unremarkable.     CT PERFUSION:     RAPID values are as follows:     CBF < 30%: 0cc   CBV <34%volume: 0 cc   Tmax > 6.0s: 0cc   Mismatch Volume: 0cc     IMPRESSION:   CTA NECK: No evidence of hemodynamically significant stenosis using NASCET criteria. No evidence of arterial dissection.     CTA BRAIN: No evidence of flow-limiting stenosis, major vessel occlusion or aneurysm.  Again appreciated is the hemorrhagic lesion in the right parietal occipital region with surrounding vasogenic edema likely a hemorrhagic metastasis given the patient's history of  lung cancer. No vascular abnormality in this region to suggest an underlying AVM or peripheral mycotic type aneurysm.  CT PERFUSION: Unremarkable CT perfusion scan.  < end of copied text > Labs, imaging, neurology and neurosurgery team notes personally reviewed by me.  CBC found elevated WBC of 11.9.  UA positive for bacteria.      LABS:                        13.6   11.90 )-----------( 285      ( 13 Mar 2020 18:24 )             41.7     Hgb Trend: 13.6<--  03-13    139  |  104  |  24<H>  ----------------------------<  155<H>  3.8   |  24  |  0.80    Ca    9.6      13 Mar 2020 18:24    TPro  7.4  /  Alb  4.4  /  TBili  0.3  /  DBili  x   /  AST  23  /  ALT  21  /  AlkPhos  77  03-13    Creatinine Trend: 0.80<--  PT/INR - ( 13 Mar 2020 18:24 )   PT: 11.3 sec;   INR: 0.99 ratio         PTT - ( 13 Mar 2020 18:24 )  PTT:31.0 sec  Urinalysis Basic - ( 13 Mar 2020 19:35 )    Color: Light Yellow / Appearance: Clear / SG: >1.050 / pH: x  Gluc: x / Ketone: Negative  / Bili: Negative / Urobili: Negative   Blood: x / Protein: Trace / Nitrite: Negative   Leuk Esterase: Negative / RBC: 10 /hpf / WBC 2 /HPF   Sq Epi: x / Non Sq Epi: 1 /hpf / Bacteria: Many    < from: CT Brain Stroke Protocol (03.13.20 @ 18:33) >    FINDINGS:     New 1.8 x 1.6 cm acute hemorrhage with surrounding vasogenic edema within the right parietal lobe. No extra-axial fluid collection, hydrocephalus or midline shift. The basal cisterns are patent.    Ventricles and sulci are normal in size for the patient's age.     Paranasal sinuses and mastoid air cells are clear. Calvarium is intact.     IMPRESSION:     New 1.8 x 1.6 cm acute hemorrhage with surrounding vasogenic edema within the right parietal lobe. Findings are concerning for metastatic lesion with hemorrhage given history of lung cancer.    No acute intracranial hemorrhage,extra-axial fluid collection, hydrocephalus, mass effect or midline shift.    Dr. Lemon discussed these findings with Dr. Mondragon on 3/13/2020 6:30 PM, with read back.  < end of copied text >    < from: CT Angio Neck w/ IV Cont (03.13.20 @ 18:50) >    FINDINGS:     CT ANGIOGRAPHY NECK:     There is no evidence for significant stenosis or major vessel occlusion involving the bilateral carotid arteries.     Origins of the bilateral vertebral arteries are unremarkable. There is no evidence for significant stenosis, major vessel occlusion or dissection of the intracranial vertebral arteries. Carotid bifurcations are unremarkable.    The soft tissues of the neck are unremarkable. Evaluation of the bilateral lung apices demonstrates emphysema.     The visualized skeletal structures are unremarkable.     CT ANGIOGRAPHY BRAIN:     There is good opacification of the intracranial internal carotid arties, basilar artery and [place].     Bilateral anterior, middle and posterior cerebral arteries are unremarkable. There is no evidence for significant stenosis, major vessel occlusion, or aneurysm.    No enlarged vascular lesions or clusters of abnormal vessels are noted to suggest an arterial venous malformation.    The visualized portions of the superficial and deep venous systems are unremarkable.     CT PERFUSION:     RAPID values are as follows:     CBF < 30%: 0cc   CBV <34%volume: 0 cc   Tmax > 6.0s: 0cc   Mismatch Volume: 0cc     IMPRESSION:   CTA NECK: No evidence of hemodynamically significant stenosis using NASCET criteria. No evidence of arterial dissection.     CTA BRAIN: No evidence of flow-limiting stenosis, major vessel occlusion or aneurysm.  Again appreciated is the hemorrhagic lesion in the right parietal occipital region with surrounding vasogenic edema likely a hemorrhagic metastasis given the patient's history of  lung cancer. No vascular abnormality in this region to suggest an underlying AVM or peripheral mycotic type aneurysm.  CT PERFUSION: Unremarkable CT perfusion scan.  < end of copied text >

## 2020-03-13 NOTE — CONSULT NOTE ADULT - ASSESSMENT
63 y/o LH female with past medical history of COPD, Migraine, Stage II Lung Ca (diagnosed in 2016) s/p chemo (treated at [facility]) s/p lobectomy presents to the ED as code stroke. Per patient and patient's daughter, patient was at her baseline until 3pm when she reported dizziness and difficulty utilizing her phone. Patient's friend then called the daughter stating she was repeating herself and not acting like herself. Patient's daughter then picked her up and noted her to be staring off into space for about 2 minutes and was then disoriented. Patient's daughter also noted patient to not be able to look to her left. Denied any overt weakness, numbness, problems speaking.   At baseline, patient is independent in all IADL's.     Neurologic exam demonstrates +LHH, +Left visual neglect, +R gaze preference, +L nasolabial fold flattening, +Extinction to bilateral simultaneous stimuli.     NIHSS: 5  MRS: 0    Impression: Left sided neglect a/w subtle L hemiparesis (f) 2/2 R parietal hemorrhage; suspect metastases given borderzone (gray-white junction) location and amount of edema; primary hemorrhage and hemorraghic stroke considered less likely.  Transient disorientation preceded by unresponsiveness likely 2/2 Focal seizure with impaired awareness    Recommendations:   Maintain SBP <160/90  Load with Keppra 1g IV x1  Continue Keppra 500mg BID  rEEG (would prolong study if patient's mental status fluctuates)  Hold ASA and DVT prophylaxis for now  MRI brain with and without contrast  MRA head without contrast and neck with contrast  Tele monitor  NPO until passes dysphagia screen  PT/OT/S&S abigail 65 y/o LH female with past medical history of COPD, Migraine, Depression, Iron deficiency anemia, Stage II Lung Adenocarcinoma (diagnosed in 2016) s/p resection followed by adjuvant chemotherapy with cisplatin and pemetrexed x 4 cycles completed in June 2016 (treated at [facility]), presents to the ED as code stroke. Per patient and patient's daughter, patient was at her baseline until 3pm when she saw a flash in her left eye a/w reported dizziness and difficulty utilizing her phone while at work. Patient's friend then called the daughter stating she was repeating herself, not acting like herself, and had problems with depth perception that almost led her to crash into another vehicle when driving. Patient's daughter then picked her up and noted her to be staring off into space for about 2 minutes with unresponsiveness after which patient was then noted to be disoriented. Patient's daughter also noted patient to not be able to look to her left. Denied any overt weakness, numbness, problems speaking.   At baseline, patient is independent in all IADL's.     Oncologist: Melita Pugh    Neurologic exam demonstrates +LHH, +Left visual neglect, +R gaze preference, +L nasolabial fold flattening, +Extinction to bilateral simultaneous stimuli.     NIHSS: 5  MRS: 0    Impression: Left sided neglect a/w subtle L hemiparesis (f) 2/2 R parietal hemorrhage; suspect hemorrhagic metastasis given borderzone (gray-white junction) location and amount of edema; primary hemorrhage and hemorraghic stroke considered less likely.  Transient disorientation preceded by unresponsiveness likely 2/2 Focal seizure with impaired awareness    Recommendations:   Maintain SBP <160/90  Load with Keppra 1g IV x1  Continue Keppra 500mg BID  rEEG (would prolong study if patient's mental status fluctuates)  Hold ASA and DVT prophylaxis for now  MRI brain with and without contrast  MRA head without contrast and neck with contrast  Tele monitor  NPO until passes dysphagia screen  PT/OT/S&S eval

## 2020-03-13 NOTE — H&P ADULT - HISTORY OF PRESENT ILLNESS
This patient This patient is a 63yo with PMH of left lung adenocarcinoma s/p resection and adjuvant chemo with cisplatin/gem in June 2016, COPD, depression and anxiety, iron deficiency anemia s/p blood and iron transfusions who presents to the ED with complaint of flashing in left eye. The patient felt fine last night and this morning. At the end of her work day, she had spontaneous flashing light in left eye, and pain. She experienced problems with depth perception while driving and got too close to another , and could not dial a phone number. She felt disequilibrium while walking, but denies focal numbness, weakness or dysphagia.  The patient notified her daughter and was brought to the ED.  Patient has persistent headache and nausea.    In the ED, code stroke was called for the patient. Patient had NIH stroke scale of 5. This patient is a 63yo with PMH of left lung adenocarcinoma s/p resection and adjuvant chemo with cisplatin/gem in June 2016, COPD, depression and anxiety, iron deficiency anemia s/p blood and iron transfusions who presents to the ED with complaint of flashing in left eye. The patient felt fine last night and this morning. At the end of her work day, she had spontaneous flashing light in left eye, and pain. She experienced problems with depth perception while driving and got too close to another , and could not dial a phone number. She felt disequilibrium while walking, but denies focal numbness, weakness or dysphagia.  The patient notified her daughter and was brought to the ED.  Patient has persistent headache and nausea.    The patient does NOT have her medications with her and is not able to confirm all of her home medications.    In the ED, code stroke was called for the patient. Patient had NIH stroke scale of 5.

## 2020-03-13 NOTE — H&P ADULT - NSHPREVIEWOFSYSTEMS_GEN_ALL_CORE
REVIEW OF SYSTEMS  CONSTITUTIONAL: No fever, no chills, + fatigue  EYES: No eye pain, + vision changes  ENMT:  No difficulty hearing, no throat pain  RESPIRATORY: No cough, No shortness of breath  CARDIOVASCULAR: No chest pain, no palpitations,   GASTROINTESTINAL: No abdominal pain, + nausea, no vomiting, no bloody stool  GENITOURINARY: No dysuria, no hematuria  NEUROLOGICAL: + headaches, + left eye vision change, + depth perception problems, no loss of strength, no numbness  SKIN: No itching, no rashes, no lesions   MUSCULOSKELETAL: No joint pain, no joint swelling; No muscle pain  HEME/LYMPH: No easy bruising, bleeding

## 2020-03-13 NOTE — H&P ADULT - PROBLEM SELECTOR PLAN 3
Patient had CT C/AP in Sept 2019  CT C- left upper lobe lobectomy. RLL GGO and tree in bud opacity resolved. Lungs clear

## 2020-03-13 NOTE — CONSULT NOTE ADULT - SUBJECTIVE AND OBJECTIVE BOX
p ([id number])     HPI: 65 yo female with pmh lung CA (in remission), COPD presenting for concern "lightening bolts" shooting in the left eye and HA x 3 hours. Pt states she tried to drive to urgent care but felt like she lost her depth perception so pulled over and called her daughter. Daughter came to pick her up believed she appeared at baseline, then states she started to repeat herself and appeared agitated. Per daughter pt had difficulty with answering questions and following directions. denies recent fevers, chills, vomiting, abdominal pain, sick contacts.       Imaging:    Exam: AAOx3, FC, AGUILLON 5/5, L homonymous hemianopia.    --Anticoagulation:     =====================  PAST MEDICAL HISTORY   Lung cancer  Migraine  COPD (chronic obstructive pulmonary disease)    PAST SURGICAL HISTORY   S/P lobectomy of lung  Benign tumor  S/P         MEDICATIONS:  Antibiotics:    Neuro:  levETIRAcetam  IVPB 1000 milliGRAM(s) IV Intermittent once    Other:  dexAMETHasone  IVPB 10 milliGRAM(s) IV Intermittent Once      SOCIAL HISTORY:   Occupation:   Marital Status:     FAMILY HISTORY:  No pertinent family history in first degree relatives      ROS: Negative except per HPI    LABS:  PT/INR - ( 13 Mar 2020 18:24 )   PT: 11.3 sec;   INR: 0.99 ratio         PTT - ( 13 Mar 2020 18:24 )  PTT:31.0 sec                        13.6   11.90 )-----------( 285      ( 13 Mar 2020 18:24 )             41.7     03-13    139  |  104  |  24<H>  ----------------------------<  155<H>  3.8   |  24  |  0.80    Ca    9.6      13 Mar 2020 18:24    TPro  7.4  /  Alb  4.4  /  TBili  0.3  /  DBili  x   /  AST  23  /  ALT  21  /  AlkPhos  77  03-13

## 2020-03-13 NOTE — ED PROVIDER NOTE - ADMIT DISPOSITION PRESENT ON ADMISSION SEPSIS
Billing Type: Third-Party Bill Consent: Verbal consent was obtained and risks were reviewed including but not limited to scarring, infection, bleeding, scabbing, incomplete removal, nerve damage and allergy to anesthesia. Anesthesia Volume In Cc: 0.5 Body Location Override (Optional - Billing Will Still Be Based On Selected Body Map Location If Applicable): left anterior shoulder Electrodesiccation And Curettage Text: The wound bed was treated with electrodesiccation and curettage after the biopsy was performed. Notification Instructions: Call for results in one week. Detail Level: Detailed Bill For Surgical Tray: no Was A Bandage Applied: Yes Silver Nitrate Text: The wound bed was treated with silver nitrate after the biopsy was performed. Depth Of Biopsy: dermis Wound Care: Vaseline Cryotherapy Text: The wound bed was treated with cryotherapy after the biopsy was performed. X Size Of Lesion In Cm: 0 Anesthesia Type: 1% lidocaine with epinephrine Electrodesiccation Text: The wound bed was treated with electrodesiccation after the biopsy was performed. Dressing: bandage No Biopsy Method: Dermablade Biopsy Type: H and E Type Of Destruction Used: Curettage Post-Care Instructions: Keep the wound clean. Bandage can be removed the next day and a small layer of Vaseline should be applied.  If the wound is oozing, a small dressing or band-aid can be reapplied. Watch for signs of infection, i.e, yellowish drainage, extreme redness, or hot to the touch.  Call the office if infection is suspected. Size Of Lesion In Cm: 0.7 Hemostasis: Electrocautery

## 2020-03-13 NOTE — ED ADULT NURSE NOTE - OBJECTIVE STATEMENT
64y female arrived to ED [facility] for headache, visual impairment. Patient PMHx lung CA (in remission), COPD. Patient reports that she went to work today, towards the end of the day developed a headache and noticed "lightening bolt" out of the left eye. Patient began to drive to urgent care when she lost depth perception and called her daughter for help. Pts daughter reports that patient was confused, repetitive, and agitated when she found her. Upon arrival to urgent care patient was assessed and EMS called. On arrival pt was agitated, repetitive, confused. After assessment, pt deemed to have stable vitals, code stroke initiated for visual sx and confusion. Patient denies CP, SOB, n/v/d, abd pain, chills, fever. Patient endorses headache. 64y female arrived to ED [facility] for headache, visual impairment. Patient PMHx lung CA (in remission), COPD. Patient reports that she went to work today, towards the end of the day developed a headache and noticed "lightening bolt" out of the left eye. Patient began to drive to urgent care when she lost depth perception and called her daughter for help. Pts daughter reports that patient was confused, repetitive, and agitated when she found her. Upon arrival to urgent care patient was assessed and EMS called. On arrival pt was agitated, repetitive, confused. After assessment, pt deemed to have stable vitals, code stroke initiated for visual sx and confusion. Patient denies CP, SOB, n/v/d, abd pain, chills, fever. Patient endorses headache. Patient daughter reports that orientation appears to be waxing and waning since onset of sx.

## 2020-03-13 NOTE — H&P ADULT - ASSESSMENT
This patient is a 65yo with PMH of left lung adenocarcinoma s/p resection and adjuvant chemo with cisplatin/gem in June 2016, COPD, depression and anxiety, iron deficiency anemia s/p blood and iron transfusions who presents to the ED with complaint of flashing in left eye and problems with depth perception, found to have R parietal ICH with surrounding vasogenic edema concerning for potential hemorrhagic metastasis. This patient is a 63yo with PMH of left lung adenocarcinoma s/p resection and adjuvant chemo with cisplatin/gem in June 2016, COPD, depression and anxiety, iron deficiency anemia s/p blood and iron transfusions who presents to the ED with complaint of flashing in left eye and problems with depth perception, found to have R parietal ICH with surrounding vasogenic edema concerning for potential hemorrhagic metastasis.     The patient does NOT have her medications with her and is not able to confirm all of her home medications. Her home medications will need to be confirmed by the daytime hospitalist.

## 2020-03-13 NOTE — H&P ADULT - PROBLEM SELECTOR PLAN 7
Transitions of Care Status:  1.  Name of PCP: Dr. Layne  2.  PCP Contacted on Admission: [ ] Y    [x ] N    3.  PCP contacted at Discharge: [ ] Y    [ ] N    [ ] N/A  4.  Post-Discharge Appointment Date and Location:  5.  Summary of Handoff given to PCP:

## 2020-03-13 NOTE — H&P ADULT - PROBLEM SELECTOR PLAN 2
The patient follows with Dr. Arora  Patient is currently breathing well on room air.  Start albuterol PRN. The patient follows with Dr. Arora  Patient is currently breathing well on room air.  Start albuterol PRN. Start spiriva and symbicort- bevespi and qvar not on formulary

## 2020-03-13 NOTE — ED PROVIDER NOTE - PHYSICAL EXAMINATION
A&Ox3, appears agitated, NCAT. PERRL, EOMI. Neck supple, no LAD. Lungs CTAB. +S1S2, RRR, No m/r/g. Abd soft, NT/ND, +BS, no rebound or guarding. Extremities: cap refill <2, pulses in distal extremities 4+, no edema. Skin without rash. CN II-XII intact. Strength 5/5 UE/LE, having difficulty following commands, Sensations intact throughout. no pronator drift.

## 2020-03-13 NOTE — CONSULT NOTE ADULT - SUBJECTIVE AND OBJECTIVE BOX
Neurology Consult Note    HPI: 63 y/o LH female with past medical history of COPD, Migraine, Stage II Lung Ca (diagnosed in 2016) s/p chemo (treated at [facility]) s/p lobectomy presents to the ED as code stroke. Per patient and patient's daughter, patient was at her baseline until 3pm when she reported dizziness and difficulty utilizing her phone. Patient's friend then called the daughter stating she was repeating herself and not acting like herself. Patient's daughter then picked her up and noted her to be staring off into space for about 2 minutes and was then disoriented. Patient's daughter also noted patient to not be able to look to her left. Denied any overt weakness, numbness, problems speaking.   At baseline, patient is independent in all IADL's.     (Stroke only)  NIHSS: 5  MRS: 0  ICH:     REVIEW OF SYSTEMS  A 10-system ROS was performed and is negative except for those items noted above and/or in the HPI.    PAST MEDICAL & SURGICAL HISTORY:  Lung cancer  Migraine  COPD (chronic obstructive pulmonary disease)  S/P lobectomy of lung  Benign tumor: left   S/P :     FAMILY HISTORY:  No pertinent family history in first degree relatives    SOCIAL HISTORY:   T/E/D:   Occupation:   Lives with:     MEDICATIONS (HOME):  Home Medications:  acetaminophen 325 mg oral tablet: 2 tab(s) orally every 6 hours, As needed, Mild Pain (2019 17:22)  folic acid 1 mg oral tablet: 1 tab(s) orally once a day (2019 17:22)  Reglan 10 mg oral tablet:  orally , As Needed - for nausea (2019 17:22)    MEDICATIONS  (STANDING):  dexAMETHasone  IVPB 10 milliGRAM(s) IV Intermittent Once  levETIRAcetam  IVPB 1000 milliGRAM(s) IV Intermittent once    MEDICATIONS  (PRN):    ALLERGIES/INTOLERANCES:  Allergies  No Known Allergies    Intolerances    VITALS & EXAMINATION:  Vital Signs Last 24 Hrs  T(C): 37.1 (13 Mar 2020 18:06), Max: 37.1 (13 Mar 2020 18:06)  T(F): 98.7 (13 Mar 2020 18:06), Max: 98.7 (13 Mar 2020 18:06)  HR: 81 (13 Mar 2020 18:06) (81 - 131)  BP: 119/76 (13 Mar 2020 18:06) (119/76 - 178/111)  BP(mean): --  RR: 19 (13 Mar 2020 18:06) (19 - 100)  SpO2: 97% (13 Mar 2020 18:06) (97% - 100%)    General:  Constitutional: Female, appears stated age, in no apparent distress including pain  Head: Normocephalic & atraumatic.  Extremities: No cyanosis, clubbing, or edema.  Skin: No rashes, bruising, or discoloration.    Neurological (>12):  MS: Awake, alert, oriented to person, place, situation, time. Normal affect. Follows all commands.    Language: Speech is clear, fluent with good repetition & comprehension (able to name objects)    CNs: PERRLA (R = 2mm, L = 3mm). +LHH (?Visual neglect). +R gaze preference, no nystagmus, no diplopia. V1-3 intact to LT/pinprick, well developed masseter muscles b/l. +Left nasolabial fold flattening. Hearing grossly normal (rubbing fingers) b/l. Symmetric palate elevation in midline. Gag reflex deferred. Head turning & shoulder shrug intact b/l. Tongue midline, normal movements, no atrophy.    Fundoscopic: Not visualized.     Motor: Normal muscle bulk & tone. No noticeable tremor. No pronator drift.              Deltoid	Biceps	Triceps	   R	5	5	5	5 	  L	5	5	5	5    	H-Flex	K-Flex	K-Ext	D-Flex	P-Flex  R	5	5	5	5	5 	   L	5	5	5	5	5	     Sensation: Intact to LT b/l throughout.     Cortical: Extinction on DSS (neglect): +Extinction to bilateral simultaneous stimuli LUE/LLE.    Reflexes:                                Plantar Resp  R			Down   L			Down     Coordination: No dysmetria to FTN/HTS    Gait: Not assessed.      LABORATORY:  CBC                       13.6   11.90 )-----------( 285      ( 13 Mar 2020 18:24 )             41.7     Chem 03-13    139  |  104  |  24<H>  ----------------------------<  155<H>  3.8   |  24  |  0.80    Ca    9.6      13 Mar 2020 18:24    TPro  7.4  /  Alb  4.4  /  TBili  0.3  /  DBili  x   /  AST  23  /  ALT  21  /  AlkPhos  77  03-13    LFTs LIVER FUNCTIONS - ( 13 Mar 2020 18:24 )  Alb: 4.4 g/dL / Pro: 7.4 g/dL / ALK PHOS: 77 U/L / ALT: 21 U/L / AST: 23 U/L / GGT: x           Coagulopathy PT/INR - ( 13 Mar 2020 18:24 )   PT: 11.3 sec;   INR: 0.99 ratio         PTT - ( 13 Mar 2020 18:24 )  PTT:31.0 sec  Lipid Panel   A1c   Cardiac enzymes CARDIAC MARKERS ( 13 Mar 2020 18:24 )  x     / x     / 73 U/L / x     / 2.2 ng/mL      U/A   CSF  Immunological  Other    STUDIES & IMAGING:  Studies (EKG, EEG, EMG, etc):     Radiology (XR, CT, MR, U/S, TTE/PARVEEN):  < from: CT Perfusion w/ Maps w/ IV Cont (20 @ 18:50) >  IMPRESSION:     CTA NECK: No evidence of hemodynamically significant stenosis using NASCET criteria. No evidence of arterial dissection.     CTA BRAIN: No evidence of flow-limiting stenosis, major vessel occlusion or aneurysm.  Again appreciated is the hemorrhagic lesion in the right parietal occipital region with surrounding vasogenic edema likely a hemorrhagic metastasis given the patient's history of  lung cancer. No vascular abnormality in this region to suggest an underlying AVM or peripheral mycotic type aneurysm.  CT PERFUSION: Unremarkable CT perfusion scan.      < end of copied text > Neurology Consult Note    HPI: 63 y/o LH female with past medical history of COPD, Migraine, Depression, Iron deficiency anemia, Stage II Lung Adenocarcinoma (diagnosed in ) s/p resection followed by adjuvant chemotherapy with cisplatin and pemetrexed x 4 cycles completed in 2016 (treated at [facility]), presents to the ED as code stroke. Per patient and patient's daughter, patient was at her baseline until 3pm when she saw a flash in her left eye a/w reported dizziness and difficulty utilizing her phone while at work. Patient's friend then called the daughter stating she was repeating herself, not acting like herself, and had problems with depth perception that almost led her to crash into another vehicle when driving. Patient's daughter then picked her up and noted her to be staring off into space for about 2 minutes with unresponsiveness after which patient was then noted to be disoriented. Patient's daughter also noted patient to not be able to look to her left. Denied any overt weakness, numbness, problems speaking.   At baseline, patient is independent in all IADL's.     Oncologist: Melita Pugh    (Stroke only)  NIHSS: 5  MRS: 0  ICH:     REVIEW OF SYSTEMS  A 10-system ROS was performed and is negative except for those items noted above and/or in the HPI.    PAST MEDICAL & SURGICAL HISTORY:  Lung cancer  Migraine  COPD (chronic obstructive pulmonary disease)  S/P lobectomy of lung  Benign tumor: left   S/P :     FAMILY HISTORY:  No pertinent family history in first degree relatives    SOCIAL HISTORY:   T/E/D:   Occupation:   Lives with:     MEDICATIONS (HOME):  Home Medications:  acetaminophen 325 mg oral tablet: 2 tab(s) orally every 6 hours, As needed, Mild Pain (2019 17:22)  folic acid 1 mg oral tablet: 1 tab(s) orally once a day (2019 17:22)  Reglan 10 mg oral tablet:  orally , As Needed - for nausea (2019 17:22)    MEDICATIONS  (STANDING):  dexAMETHasone  IVPB 10 milliGRAM(s) IV Intermittent Once  levETIRAcetam  IVPB 1000 milliGRAM(s) IV Intermittent once    MEDICATIONS  (PRN):    ALLERGIES/INTOLERANCES:  Allergies  No Known Allergies    Intolerances    VITALS & EXAMINATION:  Vital Signs Last 24 Hrs  T(C): 37.1 (13 Mar 2020 18:06), Max: 37.1 (13 Mar 2020 18:06)  T(F): 98.7 (13 Mar 2020 18:06), Max: 98.7 (13 Mar 2020 18:06)  HR: 81 (13 Mar 2020 18:06) (81 - 131)  BP: 119/76 (13 Mar 2020 18:06) (119/76 - 178/111)  BP(mean): --  RR: 19 (13 Mar 2020 18:06) (19 - 100)  SpO2: 97% (13 Mar 2020 18:06) (97% - 100%)    General:  Constitutional: Female, appears stated age, in no apparent distress including pain  Head: Normocephalic & atraumatic.  Extremities: No cyanosis, clubbing, or edema.  Skin: No rashes, bruising, or discoloration.    Neurological (>12):  MS: Awake, alert, oriented to person, place, situation, time. Normal affect. Follows all commands.    Language: Speech is clear, fluent with good repetition & comprehension (able to name objects)    CNs: PERRLA (R = 2mm, L = 3mm). +LHH (?Visual neglect). +R gaze preference, no nystagmus, no diplopia. V1-3 intact to LT/pinprick, well developed masseter muscles b/l. +Left nasolabial fold flattening. Hearing grossly normal (rubbing fingers) b/l. Symmetric palate elevation in midline. Gag reflex deferred. Head turning & shoulder shrug intact b/l. Tongue midline, normal movements, no atrophy.    Fundoscopic: Not visualized.     Motor: Normal muscle bulk & tone. No noticeable tremor. No pronator drift.              Deltoid	Biceps	Triceps	   R	5	5	5	5 	  L	5	5	5	5    	H-Flex	K-Flex	K-Ext	D-Flex	P-Flex  R	5	5	5	5	5 	   L	5	5	5	5	5	     Sensation: Intact to LT b/l throughout.     Cortical: Extinction on DSS (neglect): +Extinction to bilateral simultaneous stimuli LUE/LLE.    Reflexes:                                Plantar Resp  R			Down   L			Down     Coordination: No dysmetria to FTN/HTS    Gait: Not assessed.      LABORATORY:  CBC                       13.6   11.90 )-----------( 285      ( 13 Mar 2020 18:24 )             41.7     Chem 03-13    139  |  104  |  24<H>  ----------------------------<  155<H>  3.8   |  24  |  0.80    Ca    9.6      13 Mar 2020 18:24    TPro  7.4  /  Alb  4.4  /  TBili  0.3  /  DBili  x   /  AST  23  /  ALT  21  /  AlkPhos  77  03-13    LFTs LIVER FUNCTIONS - ( 13 Mar 2020 18:24 )  Alb: 4.4 g/dL / Pro: 7.4 g/dL / ALK PHOS: 77 U/L / ALT: 21 U/L / AST: 23 U/L / GGT: x           Coagulopathy PT/INR - ( 13 Mar 2020 18:24 )   PT: 11.3 sec;   INR: 0.99 ratio         PTT - ( 13 Mar 2020 18:24 )  PTT:31.0 sec  Lipid Panel   A1c   Cardiac enzymes CARDIAC MARKERS ( 13 Mar 2020 18:24 )  x     / x     / 73 U/L / x     / 2.2 ng/mL      U/A   CSF  Immunological  Other    STUDIES & IMAGING:  Studies (EKG, EEG, EMG, etc):     Radiology (XR, CT, MR, U/S, TTE/PARVEEN):  < from: CT Perfusion w/ Maps w/ IV Cont (20 @ 18:50) >  IMPRESSION:     CTA NECK: No evidence of hemodynamically significant stenosis using NASCET criteria. No evidence of arterial dissection.     CTA BRAIN: No evidence of flow-limiting stenosis, major vessel occlusion or aneurysm.  Again appreciated is the hemorrhagic lesion in the right parietal occipital region with surrounding vasogenic edema likely a hemorrhagic metastasis given the patient's history of  lung cancer. No vascular abnormality in this region to suggest an underlying AVM or peripheral mycotic type aneurysm.  CT PERFUSION: Unremarkable CT perfusion scan.      < end of copied text >

## 2020-03-13 NOTE — ED PROVIDER NOTE - CRITICAL CARE PROVIDED
consultation with other physicians/consult w/ pt's family directly relating to pts condition/direct patient care (not related to procedure)

## 2020-03-13 NOTE — ED PROVIDER NOTE - OBJECTIVE STATEMENT
63 yo female with pmh lung CA (in remission), COPD presenting for concern "lightening bolts" shooting in the left eye and HA x 3 hours. Pt states she tried to drive to urgent care but felt like she lost her depth perception so pulled over and called her daughter. Daughter came to pick her up believed she appeared at baseline, then states she started to repeat herself and appeared agitated, had difficulty getting her out of the car when they were at urgent care,  staff then called EMS to bring her to the ED. Per daughter pt had difficulty with answering questions and following directions. denies recent fevers, chills, vomiting, abdominal pain, sick contacts.     PMD: Darrel Layne

## 2020-03-13 NOTE — ED ADULT NURSE NOTE - NSIMPLEMENTINTERV_GEN_ALL_ED
Implemented All Fall with Harm Risk Interventions:  [place] to call system. Call bell, personal items and telephone within reach. Instruct patient to call for assistance. Room bathroom lighting operational. Non-slip footwear when patient is off stretcher. Physically safe environment: no spills, clutter or unnecessary equipment. Stretcher in lowest position, wheels locked, appropriate side rails in place. Provide visual cue, wrist band, yellow gown, etc. Monitor gait and stability. Monitor for mental status changes and reorient to person, place, and time. Review medications for side effects contributing to fall risk. Reinforce activity limits and safety measures with patient and family. Provide visual clues: red socks.

## 2020-03-13 NOTE — H&P ADULT - PROBLEM SELECTOR PLAN 4
Start pantoprazole qdaily. Start pantoprazole qdaily.  Start spiriva and symbicort. Bevespi and qvar not on formulary.

## 2020-03-13 NOTE — H&P ADULT - ATTENDING COMMENTS
Patient assigned to me by night hospitalist in charge for management and care for patient for this evening only. Care to be resumed by day hospitalist in the morning and thereafter.   Sosa Hernandez MD p [phone]

## 2020-03-13 NOTE — ED PROVIDER NOTE - ATTENDING CONTRIBUTION TO CARE
I performed a history and physical exam of the patient and discussed their management with the advanced care provider. I reviewed the advanced care provider's note and agree with the documented findings and plan of care. My medical decision making and objective findings are found above.     65 y/o female with hx of lung cancer, COPD, BIB daughter after pt started having loss of vision from her L eye, pt woke up with headache this morning but at 3Pm started having visual changes and speech changes, here pt with visual defect, and altered behavior,  no fever, no chills, no vomiting, no abdominal pain, no sick contacts, code stroke, EKG, CBC, CMP, PT/PTT, U/A,

## 2020-03-13 NOTE — H&P ADULT - PROBLEM SELECTOR PLAN 1
ICH is concerning for hemorrhage of a potential metastatic lesion.   Follow up neurosurgery and neurology recommendations.  Continue keppra 500mg BID for seizure prophylaxis- unclear if she had a seizure  Continue decadron 4mg IV q6h standing  Check MRI brain with and without contrast, MRA brain without contrast and MRA neck with contrast  Check CT chest abdomen pelvis with IV contrast to asses for metastatic lesions  Start fall precaution, aspiration precaution, seizure precautions  Start neuro checks q4h  Keep BP < 160/90  Per neurology team, patient's presentation was concerning for possible seizure- check EEG  Monitor on telemetry  Will place house oncology consult by email.

## 2020-03-13 NOTE — CONSULT NOTE ADULT - ASSESSMENT
Natalie Andrea  64F PMHx lung Ca (stage 2, resected 2016), COPD, presenting for "lightning bolts" in L eye for 2 hours. Per daughter, period of confusion ? seizure. CT head shows 2cm heme in R occipital lobe w/ edema. Exam: AAOx3, FC, AGUILLON 5/5, L homonymous hemianopia.  - No acute neurosurgical intervention  - MRI Brain w/wo  - CT CAP  - Keppra 500 BID  - Dex 10 now, 4q6 standing  - Medicine for metastatic w/u  - Plan pending imaging

## 2020-03-14 NOTE — OCCUPATIONAL THERAPY INITIAL EVALUATION ADULT - SHORT TERM MEMORY, REHAB EVAL
impaired/AOx4, scored 6 on short blessed test (questionable impairments) further cog assessment warranted.

## 2020-03-14 NOTE — SWALLOW BEDSIDE ASSESSMENT ADULT - COMMENTS
Per Neuro Resident 3/13: Left sided neglect a/w subtle L hemiparesis (f) 2/2 R parietal hemorrhage; suspect hemorrhagic metastasis given borderzone (gray-white junction) location and amount of edema; primary hemorrhage and hemorraghic stroke considered less likely.Transient disorientation preceded by unresponsiveness likely 2/2 Focal seizure with impaired awareness    3/13: Neurosurg consulted, no acute neurosurgical intervention warranted.     CT Head: Impression: No significant change when allowing for differences in technique.    Awaiting MRI.

## 2020-03-14 NOTE — OCCUPATIONAL THERAPY INITIAL EVALUATION ADULT - PERTINENT HX OF CURRENT PROBLEM, REHAB EVAL
65yo F hx L lung adenocarcinoma s/p resection/chemo in June 2016. presents to the ED with c/o  flashing in left eye. She experienced problems with depth perception while driving and got too close to another , could not dial a phone number. She felt disequilibrium while walking. The patient notified her daughter and was brought to the ED. Patient has persistent headache and nausea. found to have R parietal ICH with surrounding vasogenic edema concerning for potential hemorrhagic metastasis. 63yo F hx L lung adenocarcinoma s/p resection/chemo in June 2016. presents to the ED with c/o  flashing in left eye. She experienced problems with depth perception while driving and got too close to another , could not dial a phone number. She felt disequilibrium while walking. The patient notified her daughter and was brought to the ED. Patient has persistent headache and nausea. found to have R parietal ICH with surrounding CONTINUE BELOW

## 2020-03-14 NOTE — CHART NOTE - NSCHARTNOTEFT_GEN_A_CORE
House Oncology consulted overnight for Ms. Natalie Andrea. She is a patient of Dr. Pugh at [facility].   Ms. Andrea is a 64F with hx of Stage 2 Lung ca followed by adjuvant chemotherapy with cisplatin and pemetrexed x 4 cycles in June 2016, most recently on surveillance with no new evidence of disease on CTs. Pt presented to the ER yesterday with flashing in left eye, and issues with depth perception, found to have a right intracranial hemorrhage with surrounding vasogenic edema, findings were concerning for a metastatic lesion. Neurology and neurosurgery on board. Patient is currently on Keppra prophylaxis and decadron 4mg IV q6H.     Repeat CTH today 3/14 shows a stable right parietal hemorrhage.     We appreciate neurology and neurosurgery recommendations, pt is pending an EEG, MR brain and MRA head and neck. We will follow the results of these scans closely. If pt is found to have any new foci of metastasis please do not hesitate to call us.     Case to be discussed with attending Dr. Perez.     Alyssa Mejia MD  Hematology Oncology Fellow, PGY-4  [facility] Pager: [phone]/ [facility] Pager: [phone]

## 2020-03-14 NOTE — SWALLOW BEDSIDE ASSESSMENT ADULT - SLP GENERAL OBSERVATIONS
Patient encountered in room asleep, easily roused with verbal cues. Patient AAOx4 and participatory throughout examination, answering all biographical questions appropriately.

## 2020-03-14 NOTE — PROGRESS NOTE ADULT - ASSESSMENT
This patient is a 65yo with PMH of left lung adenocarcinoma s/p resection and adjuvant chemo with cisplatin/gem in June 2016, COPD, depression and anxiety, iron deficiency anemia s/p blood and iron transfusions who presents to the ED with complaint of flashing in left eye and problems with depth perception, found to have R parietal ICH with surrounding vasogenic edema concerning for potential hemorrhagic metastasis.     3-14-20: Neurosurgeons are planning to remove her brain mass. INR and aPPT and PLT were within normal ranges. Will consult cardiology, Dr Sharma for cardiac clearance. I informed pt of this plan. This patient is a 63yo with PMH of left lung adenocarcinoma s/p resection and adjuvant chemo with cisplatin/gem in June 2016, COPD, depression and anxiety, iron deficiency anemia s/p blood and iron transfusions who presents to the ED with complaint of flashing in left eye and problems with depth perception, found to have R parietal ICH with surrounding vasogenic edema concerning for potential hemorrhagic metastasis.     3-14-20: Neurosurgeons are planning to remove her brain mass. INR and aPPT and PLT were within normal ranges. Will consult cardiology, Dr Sharma for cardiac clearance. I informed pt of this plan.  3-14-20@ 23:27: I saw pt againtonight. Pt denied any dyspnea or chest discomfort, Afebrile, Vitals stable. Labs are acceptable. As long as Dr Sharma clears her, then she is at the optimal condition for her brain surgery.

## 2020-03-14 NOTE — PROGRESS NOTE ADULT - SUBJECTIVE AND OBJECTIVE BOX
Patient seen and examined at bedside.    --Anticoagulation--    T(C): 36.6 (03-14-20 @ 08:33), Max: 37.1 (03-13-20 @ 18:06)  HR: 74 (03-14-20 @ 08:33) (69 - 131)  BP: 96/63 (03-14-20 @ 08:33) (96/63 - 178/111)  RR: 18 (03-14-20 @ 08:33) (18 - 100)  SpO2: 94% (03-14-20 @ 08:33) (94% - 100%)  Wt(kg): --    Exam: AAOx3, FC, AGUILLON 5/5, L homonymous hemianopia.

## 2020-03-14 NOTE — SWALLOW BEDSIDE ASSESSMENT ADULT - SWALLOW EVAL: DIAGNOSIS
Patient admitted with R parietal hemorrhage, and now being assessed for oropharyngeal dysphagia. Patient presents with oropharyngeal swallow WFL at this time. Patient endorses requiring dental work and implants and endorsed preference for soft food. No overt s/s penetration/aspiration across tested consistencies.

## 2020-03-14 NOTE — PROGRESS NOTE ADULT - ASSESSMENT
Natalie Andrea  64F PMHx lung Ca (stage 2, resected 2016), COPD, presenting for "lightning bolts" in L eye for 2 hours. Per daughter, period of confusion ? seizure. CT head shows 2cm heme in R occipital lobe w/ edema. Exam: AAOx3, FC, AGUILLON 5/5, L homonymous hemianopia.  - MRI brain done  - Plan for OR tomorrow for resection  - Please document medical clearance

## 2020-03-14 NOTE — OCCUPATIONAL THERAPY INITIAL EVALUATION ADULT - ADDITIONAL COMMENTS
CONTINUED:  vasogenic edema concerning for potential hemorrhagic metastasis. Now s/p R crani for tumor resection

## 2020-03-14 NOTE — PROGRESS NOTE ADULT - SUBJECTIVE AND OBJECTIVE BOX
Patient is a 64y old  Female who presents with a chief complaint of impaired depth perception (14 Mar 2020 11:56)       Pt is seen and examined  pt is awake and lying in bed  pt seems comfortable     MEDICATIONS  (STANDING):  budesonide 160 MICROgram(s)/formoterol 4.5 MICROgram(s) Inhaler 2 Puff(s) Inhalation two times a day  dexAMETHasone  Injectable 4 milliGRAM(s) IV Push every 6 hours  levETIRAcetam 500 milliGRAM(s) Oral two times a day  pantoprazole    Tablet 40 milliGRAM(s) Oral before breakfast  sertraline 100 milliGRAM(s) Oral daily  tiotropium 18 MICROgram(s) Capsule 1 Capsule(s) Inhalation daily    MEDICATIONS  (PRN):  ALBUTerol    90 MICROgram(s) HFA Inhaler 2 Puff(s) Inhalation every 6 hours PRN Shortness of Breath and/or Wheezing      Allergies    No Known Allergies    Intolerances        Vital Signs Last 24 Hrs  T(C): 36.4 (14 Mar 2020 12:12), Max: 37.1 (13 Mar 2020 18:06)  T(F): 97.6 (14 Mar 2020 12:12), Max: 98.7 (13 Mar 2020 18:06)  HR: 76 (14 Mar 2020 12:12) (69 - 131)  BP: 105/75 (14 Mar 2020 12:12) (96/63 - 178/111)  BP(mean): --  RR: 18 (14 Mar 2020 12:12) (18 - 100)  SpO2: 94% (14 Mar 2020 12:12) (94% - 100%)        LABS:                          13.6   11.90 )-----------( 285      ( 13 Mar 2020 18:24 )             41.7         Mean Cell Volume : 97.4 fl  Mean Cell Hemoglobin : 31.8 pg  Mean Cell Hemoglobin Concentration : 32.6 gm/dL  Auto Neutrophil # : 9.90 K/uL  Auto Lymphocyte # : 1.43 K/uL  Auto Monocyte # : 0.40 K/uL  Auto Eosinophil # : 0.07 K/uL  Auto Basophil # : 0.05 K/uL  Auto Neutrophil % : 83.2 %  Auto Lymphocyte % : 12.0 %  Auto Monocyte % : 3.4 %  Auto Eosinophil % : 0.6 %  Auto Basophil % : 0.4 %    Serial CBC's  03-13 @ 18:24  Hct-41.7 / Hgb-13.6 / Plat-285 / RBC-4.28 / WBC-11.90            03-13    139  |  104  |  24<H>  ----------------------------<  155<H>  3.8   |  24  |  0.80    Ca    9.6      13 Mar 2020 18:24    TPro  7.4  /  Alb  4.4  /  TBili  0.3  /  DBili  x   /  AST  23  /  ALT  21  /  AlkPhos  77  03-13      PT/INR - ( 13 Mar 2020 18:24 )   PT: 11.3 sec;   INR: 0.99 ratio         PTT - ( 13 Mar 2020 18:24 )  PTT:31.0 sec    WBC Count: 11.90 K/uL (03-13-20 @ 18:24)  Hemoglobin: 13.6 g/dL (03-13-20 @ 18:24)  Hematocrit: 41.7 % (03-13-20 @ 18:24)  Platelet Count - Automated: 285 K/uL (03-13-20 @ 18:24)                      RADIOLOGY & ADDITIONAL STUDIES:

## 2020-03-14 NOTE — SWALLOW BEDSIDE ASSESSMENT ADULT - CONSISTENCIES ADMINISTERED
Patient expressed preference to not eat hard solids due to needing dental implants, hard solid trials deferred at this time/soft solid/solid/thin liquid/puree thin/puree thick/mech soft

## 2020-03-14 NOTE — SWALLOW BEDSIDE ASSESSMENT ADULT - SLP PERTINENT HISTORY OF CURRENT PROBLEM
This patient is a 65yo with PMH of left lung adenocarcinoma s/p resection and adjuvant chemo with cisplatin/gem in June 2016, COPD, depression and anxiety, iron deficiency anemia s/p blood and iron transfusions who presents to the ED with complaint of flashing in left eye. The patient felt fine last night and this morning. At the end of her work day, she had spontaneous flashing light in left eye, and pain. She experienced problems with depth perception while driving and got too close to another , and could not dial a phone number. She felt disequilibrium while walking, but denies focal numbness, weakness or dysphagia.

## 2020-03-14 NOTE — CONSULT NOTE ADULT - SUBJECTIVE AND OBJECTIVE BOX
CHIEF COMPLAINT:Patient is a 64y old  Female who presents with a chief complaint of impaired depth perception (14 Mar 2020 12:13)      HPI:  This patient is a 65yo with PMH of left lung adenocarcinoma s/p resection and adjuvant chemo with cisplatin/gem in 2016, COPD, depression and anxiety, iron deficiency anemia s/p blood and iron transfusions who presents to the ED with complaint of flashing in left eye. The patient felt fine last night and this morning. At the end of her work day, she had spontaneous flashing light in left eye, and pain. She experienced problems with depth perception while driving and got too close to another , and could not dial a phone number. She felt disequilibrium while walking, but denies focal numbness, weakness or dysphagia.  The patient notified her daughter and was brought to the ED.  Patient has persistent headache and nausea.    The patient does NOT have her medications with her and is not able to confirm all of her home medications.    In the ED, code stroke was called for the patient. Patient had NIH stroke scale of 5. (13 Mar 2020 20:37)      PAST MEDICAL & SURGICAL HISTORY:  Iron deficiency anemia: underwent endoscopy and colonoscopy, reportedly fine  Lung cancer  Migraine  COPD (chronic obstructive pulmonary disease)  S/P lobectomy of lung  Benign tumor: left   S/P :       MEDICATIONS  (STANDING):  budesonide 160 MICROgram(s)/formoterol 4.5 MICROgram(s) Inhaler 2 Puff(s) Inhalation two times a day  dexAMETHasone  Injectable 4 milliGRAM(s) IV Push every 6 hours  levETIRAcetam 500 milliGRAM(s) Oral two times a day  pantoprazole    Tablet 40 milliGRAM(s) Oral before breakfast  sertraline 100 milliGRAM(s) Oral daily  tiotropium 18 MICROgram(s) Capsule 1 Capsule(s) Inhalation daily    MEDICATIONS  (PRN):  ALBUTerol    90 MICROgram(s) HFA Inhaler 2 Puff(s) Inhalation every 6 hours PRN Shortness of Breath and/or Wheezing      FAMILY HISTORY:  No pertinent family history in first degree relatives      SOCIAL HISTORY:    [ ] Non-smoker  [ ] Smoker  [ ] Alcohol    Allergies    No Known Allergies    Intolerances    	    REVIEW OF SYSTEMS:  CONSTITUTIONAL: No fever, weight loss, or fatigue  EYES: No eye pain, visual disturbances, or discharge  ENT:  No difficulty hearing, tinnitus, vertigo; No sinus or throat pain  NECK: No pain or stiffness  RESPIRATORY: No cough, wheezing, chills or hemoptysis; No Shortness of Breath  CARDIOVASCULAR: No chest pain, palpitations, passing out, dizziness, or leg swelling  GASTROINTESTINAL: No abdominal or epigastric pain. No nausea, vomiting, or hematemesis; No diarrhea or constipation. No melena or hematochezia.  GENITOURINARY: No dysuria, frequency, hematuria, or incontinence  NEUROLOGICAL: No headaches, memory loss, loss of strength, numbness, or tremors  SKIN: No itching, burning, rashes, or lesions   LYMPH Nodes: No enlarged glands  ENDOCRINE: No heat or cold intolerance; No hair loss  MUSCULOSKELETAL: No joint pain or swelling; No muscle, back, or extremity pain  PSYCHIATRIC: No depression, anxiety, mood swings, or difficulty sleeping  HEME/LYMPH: No easy bruising, or bleeding gums  ALLERGY AND IMMUNOLOGIC: No hives or eczema	    [ ] All others negative	  [ ] Unable to obtain    PHYSICAL EXAM:  T(C): 36.4 (20 @ 12:12), Max: 37.1 (20 @ 18:06)  HR: 76 (20 @ 12:12) (69 - 131)  BP: 108/75 (20 @ 12:12) (96/63 - 178/111)  RR: 18 (20 @ 12:12) (18 - 100)  SpO2: 94% (20 @ 12:12) (94% - 100%)  Wt(kg): --  I&O's Summary      Appearance: Normal	  HEENT:   Normal oral mucosa, PERRL, EOMI	  Lymphatic: No lymphadenopathy  Cardiovascular: Normal S1 S2, No JVD, + murmurs, No edema  Respiratory: decrease bs  Psychiatry: A & O x 3, Mood & affect appropriate  Gastrointestinal:  Soft, Non-tender, + BS	  Skin: No rashes, No ecchymoses, No cyanosis	  Neurologic: Non-focal  Extremities: Normal range of motion, No clubbing, cyanosis or edema  Vascular: Peripheral pulses palpable 2+ bilaterally    TELEMETRY: 	    ECG:  	  RADIOLOGY:  OTHER: 	  	  LABS:	 	    CARDIAC MARKERS:  CARDIAC MARKERS ( 13 Mar 2020 18:24 )  x     / x     / 73 U/L / x     / 2.2 ng/mL                              13.6   11.90 )-----------( 285      ( 13 Mar 2020 18:24 )             41.7         139  |  104  |  24<H>  ----------------------------<  155<H>  3.8   |  24  |  0.80    Ca    9.6      13 Mar 2020 18:24    TPro  7.4  /  Alb  4.4  /  TBili  0.3  /  DBili  x   /  AST  23  /  ALT  21  /  AlkPhos  77      proBNP:   Lipid Profile:   HgA1c:   TSH:   PT/INR - ( 13 Mar 2020 18:24 )   PT: 11.3 sec;   INR: 0.99 ratio         PTT - ( 13 Mar 2020 18:24 )  PTT:31.0 sec    PREVIOUS DIAGNOSTIC TESTING:    < from: 12 Lead ECG (20 @ 17:49) >  Diagnosis Line NORMAL SINUS RHYTHM  ANTEROSEPTAL INFARCT , AGE UNDETERMINED  ABNORMAL ECG  < from: CT Abdomen and Pelvis w/ IV Cont (20 @ 07:22) >  Status post left upper lobectomy. Stable appearance of the chest as compared with 2019.    Subcentimeter hypodense foci in the liver, too small to characterize, however, likely unchanged compared with 2019.

## 2020-03-14 NOTE — SWALLOW BEDSIDE ASSESSMENT ADULT - ASR SWALLOW ASPIRATION MONITOR
oral hygiene/change of breathing pattern/gurgly voice/pneumonia/throat clearing/upper respiratory infection/fever

## 2020-03-14 NOTE — OCCUPATIONAL THERAPY INITIAL EVALUATION ADULT - ADL RETRAINING, OT EVAL
1: complete LBD independently within 4 weeks. 2: complete grooming standing at sink independently within 4 weeks

## 2020-03-14 NOTE — CONSULT NOTE ADULT - ASSESSMENT
This patient is a 65yo with PMH of left lung adenocarcinoma s/p resection and adjuvant chemo with cisplatin/gem in June 2016, COPD, depression and anxiety, iron deficiency anemia s/p blood and iron transfusions who presents to the ED with complaint of flashing in left eye. The patient felt fine last night and this morning. At the end of her work day, she had spontaneous flashing light in left eye, and pain. She experienced problems with depth perception while driving and got too close to another , and could not dial a phone number. She felt disequilibrium while walking, but denies focal numbness, weakness or dysphagia.  The patient notified her daughter and was brought to the ED.  Patient has persistent headache and nausea.  echo ordered   pt with no cardiac symptoms  pt will be clear for surgery

## 2020-03-14 NOTE — OCCUPATIONAL THERAPY INITIAL EVALUATION ADULT - LIVES WITH, PROFILE
lives alone but planning to d/c home with daughter. Daughter lives in an apartment, has elevator access, a tub, and standard toilet. Pt was independent at baseline, +driving, +working.

## 2020-03-15 NOTE — PROGRESS NOTE ADULT - SUBJECTIVE AND OBJECTIVE BOX
Patient is a 64y old  Female who presents with a chief complaint of impaired depth perception (15 Mar 2020 10:56)       Pt is seen and examined  pt is awake and lying in bed  pt seems comfortable     MEDICATIONS  (STANDING):  budesonide 160 MICROgram(s)/formoterol 4.5 MICROgram(s) Inhaler 2 Puff(s) Inhalation two times a day  dexAMETHasone  Injectable 4 milliGRAM(s) IV Push every 6 hours  levETIRAcetam 500 milliGRAM(s) Oral two times a day  pantoprazole    Tablet 40 milliGRAM(s) Oral before breakfast  sertraline 100 milliGRAM(s) Oral daily  tiotropium 18 MICROgram(s) Capsule 1 Capsule(s) Inhalation daily    MEDICATIONS  (PRN):  ALBUTerol    90 MICROgram(s) HFA Inhaler 2 Puff(s) Inhalation every 6 hours PRN Shortness of Breath and/or Wheezing  ALPRAZolam 0.25 milliGRAM(s) Oral every 12 hours PRN anxiety      Allergies    No Known Allergies    Intolerances        Vital Signs Last 24 Hrs  T(C): 36.7 (15 Mar 2020 06:15), Max: 36.7 (15 Mar 2020 06:15)  T(F): 98 (15 Mar 2020 06:15), Max: 98 (15 Mar 2020 06:15)  HR: 67 (15 Mar 2020 06:15) (64 - 76)  BP: 103/67 (15 Mar 2020 06:15) (101/64 - 108/75)  BP(mean): --  RR: 18 (15 Mar 2020 06:15) (17 - 18)  SpO2: 95% (15 Mar 2020 06:15) (93% - 95%)        LABS:                          13.5   15.69 )-----------( 296      ( 15 Mar 2020 07:23 )             40.8         Mean Cell Volume : 95.8 fl  Mean Cell Hemoglobin : 31.7 pg  Mean Cell Hemoglobin Concentration : 33.1 gm/dL  Auto Neutrophil # : x  Auto Lymphocyte # : x  Auto Monocyte # : x  Auto Eosinophil # : x  Auto Basophil # : x  Auto Neutrophil % : x  Auto Lymphocyte % : x  Auto Monocyte % : x  Auto Eosinophil % : x  Auto Basophil % : x    Serial CBC's  03-15 @ 07:23  Hct-40.8 / Hgb-13.5 / Plat-296 / RBC-4.26 / WBC-15.69          Serial CBC's  03-13 @ 18:24  Hct-41.7 / Hgb-13.6 / Plat-285 / RBC-4.28 / WBC-11.90            03-15    140  |  105  |  21  ----------------------------<  125<H>  3.9   |  23  |  0.63    Ca    9.4      15 Mar 2020 07:23    TPro  7.4  /  Alb  4.4  /  TBili  0.3  /  DBili  x   /  AST  23  /  ALT  21  /  AlkPhos  77  03-13      PT/INR - ( 15 Mar 2020 09:32 )   PT: 11.2 sec;   INR: 0.97 ratio         PTT - ( 15 Mar 2020 09:32 )  PTT:27.4 sec    WBC Count: 15.69 K/uL (03-15-20 @ 07:23)  Hemoglobin: 13.5 g/dL (03-15-20 @ 07:23)  Hematocrit: 40.8 % (03-15-20 @ 07:23)  Platelet Count - Automated: 296 K/uL (03-15-20 @ 07:23)  WBC Count: 11.90 K/uL (03-13-20 @ 18:24)  Hemoglobin: 13.6 g/dL (03-13-20 @ 18:24)  Hematocrit: 41.7 % (03-13-20 @ 18:24)  Platelet Count - Automated: 285 K/uL (03-13-20 @ 18:24)                      RADIOLOGY & ADDITIONAL STUDIES:

## 2020-03-15 NOTE — PROGRESS NOTE ADULT - ASSESSMENT
Natalie Andrea  64F PMHx lung Ca (stage 2, resected 2016), COPD, presenting for "lightning bolts" in L eye for 2 hours. Per daughter, period of confusion ? seizure. CT head shows 2cm heme in R occipital lobe w/ edema. Exam: AAOx3, FC, AGUILLON 5/5, L homonymous hemianopia.  - MRI brain done  - Plan for OR today for resection  -Echo needed pre op for clearance.   - Please document medical clearance/ cardiac clearance

## 2020-03-15 NOTE — PROGRESS NOTE ADULT - SUBJECTIVE AND OBJECTIVE BOX
CARDIOLOGY     PROGRESS  NOTE   ________________________________________________    CHIEF COMPLAINT:Patient is a 64y old  Female who presents with a chief complaint of impaired depth perception (15 Mar 2020 10:22)  no complain  	  REVIEW OF SYSTEMS:  CONSTITUTIONAL: No fever, weight loss, or fatigue  EYES: No eye pain, visual disturbances, or discharge  ENT:  No difficulty hearing, tinnitus, vertigo; No sinus or throat pain  NECK: No pain or stiffness  RESPIRATORY: No cough, wheezing, chills or hemoptysis; No Shortness of Breath  CARDIOVASCULAR: No chest pain, palpitations, passing out, dizziness, or leg swelling  GASTROINTESTINAL: No abdominal or epigastric pain. No nausea, vomiting, or hematemesis; No diarrhea or constipation. No melena or hematochezia.  GENITOURINARY: No dysuria, frequency, hematuria, or incontinence  NEUROLOGICAL: No headaches, memory loss, loss of strength, numbness, or tremors  SKIN: No itching, burning, rashes, or lesions   LYMPH Nodes: No enlarged glands  ENDOCRINE: No heat or cold intolerance; No hair loss  MUSCULOSKELETAL: No joint pain or swelling; No muscle, back, or extremity pain  PSYCHIATRIC: No depression, anxiety, mood swings, or difficulty sleeping  HEME/LYMPH: No easy bruising, or bleeding gums  ALLERGY AND IMMUNOLOGIC: No hives or eczema	    [ ] All others negative	  [ ] Unable to obtain    PHYSICAL EXAM:  T(C): 36.7 (03-15-20 @ 06:15), Max: 36.7 (03-15-20 @ 06:15)  HR: 67 (03-15-20 @ 06:15) (64 - 76)  BP: 103/67 (03-15-20 @ 06:15) (101/64 - 108/75)  RR: 18 (03-15-20 @ 06:15) (17 - 18)  SpO2: 95% (03-15-20 @ 06:15) (93% - 95%)  Wt(kg): --  I&O's Summary    14 Mar 2020 07:01  -  15 Mar 2020 07:00  --------------------------------------------------------  IN: 440 mL / OUT: 0 mL / NET: 440 mL    15 Mar 2020 07:01  -  15 Mar 2020 10:56  --------------------------------------------------------  IN: 0 mL / OUT: 0 mL / NET: 0 mL        Appearance: Normal	  HEENT:   Normal oral mucosa, PERRL, EOMI	  Lymphatic: No lymphadenopathy  Cardiovascular: Normal S1 S2, No JVD, + murmurs, No edema  Respiratory: Lungs clear to auscultation	  Psychiatry: A & O x 3, Mood & affect appropriate  Gastrointestinal:  Soft, Non-tender, + BS	  Skin: No rashes, No ecchymoses, No cyanosis	  Neurologic: Non-focal  Extremities: Normal range of motion, No clubbing, cyanosis or edema  Vascular: Peripheral pulses palpable 2+ bilaterally    MEDICATIONS  (STANDING):  budesonide 160 MICROgram(s)/formoterol 4.5 MICROgram(s) Inhaler 2 Puff(s) Inhalation two times a day  dexAMETHasone  Injectable 4 milliGRAM(s) IV Push every 6 hours  levETIRAcetam 500 milliGRAM(s) Oral two times a day  pantoprazole    Tablet 40 milliGRAM(s) Oral before breakfast  sertraline 100 milliGRAM(s) Oral daily  tiotropium 18 MICROgram(s) Capsule 1 Capsule(s) Inhalation daily      TELEMETRY: 	    ECG:  	  RADIOLOGY:  OTHER: 	  	  LABS:	 	    CARDIAC MARKERS:  CARDIAC MARKERS ( 13 Mar 2020 18:24 )  x     / x     / 73 U/L / x     / 2.2 ng/mL                                13.5   15.69 )-----------( 296      ( 15 Mar 2020 07:23 )             40.8     03-15    140  |  105  |  21  ----------------------------<  125<H>  3.9   |  23  |  0.63    Ca    9.4      15 Mar 2020 07:23    TPro  7.4  /  Alb  4.4  /  TBili  0.3  /  DBili  x   /  AST  23  /  ALT  21  /  AlkPhos  77  03-13    proBNP:   Lipid Profile:   HgA1c:   TSH:   PT/INR - ( 13 Mar 2020 18:24 )   PT: 11.3 sec;   INR: 0.99 ratio         PTT - ( 13 Mar 2020 18:24 )  PTT:31.0 sec  < from: Transthoracic Echocardiogram (03.15.20 @ 07:35) >  Mitral Valve: Normal mitral valve. Minimal mitral  regurgitation.  Aortic Valve/Aorta: Normal trileaflet aortic valve.  Normal aortic root size. (Ao: 3.7 cm at the sinuses of  Valsalva).  Left Atrium: Normal left atrium.  LA volume index = 26  cc/m2.  Left Ventricle: Normal left ventricular systolic function.  No segmental wall motion abnormalities. Normal left  ventricular internal dimensions and wall thicknesses.  Right Heart: Normal right atrium. Normal right ventricular  size and function. Normal tricuspid valve. Minimal  tricuspid regurgitation. Normal pulmonic valve.  Pericardium/Pleura: Normal pericardium with no pericardial  effusion.  Hemodynamic: Estimated right atrial pressure is 8 mm Hg.  ------------------------------------------------------------------------  Conclusions:  1. Normal mitral valve. Minimal mitral regurgitation.  2. Normal trileaflet aortic valve.  3. Normal left ventricular internal dimensions and wall  thicknesses.  4. Normal left ventricular systolicfunction. No segmental  wall motion abnormalities.  5. Normal right ventricular size and function.    < end of copied text >      Assessment and plan  ---------------------------  pt is doing well, no complain  pt with last stress test few years ago, and no chest pain.  echo noted with no WMA with normal Ef  pt is clear for surgery with mod risk  will follow up  dvt prophylaxis post op

## 2020-03-15 NOTE — PROGRESS NOTE ADULT - SUBJECTIVE AND OBJECTIVE BOX
SUMMARY: per 3/13 H/P This patient is a 65yo with PMH of left lung adenocarcinoma s/p resection and adjuvant chemo with cisplatin/gem in June 2016, COPD, depression and anxiety, iron deficiency anemia s/p blood and iron transfusions who presents to the ED with complaint of flashing in left eye. The patient felt fine last night and this morning. At the end of her work day, she had spontaneous flashing light in left eye, and pain. She experienced problems with depth perception while driving and got too close to another , and could not dial a phone number. She felt disequilibrium while walking, but denies focal numbness, weakness or dysphagia.  The patient notified her daughter and was brought to the ED.  Patient has persistent headache and nausea.    In the ED, code stroke was called for the patient. Patient had NIH stroke scale of 5.     OVERNIGHT EVENTS: Adm NSCU s/p R crani for tumor resection    ADMISSION SCORES:   GCS: 15 HH: MF: NIHSS: 5 ICH Score:    REVIEW OF SYSTEMS: +HA REVIEW OF SYSTEMS: [] Unable to Assess due to neurologic exam   [ x] All ROS addressed below are non-contributory, except:  Neuro: [x ] Headache [ ] Back pain [ ] Numbness [ ] Weakness [ ] Ataxia [ ] Dizziness [ ] Aphasia [ ] Dysarthria [ ] Visual disturbance  Resp: [ ] Shortness of breath/dyspnea [ ] Orthopnea [ ] Cough  CV: [ ] Chest pain [ ] Palpitation [ ] Lightheadedness [ ] Syncope  Renal: [ ] Thirst [ ] Edema  GI: [ ] Nausea [ ] Emesis [ ] Abdominal pain [ ] Constipation [ ] Diarrhea  Hem: [ ] Hematemesis [ ] bBright red blood per rectum  ID: [ ] Fever [ ] Chills [ ] Dysuria  ENT: [ ] Rhinorrhea    VITALS: [x] Reviewed    IMAGING/DATA: [x] Reviewed    IVF FLUIDS/MEDICATIONS: [x] Reviewed    ALLERGIES: Allergies    No Known Allergies    Intolerances    DEVICES:   [] Restraints [x] PIVs [] ET tube [] central line [] PICC [x] arterial line [x] lemon [] NGT/OGT [] EVD [] LD [] GERARDO/HMV [] LiCOX [] ICP monitor [] Trach [] PEG [] Chest Tube [] other:    EXAMINATION:  General: No acute distress  HEENT: Anicteric sclerae  Cardiac: [id number]  Lungs: Clear  Abdomen: Soft, non-tender, +BS  Extremities: No c/c/e  Skin/Incision Site: Clean, dry and intact  Neurologic: Awake, alert, fully oriented, follows commands, PERRL, VFFtc, EOMI, face symmetric, tongue midline, no drift, full strength

## 2020-03-15 NOTE — PROGRESS NOTE ADULT - PROBLEM SELECTOR PLAN 1
ICH is concerning for hemorrhage of a potential metastatic lesion.   Follow up neurosurgery and neurology recommendations.  Continue keppra 500mg BID for seizure prophylaxis- unclear if she had a seizure  Continue decadron 4mg IV q6h standing  Check MRI brain with and without contrast, MRA brain without contrast and MRA neck with contrast  Check CT chest abdomen pelvis with IV contrast to asses for metastatic lesions  Start fall precaution, aspiration precaution, seizure precautions  Start neuro checks q4h  Keep BP < 160/90  Per neurology team, patient's presentation was concerning for possible seizure- check EEG  Monitor on telemetry  Will place house oncology consult by email.
ICH is concerning for hemorrhage of a potential metastatic lesion.   Follow up neurosurgery and neurology recommendations.  Continue keppra 500mg BID for seizure prophylaxis- unclear if she had a seizure  Continue decadron 4mg IV q6h standing  Check MRI brain with and without contrast, MRA brain without contrast and MRA neck with contrast  Check CT chest abdomen pelvis with IV contrast to asses for metastatic lesions  Start fall precaution, aspiration precaution, seizure precautions  Start neuro checks q4h  Keep BP < 160/90  Per neurology team, patient's presentation was concerning for possible seizure- check EEG  Monitor on telemetry  Will place house oncology consult by email.
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
## 2020-03-15 NOTE — PROGRESS NOTE ADULT - SUBJECTIVE AND OBJECTIVE BOX
SUBJECTIVE: patient seen and examined by Neurology Resident and Attending. pt planned for OR per neurosurgery under Dr. Servin.     MEDICATIONS (HOME):  Home Medications:    MEDICATIONS  (STANDING):  budesonide 160 MICROgram(s)/formoterol 4.5 MICROgram(s) Inhaler 2 Puff(s) Inhalation two times a day  dexAMETHasone  Injectable 4 milliGRAM(s) IV Push every 6 hours  levETIRAcetam 500 milliGRAM(s) Oral two times a day  pantoprazole    Tablet 40 milliGRAM(s) Oral before breakfast  sertraline 100 milliGRAM(s) Oral daily  tiotropium 18 MICROgram(s) Capsule 1 Capsule(s) Inhalation daily    MEDICATIONS  (PRN):  ALBUTerol    90 MICROgram(s) HFA Inhaler 2 Puff(s) Inhalation every 6 hours PRN Shortness of Breath and/or Wheezing  ALPRAZolam 0.25 milliGRAM(s) Oral every 12 hours PRN anxiety    ALLERGIES/INTOLERANCES:  Allergies  No Known Allergies    VITALS & EXAMINATION:  Vital Signs Last 24 Hrs  T(C): 36.7 (15 Mar 2020 06:15), Max: 36.7 (15 Mar 2020 06:15)  T(F): 98 (15 Mar 2020 06:15), Max: 98 (15 Mar 2020 06:15)  HR: 67 (15 Mar 2020 06:15) (64 - 76)  BP: 103/67 (15 Mar 2020 06:15) (101/64 - 108/75)  BP(mean): --  RR: 18 (15 Mar 2020 06:15) (17 - 18)  SpO2: 95% (15 Mar 2020 06:15) (93% - 95%)    Neurological (>12):  MS: Awake, alert, oriented to person, place, situation, time. Normal affect. Follows all commands.    Language: Speech is clear, fluent with good repetition & comprehension (able to name objects)    CNs: PERRLA (R = 2mm, L = 3mm). no neglect noted at this time, no VFC, no nystagmus. V1-3 intact to LT/pinprick, well developed masseter muscles b/l. +Left nasolabial fold flattening. Hearing grossly normal (rubbing fingers) b/l. Symmetric palate elevation in midline. Gag reflex deferred. Head turning & shoulder shrug intact b/l. Tongue midline, normal movements, no atrophy.    Motor: Normal muscle bulk & tone. No noticeable tremor. No pronator drift.              Deltoid	Biceps	Triceps	   R	5	5	5	5 	  L	5	5	5	5    	H-Flex	K-Flex	K-Ext	D-Flex	P-Flex  R	5	5	5	5	5 	   L	5	5	5	5	5	     Sensation: Intact to LT b/l throughout.     Cortical: Extinction on DSS (neglect): no extinction at this time    Reflexes:                                Plantar Resp  R			Down   L			Down     Coordination: No dysmetria to FTN/HTS    Gait: Not assessed.      LABORATORY:  CBC                       13.5   15.69 )-----------( 296      ( 15 Mar 2020 07:23 )             40.8     Chem 03-15    140  |  105  |  21  ----------------------------<  125<H>  3.9   |  23  |  0.63    Ca    9.4      15 Mar 2020 07:23    TPro  7.4  /  Alb  4.4  /  TBili  0.3  /  DBili  x   /  AST  23  /  ALT  21  /  AlkPhos  77  03-13    LFTs LIVER FUNCTIONS - ( 13 Mar 2020 18:24 )  Alb: 4.4 g/dL / Pro: 7.4 g/dL / ALK PHOS: 77 U/L / ALT: 21 U/L / AST: 23 U/L / GGT: x           Coagulopathy PT/INR - ( 13 Mar 2020 18:24 )   PT: 11.3 sec;   INR: 0.99 ratio         PTT - ( 13 Mar 2020 18:24 )  PTT:31.0 sec  Lipid Panel   A1c   Cardiac enzymes CARDIAC MARKERS ( 13 Mar 2020 18:24 )  x     / x     / 73 U/L / x     / 2.2 ng/mL      U/A Urinalysis Basic - ( 13 Mar 2020 19:35 )    Color: Light Yellow / Appearance: Clear / SG: >1.050 / pH: x  Gluc: x / Ketone: Negative  / Bili: Negative / Urobili: Negative   Blood: x / Protein: Trace / Nitrite: Negative   Leuk Esterase: Negative / RBC: 10 /hpf / WBC 2 /HPF   Sq Epi: x / Non Sq Epi: 1 /hpf / Bacteria: Many      CSF  Immunological  Other    STUDIES & IMAGING:  Studies (EKG, EEG, EMG, etc):     Radiology (XR, CT, MR, U/S, TTE/PARVEEN):    < from: MR Head w/wo IV Cont (03.14.20 @ 11:22) >  Impression: Heterogeneous enhancing lesion with associated T1 shortening and susceptibility is seen. This is likely compatible with a hemorrhagic lesion and is likely compatible with a metastasis given patient's history of lung cancer.    < end of copied text >

## 2020-03-15 NOTE — PROGRESS NOTE ADULT - ASSESSMENT
This patient is a 63yo with PMH of left lung adenocarcinoma s/p resection and adjuvant chemo with cisplatin/gem in June 2016, COPD, depression and anxiety, iron deficiency anemia s/p blood and iron transfusions who presents to the ED with complaint of flashing in left eye and problems with depth perception, found to have R parietal ICH with surrounding vasogenic edema concerning for potential hemorrhagic metastasis.     3-14-20: Neurosurgeons are planning to remove her brain mass. INR and aPPT and PLT were within normal ranges. Will consult cardiology, Dr Sharma for cardiac clearance. I informed pt of this plan.  3-14-20@ 23:27: I saw pt again tonight. Pt denied any dyspnea or chest discomfort, Afebrile, Vitals stable. Labs are acceptable. As long as Dr Sharma clears her, then she is at the optimal condition for her brain surgery.   3-15-20: Pt without complaints. Vital stable. Dr Sharma cleared her for surgery. So she is at optimal condition from medical standpoint to have surgery.

## 2020-03-15 NOTE — PROGRESS NOTE ADULT - ASSESSMENT
R crani tumor resection POD#0    NEURO:  CT Head in AM, MRI post-op, Pain control  keppra for seizure ppx  decadron for cerebral edema  Activity: [x] OOB as tolerated [] Bedrest [x] PT [x] OT [] PMNR    PULM:  Incentive spirometry, mobilize as tolerated  COPD, cont inhalers    CV:  -160mmHg, d/c a-line in AM    RENAL:  IVF until good PO intake, d/c lemon in AM    GI:  Diet: Dysphagia screen and then advance diet as tolerated  GI prophylaxis [x] not indicated [] PPI [] other:  Bowel regimen [] colace [x] senna [] other:    ENDO:   Goal euglycemia (-180)    HEME/ONC:  VTE prophylaxis: [x] SCDs [] chemoprophylaxis [x] hold chemoprophylaxis due to: fresh post op [] high risk of DVT/PE on admission due to:    ID:  Gabriella-op antibiotics    MISC:    SOCIAL/FAMILY:  [x] awaiting [] updated at bedside [] family meeting    CODE STATUS:  [x] Full Code [] DNR [] DNI [] Palliative/Comfort Care    DISPOSITION:  [x] ICU [] Stroke Unit [] Floor [] EMU [] RCU [] PCU    [x] Patient is at high risk of neurologic deterioration/death due to: ;post op hemorrhage, cerebral edema, brain compression

## 2020-03-16 NOTE — PHYSICAL THERAPY INITIAL EVALUATION ADULT - PERTINENT HX OF CURRENT PROBLEM, REHAB EVAL
65 yo F PMHx L lung adenocarcinoma s/p resection/chemo in June 2016, presents to the ED with c/o flashing in left eye. She experienced problems with depth perception while driving and got too close to another , could not dial a phone number. She felt disequilibrium while walking. The patient notified her daughter and was brought to the ED. Patient has persistent headache and nausea. found to have R parietal ICH with surrounding SEE BELOW:

## 2020-03-16 NOTE — PHYSICAL THERAPY INITIAL EVALUATION ADULT - LIVES WITH, PROFILE
alone/Pt resides alone in a studio apartment however, pt plans to be discharged home to 29 yo daughters' residence- [facility] with no stairs and elevator access. Prior to admit, pt reports ambulating independently without an AD, performing all ADLs without assist, (+) working and driving.

## 2020-03-16 NOTE — CONSULT NOTE ADULT - ATTENDING COMMENTS
Seen and examined with resident. Agree with note.   Discussed w PT and OT- if patient has 24h supervision can return home.
Patient seen and examined.  She is awake and alert - oriented and fluent  Recalls yesterday - seeing flashing lights on the left - felt disoriented - had trouble with left  Brought to ER - as above.  Now - exam notable for left VF neglect and ? LQ defect  Motor normal  Sensory no neglect    Agree with plan as documented.

## 2020-03-16 NOTE — PROGRESS NOTE ADULT - ASSESSMENT
R crani tumor resection POD#0    NEURO:  CT Head in AM, MRI post-op, Pain control  keppra for seizure ppx  decadron for cerebral edema  Activity: [x] OOB as tolerated [] Bedrest [x] PT [x] OT [] PMNR    PULM:  Incentive spirometry, mobilize as tolerated  COPD, cont inhalers    CV:  -160mmHg, d/c a-line in AM    RENAL:  IVF until good PO intake, d/c lemon in AM    GI:  Diet: Dysphagia screen and then advance diet as tolerated  GI prophylaxis [x] not indicated [] PPI [] other:  Bowel regimen [] colace [x] senna [] other:    ENDO:   Goal euglycemia (-180)    HEME/ONC:  VTE prophylaxis: [x] SCDs [] chemoprophylaxis [x] hold chemoprophylaxis due to: fresh post op [] high risk of DVT/PE on admission due to:    ID:  Gabriella-op antibiotics    MISC:    SOCIAL/FAMILY:  [x] awaiting [] updated at bedside [] family meeting    CODE STATUS:  [x] Full Code [] DNR [] DNI [] Palliative/Comfort Care    DISPOSITION:  [x] ICU [] Stroke Unit [] Floor [] EMU [] RCU [] PCU    [x] Patient is at high risk of neurologic deterioration/death due to: ;post op hemorrhage, cerebral edema, brain compression R crani tumor resection POD#1    NEURO:  CT Head today with post-op changes  keppra for seizure   MRI brain wwo   decadron for cerebral edema  Activity: [x] OOB as tolerated [] Bedrest [x] PT [x] OT [] PMNR    PULM:  history of lung cancer   Incentive spirometry, mobilize as tolerated  COPD, cont inhalers    CV:  -160mmHg, d/c a-line     RENAL:  IVL     GI:  Diet: advance diet as tolerated  protonix while on decadron   GI prophylaxis [x] not indicated [] PPI [] other:  Bowel regimen [] colace [x] senna [] other:    ENDO:   Goal euglycemia (-180)    HEME/ONC:  VTE prophylaxis: [x] SCDs [] chemoprophylaxis [] hold chemoprophylaxis due to: lovenox 40  [] high risk of DVT/PE on admission due to:    ID:  Gabriella-op antibiotics    MISC:    SOCIAL/FAMILY:  [x] awaiting [] updated at bedside [] family meeting    CODE STATUS:  [x] Full Code [] DNR [] DNI [] Palliative/Comfort Care    DISPOSITION:  [] ICU [] Stroke Unit [x] Floor [] EMU [] RCU [] PCU

## 2020-03-16 NOTE — PROGRESS NOTE ADULT - SUBJECTIVE AND OBJECTIVE BOX
CARDIOLOGY     PROGRESS  NOTE   ________________________________________________    CHIEF COMPLAINT:Patient is a 64y old  Female who presents with a chief complaint of impaired depth perception (16 Mar 2020 06:19)  s/p sutrgery no mcomplain.  	  REVIEW OF SYSTEMS:  CONSTITUTIONAL: No fever, weight loss, or fatigue  EYES: No eye pain, visual disturbances, or discharge  ENT:  No difficulty hearing, tinnitus, vertigo; No sinus or throat pain  NECK: No pain or stiffness  RESPIRATORY: No cough, wheezing, chills or hemoptysis; No Shortness of Breath  CARDIOVASCULAR: No chest pain, palpitations, passing out, dizziness, or leg swelling  GASTROINTESTINAL: No abdominal or epigastric pain. No nausea, vomiting, or hematemesis; No diarrhea or constipation. No melena or hematochezia.  GENITOURINARY: No dysuria, frequency, hematuria, or incontinence  NEUROLOGICAL: No headaches, memory loss, loss of strength, numbness, or tremors  SKIN: No itching, burning, rashes, or lesions   LYMPH Nodes: No enlarged glands  ENDOCRINE: No heat or cold intolerance; No hair loss  MUSCULOSKELETAL: No joint pain or swelling; No muscle, back, or extremity pain  PSYCHIATRIC: No depression, anxiety, mood swings, or difficulty sleeping  HEME/LYMPH: No easy bruising, or bleeding gums  ALLERGY AND IMMUNOLOGIC: No hives or eczema	    [ ] All others negative	  [x ] Unable to obtain    PHYSICAL EXAM:  T(C): 36.6 (03-16-20 @ 02:00), Max: 36.7 (03-15-20 @ 23:00)  HR: 54 (03-16-20 @ 05:00) (52 - 68)  BP: 107/61 (03-16-20 @ 05:00) (93/54 - 109/65)  RR: 21 (03-16-20 @ 05:00) (11 - 21)  SpO2: 100% (03-16-20 @ 05:00) (95% - 100%)  Wt(kg): --  I&O's Summary    15 Mar 2020 07:01  -  16 Mar 2020 07:00  --------------------------------------------------------  IN: 1025 mL / OUT: 950 mL / NET: 75 mL        Appearance: Normal	  HEENT:   Normal oral mucosa, PERRL, EOMI	  Lymphatic: No lymphadenopathy  Cardiovascular: Normal S1 S2, No JVD, + murmurs, No edema  Respiratory: wheeze  Gastrointestinal:  Soft, Non-tender, + BS	  Skin: No rashes, No ecchymoses, No cyanosis	  Neurologic: Non-focal  Extremities: Normal range of motion, No clubbing, cyanosis or edema  Vascular: Peripheral pulses palpable 2+ bilaterally    MEDICATIONS  (STANDING):  dexAMETHasone     Tablet 4 milliGRAM(s) Oral every 6 hours  enoxaparin Injectable 40 milliGRAM(s) SubCutaneous at bedtime  levETIRAcetam 500 milliGRAM(s) Oral every 12 hours  pantoprazole    Tablet 40 milliGRAM(s) Oral before breakfast  sertraline 100 milliGRAM(s) Oral daily  tiotropium 18 MICROgram(s) Capsule 1 Capsule(s) Inhalation daily      TELEMETRY: 	    ECG:  	  RADIOLOGY:  OTHER: 	  	  LABS:	 	    CARDIAC MARKERS:                                13.2   15.93 )-----------( 275      ( 15 Mar 2020 16:44 )             38.6     03-15    139  |  103  |  21  ----------------------------<  126<H>  4.0   |  21<L>  |  0.66    Ca    8.6      15 Mar 2020 16:44    TPro  6.4  /  Alb  3.8  /  TBili  0.2  /  DBili  x   /  AST  13  /  ALT  16  /  AlkPhos  67  03-15    proBNP:   Lipid Profile:   HgA1c:   TSH:   PT/INR - ( 15 Mar 2020 09:32 )   PT: 11.2 sec;   INR: 0.97 ratio         PTT - ( 15 Mar 2020 09:32 )  PTT:27.4 sec  < from: CT Abdomen and Pelvis w/ IV Cont (03.14.20 @ 07:22) >  Status post left upper lobectomy. Stable appearance of the chest as compared with 9/19/2019.    Subcentimeter hypodense foci in the liver, too small to characterize, however, likely unchanged compared with 9/30/2019.          Assessment and plan  ---------------------------  pt is doing well, no complain, post op  pt with last stress test few years ago, and no chest pain.  echo noted with no WMA with normal Ef  dvt prophylaxis post op  s/p neurosurgery  hemodynamic stable

## 2020-03-16 NOTE — PHYSICAL THERAPY INITIAL EVALUATION ADULT - BALANCE TRAINING, PT EVAL
GOAL: Pt will improve dynamic standing balance by at least 1/2 grade to decrease fall risk during functional mobility and promote independence during ADLs within 3-4 wks.

## 2020-03-16 NOTE — PHYSICAL THERAPY INITIAL EVALUATION ADULT - ADDITIONAL COMMENTS
MRI Head 3/16/20: Interval right parietal occipital craniotomy for resection of a parietal hemorrhagic mass with expected postoperative changes and thin extra-axial fluid collection underlying the craniotomy site compared with 3/14/2020. Similar-appearing heterogeneous low marrow signal involving the upper vertebral bodies which may represent infiltrative marrow process, metastatic disease, chronic anemia, or drug therapy.  Head CT 3/16/20: Right parieto-occipital postoperative changes after removal of a hemorrhagic mass with postoperative changes since 3/14/2020.

## 2020-03-16 NOTE — PHYSICAL THERAPY INITIAL EVALUATION ADULT - GENERAL OBSERVATIONS, REHAB EVAL
Pt received supine in bed resting in NAD, +tele, +BP cuff, +pulse ox, +IVL, on 2L O2 via NC, VSS, agreeable to PT at this time.

## 2020-03-16 NOTE — PHYSICAL THERAPY INITIAL EVALUATION ADULT - PLANNED THERAPY INTERVENTIONS, PT EVAL
STAIR GOAL: Pt will negotiate 5 steps with handrail assist and a step-over-step pattern independently within 3-4 wks./balance training/gait training/bed mobility training/transfer training

## 2020-03-16 NOTE — PROGRESS NOTE ADULT - SUBJECTIVE AND OBJECTIVE BOX
SUMMARY: per 3/13 H/P This patient is a 63yo with PMH of left lung adenocarcinoma s/p resection and adjuvant chemo with cisplatin/gem in June 2016, COPD, depression and anxiety, iron deficiency anemia s/p blood and iron transfusions who presents to the ED with complaint of flashing in left eye. The patient felt fine last night and this morning. At the end of her work day, she had spontaneous flashing light in left eye, and pain. She experienced problems with depth perception while driving and got too close to another , and could not dial a phone number. She felt disequilibrium while walking, but denies focal numbness, weakness or dysphagia.  The patient notified her daughter and was brought to the ED.  Patient has persistent headache and nausea.    In the ED, code stroke was called for the patient. Patient had NIH stroke scale of 5.     OVERNIGHT EVENTS: Adm NSCU s/p R crani for tumor resection    ADMISSION SCORES:   GCS: 15 HH: MF: NIHSS: 5 ICH Score:    REVIEW OF SYSTEMS: +HA REVIEW OF SYSTEMS: [] Unable to Assess due to neurologic exam   [ x] All ROS addressed below are non-contributory, except:  Neuro: [x ] Headache [ ] Back pain [ ] Numbness [ ] Weakness [ ] Ataxia [ ] Dizziness [ ] Aphasia [ ] Dysarthria [ ] Visual disturbance  Resp: [ ] Shortness of breath/dyspnea [ ] Orthopnea [ ] Cough  CV: [ ] Chest pain [ ] Palpitation [ ] Lightheadedness [ ] Syncope  Renal: [ ] Thirst [ ] Edema  GI: [ ] Nausea [ ] Emesis [ ] Abdominal pain [ ] Constipation [ ] Diarrhea  Hem: [ ] Hematemesis [ ] bBright red blood per rectum  ID: [ ] Fever [ ] Chills [ ] Dysuria  ENT: [ ] Rhinorrhea        ICU Vital Signs Last 24 Hrs  T(C): 36.6 (16 Mar 2020 02:00), Max: 36.7 (15 Mar 2020 23:00)  T(F): 97.9 (16 Mar 2020 02:00), Max: 98 (15 Mar 2020 23:00)  HR: 54 (16 Mar 2020 05:00) (52 - 68)  BP: 107/61 (16 Mar 2020 05:00) (93/54 - 109/65)  BP(mean): 76 (16 Mar 2020 05:00) (67 - 81)  ABP: 105/56 (15 Mar 2020 23:00) (105/55 - 128/62)  ABP(mean): 75 (15 Mar 2020 23:00) (75 - 104)  RR: 21 (16 Mar 2020 05:00) (11 - 21)  SpO2: 100% (16 Mar 2020 05:00) (95% - 100%)      03-14-20 @ 07:01  -  03-15-20 @ 07:00  --------------------------------------------------------  IN: 440 mL / OUT: 0 mL / NET: 440 mL    03-15-20 @ 07:01  -  03-16-20 @ 06:20  --------------------------------------------------------  IN: 1025 mL / OUT: 950 mL / NET: 75 mL            acetaminophen   Tablet .. 650 milliGRAM(s) Oral every 6 hours PRN  ALBUTerol    90 MICROgram(s) HFA Inhaler 2 Puff(s) Inhalation every 6 hours PRN  ALPRAZolam 0.25 milliGRAM(s) Oral every 12 hours PRN  dexAMETHasone     Tablet 4 milliGRAM(s) Oral every 6 hours  enoxaparin Injectable 40 milliGRAM(s) SubCutaneous at bedtime  levETIRAcetam 500 milliGRAM(s) Oral every 12 hours  oxyCODONE    IR 5 milliGRAM(s) Oral every 4 hours PRN  oxyCODONE    IR 10 milliGRAM(s) Oral every 4 hours PRN  pantoprazole    Tablet 40 milliGRAM(s) Oral before breakfast  tiotropium 18 MICROgram(s) Capsule 1 Capsule(s) Inhalation daily      LABS:  Na: 139 (03-15 @ 16:44), 140 (03-15 @ 07:23), 139 (03-13 @ 18:24)  K: 4.0 (03-15 @ 16:44), 3.9 (03-15 @ 07:23), 3.8 (03-13 @ 18:24)  Cl: 103 (03-15 @ 16:44), 105 (03-15 @ 07:23), 104 (03-13 @ 18:24)  CO2: 21 (03-15 @ 16:44), 23 (03-15 @ 07:23), 24 (03-13 @ 18:24)  BUN: 21 (03-15 @ 16:44), 21 (03-15 @ 07:23), 24 (03-13 @ 18:24)  Cr: 0.66 (03-15 @ 16:44), 0.63 (03-15 @ 07:23), 0.80 (03-13 @ 18:24)  Glu: 126(03-15 @ 16:44), 125(03-15 @ 07:23), 155(03-13 @ 18:24)    Hgb: 13.2 (03-15 @ 16:44), 13.5 (03-15 @ 07:23), 13.6 (03-13 @ 18:24)  Hct: 38.6 (03-15 @ 16:44), 40.8 (03-15 @ 07:23), 41.7 (03-13 @ 18:24)  WBC: 15.93 (03-15 @ 16:44), 15.69 (03-15 @ 07:23), 11.90 (03-13 @ 18:24)  Plt: 275 (03-15 @ 16:44), 296 (03-15 @ 07:23), 285 (03-13 @ 18:24)    INR: 0.97 03-15-20 @ 09:32, 0.99 03-13-20 @ 18:24  PTT: 27.4 03-15-20 @ 09:32, 31.0 03-13-20 @ 18:24          LIVER FUNCTIONS - ( 15 Mar 2020 16:44 )  Alb: 3.8 g/dL / Pro: 6.4 g/dL / ALK PHOS: 67 U/L / ALT: 16 U/L / AST: 13 U/L / GGT: x                   EXAMINATION:  General: No acute distress  HEENT: Anicteric sclerae  Cardiac: [id number]  Lungs: Clear  Abdomen: Soft, non-tender, +BS  Extremities: No c/c/e  Skin/Incision Site: Clean, dry and intact  Neurologic: Awake, alert, fully oriented, follows commands, PERRL, VFFtc, EOMI, face symmetric, tongue midline, no drift, full strength SUMMARY: per 3/13 H/P This patient is a 63yo with PMH of left lung adenocarcinoma s/p resection and adjuvant chemo with cisplatin/gem in 2016, COPD, depression and anxiety, iron deficiency anemia s/p blood and iron transfusions who presents to the ED with complaint of flashing in left eye. The patient felt fine last night and this morning. At the end of her work day, she had spontaneous flashing light in left eye, and pain. She experienced problems with depth perception while driving and got too close to another , and could not dial a phone number. She felt disequilibrium while walking, but denies focal numbness, weakness or dysphagia.  The patient notified her daughter and was brought to the ED.  Patient has persistent headache and nausea.    In the ED, code stroke was called for the patient. Patient had NIH stroke scale of 5.     PAST MEDICAL & SURGICAL HISTORY:  Iron deficiency anemia: underwent endoscopy and colonoscopy, reportedly fine  Lung cancer  Migraine  COPD (chronic obstructive pulmonary disease)  S/P lobectomy of lung  Benign tumor: left   S/P :     Allergies    No Known Allergies    Intolerances    OVERNIGHT EVENTS: Adm NSCU s/p R crani for tumor resection    ADMISSION SCORES:   GCS: 15 HH: MF: NIHSS: 5 ICH Score:    REVIEW OF SYSTEMS: +HA REVIEW OF SYSTEMS: [] Unable to Assess due to neurologic exam   [ x] All ROS addressed below are non-contributory, except:  Neuro: [x ] Headache [ ] Back pain [ ] Numbness [ ] Weakness [ ] Ataxia [ ] Dizziness [ ] Aphasia [ ] Dysarthria [ ] Visual disturbance  Resp: [ ] Shortness of breath/dyspnea [ ] Orthopnea [ ] Cough  CV: [ ] Chest pain [ ] Palpitation [ ] Lightheadedness [ ] Syncope  Renal: [ ] Thirst [ ] Edema  GI: [ ] Nausea [ ] Emesis [ ] Abdominal pain [ ] Constipation [ ] Diarrhea  Hem: [ ] Hematemesis [ ] bBright red blood per rectum  ID: [ ] Fever [ ] Chills [ ] Dysuria  ENT: [ ] Rhinorrhea      T(C): 36.7 (20 @ 07:00), Max: 36.7 (03-15-20 @ 23:00)  HR: 60 (20 @ 10:00) (52 - 68)  BP: 109/56 (20 @ 10:00) (93/54 - 110/67)  RR: 19 (20 @ 10:00) (11 - 21)  SpO2: 100% (20 @ 10:00) (99% - 100%)  03-15-20 @ 07:01  -  20 @ 07:00  --------------------------------------------------------  IN: 1025 mL / OUT: 950 mL / NET: 75 mL    20 @ 07:01  -  20 @ 12:00  --------------------------------------------------------  IN: 250 mL / OUT: 300 mL / NET: -50 mL    acetaminophen   Tablet .. 650 milliGRAM(s) Oral every 6 hours PRN  ALBUTerol    90 MICROgram(s) HFA Inhaler 2 Puff(s) Inhalation every 6 hours PRN  ALPRAZolam 0.25 milliGRAM(s) Oral every 12 hours PRN  dexAMETHasone     Tablet 4 milliGRAM(s) Oral every 6 hours  enoxaparin Injectable 40 milliGRAM(s) SubCutaneous at bedtime  levETIRAcetam 500 milliGRAM(s) Oral every 12 hours  oxyCODONE    IR 5 milliGRAM(s) Oral every 4 hours PRN  oxyCODONE    IR 10 milliGRAM(s) Oral every 4 hours PRN  pantoprazole    Tablet 40 milliGRAM(s) Oral before breakfast  sertraline 100 milliGRAM(s) Oral daily  tiotropium 18 MICROgram(s) Capsule 1 Capsule(s) Inhalation daily              EXAMINATION:  General: No acute distress  HEENT: Anicteric sclerae  Cardiac: [id number]  Lungs: Clear  Abdomen: Soft, non-tender, +BS  Extremities: No c/c/e  Skin/Incision Site: Clean, dry and intact  Neurologic: Awake, alert, fully oriented, follows commands, PERRL, VFFtc, EOMI, face symmetric, tongue midline, no drift, full strength      LABS:  Na: 139 (-15 @ 16:44), 140 (-15 @ 07:23), 139 (-13 @ 18:24)  K: 4.0 (-15 @ 16:44), 3.9 (-15 @ 07:23), 3.8 (-13 @ 18:24)  Cl: 103 (-15 @ 16:44), 105 (-15 @ 07:23), 104 (-13 @ 18:24)  CO2: 21 (-15 @ 16:44), 23 (-15 @ 07:23), 24 (-13 @ 18:24)  BUN: 21 (-15 @ 16:44), 21 (-15 @ 07:23), 24 (-13 @ 18:24)  Cr: 0.66 (-15 @ 16:44), 0.63 (-15 @ 07:23), 0.80 (-13 @ 18:24)  Glu: 126(-15 @ 16:44), 125(-15 @ 07:23), 155(-13 @ 18:24)    Hgb: 13.2 (-15 @ 16:44), 13.5 (-15 @ 07:23), 13.6 (-13 @ 18:24)  Hct: 38.6 (-15 @ 16:44), 40.8 (-15 @ 07:23), 41.7 (-13 @ 18:24)  WBC: 15.93 (-15 @ 16:44), 15.69 (-15 @ 07:23), 11.90 (-13 @ 18:24)  Plt: 275 (-15 @ 16:44), 296 (-15 @ 07:23), 285 (-13 @ 18:24)    INR: 0.97 -15-20 @ 09:32, 0.99 -13-20 @ 18:24  PTT: 27.4 03-15-20 @ 09:32, 31.0 03-13-20 @ 18:24        CT head  IMPRESSION: Right parieto-occipital postoperative changes after removal of a hemorrhagic mass with postoperative changes since 3/14/2020.

## 2020-03-16 NOTE — CONSULT NOTE ADULT - SUBJECTIVE AND OBJECTIVE BOX
CC: Evaluation of Rehabilitation Needs  HPI: 63yo with PMH of left lung adenocarcinoma s/p resection and adjuvant chemo with cisplatin/gem in June 2016, COPD, depression and anxiety, iron deficiency anemia s/p blood and iron transfusions who presents to the ED with complaint of flashing in left eye. At the end of her work day, she had spontaneous flashing light in left eye, and pain. She experienced problems with depth perception while driving and got too close to another , and could not dial a phone number. She felt disequilibrium while walking, but denies focal numbness, weakness or dysphagia. (13 Mar 2020 20:37) Pt was admitted for right crani tumor resection performed 3/15. PM&R was consulted to evaluate post-acute disposition.    REVIEW OF SYSTEMS  Constitutional - No fever, No fatigue  HEENT -  No neck pain  Respiratory - No cough,  No shortness of breath  Cardiovascular - No chest pain, No palpitations  Gastrointestinal - No abdominal pain, No nausea, No vomiting, No diarrhea, No constipation  Genitourinary - No dysuria, No frequency  Neurological - +headaches, No loss of strength, No numbness  Musculoskeletal - No joint pain, No joint swelling, No muscle pain  Psychiatric - No depression, No anxiety    PAST MEDICAL & SURGICAL HISTORY  History of iron deficiency  Lung cancer  Migraine  COPD (chronic obstructive pulmonary disease)  Brain tumor  Anxiety and depression  GERD (gastroesophageal reflux disease)    FUNCTIONAL HISTORY  Independent in ambulation, ADL's, transfers prior to hospitalization    CURRENT FUNCTIONAL STATUS  Bed mobility -   Transfers -   Gait -   ADL's -    FAMILY HISTORY   Reviewed and non-contributory    ALLERGIES  No Known Allergies    VITALS  T(C): 36.7 (03-15-20 @ 23:00)  T(F): 98 (03-15-20 @ 23:00), Max: 98 (03-15-20 @ 06:15)  HR: 55 (03-16-20 @ 00:00) (55 - 68)  BP: 109/65 (03-16-20 @ 00:00) (103/67 - 109/65)  RR:  (11 - 19)  SpO2:  (95% - 100%)  Wt(kg): --    PHYSICAL EXAM  Constitutional - NAD, Comfortable  HEENT - NCAT  Chest - good chest expansion, no respiratory distress  Cardio - warm and well perfused  Abdomen -  Soft, NTND  Extremities - No C/C/E, No calf tenderness   Neurologic Exam -                    Cognitive - Awake, Alert, AAO to self, place, date, year, situation     Communication - Fluent, No dysarthria     Cranial Nerves - +mild left nasolabial weakness     Motor -                     LEFT    UE - ShAB 5/5, EF 5/5, EE 5/5, WE 5/5,  WNL                    RIGHT UE - ShAB 5/5, EF 5/5, EE 5/5, WE 5/5,  WNL                    LEFT    LE - HF 5/5, KE 5/5, DF 5/5, PF 5/5                    RIGHT LE - HF 5/5, KE 5/5, DF 5/5, PF 5/5        Sensory - Intact to light touch     Reflexes - DTR Intact, No primitive reflexive, Garza's neg b/l, Babinski's neg b/l     Coordination - FTN intact  Psychiatric - Affect WNL    RECENT LABS/IMAGING                        13.2   15.93 )-----------( 275      ( 15 Mar 2020 16:44 )             38.6     03-15    139  |  103  |  21  ----------------------------<  126<H>  4.0   |  21<L>  |  0.66    Ca    8.6      15 Mar 2020 16:44    TPro  6.4  /  Alb  3.8  /  TBili  0.2  /  DBili  x   /  AST  13  /  ALT  16  /  AlkPhos  67  03-15    PT/INR - ( 15 Mar 2020 09:32 )   PT: 11.2 sec;   INR: 0.97 ratio         PTT - ( 15 Mar 2020 09:32 )  PTT:27.4 sec    MEDICATIONS   < from: MR Head w/wo IV Cont (03.14.20 @ 11:22) >  EXAM:  MR BRAIN [facility] IC                          PROCEDURE DATE:  03/14/2020      INTERPRETATION:  Clinical indication: Confusion.    MRI of the brain was performed using sagittal T1, axial T1 T2 T2 FLAIR diffusion and susceptibility weighted sequence. The patient was injected with approximately 6 cc of Gadavist IV with 1.5 cc of contrast discarded. Sagittal coronal and axial T1-weighted sequences were performed.    Parenchymal volume loss and chronic microvascular ischemic changes are identified.    There is evidence of heterogeneous enhancing lesion seen involving the right parietal cortical and subcortical region. This finding does demonstrate surrounding edema with associated T1 shortening and susceptibility likely representing underlying hemorrhage. This is likely compatible with a hemorrhagic lesion and corresponds to area of abnormality seen on prior CT scan. This finding measures approximately 2.2 x 1.6 x 2.5 cm. No significant shift or herniation is seen.    Parenchymal volume loss is seen    Abnormal T2 prolongation in periventricular white matter and brainstem is identified.    Evaluation of the diffusion weighted sequence demonstrates no abnormal areas of restricted diffusion to suggest acute infarct.  The largevessels and trait normal flow voids from  Impression: Heterogeneous enhancing lesion with associated T1 shortening and susceptibility is seen. This is likely compatible with a hemorrhagic lesion and is likely compatible with a metastasis given patient's history of lung cancer.    < end of copied text >    MEDICATIONS  (STANDING):  dexAMETHasone     Tablet 4 milliGRAM(s) Oral every 6 hours  enoxaparin Injectable 40 milliGRAM(s) SubCutaneous at bedtime  levETIRAcetam 500 milliGRAM(s) Oral every 12 hours  pantoprazole    Tablet 40 milliGRAM(s) Oral before breakfast  sodium chloride 0.9% with potassium chloride 20 mEq/L 1000 milliLiter(s) (75 mL/Hr) IV Continuous <Continuous>  tiotropium 18 MICROgram(s) Capsule 1 Capsule(s) Inhalation daily    MEDICATIONS  (PRN):  acetaminophen   Tablet .. 650 milliGRAM(s) Oral every 6 hours PRN Temp greater or equal to 38C (100.4F), Mild Pain (1 - 3)  ALBUTerol    90 MICROgram(s) HFA Inhaler 2 Puff(s) Inhalation every 6 hours PRN Shortness of Breath and/or Wheezing  ALPRAZolam 0.25 milliGRAM(s) Oral every 12 hours PRN anxiety  oxyCODONE    IR 5 milliGRAM(s) Oral every 4 hours PRN Moderate Pain (4 - 6)  oxyCODONE    IR 10 milliGRAM(s) Oral every 4 hours PRN Severe Pain (7 - 10)      ASSESSMENT/PLAN  65 yo with PMH of left lung adenocarcinoma s/p resection and adjuvant chemo with cisplatin/gem in June 2016, COPD, depression and anxiety, iron deficiency anemia s/p blood and iron transfusions s/p right crani for tumor resection (3/15) now with with functional, gait, and ADL impairments.    *DISPOSITION INCOMPLETE PENDING ATTENDING ROUNDS*  Disposition - Pt has significant functional impairments versus baseline, which is independent in the community, driving and working.  Needs acute inpatient rehabilitation for intensive therapies (PT, OT, SLP) to restore her function towards independence, while being closely monitored for her ongoing medical issues, which can quickly destabilize if not closely monitored and managed.  Expected to tolerate and benefit from 3 hrs/day, >=5 days/wk of multidisciplinary therapies. Recommend acute inpatient rehabilitation once deemed medically and surgically stable as per the primary treating team(s).  PT - ROM, Bed Mob, Transfers, Amb with AD   OT - ADLs, ROM  SLP - Cog eval and treat  Precautions - Falls, Cardiac  DVT Prophylaxis - Lovenox as per primary team  Skin - Turn Q2hrs  Diet - DASH/TLC  Thank you for allowing us to participate in your patient's care. CC: Evaluation of Rehabilitation Needs  HPI: 63yo with PMH of left lung adenocarcinoma s/p resection and adjuvant chemo with cisplatin/gem in June 2016, COPD, depression and anxiety, iron deficiency anemia s/p blood and iron transfusions who presents to the ED with complaint of flashing in left eye. At the end of her work day, she had spontaneous flashing light in left eye, and pain. She experienced problems with depth perception while driving and got too close to another , and could not dial a phone number. She felt disequilibrium while walking, but denies focal numbness, weakness or dysphagia. (13 Mar 2020 20:37) Pt was admitted for right crani tumor resection performed 3/15. PM&R was consulted to evaluate post-acute disposition.    REVIEW OF SYSTEMS  Constitutional - No fever, No fatigue  HEENT -  +neck pain  Respiratory - No cough,  No shortness of breath  Cardiovascular - No chest pain, No palpitations  Gastrointestinal - No abdominal pain, No nausea, No vomiting, No diarrhea, No constipation  Genitourinary - No dysuria, No frequency  Neurological - +headaches, No loss of strength, No numbness  Musculoskeletal - No joint pain, No joint swelling, No muscle pain  Psychiatric - No depression, No anxiety    PAST MEDICAL & SURGICAL HISTORY  History of iron deficiency  Lung cancer  Migraine  COPD (chronic obstructive pulmonary disease)  Brain tumor  Anxiety and depression  GERD (gastroesophageal reflux disease)    FUNCTIONAL HISTORY  Independent in ambulation, ADL's, transfers prior to hospitalization    CURRENT FUNCTIONAL STATUS  Bed mobility - TBA  Transfers - TBA  Gait - TBA  ADL's - TBA    FAMILY HISTORY   Reviewed and non-contributory    ALLERGIES  No Known Allergies    VITALS  T(C): 36.7 (03-15-20 @ 23:00)  T(F): 98 (03-15-20 @ 23:00), Max: 98 (03-15-20 @ 06:15)  HR: 55 (03-16-20 @ 00:00) (55 - 68)  BP: 109/65 (03-16-20 @ 00:00) (103/67 - 109/65)  RR:  (11 - 19)  SpO2:  (95% - 100%)  Wt(kg): --    PHYSICAL EXAM  Constitutional - NAD, Comfortable  HEENT - NCAT  Chest - good chest expansion, no respiratory distress  Cardio - warm and well perfused  Abdomen -  Soft, NTND  Extremities - No C/C/E, No calf tenderness   Neurologic Exam -                    Cognitive - Awake, Alert, AAOx3     Communication - Fluent, No dysarthria     Cranial Nerves - There is no ptosis. Facial sensation is intact. Facial musculature is symmetric. Palate elevates symmetrically. Shoulder shrug is normal. Tongue is midline.       Motor -                     LEFT    UE - ShAB 5/5, EF 5/5, EE 5/5, WE 5/5,  WNL                    RIGHT UE - ShAB 5/5, EF 5/5, EE 5/5, WE 5/5,  WNL                    LEFT    LE - HF 5/5, KE 5/5, DF 5/5, PF 5/5                    RIGHT LE - HF 5/5, KE 5/5, DF 5/5, PF 5/5        Sensory - Intact to light touch     Reflexes - DTR Intact, No primitive reflexive, Garza's neg b/l  Psychiatric - Affect WNL    RECENT LABS/IMAGING                        13.2   15.93 )-----------( 275      ( 15 Mar 2020 16:44 )             38.6     03-15    139  |  103  |  21  ----------------------------<  126<H>  4.0   |  21<L>  |  0.66    Ca    8.6      15 Mar 2020 16:44    TPro  6.4  /  Alb  3.8  /  TBili  0.2  /  DBili  x   /  AST  13  /  ALT  16  /  AlkPhos  67  03-15    PT/INR - ( 15 Mar 2020 09:32 )   PT: 11.2 sec;   INR: 0.97 ratio         PTT - ( 15 Mar 2020 09:32 )  PTT:27.4 sec    MEDICATIONS   < from: MR Head w/wo IV Cont (03.14.20 @ 11:22) >  EXAM:  MR BRAIN [facility] IC                          PROCEDURE DATE:  03/14/2020      INTERPRETATION:  Clinical indication: Confusion.    MRI of the brain was performed using sagittal T1, axial T1 T2 T2 FLAIR diffusion and susceptibility weighted sequence. The patient was injected with approximately 6 cc of Gadavist IV with 1.5 cc of contrast discarded. Sagittal coronal and axial T1-weighted sequences were performed.    Parenchymal volume loss and chronic microvascular ischemic changes are identified.    There is evidence of heterogeneous enhancing lesion seen involving the right parietal cortical and subcortical region. This finding does demonstrate surrounding edema with associated T1 shortening and susceptibility likely representing underlying hemorrhage. This is likely compatible with a hemorrhagic lesion and corresponds to area of abnormality seen on prior CT scan. This finding measures approximately 2.2 x 1.6 x 2.5 cm. No significant shift or herniation is seen.    Parenchymal volume loss is seen    Abnormal T2 prolongation in periventricular white matter and brainstem is identified.    Evaluation of the diffusion weighted sequence demonstrates no abnormal areas of restricted diffusion to suggest acute infarct.  The largevessels and trait normal flow voids from  Impression: Heterogeneous enhancing lesion with associated T1 shortening and susceptibility is seen. This is likely compatible with a hemorrhagic lesion and is likely compatible with a metastasis given patient's history of lung cancer.    < end of copied text >    MEDICATIONS  (STANDING):  dexAMETHasone     Tablet 4 milliGRAM(s) Oral every 6 hours  enoxaparin Injectable 40 milliGRAM(s) SubCutaneous at bedtime  levETIRAcetam 500 milliGRAM(s) Oral every 12 hours  pantoprazole    Tablet 40 milliGRAM(s) Oral before breakfast  sodium chloride 0.9% with potassium chloride 20 mEq/L 1000 milliLiter(s) (75 mL/Hr) IV Continuous <Continuous>  tiotropium 18 MICROgram(s) Capsule 1 Capsule(s) Inhalation daily    MEDICATIONS  (PRN):  acetaminophen   Tablet .. 650 milliGRAM(s) Oral every 6 hours PRN Temp greater or equal to 38C (100.4F), Mild Pain (1 - 3)  ALBUTerol    90 MICROgram(s) HFA Inhaler 2 Puff(s) Inhalation every 6 hours PRN Shortness of Breath and/or Wheezing  ALPRAZolam 0.25 milliGRAM(s) Oral every 12 hours PRN anxiety  oxyCODONE    IR 5 milliGRAM(s) Oral every 4 hours PRN Moderate Pain (4 - 6)  oxyCODONE    IR 10 milliGRAM(s) Oral every 4 hours PRN Severe Pain (7 - 10)      ASSESSMENT/PLAN  65 yo with PMH of left lung adenocarcinoma s/p resection and adjuvant chemo with cisplatin/gem in June 2016, COPD, depression and anxiety, iron deficiency anemia s/p blood and iron transfusions s/p right crani for tumor resection (3/15) now with with functional, gait, and ADL impairments.    *DISPOSITION INCOMPLETE PENDING ATTENDING ROUNDS*  Disposition - Pt has significant functional impairments versus baseline, which is independent in the community, driving and working.  Needs acute inpatient rehabilitation for intensive therapies (PT, OT, SLP) to restore her function towards independence, while being closely monitored for her ongoing medical issues, which can quickly destabilize if not closely monitored and managed.  Expected to tolerate and benefit from 3 hrs/day, >=5 days/wk of multidisciplinary therapies. Recommend acute inpatient rehabilitation once deemed medically and surgically stable as per the primary treating team(s).  PT - ROM, Bed Mob, Transfers, Amb with AD   OT - ADLs, ROM  SLP - Cog eval and treat  Precautions - Falls, Cardiac  DVT Prophylaxis - Lovenox as per primary team  Skin - Turn Q2hrs  Diet - DASH/TLC  Thank you for allowing us to participate in your patient's care. CC: Evaluation of Rehabilitation Needs  HPI: 63yo with PMH of left lung adenocarcinoma s/p resection and adjuvant chemo with cisplatin/gem in June 2016, COPD, depression and anxiety, iron deficiency anemia s/p blood and iron transfusions who presents to the ED with complaint of flashing in left eye. At the end of her work day, she had spontaneous flashing light in left eye, and pain. She experienced problems with depth perception while driving and got too close to another , and could not dial a phone number. She felt disequilibrium while walking, but denies focal numbness, weakness or dysphagia. (13 Mar 2020 20:37) Pt was admitted for right crani tumor resection performed 3/15. PM&R was consulted to evaluate post-acute disposition.    REVIEW OF SYSTEMS  Constitutional - No fever, No fatigue  HEENT -  +neck pain  Respiratory - No cough,  No shortness of breath  Cardiovascular - No chest pain, No palpitations  Gastrointestinal - No abdominal pain, No nausea, No vomiting, No diarrhea, No constipation  Genitourinary - No dysuria, No frequency  Neurological - +headaches, No loss of strength, No numbness  Musculoskeletal - No joint pain, No joint swelling, No muscle pain  Psychiatric - No depression, No anxiety    PAST MEDICAL & SURGICAL HISTORY  History of iron deficiency  Lung cancer  Migraine  COPD (chronic obstructive pulmonary disease)  Brain tumor  Anxiety and depression  GERD (gastroesophageal reflux disease)    FUNCTIONAL HISTORY  Independent in ambulation, ADL's, transfers prior to hospitalization    CURRENT FUNCTIONAL STATUS  Transfers - Supervision  Gait - Contact Guard     FAMILY HISTORY   Reviewed and non-contributory    ALLERGIES  No Known Allergies    VITALS  T(C): 36.7 (03-15-20 @ 23:00)  T(F): 98 (03-15-20 @ 23:00), Max: 98 (03-15-20 @ 06:15)  HR: 55 (03-16-20 @ 00:00) (55 - 68)  BP: 109/65 (03-16-20 @ 00:00) (103/67 - 109/65)  RR:  (11 - 19)  SpO2:  (95% - 100%)  Wt(kg): --    PHYSICAL EXAM  Constitutional - NAD, Comfortable  HEENT - NCAT  Chest - good chest expansion, no respiratory distress  Cardio - warm and well perfused  Abdomen -  Soft, NTND  Extremities - No C/C/E, No calf tenderness   Neurologic Exam -                    Cognitive - Awake, Alert, AAOx3     Communication - Fluent, No dysarthria     Cranial Nerves - There is no ptosis. Facial sensation is intact. Facial musculature is symmetric. Palate elevates symmetrically. Shoulder shrug is normal. Tongue is midline.       Motor -                     LEFT    UE - ShAB 5/5, EF 5/5, EE 5/5, WE 5/5,  WNL                    RIGHT UE - ShAB 5/5, EF 5/5, EE 5/5, WE 5/5,  WNL                    LEFT    LE - HF 5/5, KE 5/5, DF 5/5, PF 5/5                    RIGHT LE - HF 5/5, KE 5/5, DF 5/5, PF 5/5        Sensory - Intact to light touch     Reflexes - DTR Intact, No primitive reflexive, Garza's neg b/l  Psychiatric - Affect WNL    RECENT LABS/IMAGING                        13.2   15.93 )-----------( 275      ( 15 Mar 2020 16:44 )             38.6     03-15    139  |  103  |  21  ----------------------------<  126<H>  4.0   |  21<L>  |  0.66    Ca    8.6      15 Mar 2020 16:44    TPro  6.4  /  Alb  3.8  /  TBili  0.2  /  DBili  x   /  AST  13  /  ALT  16  /  AlkPhos  67  03-15    PT/INR - ( 15 Mar 2020 09:32 )   PT: 11.2 sec;   INR: 0.97 ratio         PTT - ( 15 Mar 2020 09:32 )  PTT:27.4 sec    MEDICATIONS   < from: MR Head w/wo IV Cont (03.14.20 @ 11:22) >  EXAM:  MR BRAIN [facility] IC                          PROCEDURE DATE:  03/14/2020      INTERPRETATION:  Clinical indication: Confusion.    MRI of the brain was performed using sagittal T1, axial T1 T2 T2 FLAIR diffusion and susceptibility weighted sequence. The patient was injected with approximately 6 cc of Gadavist IV with 1.5 cc of contrast discarded. Sagittal coronal and axial T1-weighted sequences were performed.    Parenchymal volume loss and chronic microvascular ischemic changes are identified.    There is evidence of heterogeneous enhancing lesion seen involving the right parietal cortical and subcortical region. This finding does demonstrate surrounding edema with associated T1 shortening and susceptibility likely representing underlying hemorrhage. This is likely compatible with a hemorrhagic lesion and corresponds to area of abnormality seen on prior CT scan. This finding measures approximately 2.2 x 1.6 x 2.5 cm. No significant shift or herniation is seen.    Parenchymal volume loss is seen    Abnormal T2 prolongation in periventricular white matter and brainstem is identified.    Evaluation of the diffusion weighted sequence demonstrates no abnormal areas of restricted diffusion to suggest acute infarct.  The largevessels and trait normal flow voids from  Impression: Heterogeneous enhancing lesion with associated T1 shortening and susceptibility is seen. This is likely compatible with a hemorrhagic lesion and is likely compatible with a metastasis given patient's history of lung cancer.    < end of copied text >    MEDICATIONS  (STANDING):  dexAMETHasone     Tablet 4 milliGRAM(s) Oral every 6 hours  enoxaparin Injectable 40 milliGRAM(s) SubCutaneous at bedtime  levETIRAcetam 500 milliGRAM(s) Oral every 12 hours  pantoprazole    Tablet 40 milliGRAM(s) Oral before breakfast  sodium chloride 0.9% with potassium chloride 20 mEq/L 1000 milliLiter(s) (75 mL/Hr) IV Continuous <Continuous>  tiotropium 18 MICROgram(s) Capsule 1 Capsule(s) Inhalation daily    MEDICATIONS  (PRN):  acetaminophen   Tablet .. 650 milliGRAM(s) Oral every 6 hours PRN Temp greater or equal to 38C (100.4F), Mild Pain (1 - 3)  ALBUTerol    90 MICROgram(s) HFA Inhaler 2 Puff(s) Inhalation every 6 hours PRN Shortness of Breath and/or Wheezing  ALPRAZolam 0.25 milliGRAM(s) Oral every 12 hours PRN anxiety  oxyCODONE    IR 5 milliGRAM(s) Oral every 4 hours PRN Moderate Pain (4 - 6)  oxyCODONE    IR 10 milliGRAM(s) Oral every 4 hours PRN Severe Pain (7 - 10)      ASSESSMENT/PLAN  63 yo with PMH of left lung adenocarcinoma s/p resection and adjuvant chemo with cisplatin/gem in June 2016, COPD, depression and anxiety, iron deficiency anemia s/p blood and iron transfusions s/p right crani for tumor resection (3/15) now with with functional, gait, and ADL impairments.    Disposition - Home with Home PT/OT  and familial supervision once patient is medically stable per primary treating team(s). Pt can transition to outpatient PT/OT once patient is discharged from home PT/OT. Pt does not have enough rehab discipline needs to warrant intensive acute rehabilitation.  PT - ROM, Bed Mob, Transfers, Amb with AD   OT - ADLs, ROM  SLP - Cog eval and treat  Precautions - Falls, Cardiac  DVT Prophylaxis - Lovenox as per primary team  Skin - Turn Q2hrs  Diet - DASH/TLC  Thank you for allowing us to participate in your patient's care.

## 2020-03-16 NOTE — PHYSICAL THERAPY INITIAL EVALUATION ADULT - DIAGNOSIS, PT EVAL
Pt presents with decreased memory, impaired balance, and decreased mobility all impacting ability to perform ADLs and functional mobility independently.

## 2020-03-16 NOTE — PHYSICAL THERAPY INITIAL EVALUATION ADULT - PRECAUTIONS/LIMITATIONS, REHAB EVAL
fall precautions/CONTINUED: vasogenic edema concerning for potential hemorrhagic metastasis. Pt now presents POD#1 s/p R crani for tumor resection.

## 2020-03-17 NOTE — PROGRESS NOTE ADULT - REASON FOR ADMISSION
impaired depth perception
Admitted 3/13 to medicine service right occipital hemorrhagic met; 3/15 s/p right occipital craniotomy for resection of brain lesion
impaired depth perception

## 2020-03-17 NOTE — DISCHARGE NOTE PROVIDER - NSDCCPTREATMENT_GEN_ALL_CORE_FT
PRINCIPAL PROCEDURE  Procedure: Craniotomy for tumor with stereotactic guidance  Findings and Treatment:

## 2020-03-17 NOTE — DISCHARGE NOTE PROVIDER - HOSPITAL COURSE
64 year old female with medical history of Stage 2 left lung adenocarcinoma s/p resection and adjuvant chemotherapy with cisplatin / gem in June 2016, COPD, depression, anxiety and iron deficiency anemia s/p blood / iron transfusions presents to the ED for complaints of "flashing in left eye" accompanied with eye pain headache and nausea. Patient was evaluated by Medicine service and admitted in their service as well. Neurology and Neurosurgery was consulted as well for concerns for possible seizure episode as well as the right occipital brain mass with edema with 2cm hemorrhage that was revealed in her CT Brain. Patient had further work up, she had a CT Chest / Abdomen / Pelvis which reveals "s/p left upper lobectomy, stable chest ct in comparison to prior, and a subcentimeter hypodense foci in the liver too small to characterize likely unchanged from prior CT." She also had an MRI Brain which also confirms the brain mass, suspicion for possible metastases. Oncology was consulted and was following the patient as well. Neurosurgery planned for surgical intervention and was seen and cleared by Cardiology. On 3/15/20 patient was taken to the OR for s/p right occipital craniotomy for tumor resection with frozen as lung cancer metastases). Patient tolerated procedure well and was managed by Neuro ICU. She had her post-op CT Brain which revealed stable post-op changes. Patient had her post-op MRI Brain on 3/16 which revealed post-op changes. Patient was then transitioned to [facility] and was evaluated by PT and PM&R and revealed a candidate for home with home PT. On the day of discharge patient is neurologically and hemodynamically stable for discharge.         More than 30 minutes were spent educating the patient and family regarding condition, medications, follow up plans, signs and symptoms to be concerned with, preparing paperwork, and questions answered regarding discharge. Patient is a 64 year old female with a past medical history of Stage 2 left lung adenocarcinoma s/p resection and adjuvant chemotherapy with cisplatin / gem in June 2016, COPD, depression, anxiety and iron deficiency anemia s/p blood / iron transfusions who presented to the ED for complaints of "flashing in left eye" accompanied with eye pain headache and nausea. CT head was performed and revealed RT occipital hemorrhage concerning for underlying brain lesion. Neurology and Neurosurgery was consulted as well for concerns for possible seizure episode as well as the right occipital brain mass with edema with 2cm hemorrhage that was revealed on her CT Brain. Patient had further work up, she had a CT Chest / Abdomen / Pelvis which reveals "s/p left upper lobectomy, stable chest ct in comparison to prior, and a subcentimeter hypodense foci in the liver too small to characterize likely unchanged from prior CT." She also had an MRI Brain which also confirms the brain mass, suspicion for possible metastases. Oncology was consulted and was following the patient as well. Neurosurgery planned for surgical intervention; cardiology cleared for surgery. On 3/15/20 patient was taken to the OR and underwent right occipital craniotomy for tumor resection. Final pathology pending on day of discharge. Patient tolerated procedure well and was managed by Neuro ICU. She had her post-op CT Brain which revealed expected post-operative changes. Patient had her post-operative MRI Brain on 3/16 which revealed expected post-operative changes. Patient was then transitioned to [facility] and was evaluated by PT and PM&R who recommended home with home PT and 24 hour supervision upon discharge. On the day of discharge patient is medically and neurologically stable and cleared for discharge home.

## 2020-03-17 NOTE — DISCHARGE NOTE PROVIDER - NSDCFUADDINST_GEN_ALL_CORE_FT
Please make all necessary appointments and follow up. Please do NOT take any Aspirin and NSAIDs (Ibuprofen, Advil, Aleve and Motrin etc) until cleared by your neurosurgeon.   Please keep your surgical incision clean and dry.  Please go to the emergency room for any of the following: altered mental status, seizures, pain uncontrolled by pain medications, leaking / bleeding from surgical incision, chest pain, or shortness of breath.

## 2020-03-17 NOTE — PROGRESS NOTE ADULT - ASSESSMENT
This patient is a 65yo with PMH of left lung adenocarcinoma s/p resection and adjuvant chemo with cisplatin/gem in June 2016, COPD, depression and anxiety, iron deficiency anemia s/p blood and iron transfusions who presents to the ED with complaint of flashing in left eye and problems with depth perception, found to have R parietal ICH with surrounding vasogenic edema concerning for potential hemorrhagic metastasis.     3-14-20: Neurosurgeons are planning to remove her brain mass. INR and aPPT and PLT were within normal ranges. Will consult cardiology, Dr Sharma for cardiac clearance. I informed pt of this plan.  3-14-20@ 23:27: I saw pt again tonight. Pt denied any dyspnea or chest discomfort, Afebrile, Vitals stable. Labs are acceptable. As long as Dr Sharma clears her, then she is at the optimal condition for her brain surgery.   3-15-20: Pt without complaints. Vital stable. Dr Sharma cleared her for surgery. So she is at optimal condition from medical standpoint to have surgery.  3-17-20: Doing well after the surgery. Pt is being D/C today.

## 2020-03-17 NOTE — OCCUPATIONAL THERAPY INITIAL EVALUATION ADULT - VISUAL ASSESSMENT: VISUAL NEGLECT
69 year old male with PMhx of HTN and HLD presents to the ED for evaluation of confusion. supplemental history obtained from the wife over the phone as the patient is disoriented. Per family over past 2 months patient endorses intermittent headache and has been more forgetful and confused. patient had MRI as out patient 3/12/20 which showed multiple masses in brain prompting visit to the ED per PMD for further eval.  Pt has no complaints at this time. patient denies headache, blurry vision, motor or sensory deficits, change in bowel or urinary habits, fever, chills, nausea or vomiting.    in the ed patient is hemodynamically stable.  CT scan with brain masses and brain edema and mass effect.  admitted to medicine for further management. none

## 2020-03-17 NOTE — DISCHARGE NOTE PROVIDER - CARE PROVIDER_API CALL
Jose Manuel Servin (MD)  Neurosurgery  General  [address]  [place] [address]  Phone: [phone]  Fax: [phone]  Follow Up Time:     Darrel Layne (DO)  Family Medicine  [address] [place] [address]  Phone: [phone]  Fax: [phone]  Follow Up Time: Jose Manuel Servin)  Neurosurgery  General  [address]  [place] [address]  Phone: [phone]  Fax: [phone]  Follow Up Time:     Darrel Layne (DO)  Family Medicine  [address], [place] [address]  Phone: [phone]  Fax: [phone]  Follow Up Time:     Melita Pugh; MBBS)  Hematology; [facility] Medicine; Medical Oncology  [address] [place] [id number]  Phone: [phone]  Fax: [phone]  Follow Up Time: 1 week

## 2020-03-17 NOTE — DISCHARGE NOTE PROVIDER - REASON FOR ADMISSION
impaired depth perception  s/p right occipital craniotomy for tumor resection Admitted 3/13 right occipital hemorrhagic brain lesion; 3/15 s/p right occipital craniotomy for resection of right occipital hemorrhagic brain lesion

## 2020-03-17 NOTE — PROGRESS NOTE ADULT - SUBJECTIVE AND OBJECTIVE BOX
CARDIOLOGY     PROGRESS  NOTE   ________________________________________________    CHIEF COMPLAINT:Patient is a 64y old  Female who presents with a chief complaint of impaired depth perception  s/p right occipital craniotomy for tumor resection (17 Mar 2020 07:24)  no complain.  	  REVIEW OF SYSTEMS:  CONSTITUTIONAL: No fever, weight loss, or fatigue  EYES: No eye pain, visual disturbances, or discharge  ENT:  No difficulty hearing, tinnitus, vertigo; No sinus or throat pain  NECK: No pain or stiffness  RESPIRATORY: No cough, wheezing, chills or hemoptysis; No Shortness of Breath  CARDIOVASCULAR: No chest pain, palpitations, passing out, dizziness, or leg swelling  GASTROINTESTINAL: No abdominal or epigastric pain. No nausea, vomiting, or hematemesis; No diarrhea or constipation. No melena or hematochezia.  GENITOURINARY: No dysuria, frequency, hematuria, or incontinence  NEUROLOGICAL: No headaches, memory loss, loss of strength, numbness, or tremors  SKIN: No itching, burning, rashes, or lesions   LYMPH Nodes: No enlarged glands  ENDOCRINE: No heat or cold intolerance; No hair loss  MUSCULOSKELETAL: No joint pain or swelling; No muscle, back, or extremity pain  PSYCHIATRIC: No depression, anxiety, mood swings, or difficulty sleeping  HEME/LYMPH: No easy bruising, or bleeding gums  ALLERGY AND IMMUNOLOGIC: No hives or eczema	    [ ] All others negative	  [ ] Unable to obtain    PHYSICAL EXAM:  T(C): 36.4 (03-17-20 @ 08:25), Max: 37.2 (03-16-20 @ 15:00)  HR: 54 (03-17-20 @ 08:25) (53 - 78)  BP: 133/76 (03-17-20 @ 08:25) (103/67 - 133/76)  RR: 20 (03-17-20 @ 08:25) (16 - 20)  SpO2: 97% (03-17-20 @ 08:25) (95% - 100%)  Wt(kg): --  I&O's Summary    16 Mar 2020 07:01  -  17 Mar 2020 07:00  --------------------------------------------------------  IN: 500 mL / OUT: 500 mL / NET: 0 mL        Appearance: Normal	  HEENT:   Normal oral mucosa, PERRL, EOMI	  Lymphatic: No lymphadenopathy  Cardiovascular: Normal S1 S2, No JVD, + murmurs, No edema  Respiratory: Lungs clear to auscultation	  Psychiatry: A & O x 3, Mood & affect appropriate  Gastrointestinal:  Soft, Non-tender, + BS	  Skin: No rashes, No ecchymoses, No cyanosis	  Neurologic: Non-focal  Extremities: Normal range of motion, No clubbing, cyanosis or edema  Vascular: Peripheral pulses palpable 2+ bilaterally    MEDICATIONS  (STANDING):  dexAMETHasone     Tablet 4 milliGRAM(s) Oral every 6 hours  dextrose 5%. 1000 milliLiter(s) (50 mL/Hr) IV Continuous <Continuous>  dextrose 50% Injectable 12.5 Gram(s) IV Push once  dextrose 50% Injectable 25 Gram(s) IV Push once  dextrose 50% Injectable 25 Gram(s) IV Push once  enoxaparin Injectable 40 milliGRAM(s) SubCutaneous at bedtime  insulin lispro (HumaLOG) corrective regimen sliding scale   SubCutaneous three times a day before meals  levETIRAcetam 500 milliGRAM(s) Oral every 12 hours  pantoprazole    Tablet 40 milliGRAM(s) Oral before breakfast  sertraline 100 milliGRAM(s) Oral daily  tiotropium 18 MICROgram(s) Capsule 1 Capsule(s) Inhalation daily      TELEMETRY: 	    ECG:  	  RADIOLOGY:  OTHER: 	  	  LABS:	 	    CARDIAC MARKERS:                                13.2   15.93 )-----------( 275      ( 15 Mar 2020 16:44 )             38.6     03-15    139  |  103  |  21  ----------------------------<  126<H>  4.0   |  21<L>  |  0.66    Ca    8.6      15 Mar 2020 16:44    TPro  6.4  /  Alb  3.8  /  TBili  0.2  /  DBili  x   /  AST  13  /  ALT  16  /  AlkPhos  67  03-15    proBNP:   Lipid Profile:   HgA1c: Hemoglobin A1C, Whole Blood: 5.3 % (03-16 @ 18:00)    TSH:   PT/INR - ( 15 Mar 2020 09:32 )   PT: 11.2 sec;   INR: 0.97 ratio         PTT - ( 15 Mar 2020 09:32 )  PTT:27.4 sec      Assessment and plan  ---------------------------  pt is doing well, no complain, post op  continue current meds  hemodynamic stable  dvt priophylaxis

## 2020-03-17 NOTE — PROGRESS NOTE ADULT - SUBJECTIVE AND OBJECTIVE BOX
Patient is a 64y old  Female who presents with a chief complaint of Admitted 3/13 to medicine service right occipital hemorrhagic met; 3/15 s/p right occipital craniotomy for resection of brain lesion (17 Mar 2020 10:17)       Pt is seen and examined  pt is awake and lying in bed  pt seems comfortable     MEDICATIONS  (STANDING):  dexAMETHasone     Tablet 4 milliGRAM(s) Oral every 6 hours  dextrose 5%. 1000 milliLiter(s) (50 mL/Hr) IV Continuous <Continuous>  dextrose 50% Injectable 12.5 Gram(s) IV Push once  dextrose 50% Injectable 25 Gram(s) IV Push once  dextrose 50% Injectable 25 Gram(s) IV Push once  enoxaparin Injectable 40 milliGRAM(s) SubCutaneous at bedtime  insulin lispro (HumaLOG) corrective regimen sliding scale   SubCutaneous three times a day before meals  levETIRAcetam 500 milliGRAM(s) Oral every 12 hours  pantoprazole    Tablet 40 milliGRAM(s) Oral before breakfast  sertraline 150 milliGRAM(s) Oral daily  tiotropium 18 MICROgram(s) Capsule 1 Capsule(s) Inhalation daily    MEDICATIONS  (PRN):  acetaminophen   Tablet .. 650 milliGRAM(s) Oral every 6 hours PRN Temp greater or equal to 38C (100.4F), Mild Pain (1 - 3)  ALBUTerol    90 MICROgram(s) HFA Inhaler 2 Puff(s) Inhalation every 6 hours PRN Shortness of Breath and/or Wheezing  ALPRAZolam 0.5 milliGRAM(s) Oral every 12 hours PRN anxiety  dextrose 40% Gel 15 Gram(s) Oral once PRN Blood Glucose LESS THAN 70 milliGRAM(s)/deciLiter  glucagon  Injectable 1 milliGRAM(s) IntraMuscular once PRN Glucose <70 milliGRAM(s)/deciLiter  oxyCODONE    IR 5 milliGRAM(s) Oral every 4 hours PRN Moderate Pain (4 - 6)  oxyCODONE    IR 10 milliGRAM(s) Oral every 4 hours PRN Severe Pain (7 - 10)      Allergies    No Known Allergies    Intolerances        Vital Signs Last 24 Hrs  T(C): 36.6 (17 Mar 2020 12:12), Max: 37.2 (16 Mar 2020 15:00)  T(F): 97.9 (17 Mar 2020 12:12), Max: 99 (16 Mar 2020 15:00)  HR: 56 (17 Mar 2020 12:12) (54 - 78)  BP: 130/77 (17 Mar 2020 12:12) (103/67 - 133/76)  BP(mean): 77 (16 Mar 2020 15:00) (77 - 77)  RR: 20 (17 Mar 2020 12:12) (16 - 20)  SpO2: 98% (17 Mar 2020 12:12) (95% - 100%)        LABS:                          13.2   15.93 )-----------( 275      ( 15 Mar 2020 16:44 )             38.6         Mean Cell Volume : 95.5 fl  Mean Cell Hemoglobin : 32.7 pg  Mean Cell Hemoglobin Concentration : 34.2 gm/dL  Auto Neutrophil # : 14.79 K/uL  Auto Lymphocyte # : 0.77 K/uL  Auto Monocyte # : 0.28 K/uL  Auto Eosinophil # : 0.00 K/uL  Auto Basophil # : 0.01 K/uL  Auto Neutrophil % : 92.8 %  Auto Lymphocyte % : 4.8 %  Auto Monocyte % : 1.8 %  Auto Eosinophil % : 0.0 %  Auto Basophil % : 0.1 %    Serial CBC's  03-15 @ 16:44  Hct-38.6 / Hgb-13.2 / Plat-275 / RBC-4.04 / WBC-15.93          Serial CBC's  03-15 @ 07:23  Hct-40.8 / Hgb-13.5 / Plat-296 / RBC-4.26 / WBC-15.69          Serial CBC's  03-13 @ 18:24  Hct-41.7 / Hgb-13.6 / Plat-285 / RBC-4.28 / WBC-11.90            03-15    139  |  103  |  21  ----------------------------<  126<H>  4.0   |  21<L>  |  0.66    Ca    8.6      15 Mar 2020 16:44    TPro  6.4  /  Alb  3.8  /  TBili  0.2  /  DBili  x   /  AST  13  /  ALT  16  /  AlkPhos  67  03-15          WBC Count: 15.93 K/uL (03-15-20 @ 16:44)  Hemoglobin: 13.2 g/dL (03-15-20 @ 16:44)  Hematocrit: 38.6 % (03-15-20 @ 16:44)  Platelet Count - Automated: 275 K/uL (03-15-20 @ 16:44)  WBC Count: 15.69 K/uL (03-15-20 @ 07:23)  Hemoglobin: 13.5 g/dL (03-15-20 @ 07:23)  Hematocrit: 40.8 % (03-15-20 @ 07:23)  Platelet Count - Automated: 296 K/uL (03-15-20 @ 07:23)  WBC Count: 11.90 K/uL (03-13-20 @ 18:24)  Hemoglobin: 13.6 g/dL (03-13-20 @ 18:24)  Hematocrit: 41.7 % (03-13-20 @ 18:24)  Platelet Count - Automated: 285 K/uL (03-13-20 @ 18:24)                      RADIOLOGY & ADDITIONAL STUDIES:

## 2020-03-17 NOTE — DISCHARGE NOTE PROVIDER - PROVIDER TOKENS
PROVIDER:[TOKEN:[[id number]]],PROVIDER:[TOKEN:[[id number]]] PROVIDER:[TOKEN:[[id number]]],PROVIDER:[TOKEN:[[id number]]],PROVIDER:[TOKEN:[[id number]],FOLLOWUP:[1 week]]

## 2020-03-17 NOTE — DISCHARGE NOTE PROVIDER - NSDCCPCAREPLAN_GEN_ALL_CORE_FT
PRINCIPAL DISCHARGE DIAGNOSIS  Diagnosis: Brain tumor consultation  Assessment and Plan of Treatment: s/p right occipital craniotomy for tumor resection  Please make an appointment and follow up with Dr. Servin in one week after discharge. Please continue Keppra for seizure prophylaxis. Please continue to take Decadron taper as instructed. Patient is instructed to keep her surgical incision clean and dry, she is to avoid soap / shampoo in the affected area, no scrubbing and pat dry only.      SECONDARY DISCHARGE DIAGNOSES  Diagnosis: History of COPD  Assessment and Plan of Treatment: Please make an appointment and follow up with your primary care provider in one week after discharge. Please continue Spiriva.    Diagnosis: Anxiety and depression  Assessment and Plan of Treatment: Anxiety and depression  Please make an appointment and follow up with your primary care provider in one week after discharge. Please continue to take Zoloft and Xanax as prescribed by your doctor.    Diagnosis: History of lung cancer  Assessment and Plan of Treatment: History of lung cancer  Pleae make an appointment and follow up with your primary care provider in one week after discharge. PRINCIPAL DISCHARGE DIAGNOSIS  Diagnosis: Brain tumor consultation  Assessment and Plan of Treatment: s/p right occipital craniotomy for tumor resection  Please make an appointment and follow up with neurosurgeon Dr. Servin in one week after discharge. Call [phone] to schedule an appointment. Keep incision site clean and dry. Your stitches are absorbable and do not need to be removed. No creams, lotions, or ointments to incision area. Ok to shower but do not allow water to hit incision site directly. Gently pat dry after shower.    Please continue Keppra for seizure prophylaxis. Please continue to take Decadron taper as instructed.   NO heavy lifting, strenuous activity, twisting, bending, driving, or working until cleared by your physician.   Return to ER immediately for any of the following: fever, bleeding, new onset numbness/tingling/weakness, nausea and/or vomiting, chest pain, shortness of breath, confusion, seizure, altered mental status, urinary and/or fecal incontinence or retention.      SECONDARY DISCHARGE DIAGNOSES  Diagnosis: History of COPD  Assessment and Plan of Treatment: Please make an appointment and follow up with your primary care provider in one week after discharge. Please continue Proventil and Spiriva.    Diagnosis: History of lung cancer  Assessment and Plan of Treatment: History of lung cancer  Pleae make an appointment and follow up with your primary care provider in one week after discharge.    Diagnosis: Anxiety and depression  Assessment and Plan of Treatment: Anxiety and depression  Please make an appointment and follow up with your primary care provider in one week after discharge. Please continue to take Zoloft and Xanax as prescribed by your doctor.

## 2020-03-17 NOTE — DISCHARGE NOTE PROVIDER - CARE PROVIDERS DIRECT ADDRESSES
[email or web address]

## 2020-03-17 NOTE — PROGRESS NOTE ADULT - ASSESSMENT
HPI: This patient is a 63yo with PMH of left lung adenocarcinoma s/p resection and adjuvant chemo with cisplatin/gem in June 2016, COPD, depression and anxiety, iron deficiency anemia s/p blood and iron transfusions who presents to the ED with complaint of flashing in left eye. In the ED, code stroke was called for the patient. Patient had NIH stroke scale of 5. Imaging revealed RT ocipital hemorrhagic lesion with cerebral edema. Admitted to medicine on 3/13. CT C/A/P unchanged since 9/2019.  (13 Mar 2020 20:37)    PROCEDURE: Admitted 3/13 to medicine service; 3/15 s/p right occipital craniotomy for resection of hemorrhagic brain lesion POD#2    PLAN:  - F/U final surgical pathology  - 1 week decadron taper for cerebral edema  - Continue Keppra 500 q 12 for seizure prophylaxis  - Pain control with Oxy IR PRN  - Outpatient oncology follow up for further treatment plans  - Continue Xanax PRN for anxiety; will increase dose to home dose of 0.5mg BID PRN - Confirmed on iSTOP Ref # [id number]  - Continue Zoloft; increase to home dose of 150mg daily as per patient and [facility] pharmacy  - Continue Proventil and Spiriva inhalers for history of COPD  - Continue Protonix for GI prophylaxis while on steroids  - Continue low dose HISS while on steroids  - Encouraged mobilization  - Encouraged incentive spirometer  - DVT prophylaxis: SQL, b/l venodynes  - Dispo: home PT w/ 24 hour supervision to be provided by daughter  - Discharge home today  - Will discuss with Dr. Servin    [facility] # [id number]

## 2020-03-17 NOTE — DISCHARGE NOTE PROVIDER - NSDCMRMEDTOKEN_GEN_ALL_CORE_FT
acetaminophen 325 mg oral tablet: 2 tab(s) orally every 6 hours, As needed, Mild Pain (1 - 3)  albuterol 90 mcg/inh inhalation aerosol: 2 puff(s) inhaled every 6 hours, As needed, Shortness of Breath and/or Wheezing  ALPRAZolam 0.5 mg oral tablet: 1 tab(s) orally every 12 hours, As needed, anxiety  dexamethasone 2 mg oral tablet: 2 tab(s) orally q8 hours x 2 days then take 1 tab PO q8 hours x 2 days, then take 1 tab PO q 12  x 2 days, then 1 tab daily x 2 days  levETIRAcetam 500 mg oral tablet: 1 tab(s) orally every 12 hours  oxyCODONE 5 mg oral tablet: 1-2 tab(s) orally every 4 hours, As needed, Moderate Pain (4 - 6) to severe pain (7-10) MDD:8 tabs  pantoprazole 40 mg oral delayed release tablet: 1 tab(s) orally once a day (before a meal)  sertraline 50 mg oral tablet: 3 tab(s) orally once a day  tiotropium 18 mcg inhalation capsule: 1 cap(s) inhaled once a day

## 2020-03-17 NOTE — DISCHARGE NOTE NURSING/CASE MANAGEMENT/SOCIAL WORK - PATIENT PORTAL LINK FT
You can access the FollowMyHealth Patient Portal offered by [facility] by registering at the following website: [email or web address]. By joining [facility]’s FollowMyHealth portal, you will also be able to view your health information using other applications (apps) compatible with our system.

## 2020-03-18 PROBLEM — D50.9 IRON DEFICIENCY ANEMIA, UNSPECIFIED: Chronic | Status: ACTIVE | Noted: 2020-01-01

## 2020-03-31 NOTE — PHYSICAL EXAM
[Normal] : well developed, well nourished, in no acute distress [General Appearance - Well Developed] : well developed [Oriented To Time, Place, And Person] : oriented to person, place, and time [de-identified] : PHYSICAL EXAM NOT PERFORMED

## 2020-03-31 NOTE — HISTORY OF PRESENT ILLNESS
[Other Location: e.g. School (Enter Location, City,State)___] : at [unfilled], at the time of the visit. [Medical Office: ([facility])___] : at ~his/her~ medical office located in [place] [Other:____] : [unfilled] [Patient] : the patient [Self] : self [FreeTextEntry1] : Ms. Andrea is a 64 year old female with history of left lung adenocarcinoma, presents with metastases to the brain. S/P Left upper lobectomy by Dr. Hernández and S/P right occipital hemorrhagic lesion resection on 03/15/20 by Dr. Servin.\par \par Oncology history\par Her oncology history dates back in 02/2016, when he presented with TORRES, CT chest showed left lower lobe lesion. . PET/CT was positive in the left upper lung central mass. She underwent left upper lobe lobectomy on 03/04/16 by Dr. Hernández. Pathology demonstrating adenocarcinoma of lung, staging [id number].\par \par She was saw  Dr. Pugh on 03/25/16 and Dr. Robert on 04/12/16 on a second consult with both recommending chemo with Cisplatin/Pemetrexed and has completed 4 cycles in June, 2016.\par \par She continued to follow up and surveillance scans under Dr. Pugh and Dr. Arora until recently when she presented to ED with headache and eye pain and 03/14/20 MRI brain noted heterogeneous enhancing lesion involving the right parietal cortical and subcortical region measuring approximately 2.2 x 1.6 x 2.5 cm.\par \par She underwent right occipital craniotomy for resection of right occipital hemorrhagic brain lesion.on 03/15/20 by Dr. Servin.\par \par Pathology of the right brain occipital tumor on 03/15.20 noted metastatic adenocarcinoma, positive for CK7, TTF-1 and Nasip A, negative for CK20, CDX-2 and PAX-8,.consistent with lung origin.\par \par Post operative MRI on 03/16/20 Interval right parietal occipital craniotomy for resection of a parietal hemorrhagic mass with expected postoperative changes and thin extra-axial fluid collection underlying the craniotomy site compared with 3/14/2020. She has an appointment to see Dr. Pugh on 04/02/20.  Today she is seen in initial consultation.  She provides verbal consent for this telehealth visit. \par \par Clinically she feels in excellent health.  She denies nausea, vomiting, headache, or other complaints.

## 2020-03-31 NOTE — LETTER CLOSING
[FreeTextEntry3] : Jairo Rosado MD\par Attending Physician\par Department of Radiation Medicine\par \par \par

## 2020-03-31 NOTE — REVIEW OF SYSTEMS
[Constipation] : constipation [Negative] : Genitourinary [Fever] : no fever [Chills] : no chills [Night Sweats] : no night sweats [Dysphagia] : no dysphagia [Loss of Hearing] : no loss of hearing [Chest Pain] : no chest pain [Shortness Of Breath] : no shortness of breath [Cough] : no cough [Confused] : no confusion [Fainting] : no fainting [FreeTextEntry9] : intermittent left arm weakness [de-identified] : intermittent headaches, 4/10, once or twice

## 2020-04-02 PROBLEM — K29.50 CHRONIC GASTRITIS: Status: ACTIVE | Noted: 2019-01-30

## 2020-04-02 NOTE — HISTORY OF PRESENT ILLNESS
[Disease: _____________________] : Disease: [unfilled] [AJCC Stage: ____] : AJCC Stage: [unfilled] [Home] : at home, [unfilled] , at the time of the visit. [Medical Office: ([facility])___] : at ~his/her~ medical office located in [place] [Patient] : the patient [Self] : self [FreeTextEntry2] : Natalie Andrea [FreeTextEntry4] : Jaya Torres [de-identified] : MS. Andrea is a 65 yo woman who is s/p resection of stage II lung cancer followed by adjuvant chemo with cisplatin and pemetrexed x 4 cycles which completed in June of 2016. \par Was enrolled in ALCHEMIST, EGFR and ALK testing of tumor- tumor WT for both. Was offered adjuvant nivolumab as part of the study but declined. \par Surveillance scans showed YASMEEN. She just had a CT scan on 11/15 which showed GGO and tree-in-bud findings b/l upper lobes suggestive of PNA and endobronchial spread of infection. She was just seen by Dr. Arora for lower resp tract symptoms and tx with abx (azithromycin), and steroids (currently at 20 mg daily). She is feeling a little better. She still has dry cough. No fever, chills, rigors. \par \par 9/3/19: Persistent dyspnea. Hurt her back after rowing last week. She otherwise denies fever, chills, cough, mucus, sore throat, ear pain, chest pain, her nausea from reflux has improved and is no longer vomiting from her reflux. She reports no GERD symptoms. She is no longer smoking.  \par \par 10/1/19: Pt returns for follow up for discussion following scans. Pt complains of abdominal bloating and back discomfort. No other complaints. Remaining ROS unremarkable\par \par 10/31/19: Pt seen today as urgent visit. Called this am stating that she feels terrible. She is c/o sob/ profound fatigue and occasional dizziness. She denies fever/chills. She recently underwent colonoscopy and EGD which did not reveal evidence of bleeding or reason for new worsening anemia. \par \par 4/2/20: Pt was receiving IV iron on and off with resolution of above mentioned symptoms. However, she presented to ED with headache and eye pain on 03/14/20. MRI brain noted heterogeneous enhancing lesion involving the right parietal cortical and subcortical region measuring approximately 2.2 x 1.6 x 2.5 cm.\par \par She underwent right occipital craniotomy for resection of right occipital hemorrhagic brain lesion.on 03/15/20 by Dr. Servin.\par \par Pathology of the right brain occipital tumor on 03/15.20 noted metastatic adenocarcinoma, positive for CK7, TTF-1 and Nasip A, negative for CK20, CDX-2 and PAX-8,.consistent with lung origin. PDL1 was 30%, BRCA 2 mut (unclear if germline or somatic), KRAS G12C amongst others.\par \par Post operative MRI on 03/16/20 Interval right parietal occipital craniotomy for resection of a parietal hemorrhagic mass with expected postoperative changes and thin extra-axial fluid collection underlying the craniotomy site compared with 3/14/2020. \par \par Ms. Andrea is back to baseline, working from home and feels well otherwise. She has no pulmonary complaints. [de-identified] : adenoca

## 2020-04-02 NOTE — PHYSICAL EXAM
[Restricted in physically strenuous activity but ambulatory and able to carry out work of a light or sedentary nature] : Status 1- Restricted in physically strenuous activity but ambulatory and able to carry out work of a light or sedentary nature, e.g., light house work, office work [de-identified] : outside scope of visit but sounds comfortable [de-identified] : Outside scope of visit [de-identified] : outside scope of visit

## 2020-04-02 NOTE — ASSESSMENT
[Curative] : Goals of care discussed with patient: Curative [FreeTextEntry1] : 63 yo woman with recently diagnosed stage IIA lung ca s/p LLobectomy .\par Completed 4 cycles of cis/gem in June 2016- did great and tolerated well. \par Surveillance CTs all CTs show YASMEEN but unfortunately was recently hospitalized with seizure. Was found to have brain met in rt occipital region. This was resected. Pt has no residual neurological deficits. She has CT scan of chest and abd while in hospital which showed no extracranial disease. LIver lesion stable from prior. However a new lesion was described\par Will get PET/CT for new baseline \par PDL1 was 30%. KRAS G12C, BRCA mut of unknown significance, TAMI, high TMB\par Discussed oligometastatic status and good prognosis associated with brain-directed tx\par Given established metastatic disease and pos PDL1, I would recommend immunotherapy, pembrolizumab Q 3-4 weeks despite lack of measurable extracranial disease.\par I discussed the rationale, mode of administration, irAES and need for timely reporting of symptoms. Pt expressed understanding and agreed. \par She is scheduled for GK mid-April. Will start Pembro 3rd week of April\par Schedulers emailed \par Will see pt prior to tx to discuss scan results and answer any additional questions\par Educated patient about COVID_19 pandemic. Discussed higher risk for complications in infected with the virus given immunosuppressed state due to cancer. Recommended universal precautions and discouraged attending parties or gatherings. Recommended frequent hand-washing and hand  use.\par \par \par

## 2020-04-02 NOTE — REVIEW OF SYSTEMS
[Fatigue] : fatigue [Shortness Of Breath] : shortness of breath [Cough] : cough [Dizziness] : dizziness [Negative] : Allergic/Immunologic [SOB on Exertion] : no shortness of breath during exertion

## 2020-04-23 PROBLEM — K58.9 IRRITABLE BOWEL SYNDROME: Status: ACTIVE | Noted: 2019-01-30

## 2020-04-23 NOTE — ASSESSMENT
[FreeTextEntry1] : 65 yo woman with recently diagnosed stage IIA lung ca s/p LLobectomy .\par Completed 4 cycles of cis/gem in June 2016- did great and tolerated well. \par Surveillance CTs all CTs show YASMEEN but unfortunately was recently hospitalized with seizure. Was found to have brain met in rt occipital region. This was resected. Pt has no residual neurological deficits. She has CT scan of chest and abd while in hospital which showed no extracranial disease. LIver lesion stable from prior. However a new lesion was described\par we wanted to get a PET/CT for new baseline but given COVID crisis and since pt had scans as inpatient, we decided to postpone this till after cycle 2 or beyond. \par PDL1 was 30%. KRAS G12C, BRCA mut of unknown significance, TAMI, high TMB\par Discussed oligometastatic status and good prognosis associated with brain-directed tx\par Given established metastatic disease and pos PDL1, recommended immunotherapy, pembrolizumab Q 3-4 weeks despite lack of measurable extracranial disease.\par I again discussed the rationale, mode of administration, irAES and need for timely reporting of symptoms. Pt expressed understanding and agreed. She singned informed consent today. \par She is s/p GK mid-April. \par Educated patient about COVID_19 pandemic. Discussed higher risk for complications in infected with the virus given immunosuppressed state due to cancer. Recommended universal precautions and discouraged attending parties or gatherings. Recommended frequent hand-washing and hand  use.\par \par \par  [Curative] : Goals of care discussed with patient: Curative

## 2020-04-23 NOTE — HISTORY OF PRESENT ILLNESS
[Disease: _____________________] : Disease: [unfilled] [de-identified] : MS. Andrea is a 65 yo woman who is s/p resection of stage II lung cancer followed by adjuvant chemo with cisplatin and pemetrexed x 4 cycles which completed in June of 2016. \par Was enrolled in ALCHEMIST, EGFR and ALK testing of tumor- tumor WT for both. Was offered adjuvant nivolumab as part of the study but declined. \par Surveillance scans showed YASMEEN. She just had a CT scan on 11/15 which showed GGO and tree-in-bud findings b/l upper lobes suggestive of PNA and endobronchial spread of infection. She was just seen by Dr. Arora for lower resp tract symptoms and tx with abx (azithromycin), and steroids (currently at 20 mg daily). She is feeling a little better. She still has dry cough. No fever, chills, rigors. \par \par 9/3/19: Persistent dyspnea. Hurt her back after rowing last week. She otherwise denies fever, chills, cough, mucus, sore throat, ear pain, chest pain, her nausea from reflux has improved and is no longer vomiting from her reflux. She reports no GERD symptoms. She is no longer smoking.  \par \par 10/1/19: Pt returns for follow up for discussion following scans. Pt complains of abdominal bloating and back discomfort. No other complaints. Remaining ROS unremarkable\par \par 10/31/19: Pt seen today as urgent visit. Called this am stating that she feels terrible. She is c/o sob/ profound fatigue and occasional dizziness. She denies fever/chills. She recently underwent colonoscopy and EGD which did not reveal evidence of bleeding or reason for new worsening anemia. \par \par 4/2/20: Pt was receiving IV iron on and off with resolution of above mentioned symptoms. However, she presented to ED with headache and eye pain on 03/14/20. MRI brain noted heterogeneous enhancing lesion involving the right parietal cortical and subcortical region measuring approximately 2.2 x 1.6 x 2.5 cm.\par \par She underwent right occipital craniotomy for resection of right occipital hemorrhagic brain lesion.on 03/15/20 by Dr. Servin.\par \par Pathology of the right brain occipital tumor on 03/15.20 noted metastatic adenocarcinoma, positive for CK7, TTF-1 and Nasip A, negative for CK20, CDX-2 and PAX-8,.consistent with lung origin. PDL1 was 30%, BRCA 2 mut (unclear if germline or somatic), KRAS G12C amongst others.\par \par Post operative MRI on 03/16/20 Interval right parietal occipital craniotomy for resection of a parietal hemorrhagic mass with expected postoperative changes and thin extra-axial fluid collection underlying the craniotomy site compared with 3/14/2020. \par \par 4/23/20: Pt here for cycle 1 of pembrolizumab. She has completed cranial RT without any AEs. Working from home. Denies any pain, fever, cough, but still has significant fatigue. \par \par Ms. Andrea is back to baseline, working from home and feels well otherwise. She has no pulmonary complaints. [AJCC Stage: ____] : AJCC Stage: [unfilled] [de-identified] : adenoca

## 2020-04-23 NOTE — REVIEW OF SYSTEMS
[Shortness Of Breath] : shortness of breath [Cough] : cough [Fatigue] : fatigue [SOB on Exertion] : no shortness of breath during exertion [Dizziness] : dizziness [FreeTextEntry6] : nothing new [Negative] : Allergic/Immunologic

## 2020-04-23 NOTE — PHYSICAL EXAM
[Restricted in physically strenuous activity but ambulatory and able to carry out work of a light or sedentary nature] : Status 1- Restricted in physically strenuous activity but ambulatory and able to carry out work of a light or sedentary nature, e.g., light house work, office work [Thin] : thin [Normal] : normal appearance, no rash, nodules, vesicles, ulcers, erythema [de-identified] : clear [de-identified] : anxious

## 2020-05-06 PROBLEM — C79.31 SECONDARY MALIGNANT NEOPLASM OF BRAIN: Chronic | Status: ACTIVE | Noted: 2020-01-01

## 2020-05-06 PROBLEM — Z92.21 PERSONAL HISTORY OF ANTINEOPLASTIC CHEMOTHERAPY: Chronic | Status: ACTIVE | Noted: 2020-01-01

## 2020-05-28 NOTE — HISTORY OF PRESENT ILLNESS
[Home] : at home, [unfilled] , at the time of the visit. [Verbal consent obtained from patient] : the patient, [unfilled] [Medical Office: ([facility])___] : at the medical office located in  [Disease: _____________________] : Disease: [unfilled] [FreeTextEntry4] : Kevin Jasso [AJCC Stage: ____] : AJCC Stage: [unfilled] [de-identified] : MS. Andrea is a 65 yo woman who is s/p resection of stage II lung cancer followed by adjuvant chemo with cisplatin and pemetrexed x 4 cycles which completed in June of 2016. \par Was enrolled in ALCHEMIST, EGFR and ALK testing of tumor- tumor WT for both. Was offered adjuvant nivolumab as part of the study but declined. \par Surveillance scans showed YASMEEN. She just had a CT scan on 11/15 which showed GGO and tree-in-bud findings b/l upper lobes suggestive of PNA and endobronchial spread of infection. She was just seen by Dr. Arora for lower resp tract symptoms and tx with abx (azithromycin), and steroids (currently at 20 mg daily). She is feeling a little better. She still has dry cough. No fever, chills, rigors. \par \par 9/3/19: Persistent dyspnea. Hurt her back after rowing last week. She otherwise denies fever, chills, cough, mucus, sore throat, ear pain, chest pain, her nausea from reflux has improved and is no longer vomiting from her reflux. She reports no GERD symptoms. She is no longer smoking.  \par \par 10/1/19: Pt returns for follow up for discussion following scans. Pt complains of abdominal bloating and back discomfort. No other complaints. Remaining ROS unremarkable\par \par 10/31/19: Pt seen today as urgent visit. Called this am stating that she feels terrible. She is c/o sob/ profound fatigue and occasional dizziness. She denies fever/chills. She recently underwent colonoscopy and EGD which did not reveal evidence of bleeding or reason for new worsening anemia. \par \par 4/2/20: Pt was receiving IV iron on and off with resolution of above mentioned symptoms. However, she presented to ED with headache and eye pain on 03/14/20. MRI brain noted heterogeneous enhancing lesion involving the right parietal cortical and subcortical region measuring approximately 2.2 x 1.6 x 2.5 cm.\par \par She underwent right occipital craniotomy for resection of right occipital hemorrhagic brain lesion.on 03/15/20 by Dr. Servin.\par \par Pathology of the right brain occipital tumor on 03/15.20 noted metastatic adenocarcinoma, positive for CK7, TTF-1 and Nasip A, negative for CK20, CDX-2 and PAX-8,.consistent with lung origin. PDL1 was 30%, BRCA 2 mut (unclear if germline or somatic), KRAS G12C amongst others.\par \par Post operative MRI on 03/16/20 Interval right parietal occipital craniotomy for resection of a parietal hemorrhagic mass with expected postoperative changes and thin extra-axial fluid collection underlying the craniotomy site compared with 3/14/2020. \par \par 4/23/20: Pt here for cycle 1 of pembrolizumab. She has completed cranial RT without any AEs. Working from home. Denies any pain, fever, cough, but still has significant fatigue. \par \par Ms. Andrea is back to baseline, working from home and feels well otherwise. She has no pulmonary complaints.\par \par 5/28/20: Doing well but emotionally still struggling, more so now due to COVID crisis and social distancing, has some alopecia. No irAE [de-identified] : adenoca

## 2020-05-28 NOTE — ASSESSMENT
[FreeTextEntry1] : 65 yo woman with recently diagnosed stage IIA lung ca s/p LLobectomy .\par Completed 4 cycles of cis/gem in June 2016- did great and tolerated well. \par Surveillance CTs all CTs show YASMEEN but unfortunately was recently hospitalized with seizure. Was found to have brain met in rt occipital region. This was resected. Pt has no residual neurological deficits. She has CT scan of chest and abd while in hospital which showed no extracranial disease. LIver lesion stable from prior. However a new lesion was described\par we wanted to get a PET/CT for new baseline but given COVID crisis and since pt had scans as inpatient, we decided to postpone this till after cycle 2 or beyond. \par PDL1 was 30%. KRAS G12C, BRCA mut of unknown significance, TAMI, high TMB\par Discussed oligometastatic status and good prognosis associated with brain-directed tx\par Given established metastatic disease and pos PDL1, she was started on pembrolizumab despite lack of measurable extracranial disease. Given new FDA dosing schedule, will change schedule to Q 6 weeks to avoid unnecessary exposure. \par She is s/p GK mid-April. \par Follow up MRI schedule in [place]\par Will order PET/CT around same time\par Educated patient about COVID_19 pandemic. Discussed higher risk for complications in infected with the virus given immunosuppressed state due to cancer. Recommended universal precautions and discouraged attending parties or gatherings. Recommended frequent hand-washing and hand  use.\par \par \par  [Curative] : Goals of care discussed with patient: Curative

## 2020-05-28 NOTE — REVIEW OF SYSTEMS
[Fatigue] : fatigue [Shortness Of Breath] : shortness of breath [Cough] : cough [SOB on Exertion] : no shortness of breath during exertion [Anxiety] : anxiety [Dizziness] : dizziness [Negative] : Allergic/Immunologic [FreeTextEntry6] : nothing new [de-identified] : hair loss

## 2020-05-28 NOTE — PHYSICAL EXAM
[Restricted in physically strenuous activity but ambulatory and able to carry out work of a light or sedentary nature] : Status 1- Restricted in physically strenuous activity but ambulatory and able to carry out work of a light or sedentary nature, e.g., light house work, office work [Thin] : thin [Normal] : grossly intact [de-identified] : anxious [de-identified] : no obvious hair loss [de-identified] : breathing comfortable

## 2020-06-25 NOTE — HISTORY OF PRESENT ILLNESS
[Other Location: e.g. School (Enter Location, City,State)___] : at [unfilled], at the time of the visit. [Medical Office: ([facility])___] : at ~his/her~ medical office located in [place] [Other:____] : [unfilled] [Self] : self [Patient] : the patient [FreeTextEntry1] : Ms. Andrea is a 64 year old female with history of left lung adenocarcinoma, presents with metastases to the brain. S/P Left upper lobectomy by Dr. Hernández and S/P right occipital hemorrhagic lesion resection on 03/15/20 by Dr. Servin.\par Ms. Andrea underwent gamma knife radiation therapy on 4/14/2020 to the right occipital cavity, 2000 cgy. \par \par Oncology history\par Her oncology history dates back in 02/2016, when he presented with TORRES, CT chest showed left lower lobe lesion. . PET/CT was positive in the left upper lung central mass. She underwent left upper lobe lobectomy on 03/04/16 by Dr. Hernández. Pathology demonstrating adenocarcinoma of lung, staging [id number].\par \par She was saw  Dr. Pugh on 03/25/16 and Dr. Robert on 04/12/16 on a second consult with both recommending chemo with Cisplatin/Pemetrexed and has completed 4 cycles in June, 2016.\par \par She continued to follow up and surveillance scans under Dr. Pugh and Dr. Arora until recently when she presented to ED with headache and eye pain and 03/14/20 MRI brain noted heterogeneous enhancing lesion involving the right parietal cortical and subcortical region measuring approximately 2.2 x 1.6 x 2.5 cm.\par \par She underwent right occipital craniotomy for resection of right occipital hemorrhagic brain lesion.on 03/15/20 by Dr. Servin.\par \par Pathology of the right brain occipital tumor on 03/15.20 noted metastatic adenocarcinoma, positive for CK7, TTF-1 and Nasip A, negative for CK20, CDX-2 and PAX-8,.consistent with lung origin.\par \par Post operative MRI on 03/16/20 Interval right parietal occipital craniotomy for resection of a parietal hemorrhagic mass with expected postoperative changes and thin extra-axial fluid collection underlying the craniotomy site compared with 3/14/2020. She has an appointment to see Dr. Pugh on 04/02/20.  Today she is seen in initial consultation.  She provides verbal consent for this telehealth visit. \par \par At the time of initial consult she clinically she feels in excellent health.  She denied nausea, vomiting, headache, or other complaints. \par \par 6/24/2020- Ms. Andrea presents today for follow up. She is seen through TELEPHONE at her request. She provides verbal consent for this on 6/24/2020 at 2:35 PM. \par  MRI brain done yesterday. No final read at this point however the post operative cavity appears stable. Started on pembroluzimab\par PET scheduled 6/29. She feels well. Denies increase in headaches over baseline. Denies focal weakness, left sided weakness has resolved. Denies nausea, vomiting. Vision now back to normal.

## 2020-06-25 NOTE — REVIEW OF SYSTEMS
[Constipation] : constipation [Negative] : Genitourinary [Fever] : no fever [Dysphagia] : no dysphagia [Chills] : no chills [Night Sweats] : no night sweats [Chest Pain] : no chest pain [Loss of Hearing] : no loss of hearing [Shortness Of Breath] : no shortness of breath [Cough] : no cough [Confused] : no confusion [Fainting] : no fainting [de-identified] : intermittent headaches every two weeks

## 2020-07-02 NOTE — HISTORY OF PRESENT ILLNESS
[Disease: _____________________] : Disease: [unfilled] [de-identified] : MS. Andrea is a 65 yo woman who is s/p resection of stage II lung cancer followed by adjuvant chemo with cisplatin and pemetrexed x 4 cycles which completed in June of 2016. \par Was enrolled in ALCHEMIST, EGFR and ALK testing of tumor- tumor WT for both. Was offered adjuvant nivolumab as part of the study but declined. \par Surveillance scans showed YASMEEN. She just had a CT scan on 11/15 which showed GGO and tree-in-bud findings b/l upper lobes suggestive of PNA and endobronchial spread of infection. She was just seen by Dr. Arora for lower resp tract symptoms and tx with abx (azithromycin), and steroids (currently at 20 mg daily). She is feeling a little better. She still has dry cough. No fever, chills, rigors. \par \par 9/3/19: Persistent dyspnea. Hurt her back after rowing last week. She otherwise denies fever, chills, cough, mucus, sore throat, ear pain, chest pain, her nausea from reflux has improved and is no longer vomiting from her reflux. She reports no GERD symptoms. She is no longer smoking.  \par \par 10/1/19: Pt returns for follow up for discussion following scans. Pt complains of abdominal bloating and back discomfort. No other complaints. Remaining ROS unremarkable\par \par 10/31/19: Pt seen today as urgent visit. Called this am stating that she feels terrible. She is c/o sob/ profound fatigue and occasional dizziness. She denies fever/chills. She recently underwent colonoscopy and EGD which did not reveal evidence of bleeding or reason for new worsening anemia. \par \par 4/2/20: Pt was receiving IV iron on and off with resolution of above mentioned symptoms. However, she presented to ED with headache and eye pain on 03/14/20. MRI brain noted heterogeneous enhancing lesion involving the right parietal cortical and subcortical region measuring approximately 2.2 x 1.6 x 2.5 cm.\par \par She underwent right occipital craniotomy for resection of right occipital hemorrhagic brain lesion.on 03/15/20 by Dr. Servin.\par \par Pathology of the right brain occipital tumor on 03/15.20 noted metastatic adenocarcinoma, positive for CK7, TTF-1 and Nasip A, negative for CK20, CDX-2 and PAX-8,.consistent with lung origin. PDL1 was 30%, BRCA 2 mut (unclear if germline or somatic), KRAS G12C amongst others.\par \par Post operative MRI on 03/16/20 Interval right parietal occipital craniotomy for resection of a parietal hemorrhagic mass with expected postoperative changes and thin extra-axial fluid collection underlying the craniotomy site compared with 3/14/2020. \par \par 4/23/20: Pt here for cycle 1 of pembrolizumab. She has completed cranial RT without any AEs. Working from home. Denies any pain, fever, cough, but still has significant fatigue. \par \par Ms. Andrea is back to baseline, working from home and feels well otherwise. She has no pulmonary complaints.\par \par 5/28/20: Doing well but emotionally still struggling, more so now due to COVID crisis and social distancing, has some alopecia. No irAE\par \par 7/2/2020: Pt has been on pembrolizumab since 4/26/2020. She is tolerating well wo irAE. She has been working at home since COVID pandemic which she finds stressful. She is particularly anxious about her recent imaging and is eager for results. She offers no new complaints. ROS unremarkable.  [AJCC Stage: ____] : AJCC Stage: [unfilled] [de-identified] : adenoca [Home] : at home, [unfilled] , at the time of the visit. [Medical Office: ([facility])___] : at the medical office located in  [FreeTextEntry4] : Kevin Jasso [Verbal consent obtained from patient] : the patient, [unfilled]

## 2020-07-02 NOTE — PHYSICAL EXAM
[Restricted in physically strenuous activity but ambulatory and able to carry out work of a light or sedentary nature] : Status 1- Restricted in physically strenuous activity but ambulatory and able to carry out work of a light or sedentary nature, e.g., light house work, office work [Thin] : thin [Normal] : affect appropriate [de-identified] : breathing comfortable [de-identified] : anxious

## 2020-07-02 NOTE — REVIEW OF SYSTEMS
[Fatigue] : no fatigue [Shortness Of Breath] : no shortness of breath [SOB on Exertion] : no shortness of breath during exertion [Cough] : cough [Anxiety] : anxiety [Dizziness] : no dizziness [Negative] : Heme/Lymph [de-identified] : hair loss [FreeTextEntry6] : nothing new

## 2020-07-02 NOTE — ASSESSMENT
[FreeTextEntry1] : 63 yo woman with recently diagnosed stage IIA lung ca s/p LLobectomy .\par Completed 4 cycles of cis/gem in June 2016- did great and tolerated well. \par Surveillance CTs all CTs show YASMEEN but unfortunately was recently hospitalized with seizure. Was found to have brain met in rt occipital region. This was resected. Pt has no residual neurological deficits. She has CT scan of chest and abd while in hospital which showed no extracranial disease. LIver lesion stable from prior. However a new lesion was described\par we wanted to get a PET/CT for new baseline but given COVID crisis and since pt had scans as inpatient, we decided to postpone due to covid until last week. \par PDL1 was 30%. KRAS G12C, BRCA mut of unknown significance, TAMI, high TMB\par \par She is s/p GK mid-April. \par Follow up MRI reviewed with patient. C/W post surgical changes and treatment effect \par PET/CT reviewed personally with patient. Findings completely unremarkable for PD. 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 Will order US thyroid to fully evaluate. \par Continue treatment with Keytruda on q6w interval dosing. \par OV following US\par \par  [Curative] : Goals of care discussed with patient: Curative

## 2020-08-04 NOTE — REASON FOR VISIT
[Follow-Up] : a follow-up visit [FreeTextEntry1] : via video call -lung cancer, chronic fatigue, COPD, TORRES, mediastinal adenopathy, pulmonary emphysema, tobacco use

## 2020-08-04 NOTE — ASSESSMENT
[FreeTextEntry1] : She is a 66 y/o female with a history of anxiety and former 40 pack year smoker who is status post left upper lobectomy due to stage 2 lung cancer with chemotherapy. She was spoken to via video call for pulmonary follow up evaluation. \par \par Her SOB is multifactorial:\par -COPD/ emphysema\par -restrictive lung disease\par -poor mechanics of breathing \par -out of shape \par -cardiac disease\par \par Problem 1: poor breathing mechanics\par -Proper breathing techniques were reviewed with an emphasis of exhalation. Patient instructed to breath in for 1 second and out for four seconds. Patient was encouraged to not talk while walking.\par \par Problem 2: COPD/emphysema\par -start Utibron 1 puff BID\par -start Asmanex 2 puffs BID\par -start Ventolin prn\par \par -Inhaler technique reviewed as well as oral hygiene techniques reviewed with patient. Avoidance of cold air, extremes of temperature, rescue inhaler should be used before exercise. Order of medication reviewed with patient. Recommended use of a cool mist humidifier in the bedroom. \par \par -COPD is a progressive disease and although it can’t be cured , appropriate management can slow its progression, reduce frequency and severity of exacerbations, and improve symptoms and the patient quality of life. Hospitalizations are the greatest contributor to the total COPD costs and account for up to 87% of total COPD related costs. Exacerbations are the main cause of admissions and subsequently account for the 40-75% of COPD costs. Inhaled maintenance therapy reduces the incidence of exacerbations in patients with stable COPD. Incorrect inhaler use and nonadherence are major obstacles to achieving COPD treatment goals. Many COPD patients have challenges (impaired inhalation, limited dexterity, reduced cognition: that limit their ability to correctly use their COPD treatment devices resulting in reduced symptom control. Of most importance is smoking cessation and early intervention with respiratory illnesses and contemplation for pulmonary rehab to enhance quality of life.\par \par Problem 3: restrictive lung disease\par -due to her prior lobectomy\par \par Problem 4: Smoking cessation (has quit smoking)\par -Discussed for five minutes with the patient the risks/associations with continued smoking including COPD, emphysema, shortness of breath, renal cancer, bladder cancer, stroke risk, cardiac disease, etc. Smoking cessation was discussed at length and highly encouraged. Various options to aid cessation was discussed including use of Chantix, Nicotrol, nicotine products, laser therapy, hypnosis, Wellbutrin, etc. \par \par Problem 5: non small cell stage two lung cancer\par -f/u chest CT in 6 months\par -f/u with Dr. Pugh\par \par Problem 6: r/o DEDRA\par -set up home sleep study\par \par Sleep apnea is associated with adverse clinical consequences which an affect most organ systems. Cardiovascular disease risk includes arrhythmias, atrial fibrillation, hypertension, coronary artery disease, and stroke. Metabolic disorders include diabetes type 2, non-alcoholic fatty liver disease. Mood disorder especially depression; and cognitive decline especially in the elderly. Associations with chronic reflux/Yebaoh’s esophagus some but not all inclusive. \par -Reasons include arousal consistent with hypopnea; respiratory events most prominent in REM sleep or supine position; therefore sleep staging and body position are important for accurate diagnosis and estimation of AHI.\par \par Problem 7: health maintenance\par -recommended a yearly flu shot after October 15\par -recommended strep pneumonia vaccines: Prevnar-13 vaccine, followed by Pneumo vaccine 23 on year following\par -recommended early intervention for URIs\par -recommended osteoporosis evaluations\par -recommended early dermatological evaluations\par -recommended after the age of 50 to the age of 70, colonoscopy every 5 years\par -encouraged early intervention\par \par Follow up in 2-3 months\par The patient was encouraged to call with any changes, concerns, or questions.

## 2020-08-04 NOTE — PHYSICAL EXAM
[No Acute Distress] : no acute distress [Well Nourished] : well nourished [No Deformities] : no deformities [Normal Appearance] : normal appearance [Well Groomed] : well groomed [Well Developed] : well developed

## 2020-08-04 NOTE — HISTORY OF PRESENT ILLNESS
[Home] : at home, [unfilled] , at the time of the visit. [FreeTextEntry1] : Ms. Andrea is a 61 y/o female who presents to the office via video call for a follow up visit for lung cancer, chronic fatigue, COPD, TORRES, mediastinal adenopathy, pulmonary emphysema, tobacco use. Her chief complaint is\par \par -she denies any headaches, nausea, vomiting, fever, chills, sweats, chest pain, chest pressure, diarrhea, constipation, dysphagia, dizziness, sour taste in the mouth, leg swelling, leg pain, itchy eyes, itchy ears, heartburn, reflux, myalgias or arthralgias.\par  [Medical Office: ([facility])___] : at the medical office located in

## 2020-08-13 NOTE — PHYSICAL EXAM
[Restricted in physically strenuous activity but ambulatory and able to carry out work of a light or sedentary nature] : Status 1- Restricted in physically strenuous activity but ambulatory and able to carry out work of a light or sedentary nature, e.g., light house work, office work [Thin] : thin [Normal] : affect appropriate [de-identified] : anxious [de-identified] : breathing comfortable

## 2020-08-13 NOTE — REVIEW OF SYSTEMS
[Cough] : cough [Anxiety] : anxiety [Negative] : Allergic/Immunologic [Fatigue] : no fatigue [Shortness Of Breath] : no shortness of breath [SOB on Exertion] : no shortness of breath during exertion [Dizziness] : no dizziness [FreeTextEntry6] : nothing new [Depression] : depression [de-identified] : hair loss

## 2020-08-13 NOTE — ASSESSMENT
[Curative] : Goals of care discussed with patient: Curative [FreeTextEntry1] : 65 yo woman with recently diagnosed stage IIA lung ca s/p LLobectomy .\par Completed 4 cycles of cis/gem in June 2016- did great and tolerated well. \par Surveillance CTs all CTs show YASMEEN but unfortunately was recently hospitalized with seizure. Was found to have brain met in rt occipital region. This was resected. Pt has no residual neurological deficits. She has CT scan of chest and abd while in hospital which showed no extracranial disease. LIver lesion stable from prior. However a new lesion was described\par we wanted to get a PET/CT for new baseline but given COVID crisis and since pt had scans as inpatient, we decided to postpone due to covid until last week. \par PDL1 was 30%. KRAS G12C, BRCA mut of unknown significance, TAMI, high TMB\par \par She is s/p GK mid-April. \par Follow up MRI reviewed with patient. C/W post surgical changes and treatment effect \par PET/CT done end of JUne reviewed personally with patient. Findings completely unremarkable. 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 US of thyroid showed a BIRAD 4 lesion- very small. will follow up. TSH low, T4/T3 normal\par Continue treatment with Keytruda on q6w interval dosing. \par Depression- sees a psychologist but wishes to change providers. Will refer to Dr. Duron\par OV following US\par \par

## 2020-08-13 NOTE — HISTORY OF PRESENT ILLNESS
[Disease: _____________________] : Disease: [unfilled] [AJCC Stage: ____] : AJCC Stage: [unfilled] [Home] : at home, [unfilled] , at the time of the visit. [Verbal consent obtained from patient] : the patient, [unfilled] [Medical Office: ([facility])___] : at the medical office located in  [de-identified] : MS. Andrea is a 63 yo woman who is s/p resection of stage II lung cancer followed by adjuvant chemo with cisplatin and pemetrexed x 4 cycles which completed in June of 2016. \par Was enrolled in ALCHEMIST, EGFR and ALK testing of tumor- tumor WT for both. Was offered adjuvant nivolumab as part of the study but declined. \par Surveillance scans showed YASMEEN. She just had a CT scan on 11/15 which showed GGO and tree-in-bud findings b/l upper lobes suggestive of PNA and endobronchial spread of infection. She was just seen by Dr. Arora for lower resp tract symptoms and tx with abx (azithromycin), and steroids (currently at 20 mg daily). She is feeling a little better. She still has dry cough. No fever, chills, rigors. \par \par 9/3/19: Persistent dyspnea. Hurt her back after rowing last week. She otherwise denies fever, chills, cough, mucus, sore throat, ear pain, chest pain, her nausea from reflux has improved and is no longer vomiting from her reflux. She reports no GERD symptoms. She is no longer smoking.  \par \par 10/1/19: Pt returns for follow up for discussion following scans. Pt complains of abdominal bloating and back discomfort. No other complaints. Remaining ROS unremarkable\par \par 10/31/19: Pt seen today as urgent visit. Called this am stating that she feels terrible. She is c/o sob/ profound fatigue and occasional dizziness. She denies fever/chills. She recently underwent colonoscopy and EGD which did not reveal evidence of bleeding or reason for new worsening anemia. \par \par 4/2/20: Pt was receiving IV iron on and off with resolution of above mentioned symptoms. However, she presented to ED with headache and eye pain on 03/14/20. MRI brain noted heterogeneous enhancing lesion involving the right parietal cortical and subcortical region measuring approximately 2.2 x 1.6 x 2.5 cm.\par \par She underwent right occipital craniotomy for resection of right occipital hemorrhagic brain lesion.on 03/15/20 by Dr. Servin.\par \par Pathology of the right brain occipital tumor on 03/15.20 noted metastatic adenocarcinoma, positive for CK7, TTF-1 and Nasip A, negative for CK20, CDX-2 and PAX-8,.consistent with lung origin. PDL1 was 30%, BRCA 2 mut (unclear if germline or somatic), KRAS G12C amongst others.\par \par Post operative MRI on 03/16/20 Interval right parietal occipital craniotomy for resection of a parietal hemorrhagic mass with expected postoperative changes and thin extra-axial fluid collection underlying the craniotomy site compared with 3/14/2020. \par \par 4/23/20: Pt here for cycle 1 of pembrolizumab. She has completed cranial RT without any AEs. Working from home. Denies any pain, fever, cough, but still has significant fatigue. \par \par Ms. Andrea is back to baseline, working from home and feels well otherwise. She has no pulmonary complaints.\par \par 5/28/20: Doing well but emotionally still struggling, more so now due to COVID crisis and social distancing, has some alopecia. No irAE\par \par 7/2/2020: Pt has been on pembrolizumab since 4/26/2020. She is tolerating well wo irAE. She has been working at home since COVID pandemic which she finds stressful. She is particularly anxious about her recent imaging and is eager for results. She offers no new complaints. ROS unremarkable.  [de-identified] : adenoca [FreeTextEntry4] : Kevin Jasso

## 2020-08-25 NOTE — CONSULT NOTE ADULT - SUBJECTIVE AND OBJECTIVE BOX
HPI: 65y F pmhx adenocarcinoma of lung with mets to brain s/p r. occipital craniotomy for brain lesion on 3/15/20 by Dr. sinclair. patient presents today bib daughter for AMS, seizure like episode at home. patient has been on keppra 500 bid at home since surgery. While ED patient noticed to be seizing ativan 1mg given. CTH done. Upon my examination in ED patient lip smacking, eyes deviated to right, L. arm jerking, eye twitching, keppra 1gm loading, ativan 2mg given. ED planning on intubation as patient in status epilepticus without return to baseline.     PAST MEDICAL & SURGICAL HISTORY:  History of antineoplastic chemotherapy  Metastatic adenocarcinoma to brain  Iron deficiency anemia: underwent endoscopy and colonoscopy, reportedly fine  Lung cancer  Migraine  COPD (chronic obstructive pulmonary disease)  History of craniotomy  S/P lobectomy of lung  Benign tumor: left   S/P :     FAMILY HISTORY:  No pertinent family history in first degree relatives    Home Medications:  acetaminophen 325 mg oral tablet: 2 tab(s) orally every 6 hours, As needed, Mild Pain (1 - 3) (2020 15:33)  albuterol 90 mcg/inh inhalation aerosol: 2 puff(s) inhaled every 6 hours, As needed, Shortness of Breath and/or Wheezing (2020 15:33)  ALPRAZolam 0.5 mg oral tablet: 1 tab(s) orally every 12 hours, As needed, anxiety (2020 15:33)  sertraline 50 mg oral tablet: 3 tab(s) orally once a day (2020 15:33)  tiotropium 18 mcg inhalation capsule: 1 cap(s) inhaled once a day (2020 15:33)      MEDICATIONS  (STANDING):  levETIRAcetam  IVPB 1000 milliGRAM(s) IV Intermittent once    MEDICATIONS  (PRN):    Vital Signs Last 24 Hrs  T(C): 37.1 (25 Aug 2020 19:11), Max: 37.1 (25 Aug 2020 19:11)  T(F): 98.7 (25 Aug 2020 19:11), Max: 98.7 (25 Aug 2020 19:11)  HR: 76 (25 Aug 2020 19:11) (76 - 76)  BP: 139/81 (25 Aug 2020 19:11) (139/81 - 139/81)  BP(mean): --  RR: 16 (25 Aug 2020 19:11) (16 - 16)  SpO2: 92% (25 Aug 2020 19:11) (92% - 92%)    PHYSICAL EXAM:  in status epilepticus, eyes deviated to right, lip smacking, eye twitching, L. arm jerking    LABS:  Urinalysis Basic - ( 25 Aug 2020 19:30 )    Color: LIGHT YELLOW / Appearance: CLEAR / S.024 / pH: 6.0  Gluc: NEGATIVE / Ketone: NEGATIVE  / Bili: NEGATIVE / Urobili: NORMAL   Blood: TRACE / Protein: 30 / Nitrite: NEGATIVE   Leuk Esterase: NEGATIVE / RBC: 0-2 / WBC 0-2   Sq Epi: OCC / Non Sq Epi: x / Bacteria: NEGATIVE      PT/INR - ( 25 Aug 2020 19:30 )   PT: 11.3 SEC;   INR: 0.98          PTT - ( 25 Aug 2020 19:30 )  PTT:27.2 SEC                        14.8   17.28 )-----------( 257      ( 25 Aug 2020 19:30 )             44.0     08-25    139  |  101  |  21  ----------------------------<  146<H>  3.5   |  23  |  0.98    Ca    9.5      25 Aug 2020 19:30    TPro  8.0  /  Alb  5.0  /  TBili  0.2  /  DBili  x   /  AST  43<H>  /  ALT  33  /  AlkPhos  74        RADIOLOGY:    CTH done: f/u official read

## 2020-08-25 NOTE — CONSULT NOTE ADULT - ASSESSMENT
65y F lung Ca adenocarcinoma mets to brain s/p R. occipital crani in 03/20 p/w AMS, seizure in ED x 2. Ativan given, keppra loaded.

## 2020-08-25 NOTE — ED ADULT TRIAGE NOTE - CHIEF COMPLAINT QUOTE
pt brought to ED by her daughter.  as per daughter, pt became AMS and then completely [place]incoherent" in the mall today.  pt had to be pulled from the car outside in order to register.  pt has PMH: lung ca with mets to brain, seizures.  pt is awake and alert upon entrance to ED though seems lethargic

## 2020-08-25 NOTE — H&P ADULT - ATTENDING COMMENTS
pt is a 66 yo female with hx metastatic lung ca to the brain with seizures  presents s/p sz x3 with change in mental status, Pt intubated for airway  protection post ativan, bp 120/74 rr 18 heent neck supple, lungs clear,  abdomen cachectic ext no edema neuro sedated labs noted wbc 17 bicarb 23  cr 0.98 . pt is s/p occipital resection of mets     66 yo female with status epilepticus sedated on the vent on propofol   -neurology evaluation, 24 hr eeg monitoring, keppra 1000 bid  -neuro checks q 1 hrs  -repeat ct scan of the head  -monitor airway  -

## 2020-08-25 NOTE — H&P ADULT - NSHPLABSRESULTS_GEN_ALL_CORE
LABS:                          14.8   17.28 )-----------( 257      ( 25 Aug 2020 19:30 )             44.0     -    139  |  101  |  21  ----------------------------<  146<H>  3.5   |  23  |  0.98    Ca    9.5      25 Aug 2020 19:30    TPro  8.0  /  Alb  5.0  /  TBili  0.2  /  DBili  x   /  AST  43<H>  /  ALT  33  /  AlkPhos  74  08-25    LIVER FUNCTIONS - ( 25 Aug 2020 19:30 )  Alb: 5.0 g/dL / Pro: 8.0 g/dL / ALK PHOS: 74 u/L / ALT: 33 u/L / AST: 43 u/L / GGT: x           PT/INR - ( 25 Aug 2020 19:30 )   PT: 11.3 SEC;   INR: 0.98          PTT - ( 25 Aug 2020 19:30 )  PTT:27.2 SEC  Urinalysis Basic - ( 25 Aug 2020 19:30 )    Color: LIGHT YELLOW / Appearance: CLEAR / S.024 / pH: 6.0  Gluc: NEGATIVE / Ketone: NEGATIVE  / Bili: NEGATIVE / Urobili: NORMAL   Blood: TRACE / Protein: 30 / Nitrite: NEGATIVE   Leuk Esterase: NEGATIVE / RBC: 0-2 / WBC 0-2   Sq Epi: OCC / Non Sq Epi: x / Bacteria: NEGATIVE      < from: CT Head No Cont (20 @ 21:05) >    IMPRESSION:  Right parieto-occipital gliosis with evolving hemorrhage. There is no acute intracranial hemorrhage, mass effect, midline shift or extra axial collections.    < end of copied text >

## 2020-08-25 NOTE — ED PROVIDER NOTE - PHYSICAL EXAMINATION
Gen: chronically ill  HEENT: EOMI, mmm  Lungs: CTAB/L no C/ W /R   CVS: RRR   Abd; Soft non tender, non distended   Ext: no edema  Skin: no rash  Neuro: altered, awake, unable to answer questions, face appears symmetrical, moves right side, more than left side, minimally verbal

## 2020-08-25 NOTE — ED PROCEDURE NOTE - ATTENDING CONTRIBUTION TO CARE
I have participated in and supervised all key portions of the above procedures and agree with the above documentation. - Alley MORGAN

## 2020-08-25 NOTE — ED PROCEDURE NOTE - PROCEDURE ADDITIONAL DETAILS
Initial attempt made with DL with a resultant esophageal intubation. Brief period of hypoxia and cyanosis down to 20s which improved with use of BVM. Second attempt made with glidescope and tube visualized to be passing through the cords. Positive end tidal co2 color change. 02 improved to 100%.

## 2020-08-25 NOTE — H&P ADULT - NSICDXPASTSURGICALHX_GEN_ALL_CORE_FT
PAST SURGICAL HISTORY:  Benign tumor left     History of craniotomy     S/P      S/P lobectomy of lung

## 2020-08-25 NOTE — CONSULT NOTE ADULT - ATTENDING COMMENTS
65 year old female with a past medical history of COPD, Migraine, Depression, Iron deficiency anemia, Stage II Lung Adenocarcinoma (diagnosed in 2016) s/p resection followed by adjuvant chemotherapy with cisplatin and pemetrexed x 4 cycles completed in June 2016 (treated at [facility]) presented to the ED with confusion. Pt was in the ED having facial twitching (unknown what side), gaze deviation to the Left which then progressed to  full body shaking. Convulsive status was suspected and pt was intubated and transferred to the MICU.   She was started on 1g Keppra BID. She is currently on propofol and Versed.   On exam she arouses somewhat to voice and tactile stimulation but not opening her eyes, but raises her eyebrows and turns her head. Pupils 3mm, down to 1mm with light, slight irregularity of the right pupil, possibly post surgical, corneal reflex intact bilat, oculocephalics intact, pt localizes to painful stimuli, spontaneous movement of both arms and legs, no extremity rigidity, plantars downgoing bilat.     MRI from June '20 shows a right occipital lesion, s/p resection with suspected post-surgical changes. CTH on admission does not show any overt signs of metastatic lesion. The reported semiology of her seizure, however, does not match the location of her prior lesion. Therefore, suspect possible new metastatic lesion or leptomeningeal disease.  -Pending VEEG, which should be hooked up soon to look for subclinical seizures or evidence of potential seizure focus.   - Will need MR brain w/wo   - Continue Keppra 1g BID for now, unless there is evidence of seizure activity.   -  Seizure precautions  - management as per MICU

## 2020-08-25 NOTE — CONSULT NOTE ADULT - ASSESSMENT
Natalie Andrea is a 65 year old female with a past medical history of COPD, Migraine, Depression, Iron deficiency anemia, Stage II Lung Adenocarcinoma (diagnosed in 2016) s/p resection followed by adjuvant chemotherapy with cisplatin and pemetrexed x 4 cycles completed in June 2016 (treated at [facility]) presented to the ED after having episodes of suspected but unwitnessed seizures.     Impression: Focal status epilepticus in the setting of previous hemorrhagic metastasis from lung adenocarcinoma and unclear if further metastasis present contributing to current episode.     Recs:  [] increase Keppra to 1g bid  [] continue propofol, Versed until vEEG available  [] vEEG  [] neurochecks q1h to assess brainstem reflexes  [] emergent CTH if any brainstem reflexes are lost  [] MRI head w and w/o contrast when feasible to assess if any additional tumor burden  [] previously seen by Dr. Schaefer, will check in AM if will be seen by neuro-oncology.    Case discussed with Dr. Sánchez, patient to be seen in AM with Dr. Enriquez. Natalie Andrea is a 65 year old female with a past medical history of COPD, Migraine, Depression, Iron deficiency anemia, Stage II Lung Adenocarcinoma (diagnosed in 2016) s/p resection followed by adjuvant chemotherapy with cisplatin and pemetrexed x 4 cycles completed in June 2016 (treated at [facility]) presented to the ED after having episodes of suspected but unwitnessed seizures.     Impression: Focal status epilepticus in the setting of previous hemorrhagic metastasis from lung adenocarcinoma and unclear if further metastasis present contributing to current episode.     Recs:  [] increase Keppra to 1g bid  [] continue propofol, Versed until vEEG available  [] vEEG  [] neurochecks q1h to assess brainstem reflexes  [] emergent CTH if any brainstem reflexes are lost  [] MRI head w and w/o contrast when feasible to assess if any additional tumor burden  [] close monitoring in MICU  [] previously seen by Dr. Schaefer, will check in AM if will be seen by neuro-oncology.    Case discussed with Dr. Sánchez, patient to be seen in AM with Dr. Enriquez.

## 2020-08-25 NOTE — ED PROVIDER NOTE - CLINICAL SUMMARY MEDICAL DECISION MAKING FREE TEXT BOX
65F Hx L Lung adenoCA w mets to brain s/p hemorrhagic lesion resection 3/2020 p/w seizures in status epilepticus s/p intubation for airway protection and transfer to MICU

## 2020-08-25 NOTE — ED ADULT NURSE REASSESSMENT NOTE - NS ED NURSE REASSESS COMMENT FT1
Received patient at 2030, patient received from RN Kathi, patient unresponsive, patient started to vomit X 1 time, yellow in color. Patient brought to CT scan with ANTONY Vital at bedside. Patient brought back to room 28. Patient with seizure at around 2115 pm. MD Hager made aware. Pt with no gag reflex, patient brought to room 20 for intubation. ICU RN at bedside. Neuro at bedside. Will continue to monitor.

## 2020-08-25 NOTE — ED ADULT NURSE REASSESSMENT NOTE - NS ED NURSE REASSESS COMMENT FT1
Mobile Critical Care RN:  Pt found altered and vomiting.  Pt not protecting airway.  no gag reflex with suction catheter.  Pt taken directly to CT.  Pt had witnessed seizure like activity in CT with nystagmus and deviated gaze left.  MD at bedside.  EKG completed.  awaiting neuro eval.  Pt loaded with Keppra as ordered.  Seizure precautions taken.

## 2020-08-25 NOTE — H&P ADULT - NSHPPHYSICALEXAM_GEN_ALL_CORE
VITALS:   T(C): 38.1 (08-26-20 @ 00:41), Max: 38.2 (08-25-20 @ 23:08)  HR: 87 (08-26-20 @ 00:15) (76 - 98)  BP: 152/97 (08-25-20 @ 23:08) (124/91 - 154/98)  RR: 16 (08-25-20 @ 23:08) (16 - 21)  SpO2: 97% (08-26-20 @ 00:15) (92% - 100%)    GENERAL: sedated and intubated  HEAD:  Atraumatic  EYES: PERRL, conjunctiva and sclera clear  ENT: Moist mucous membranes  NECK: Supple, No JVD  CHEST/LUNG: Mechanical ventilation sounds  HEART: Regular rate and rhythm; No murmurs, rubs, or gallops  ABDOMEN: BSx4; Soft, nontender, nondistended  EXTREMITIES:  2+ Peripheral Pulses, brisk capillary refill. No clubbing, cyanosis, or edema  NERVOUS SYSTEM:  sedated; responding to painful stimuli  SKIN: No rashes

## 2020-08-25 NOTE — ED PROVIDER NOTE - PMH
COPD (chronic obstructive pulmonary disease)    History of antineoplastic chemotherapy    Iron deficiency anemia  underwent endoscopy and colonoscopy, reportedly fine  Lung cancer    Metastatic adenocarcinoma to brain    Migraine

## 2020-08-25 NOTE — ED PROVIDER NOTE - OBJECTIVE STATEMENT
65F PMHx L lung adenocarcinoma with mets to brain s/p R occipital craniotomy of hemorrhagic brain lesion 3/15/2020, 65F PMHx L lung adenocarcinoma with mets to brain s/p R occipital craniotomy of hemorrhagic brain lesion 3/15/2020, migraine p/w AMS and seizure. Patient was brought in by ambulance 65F PMHx L lung adenocarcinoma with mets to brain s/p R occipital craniotomy of hemorrhagic brain lesion 3/15/2020, migraine p/w AMS and seizure. Patient was brought in by daughter after she was called 65F PMHx L lung adenocarcinoma with mets to brain s/p R occipital craniotomy of hemorrhagic brain lesion 3/15/2020, migraine p/w AMS and seizure. Patient was brought in by daughter after she was called because patient was disheveled in a store, reminding her of prior seizure. Daughter brought patient to ED, patient was confused and tired and lethargic and nauseous. In ED, had more seizures. CT head performed. Patient then went into status epilepticus, and intubated for airway protection, transferred to MICU. Discussed with daughter Janessa - [phone] - patient is FULL CODE

## 2020-08-25 NOTE — ED PROVIDER NOTE - PROGRESS NOTE DETAILS
Alley MORGAN: Patient noted to have left eye deviation and seizure episode while in CT Scan. When I arrived, patient was still on table and CT Head in process. CT Head on my view with no acute bleed. Patient opens eyes, does not talk. Will load with Keppra, avoid ativan as patient needs Neuro eval and is not currently seizing. Alley MORGAN: Patient was noted to be seizing again. No return to baseline. Patient emergently intubated. Alley MORGAN: Patient was noted to be seizing again, without return to baseline from witnessed seizure in CT Scanner. She was started on 1000 mg Keppra load, given 2 mg of IV ativan when this 3rd episode was witnessed. Patient emergently intubated, successful on 2nd attempt. Prior to intubation, patient noted to be vomiting. Concern for status epilepticus. Dr. Singh in ED at time of intubation. Patient intubated with etomidate and succinylcholine, started on propofol drip after intubation. Patient to be admitted to MICU.

## 2020-08-25 NOTE — ED PROVIDER NOTE - CRITICAL CARE PROVIDED
documentation/direct patient care (not related to procedure)/consult w/ pt's family directly relating to pts condition/consultation with other physicians/interpretation of diagnostic studies

## 2020-08-25 NOTE — ED ADULT NURSE NOTE - OBJECTIVE STATEMENT
64 y/o F received to room 28 c/o AMS. Pt noted to be lethargic on responding by shaking her head yes or no. As per daughter, [place] the mall, pt went to the bathroom and became unresponsive. Pt noted to have urine saturated clothing upon arrival. Pt family member states she has lung ca with mets to brain. Pt respirations even and unlabored. pt abdomen soft nontender nondistended.  PT skin intact. 20G IV palced L AC, labs drawn and sent, medicated as per md order. will continue to monitor.

## 2020-08-25 NOTE — ED PROVIDER NOTE - ATTENDING CONTRIBUTION TO CARE
Patient is a 64 yo F with history of f Stage 2 left lung adenocarcinoma s/p resection and adjuvant chemotherapy with cisplatin / gem in June 2016, COPD, depression, anxiety and iron deficiency anemia s/p blood / iron transfusions, diagnosed with brain mass in March, concerning for metastatic disease s/p right occipital craniotomy for tumor resection, here for AMS. Daughter had received a call that patient was in a store, confused, lethargic. On arrival here, patient had a seizure episode, which appeared to be twitching face/ blinking eyes. She was able to move her right arm during episode, was able to answer be verbally responsive during episode but not provide a history. She received 1 mg of IV ativan to prevent further episodes. She had an episode of vomiting here in the ED.     VS noted  Gen: chronically ill  HEENT: EOMI, mmm  Lungs: CTAB/L no C/ W /R   CVS: RRR   Abd; Soft non tender, non distended   Ext: no edema  Skin: no rash  Neuro: altered, awake, unable to answer questions, face appears symmetrical, moves right side, more than left side, minimally verbal  a/p: complicated hx as above, now with AMS and seizures - given 1 mg of IV ativan. plan for labs, CT Head, Nsx evaluation, neurology evaluation. Will continue to observe in ED. r/o ICH, r/o infection.   - Alley MORGAN

## 2020-08-25 NOTE — H&P ADULT - NSICDXPASTMEDICALHX_GEN_ALL_CORE_FT
PAST MEDICAL HISTORY:  COPD (chronic obstructive pulmonary disease)     History of antineoplastic chemotherapy     Iron deficiency anemia underwent endoscopy and colonoscopy, reportedly fine    Lung cancer     Metastatic adenocarcinoma to brain     Migraine

## 2020-08-25 NOTE — H&P ADULT - HISTORY OF PRESENT ILLNESS
Pt sedated and intubated; information obtained via chart review.    65F with hx of L lung adenocarcinoma with mets to brain s/p R occipital craniotomy of hemorrhagic brain lesion 3/15/2020, migraine p/w AMS described by daughter as appearing disheveled in a store that reminded the daughter of a prior episode of seizure. Pt sedated and intubated; information obtained via chart review.    65F with hx of L lung adenocarcinoma s/p resection followed by adjuvant chemo with metastasis to the brain s/p R occipital craniotomy of hemorrhagic brain lesion 3/15/2020, migraine, COPD, and depression p/w AMS described by daughter as patient appearing confused and not making sense.    In the ED, pt was afebrile at 98.7, HR 76, /81. In the ED, she had a total of 3 seizures in which she had left eye deviation. She was given 1000mg Keppra load and during the 3rd episode, she was given 2mg IV ativan. Pt was also noted to have 1 episode of NBNB emesis, after which she was given succinylcholine, etomodiate, intubated, and then started on a propofol drip. Later on, pt was noted to have arm stiffening and received 4mg versed and 100mg fentanyl.

## 2020-08-26 NOTE — CHART NOTE - NSCHARTNOTEFT_GEN_A_CORE
PRELIMINARY EEG REPORT    EEG reviewed to 16:33   Date: 08-26-20      - Attenuation of fast activity over the right hemisphere.  - Moderate to severe generalized slowing.  - No epileptiform pattern or seizure seen.    ____________________________________    Humera Vale MD  Attending Physician, [facility] [place]

## 2020-08-26 NOTE — PROGRESS NOTE ADULT - SUBJECTIVE AND OBJECTIVE BOX
Erick Garcia M.D.  PGY1   [id number] / [phone]    INTERVAL HPI/OVERNIGHT EVENTS:    SUBJECTIVE: Patient seen and examined at bedside.     REVIEW OF SYSTEMS:  [  ] All other systems negative  [ ] Unable to assess ROS because pt intubated and sedated.  OBJECTIVE:    VITAL SIGNS:  ICU Vital Signs Last 24 Hrs  T(C): 37.6 (26 Aug 2020 04:30), Max: 38.2 (25 Aug 2020 23:08)  T(F): 99.6 (26 Aug 2020 04:30), Max: 100.7 (25 Aug 2020 23:08)  HR: 83 (26 Aug 2020 06:19) (71 - 98)  BP: 109/77 (26 Aug 2020 06:00) (63/45 - 156/78)  BP(mean): 87 (26 Aug 2020 06:00) (52 - 120)  ABP: --  ABP(mean): --  RR: 14 (26 Aug 2020 06:00) (14 - 21)  SpO2: 100% (26 Aug 2020 06:19) (92% - 100%)    Mode: AC/ CMV (Assist Control/ Continuous Mandatory Ventilation), RR (machine): 14, TV (machine): 400, FiO2: 40, PEEP: 5, ITime: 0.91, MAP: 10, PIP: 20    08-25 @ 07:01  -  08-26 @ 07:00  --------------------------------------------------------  IN: 922.5 mL / OUT: 750 mL / NET: 172.5 mL      CAPILLARY BLOOD GLUCOSE      POCT Blood Glucose.: 126 mg/dL (25 Aug 2020 19:11)      PHYSICAL EXAM:    General: NAD  HEENT: NC/AT; PERRL, clear conjunctiva  Neck: supple  Respiratory: CTA b/l  Cardiovascular: +S1/S2; RRR  Abdomen: soft, NT/ND; +BS x4  Extremities: WWP, 2+ peripheral pulses b/l; no LE edema  Skin: normal color and turgor; no rash  Neurological:    MEDICATIONS:  MEDICATIONS  (STANDING):  chlorhexidine 0.12% Liquid 15 milliLiter(s) Oral Mucosa every 12 hours  chlorhexidine 4% Liquid 1 Application(s) Topical <User Schedule>  levETIRAcetam  IVPB 1000 milliGRAM(s) IV Intermittent every 12 hours  norepinephrine Infusion 0.05 MICROgram(s)/kG/Min (6.12 mL/Hr) IV Continuous <Continuous>  piperacillin/tazobactam IVPB.. 3.375 Gram(s) IV Intermittent every 8 hours  propofol Infusion 20 MICROgram(s)/kG/Min (7.8 mL/Hr) IV Continuous <Continuous>  sodium chloride 0.9%. 1000 milliLiter(s) (75 mL/Hr) IV Continuous <Continuous>  vancomycin  IVPB      vancomycin  IVPB 1000 milliGRAM(s) IV Intermittent every 12 hours    MEDICATIONS  (PRN):  acetaminophen    Suspension .. 650 milliGRAM(s) Oral every 6 hours PRN Temp greater or equal to 38C (100.4F)      ALLERGIES:  Allergies    No Known Allergies    Intolerances        LABS:                        13.8   16.09 )-----------( 260      ( 26 Aug 2020 04:30 )             40.2     Hemoglobin: 13.8 g/dL ( @ 04:30)  Hemoglobin: 14.8 g/dL ( @ 19:30)    CBC Full  -  ( 26 Aug 2020 04:30 )  WBC Count : 16.09 K/uL  RBC Count : 4.23 M/uL  Hemoglobin : 13.8 g/dL  Hematocrit : 40.2 %  Platelet Count - Automated : 260 K/uL  Mean Cell Volume : 95.0 fL  Mean Cell Hemoglobin : 32.6 pg  Mean Cell Hemoglobin Concentration : 34.3 %  Auto Neutrophil # : x  Auto Lymphocyte # : x  Auto Monocyte # : x  Auto Eosinophil # : x  Auto Basophil # : x  Auto Neutrophil % : x  Auto Lymphocyte % : x  Auto Monocyte % : x  Auto Eosinophil % : x  Auto Basophil % : x        138  |  101  |  12  ----------------------------<  112<H>  3.7   |  20<L>  |  0.93    Ca    8.7      26 Aug 2020 04:30  Phos  3.2       Mg     1.7         TPro  6.6  /  Alb  4.3  /  TBili  0.5  /  DBili  x   /  AST  50<H>  /  ALT  29  /  AlkPhos  62      Creatinine Trend: 0.93<--, 0.98<--  LIVER FUNCTIONS - ( 26 Aug 2020 04:30 )  Alb: 4.3 g/dL / Pro: 6.6 g/dL / ALK PHOS: 62 u/L / ALT: 29 u/L / AST: 50 u/L / GGT: x           PT/INR - ( 25 Aug 2020 19:30 )   PT: 11.3 SEC;   INR: 0.98          PTT - ( 25 Aug 2020 19:30 )  PTT:27.2 SEC    hs Troponin:    ABG - ( 26 Aug 2020 00:30 )  pH, Arterial: 7.38  pH, Blood: x     /  pCO2: 41    /  pO2: 82    / HCO3: 24    / Base Excess: -0.6  /  SaO2: 96.1                00:30 - ABG - pH: 7.38  |  pCO2: 41    |  pO2: 82    | Lactate:       | BE: -0.6   19:45 - VBG - pH: 7.28  | pCO2: 63    | pO2: 33    | Lactate: 1.5    19:30 - VBG - pH: 7.27  | pCO2: 67    | pO2: < 24  | Lactate: 1.4        Urinalysis Basic - ( 25 Aug 2020 19:30 )    Color: LIGHT YELLOW / Appearance: CLEAR / S.024 / pH: 6.0  Gluc: NEGATIVE / Ketone: NEGATIVE  / Bili: NEGATIVE / Urobili: NORMAL   Blood: TRACE / Protein: 30 / Nitrite: NEGATIVE   Leuk Esterase: NEGATIVE / RBC: 0-2 / WBC 0-2   Sq Epi: OCC / Non Sq Epi: x / Bacteria: NEGATIVE    EKG:   MICROBIOLOGY:    IMAGING:    Labs, imaging, EKG personally reviewed    RADIOLOGY & ADDITIONAL TESTS: Reviewed. Erick Garcia M.D.  PGY1   [id number] / [phone]    INTERVAL HPI/OVERNIGHT EVENTS: ON pt was admitted to micu after having seizures, episodes of emesis, and intubated for airway protection.     SUBJECTIVE: Patient seen and examined at bedside. intubated     REVIEW OF SYSTEMS:  [X] Unable to assess ROS because pt intubated and sedated.  OBJECTIVE:    VITAL SIGNS:  ICU Vital Signs Last 24 Hrs  T(C): 37.6 (26 Aug 2020 04:30), Max: 38.2 (25 Aug 2020 23:08)  T(F): 99.6 (26 Aug 2020 04:30), Max: 100.7 (25 Aug 2020 23:08)  HR: 83 (26 Aug 2020 06:19) (71 - 98)  BP: 109/77 (26 Aug 2020 06:00) (63/45 - 156/78)  BP(mean): 87 (26 Aug 2020 06:00) (52 - 120)  ABP: --  ABP(mean): --  RR: 14 (26 Aug 2020 06:00) (14 - 21)  SpO2: 100% (26 Aug 2020 06:19) (92% - 100%)    Mode: AC/ CMV (Assist Control/ Continuous Mandatory Ventilation), RR (machine): 14, TV (machine): 400, FiO2: 40, PEEP: 5, ITime: 0.91, MAP: 10, PIP: 20    08-25 @ 07:01  -  08-26 @ 07:00  --------------------------------------------------------  IN: 922.5 mL / OUT: 750 mL / NET: 172.5 mL      CAPILLARY BLOOD GLUCOSE      POCT Blood Glucose.: 126 mg/dL (25 Aug 2020 19:11)      PHYSICAL EXAM:    General: NAD  HEENT: NC/AT; PERRL, clear conjunctiva  Neck: supple  Respiratory: CTA b/l  Cardiovascular: +S1/S2; RRR  Abdomen: soft, NT/ND; +BS x4  Extremities: WWP, 2+ peripheral pulses b/l; no LE edema  Skin: normal color and turgor; no rash  Neurological:    MEDICATIONS:  MEDICATIONS  (STANDING):  chlorhexidine 0.12% Liquid 15 milliLiter(s) Oral Mucosa every 12 hours  chlorhexidine 4% Liquid 1 Application(s) Topical <User Schedule>  levETIRAcetam  IVPB 1000 milliGRAM(s) IV Intermittent every 12 hours  norepinephrine Infusion 0.05 MICROgram(s)/kG/Min (6.12 mL/Hr) IV Continuous <Continuous>  piperacillin/tazobactam IVPB.. 3.375 Gram(s) IV Intermittent every 8 hours  propofol Infusion 20 MICROgram(s)/kG/Min (7.8 mL/Hr) IV Continuous <Continuous>  sodium chloride 0.9%. 1000 milliLiter(s) (75 mL/Hr) IV Continuous <Continuous>  vancomycin  IVPB      vancomycin  IVPB 1000 milliGRAM(s) IV Intermittent every 12 hours    MEDICATIONS  (PRN):  acetaminophen    Suspension .. 650 milliGRAM(s) Oral every 6 hours PRN Temp greater or equal to 38C (100.4F)      ALLERGIES:  Allergies    No Known Allergies    Intolerances        LABS:                        13.8   16.09 )-----------( 260      ( 26 Aug 2020 04:30 )             40.2     Hemoglobin: 13.8 g/dL ( @ 04:30)  Hemoglobin: 14.8 g/dL ( @ 19:30)    CBC Full  -  ( 26 Aug 2020 04:30 )  WBC Count : 16.09 K/uL  RBC Count : 4.23 M/uL  Hemoglobin : 13.8 g/dL  Hematocrit : 40.2 %  Platelet Count - Automated : 260 K/uL  Mean Cell Volume : 95.0 fL  Mean Cell Hemoglobin : 32.6 pg  Mean Cell Hemoglobin Concentration : 34.3 %  Auto Neutrophil # : x  Auto Lymphocyte # : x  Auto Monocyte # : x  Auto Eosinophil # : x  Auto Basophil # : x  Auto Neutrophil % : x  Auto Lymphocyte % : x  Auto Monocyte % : x  Auto Eosinophil % : x  Auto Basophil % : x        138  |  101  |  12  ----------------------------<  112<H>  3.7   |  20<L>  |  0.93    Ca    8.7      26 Aug 2020 04:30  Phos  3.2     -  Mg     1.7         TPro  6.6  /  Alb  4.3  /  TBili  0.5  /  DBili  x   /  AST  50<H>  /  ALT  29  /  AlkPhos  62  08-26    Creatinine Trend: 0.93<--, 0.98<--  LIVER FUNCTIONS - ( 26 Aug 2020 04:30 )  Alb: 4.3 g/dL / Pro: 6.6 g/dL / ALK PHOS: 62 u/L / ALT: 29 u/L / AST: 50 u/L / GGT: x           PT/INR - ( 25 Aug 2020 19:30 )   PT: 11.3 SEC;   INR: 0.98          PTT - ( 25 Aug 2020 19:30 )  PTT:27.2 SEC    hs Troponin:    ABG - ( 26 Aug 2020 00:30 )  pH, Arterial: 7.38  pH, Blood: x     /  pCO2: 41    /  pO2: 82    / HCO3: 24    / Base Excess: -0.6  /  SaO2: 96.1                00:30 - ABG - pH: 7.38  |  pCO2: 41    |  pO2: 82    | Lactate:       | BE: -0.6   19:45 - VBG - pH: 7.28  | pCO2: 63    | pO2: 33    | Lactate: 1.5    19:30 - VBG - pH: 7.27  | pCO2: 67    | pO2: < 24  | Lactate: 1.4        Urinalysis Basic - ( 25 Aug 2020 19:30 )    Color: LIGHT YELLOW / Appearance: CLEAR / S.024 / pH: 6.0  Gluc: NEGATIVE / Ketone: NEGATIVE  / Bili: NEGATIVE / Urobili: NORMAL   Blood: TRACE / Protein: 30 / Nitrite: NEGATIVE   Leuk Esterase: NEGATIVE / RBC: 0-2 / WBC 0-2   Sq Epi: OCC / Non Sq Epi: x / Bacteria: NEGATIVE    EKG:   MICROBIOLOGY:    IMAGING:    Labs, imaging, EKG personally reviewed    RADIOLOGY & ADDITIONAL TESTS: Reviewed.

## 2020-08-26 NOTE — PROGRESS NOTE ADULT - ATTENDING COMMENTS
1. Acute hypoxemic respiratory failure due to status epilepticus. Continue current AC vent settings.  2, Neuro. 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 Pt with removal  of occipital lobe tumor in past. Pt was on Keppra as out pat. Continue Keppra and Propofol.   VEEG r/o continuing seizures on propofol. If negative taper off propofol. MRI when off EEG.  3. ID . Aspiration pneumonia. Continue vancomycin and zosyn.

## 2020-08-26 NOTE — PROGRESS NOTE ADULT - ASSESSMENT
65F with hx of L lung adenocarcinoma s/p resection followed by adjuvant chemo with metastasis to the brain s/p R occipital craniotomy of hemorrhagic brain lesion 3/15/2020, migraine, and COPD admitted for AMS with 3 episodes of seizures within the ED with concern for status epilepticus, currently sedated and intubated for airway protection in setting of seizures and 1 episode of NBNB emesis    #neuro  - continue propofol infusion  - continue keprra 1g BID for ppx  - EEG in AM  - q1h neuro checks  - f/u MRI with and without contrast  - f/u neuro and neurosurgery recs    #CV  - sBP ranging between 130s-150s  - levo ordered if needed  - EKG showing sinus rhythm at 84, normal axis, no ST elevations or depression    #pulmonary  - intubated: 14/450/5/40 with SaO2 100%  - ABG pH 7.38/pCO2 41/HCO3 24/ pO2 82    #GI  - NPO; consider starting tube feeds tomorrow; OG tube in place    #renal  - Na 139, K 3.5; f/u lytes and replete as needed  - f/u Cr    #ID  - fever of 100.7, concern for aspiration PNA in setting of emesis and seizures  - vanc and zosyn  - f/u BCx    #endo  - glu ranging between 120s-140s    #PPX  - DVT: SCD in setting of evolving hemorrhage 65F with hx of L lung adenocarcinoma s/p resection followed by adjuvant chemo with metastasis to the brain s/p R occipital craniotomy of hemorrhagic brain lesion 3/15/2020, migraine, and COPD admitted for AMS with 3 episodes of seizures within the ED with concern for status epilepticus, currently sedated and intubated for airway protection in setting of seizures and 1 episode of NBNB emesis    #neuro  - continue propofol infusion  - continue keprra 1g BID for ppx  - EEG today with MRI after per neuro.   - q1h neuro checks  - f/u MRI with and without contrast  - f/u neuro  recs    #CV  - sBP ranging between 100-110  -  On levo 0.05  - EKG showing sinus rhythm at 84, normal axis, no ST elevations or depression    #pulmonary  - intubated:  flow changed form 35- >60 in setting of COPD    #GI  - tube feeds started     #renal  - Na 138, K 3.7; f/u lytes and replete as needed  - f/u Cr    #ID  - fever of 100.7, concern for aspiration PNA in setting of emesis and seizures  - vanc and zosyn  - f/u BCx    #endo  - glu ranging between 120s-140s    #PPX  - DVT: SCD in setting of possible hemorrhage 65F with hx of L lung adenocarcinoma s/p resection followed by adjuvant chemo with metastasis to the brain s/p R occipital craniotomy of hemorrhagic brain lesion 3/15/2020, migraine, and COPD admitted for AMS with 3 episodes of seizures within the ED with concern for status epilepticus, currently sedated and intubated for airway protection in setting of seizures and 1 episode of NBNB emesis    #neuro  - continue propofol infusion  - continue keprra 1g BID for ppx  - EEG today with MRI after per neuro.   - q1h neuro checks  - f/u MRI with and without contrast  - f/u neuro  recs    #CV  - sBP ranging between 100-110  -  On levo 0.05  - EKG showing sinus rhythm at 84, normal axis, no ST elevations or depression    #pulmonary  - intubated:  flow changed form 35- >60 in setting of COPD    #GI  - tube feeds started     #renal  - Na 138, K 3.7; f/u lytes and replete as needed  - f/u Cr    #ID  - fever of 100.7, concern for aspiration PNA in setting of emesis and seizures  - vanc and zosyn  - f/u BCx    #endo  - glu ranging between 120s-140s    #PPX  - DVT: SCD in setting of possible hemorrhage    #Ethics  -Per Ed note, Ed provider discussed with daughter Janessa - [phone] - patient is FULL CODE

## 2020-08-26 NOTE — CHART NOTE - NSCHARTNOTEFT_GEN_A_CORE
: Dr. Tiffanie Ferreira, PGY-1    INDICATION: New MICU Admit    PROCEDURE:  [x] LIMITED ECHO  [x] LIMITED CHEST  [ ] LIMITED RETROPERITONEAL  [ ] LIMITED ABDOMINAL  [ ] LIMITED DVT  [ ] NEEDLE GUIDANCE VASCULAR  [ ] NEEDLE GUIDANCE THORACENTESIS  [ ] NEEDLE GUIDANCE PARACENTESIS  [ ] NEEDLE GUIDANCE PERICARDIOCENTESIS  [ ] OTHER    FINDINGS:  A-line predominant in bilateral anterior views w/ some B-lines. Consolidation observed at right lung base.    Grossly normal LV function. Thick LV walls on parasternal short.    INTERPRETATION:  A-line predominance. Consolidation at right lung base consistent with aspiration. Grossly normal LV function.    Images uploaded on [facility] : Dr. Tiffanie Ferreira, PGY-1    INDICATION: New MICU Admit    PROCEDURE:  [x] LIMITED ECHO  [x] LIMITED CHEST  [ ] LIMITED RETROPERITONEAL  [ ] LIMITED ABDOMINAL  [ ] LIMITED DVT  [ ] NEEDLE GUIDANCE VASCULAR  [ ] NEEDLE GUIDANCE THORACENTESIS  [ ] NEEDLE GUIDANCE PARACENTESIS  [ ] NEEDLE GUIDANCE PERICARDIOCENTESIS  [ ] OTHER    FINDINGS:  A-line predominant in bilateral anterior views w/ some B-lines. Consolidation observed at right lung base.    Grossly normal LV function. Thick LV walls on parasternal short.    INTERPRETATION:  A-line predominance. Consolidation at right lung base consistent with pneumonia.  Normal LV function.    Images uploaded on [facility]

## 2020-08-26 NOTE — PHARMACOTHERAPY INTERVENTION NOTE - COMMENTS
Patient in MICU, mechanically ventilated, sedated with propofol.    Plan:  1.  Recommend prophylactic eyecare with petrolatum ophthalmic ointment to each eye twice daily.      Marv Cao, PharmD, BCPS  Clinical Pharmacy Coordinator  Medical Intensive Care Unit - [facility]   Spectra [phone]

## 2020-08-27 NOTE — PROGRESS NOTE ADULT - ASSESSMENT
64 yo woman with PMH of COPD, Migraine, Depression, Iron deficiency anemia, Stage II Lung Adenocarcinoma (diagnosed in 2016) s/p resection followed by adjuvant chemotherapy with cisplatin and pemetrexed x 4 cycles completed in June 2016 (treated at [facility]) presented to the ED after having episodes of suspected but unwitnessed seizures.     Impression: Focal status epilepticus in the setting of previous hemorrhagic metastasis from lung adenocarcinoma and unclear if further metastasis present contributing to current episode.    Plan:  [] continue Keppra 1g bid  [] titrate down sedation as tolerated  [] continue vEEG, pending final read  [] neurochecks q1h to assess brainstem reflexes  [] emergent CTH if any brainstem reflexes are lost  [] MRI head w and w/o contrast when feasible to assess if any additional tumor burden  [] close monitoring in MICU    Case seen and discussed with attending Dr. Enriquez.

## 2020-08-27 NOTE — DIETITIAN INITIAL EVALUATION ADULT. - OTHER INFO
Pt admitted w/ dx of nonintractable epilepsy w/status epilepticus.  Pt presents w/AMS.  Pt was intubated/sedated , and OGT placed.  TF was started w/Jevity 1.2 to goal of 40mL/h.  Today, pt self-extubated-TF was stopped around 11:00 AM.  Spoke w/RN-pt NPO at this time.  MICU team to consider initiation of po diet as appropriate.

## 2020-08-27 NOTE — PROGRESS NOTE ADULT - SUBJECTIVE AND OBJECTIVE BOX
Neurology  Consult Note  20    Name:  DANIELLE ANTONIO; 65y (1955)    Neurology consult: Patient seen and examined at bedside. On our examination, pt was sedated, less active even with noxious stimuli compared to prior exam. As per MICU team, pt had just received Versed due to agitation where she was thrashing around in the bed.    Reason for Admission: Non-intractable epilepsy with status epilepticus      Chief Complaint:  HPI:  Pt sedated and intubated; information obtained via chart review.    65F with hx of L lung adenocarcinoma s/p resection followed by adjuvant chemo with metastasis to the brain s/p R occipital craniotomy of hemorrhagic brain lesion 3/15/2020, migraine, COPD, and depression p/w AMS described by daughter as patient appearing confused and not making sense.    In the ED, pt was afebrile at 98.7, HR 76, /81. In the ED, she had a total of 3 seizures in which she had left eye deviation. She was given 1000mg Keppra load and during the 3rd episode, she was given 2mg IV ativan. Pt was also noted to have 1 episode of NBNB emesis, after which she was given succinylcholine, etomodiate, intubated, and then started on a propofol drip. Later on, pt was noted to have arm stiffening and received 4mg versed and 100mg fentanyl. (25 Aug 2020 23:30)    Review of Systems:  As states in HPI.        PMHx:   History of antineoplastic chemotherapy  Metastatic adenocarcinoma to brain  Iron deficiency anemia  History of iron deficiency  Lung cancer  Migraine  COPD (chronic obstructive pulmonary disease)    PFHx:   No pertinent family history in first degree relatives    PSuHx:   History of craniotomy  S/P lobectomy of lung  Benign tumor  S/P       Medications:  MEDICATIONS  (STANDING):  chlorhexidine 0.12% Liquid 15 milliLiter(s) Oral Mucosa every 12 hours  chlorhexidine 4% Liquid 1 Application(s) Topical <User Schedule>  dexMEDEtomidine Infusion 1 MICROgram(s)/kG/Hr (16.3 mL/Hr) IV Continuous <Continuous>  levETIRAcetam  IVPB 1000 milliGRAM(s) IV Intermittent every 12 hours  norepinephrine Infusion 0.05 MICROgram(s)/kG/Min (6.12 mL/Hr) IV Continuous <Continuous>  petrolatum Ophthalmic Ointment 1 Application(s) Both EYES two times a day  piperacillin/tazobactam IVPB.. 3.375 Gram(s) IV Intermittent every 8 hours  propofol Infusion 20 MICROgram(s)/kG/Min (7.8 mL/Hr) IV Continuous <Continuous>  sodium chloride 0.9%. 1000 milliLiter(s) (75 mL/Hr) IV Continuous <Continuous>  vancomycin  IVPB      vancomycin  IVPB 1000 milliGRAM(s) IV Intermittent every 12 hours    MEDICATIONS  (PRN):  acetaminophen    Suspension .. 650 milliGRAM(s) Oral every 6 hours PRN Temp greater or equal to 38C (100.4F)    Vitals:  T(C): 36.2 (20 @ 09:00), Max: 37.6 (20 @ 16:00)  HR: 47 (20 @ 11:00) (47 - 85)  BP: 134/89 (20 @ 11:00) (89/65 - 139/52)  RR: 14 (20 @ 11:00) (14 - 16)  SpO2: 100% (20 @ 11:00) (99% - 100%)    Labs:                        12.2   11.15 )-----------( 202      ( 27 Aug 2020 03:10 )             36.4         141  |  109<H>  |  8   ----------------------------<  120<H>  2.9<LL>   |  22  |  0.83    Ca    8.0<L>      27 Aug 2020 03:10  Phos  2.7       Mg     1.9         TPro  5.5<L>  /  Alb  3.3  /  TBili  0.5  /  DBili  x   /  AST  40<H>  /  ALT  23  /  AlkPhos  56      CAPILLARY BLOOD GLUCOSE      POCT Blood Glucose.: 126 mg/dL (25 Aug 2020 19:11)    LIVER FUNCTIONS - ( 27 Aug 2020 03:10 )  Alb: 3.3 g/dL / Pro: 5.5 g/dL / ALK PHOS: 56 u/L / ALT: 23 u/L / AST: 40 u/L / GGT: x             Culture - Blood (collected 26 Aug 2020 02:49)  Source: .Blood Blood-Peripheral  Preliminary Report (27 Aug 2020 03:01):    No growth to date.    Culture - Blood (collected 26 Aug 2020 02:49)  Source: .Blood Blood-Peripheral  Preliminary Report (27 Aug 2020 03:01):    No growth to date.    Culture - Urine (collected 26 Aug 2020 02:25)  Source: .Urine Catheterized  Preliminary Report (27 Aug 2020 02:40):    >100,000 CFU/ml Escherichia coli      PT/INR - ( 25 Aug 2020 19:30 )   PT: 11.3 SEC;   INR: 0.98     PTT - ( 25 Aug 2020 19:30 )  PTT:27.2 SEC      PHYSICAL EXAMINATION:  General: Sedated, lying with eyes closed, in no acute distress.  Neurologic:  - Mental Status:  Sedated  - Cranial Nerves:  Pupils are constricted at 1mm b/l, positive oculo-cephalic reflex though slowed in the L eye, Face appears symmetric, No gag reflex  - Motor:  Decreased tone b/l throughout  - Reflexes: Diminished b/l throughout  - Sensory: No response to noxious stimuli, likely due to recent sedation    Radiology:  CT Head No Cont:  (25 Aug 2020 21:05)  IMPRESSION:  Right parieto-occipital gliosis with evolving hemorrhage. There is no acute intracranial hemorrhage, mass effect, midline shift or extra axial collections. Neurology  Consult Note  20    Name:  DANIELLE ANTONIO; 65y (1955)    Neurology consult: Patient seen and examined at bedside. s per MICU team, pt had just received Versed due to agitation where she was thrashing around in the bed.    Reason for Admission: Non-intractable epilepsy with status epilepticus      Chief Complaint:  HPI:  Pt sedated and intubated; information obtained via chart review.    65F with hx of L lung adenocarcinoma s/p resection followed by adjuvant chemo with metastasis to the brain s/p R occipital craniotomy of hemorrhagic brain lesion 3/15/2020, migraine, COPD, and depression p/w AMS described by daughter as patient appearing confused and not making sense.    In the ED, pt was afebrile at 98.7, HR 76, /81. In the ED, she had a total of 3 seizures in which she had left eye deviation. She was given 1000mg Keppra load and during the 3rd episode, she was given 2mg IV ativan. Pt was also noted to have 1 episode of NBNB emesis, after which she was given succinylcholine, etomodiate, intubated, and then started on a propofol drip. Later on, pt was noted to have arm stiffening and received 4mg versed and 100mg fentanyl. (25 Aug 2020 23:30)    Review of Systems:  As states in HPI.        PMHx:   History of antineoplastic chemotherapy  Metastatic adenocarcinoma to brain  Iron deficiency anemia  History of iron deficiency  Lung cancer  Migraine  COPD (chronic obstructive pulmonary disease)    PFHx:   No pertinent family history in first degree relatives    PSuHx:   History of craniotomy  S/P lobectomy of lung  Benign tumor  S/P       Medications:  MEDICATIONS  (STANDING):  chlorhexidine 0.12% Liquid 15 milliLiter(s) Oral Mucosa every 12 hours  chlorhexidine 4% Liquid 1 Application(s) Topical <User Schedule>  dexMEDEtomidine Infusion 1 MICROgram(s)/kG/Hr (16.3 mL/Hr) IV Continuous <Continuous>  levETIRAcetam  IVPB 1000 milliGRAM(s) IV Intermittent every 12 hours  norepinephrine Infusion 0.05 MICROgram(s)/kG/Min (6.12 mL/Hr) IV Continuous <Continuous>  petrolatum Ophthalmic Ointment 1 Application(s) Both EYES two times a day  piperacillin/tazobactam IVPB.. 3.375 Gram(s) IV Intermittent every 8 hours  propofol Infusion 20 MICROgram(s)/kG/Min (7.8 mL/Hr) IV Continuous <Continuous>  sodium chloride 0.9%. 1000 milliLiter(s) (75 mL/Hr) IV Continuous <Continuous>  vancomycin  IVPB      vancomycin  IVPB 1000 milliGRAM(s) IV Intermittent every 12 hours    MEDICATIONS  (PRN):  acetaminophen    Suspension .. 650 milliGRAM(s) Oral every 6 hours PRN Temp greater or equal to 38C (100.4F)    Vitals:  T(C): 36.2 (20 @ 09:00), Max: 37.6 (20 @ 16:00)  HR: 47 (20 @ 11:00) (47 - 85)  BP: 134/89 (20 @ 11:00) (89/65 - 139/52)  RR: 14 (20 @ 11:) (14 - 16)  SpO2: 100% (20 @ 11:00) (99% - 100%)      PHYSICAL EXAMINATION:  General: Sedated, lying with eyes closed, in no acute distress.  Neurologic:  - Mental Status:  Sedated  - Cranial Nerves:  Pupils are constricted at 1mm b/l, positive oculo-cephalic reflex though slowed in the L eye, Face appears symmetric, No gag reflex  - Motor:  Decreased tone b/l throughout  - Reflexes: Diminished b/l throughout  - Sensory: No response to noxious stimuli, likely due to recent sedation      Labs:                        12.2   11.15 )-----------( 202      ( 27 Aug 2020 03:10 )             36.4         141  |  109<H>  |  8   ----------------------------<  120<H>  2.9<LL>   |  22  |  0.83    Ca    8.0<L>      27 Aug 2020 03:10  Phos  2.7       Mg     1.9         TPro  5.5<L>  /  Alb  3.3  /  TBili  0.5  /  DBili  x   /  AST  40<H>  /  ALT  23  /  AlkPhos  56      CAPILLARY BLOOD GLUCOSE      POCT Blood Glucose.: 126 mg/dL (25 Aug 2020 19:11)    LIVER FUNCTIONS - ( 27 Aug 2020 03:10 )  Alb: 3.3 g/dL / Pro: 5.5 g/dL / ALK PHOS: 56 u/L / ALT: 23 u/L / AST: 40 u/L / GGT: x             Culture - Blood (collected 26 Aug 2020 02:49)  Source: .Blood Blood-Peripheral  Preliminary Report (27 Aug 2020 03:):    No growth to date.    Culture - Blood (collected 26 Aug 2020 02:49)  Source: .Blood Blood-Peripheral  Preliminary Report (27 Aug 2020 03:):    No growth to date.    Culture - Urine (collected 26 Aug 2020 02:25)  Source: .Urine Catheterized  Preliminary Report (27 Aug 2020 02:40):    >100,000 CFU/ml Escherichia coli      PT/INR - ( 25 Aug 2020 19:30 )   PT: 11.3 SEC;   INR: 0.98     PTT - ( 25 Aug 2020 19:30 )  PTT:27.2 SEC        Radiology:  CT Head No Cont:  (25 Aug 2020 21:05)  IMPRESSION:  Right parieto-occipital gliosis with evolving hemorrhage. There is no acute intracranial hemorrhage, mass effect, midline shift or extra axial collections.

## 2020-08-27 NOTE — PROGRESS NOTE ADULT - SUBJECTIVE AND OBJECTIVE BOX
Erick Garcia M.D.  PGY1   [id number] / [phone]    INTERVAL HPI/OVERNIGHT EVENTS:    SUBJECTIVE: Patient seen and examined at bedside.     REVIEW OF SYSTEMS:  Constitutional:     [ ] negative [ ] fevers [ ] chills [ ] weight loss [ ] weight gain  HEENT:                  [ ] negative [ ] dry eyes [ ] eye irritation [ ] postnasal drip [ ] nasal congestion  CV:                         [ ] negative  [ ] chest pain [ ] orthopnea [ ] palpitations [ ] murmur  Resp:                     [ ] negative [ ] cough [ ] shortness of breath [ ] dyspnea [ ] wheezing [ ] sputum [ ] hemoptysis  GI:                          [ ] negative [ ] nausea [ ] vomiting [ ] diarrhea [ ] constipation [ ] abd pain [ ] dysphagia   :                        [ ] negative [ ] dysuria [ ] nocturia [ ] hematuria [ ] increased urinary frequency  Musculoskeletal: [ ] negative [ ] back pain [ ] myalgias [ ] arthralgias [ ] fracture  Skin:                       [ ] negative [ ] rash [ ] itch  Neurological:        [ ] negative [ ] headache [ ] dizziness [ ] syncope [ ] weakness [ ] numbness  Psychiatric:           [ ] negative [ ] anxiety [ ] depression  Endocrine:            [ ] negative [ ] diabetes [ ] thyroid problem  Heme/Lymph:      [ ] negative [ ] anemia [ ] bleeding problem  Allergic/Immune: [ ] negative [ ] itchy eyes [ ] nasal discharge [ ] hives [ ] angioedema    [  ] All other systems negative  [ ] Unable to assess ROS because pt intubated and sedated.  OBJECTIVE:    VITAL SIGNS:  ICU Vital Signs Last 24 Hrs  T(C): 36.5 (27 Aug 2020 04:00), Max: 37.8 (26 Aug 2020 08:00)  T(F): 97.7 (27 Aug 2020 04:00), Max: 100.1 (26 Aug 2020 08:00)  HR: 58 (27 Aug 2020 06:00) (56 - 85)  BP: 130/89 (27 Aug 2020 06:00) (73/56 - 139/52)  BP(mean): 102 (27 Aug 2020 06:00) (63 - 102)  ABP: --  ABP(mean): --  RR: 14 (27 Aug 2020 06:00) (14 - 17)  SpO2: 100% (27 Aug 2020 06:00) (99% - 100%)    Mode: AC/ CMV (Assist Control/ Continuous Mandatory Ventilation), RR (machine): 14, TV (machine): 400, FiO2: 40, PEEP: 5, ITime: 0.65, MAP: 9, PIP: 21    08-26 @ 07:01  -   @ 07:00  --------------------------------------------------------  IN: 3808.9 mL / OUT: 1890 mL / NET: 1918.9 mL      CAPILLARY BLOOD GLUCOSE      POCT Blood Glucose.: 126 mg/dL (25 Aug 2020 19:11)      PHYSICAL EXAM:    General: NAD  HEENT: NC/AT; PERRL, clear conjunctiva  Neck: supple  Respiratory: CTA b/l  Cardiovascular: +S1/S2; RRR  Abdomen: soft, NT/ND; +BS x4  Extremities: WWP, 2+ peripheral pulses b/l; no LE edema  Skin: normal color and turgor; no rash  Neurological:    MEDICATIONS:  MEDICATIONS  (STANDING):  chlorhexidine 0.12% Liquid 15 milliLiter(s) Oral Mucosa every 12 hours  chlorhexidine 4% Liquid 1 Application(s) Topical <User Schedule>  dexMEDEtomidine Infusion 1 MICROgram(s)/kG/Hr (16.3 mL/Hr) IV Continuous <Continuous>  levETIRAcetam  IVPB 1000 milliGRAM(s) IV Intermittent every 12 hours  norepinephrine Infusion 0.05 MICROgram(s)/kG/Min (6.12 mL/Hr) IV Continuous <Continuous>  petrolatum Ophthalmic Ointment 1 Application(s) Both EYES two times a day  piperacillin/tazobactam IVPB.. 3.375 Gram(s) IV Intermittent every 8 hours  propofol Infusion 20 MICROgram(s)/kG/Min (7.8 mL/Hr) IV Continuous <Continuous>  sodium chloride 0.9%. 1000 milliLiter(s) (75 mL/Hr) IV Continuous <Continuous>  vancomycin  IVPB      vancomycin  IVPB 1000 milliGRAM(s) IV Intermittent every 12 hours    MEDICATIONS  (PRN):  acetaminophen    Suspension .. 650 milliGRAM(s) Oral every 6 hours PRN Temp greater or equal to 38C (100.4F)      ALLERGIES:  Allergies    No Known Allergies    Intolerances        LABS:                        12.2   11.15 )-----------( 202      ( 27 Aug 2020 03:10 )             36.4     Hemoglobin: 12.2 g/dL ( @ 03:10)  Hemoglobin: 13.8 g/dL ( @ 04:30)  Hemoglobin: 14.8 g/dL ( @ 19:30)    CBC Full  -  ( 27 Aug 2020 03:10 )  WBC Count : 11.15 K/uL  RBC Count : 3.88 M/uL  Hemoglobin : 12.2 g/dL  Hematocrit : 36.4 %  Platelet Count - Automated : 202 K/uL  Mean Cell Volume : 93.8 fL  Mean Cell Hemoglobin : 31.4 pg  Mean Cell Hemoglobin Concentration : 33.5 %  Auto Neutrophil # : x  Auto Lymphocyte # : x  Auto Monocyte # : x  Auto Eosinophil # : x  Auto Basophil # : x  Auto Neutrophil % : x  Auto Lymphocyte % : x  Auto Monocyte % : x  Auto Eosinophil % : x  Auto Basophil % : x        141  |  109<H>  |  8   ----------------------------<  120<H>  2.9<LL>   |  22  |  0.83    Ca    8.0<L>      27 Aug 2020 03:10  Phos  2.7       Mg     1.9         TPro  5.5<L>  /  Alb  3.3  /  TBili  0.5  /  DBili  x   /  AST  40<H>  /  ALT  23  /  AlkPhos  56  27    Creatinine Trend: 0.83<--, 0.93<--, 0.98<--  LIVER FUNCTIONS - ( 27 Aug 2020 03:10 )  Alb: 3.3 g/dL / Pro: 5.5 g/dL / ALK PHOS: 56 u/L / ALT: 23 u/L / AST: 40 u/L / GGT: x           PT/INR - ( 25 Aug 2020 19:30 )   PT: 11.3 SEC;   INR: 0.98          PTT - ( 25 Aug 2020 19:30 )  PTT:27.2 SEC    hs Troponin:    ABG - ( 26 Aug 2020 00:30 )  pH, Arterial: 7.38  pH, Blood: x     /  pCO2: 41    /  pO2: 82    / HCO3: 24    / Base Excess: -0.6  /  SaO2: 96.1                03:10 - VBG - pH: 7.42  | pCO2: 38    | pO2: 79    | Lactate:            Urinalysis Basic - ( 25 Aug 2020 19:30 )    Color: LIGHT YELLOW / Appearance: CLEAR / S.024 / pH: 6.0  Gluc: NEGATIVE / Ketone: NEGATIVE  / Bili: NEGATIVE / Urobili: NORMAL   Blood: TRACE / Protein: 30 / Nitrite: NEGATIVE   Leuk Esterase: NEGATIVE / RBC: 0-2 / WBC 0-2   Sq Epi: OCC / Non Sq Epi: x / Bacteria: NEGATIVE      CSF:                      EKG:   MICROBIOLOGY:    Culture - Blood (collected 26 Aug 2020 02:49)  Source: .Blood Blood-Peripheral  Preliminary Report (27 Aug 2020 03:01):    No growth to date.    Culture - Blood (collected 26 Aug 2020 02:49)  Source: .Blood Blood-Peripheral  Preliminary Report (27 Aug 2020 03:01):    No growth to date.    Culture - Urine (collected 26 Aug 2020 02:25)  Source: .Urine Catheterized  Preliminary Report (27 Aug 2020 02:40):    >100,000 CFU/ml Escherichia coli      IMAGING:      Labs, imaging, EKG personally reviewed    RADIOLOGY & ADDITIONAL TESTS: Reviewed. Erick Garcia M.D.  PGY1   [id number] / [phone]    INTERVAL HPI/OVERNIGHT EVENTS:    SUBJECTIVE: Patient seen and examined at bedside.     REVIEW OF SYSTEMS:     [X] Unable to assess ROS because pt intubated and sedated.    OBJECTIVE:     VITAL SIGNS:  ICU Vital Signs Last 24 Hrs  T(C): 36.5 (27 Aug 2020 04:00), Max: 37.8 (26 Aug 2020 08:00)  T(F): 97.7 (27 Aug 2020 04:00), Max: 100.1 (26 Aug 2020 08:00)  HR: 58 (27 Aug 2020 06:00) (56 - 85)  BP: 130/89 (27 Aug 2020 06:00) (73/56 - 139/52)  BP(mean): 102 (27 Aug 2020 06:00) (63 - 102)    RR: 14 (27 Aug 2020 06:00) (14 - 17)  SpO2: 100% (27 Aug 2020 06:00) (99% - 100%)    Mode: AC/ CMV (Assist Control/ Continuous Mandatory Ventilation), RR (machine): 14, TV (machine): 400, FiO2: 40, PEEP: 5, ITime: 0.65, MAP: 9, PIP: 21    08-26 @ 07:01  -  08-27 @ 07:00  --------------------------------------------------------  IN: 3808.9 mL / OUT: 1890 mL / NET: 1918.9 mL      CAPILLARY BLOOD GLUCOSE      POCT Blood Glucose.: 126 mg/dL (25 Aug 2020 19:11)      PHYSICAL EXAM:    General: awake, self extubated, tired appearing, agitated.   HEENT: NC/AT; PERRL, clear conjunctiva  Respiratory: CTA b/l  Cardiovascular: +S1/S2; RRR  Abdomen: soft, NT/ND; +BS x4  Extremities: WWP, 2+ peripheral pulses b/l;  Skin: normal color and turgor; no rash    MEDICATIONS:  MEDICATIONS  (STANDING):  chlorhexidine 0.12% Liquid 15 milliLiter(s) Oral Mucosa every 12 hours  chlorhexidine 4% Liquid 1 Application(s) Topical <User Schedule>  dexMEDEtomidine Infusion 1 MICROgram(s)/kG/Hr (16.3 mL/Hr) IV Continuous <Continuous>  levETIRAcetam  IVPB 1000 milliGRAM(s) IV Intermittent every 12 hours  norepinephrine Infusion 0.05 MICROgram(s)/kG/Min (6.12 mL/Hr) IV Continuous <Continuous>  petrolatum Ophthalmic Ointment 1 Application(s) Both EYES two times a day  piperacillin/tazobactam IVPB.. 3.375 Gram(s) IV Intermittent every 8 hours  propofol Infusion 20 MICROgram(s)/kG/Min (7.8 mL/Hr) IV Continuous <Continuous>  sodium chloride 0.9%. 1000 milliLiter(s) (75 mL/Hr) IV Continuous <Continuous>  vancomycin  IVPB      vancomycin  IVPB 1000 milliGRAM(s) IV Intermittent every 12 hours    MEDICATIONS  (PRN):  acetaminophen    Suspension .. 650 milliGRAM(s) Oral every 6 hours PRN Temp greater or equal to 38C (100.4F)      ALLERGIES:  Allergies    No Known Allergies    Intolerances        LABS:                        12.2   11.15 )-----------( 202      ( 27 Aug 2020 03:10 )             36.4     Hemoglobin: 12.2 g/dL ( @ 03:10)  Hemoglobin: 13.8 g/dL ( @ 04:30)  Hemoglobin: 14.8 g/dL ( @ 19:30)    CBC Full  -  ( 27 Aug 2020 03:10 )  WBC Count : 11.15 K/uL  RBC Count : 3.88 M/uL  Hemoglobin : 12.2 g/dL  Hematocrit : 36.4 %  Platelet Count - Automated : 202 K/uL  Mean Cell Volume : 93.8 fL  Mean Cell Hemoglobin : 31.4 pg  Mean Cell Hemoglobin Concentration : 33.5 %  Auto Neutrophil # : x  Auto Lymphocyte # : x  Auto Monocyte # : x  Auto Eosinophil # : x  Auto Basophil # : x  Auto Neutrophil % : x  Auto Lymphocyte % : x  Auto Monocyte % : x  Auto Eosinophil % : x  Auto Basophil % : x        141  |  109<H>  |  8   ----------------------------<  120<H>  2.9<LL>   |  22  |  0.83    Ca    8.0<L>      27 Aug 2020 03:10  Phos  2.7     08-27  Mg     1.9     08-27    TPro  5.5<L>  /  Alb  3.3  /  TBili  0.5  /  DBili  x   /  AST  40<H>  /  ALT  23  /  AlkPhos  56  08-27    Creatinine Trend: 0.83<--, 0.93<--, 0.98<--  LIVER FUNCTIONS - ( 27 Aug 2020 03:10 )  Alb: 3.3 g/dL / Pro: 5.5 g/dL / ALK PHOS: 56 u/L / ALT: 23 u/L / AST: 40 u/L / GGT: x           PT/INR - ( 25 Aug 2020 19:30 )   PT: 11.3 SEC;   INR: 0.98          PTT - ( 25 Aug 2020 19:30 )  PTT:27.2 SEC      ABG - ( 26 Aug 2020 00:30 )  pH, Arterial: 7.38  pH, Blood: x     /  pCO2: 41    /  pO2: 82    / HCO3: 24    / Base Excess: -0.6  /  SaO2: 96.1          03:10 - VBG - pH: 7.42  | pCO2: 38    | pO2: 79    | Lactate:            Urinalysis Basic - ( 25 Aug 2020 19:30 )    Color: LIGHT YELLOW / Appearance: CLEAR / S.024 / pH: 6.0  Gluc: NEGATIVE / Ketone: NEGATIVE  / Bili: NEGATIVE / Urobili: NORMAL   Blood: TRACE / Protein: 30 / Nitrite: NEGATIVE   Leuk Esterase: NEGATIVE / RBC: 0-2 / WBC 0-2   Sq Epi: OCC / Non Sq Epi: x / Bacteria: NEGATIVE    EKG:   MICROBIOLOGY:    Culture - Blood (collected 26 Aug 2020 02:49)  Source: .Blood Blood-Peripheral  Preliminary Report (27 Aug 2020 03:01):    No growth to date.    Culture - Blood (collected 26 Aug 2020 02:49)  Source: .Blood Blood-Peripheral  Preliminary Report (27 Aug 2020 03:01):    No growth to date.    Culture - Urine (collected 26 Aug 2020 02:25)  Source: .Urine Catheterized  Preliminary Report (27 Aug 2020 02:40):    >100,000 CFU/ml Escherichia coli      IMAGING:      Labs, imaging, EKG personally reviewed    RADIOLOGY & ADDITIONAL TESTS: Reviewed.

## 2020-08-27 NOTE — CHART NOTE - NSCHARTNOTEFT_GEN_A_CORE
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 Pt sedated and intubated in ED for airway protection in setting of seizures and 1 episode of NBNB emesis. 8/26, in MICU, patient was monitored for seizure activity by EEG, and remained on keppra 1g BID for prophylaxis. no epileptiform pattern or seizure was seen throughout stay. Patient's BP was maintained with 0.05 levophed and was gradually weaned off. Urine cultures positive for E. coli, and vanc and zosyn were continued for empiric treatment of aspiration PNA in the setting of emesis and seizures. Propofol gradually weaned off, and started Precedex. Patient self-extubated on 8/27, is off propofol and precedex. Keppra continued for ppx. Neuro cleared pt for transfer to floor, will need MRI once on floors.       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 Moderate to severe nonspecific diffuse or multifocal cerebral dysfunction.   2. Skull defect max in the left posterior quadrant.  3. No epileptiform pattern or seizure seen.  - MRI after per neuro.   - f/u MRI with and without contrast, can be done on floors.   - f/u neuro  recs, can be d/c from micu after monitoring.     #CV  - sBP ranging between 100-110    #pulmonary  saturating well on RA.     #ID  -afebrile ON. concern for aspiration PNA in setting of emesis and seizures  -urine cultures came back positive for E. coli  - c/w vanc and zosyn  - f/u BCx    #endo  - glu ranging between 120s-140s    #PPX  - DVT: SCD in setting of possible hemorrhage    #Ethics  -Per Ed note, Ed provider discussed with daughter Janessa - [phone] - patient is FULL CODE          For Followup:          Amol ANP-BC (ext. 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 Pt sedated and intubated in ED for airway protection in setting of seizures and 1 episode of NBNB emesis. 8/26, in MICU, patient was monitored for seizure activity by EEG, and remained on keppra 1g BID for prophylaxis. no epileptiform pattern or seizure was seen throughout stay. Patient's BP was maintained with 0.05 levophed and was gradually weaned off. Urine cultures positive for E. coli, and vanc and zosyn were continued for empiric treatment of aspiration PNA in the setting of emesis and seizures. Propofol gradually weaned off, and started Precedex. Patient self-extubated on 8/27, is off propofol and precedex. Keppra continued for ppx. Neuro cleared pt for transfer to floor, will need MRI once on floors.       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 Moderate to severe nonspecific diffuse or multifocal cerebral dysfunction.   2. Skull defect max in the left posterior quadrant.  3. No epileptiform pattern or seizure seen.  - MRI after per neuro.   - f/u MRI with and without contrast, can be done on floors.   - f/u neuro recs, can be d/c from micu after monitoring.     #CV  - SBP ranging between 100-110    #pulmonary  saturating well on RA.     #ID  -afebrile ON. concern for aspiration PNA in setting of emesis and seizures  -urine cultures came back positive for E. coli  - c/w vanc and zosyn  - f/u BCx    #endo  - glu ranging between 120s-140s    #PPX  - DVT: SCD in setting of possible hemorrhage    #Ethics  -Per Ed note, Ed provider discussed with daughter Janessa - [phone] - patient is FULL CODE      For Followup:  Please follow up neurology recs,  advise pt to see her oncologist, follows Dr. Pugh.  MRI once on floors.           Amol ANP-BC (ext. 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 Pt sedated and intubated in ED for airway protection in setting of seizures and 1 episode of NBNB emesis. 8/26, in MICU, patient was monitored for seizure activity by EEG, and remained on keppra 1g BID for prophylaxis. no epileptiform pattern or seizure was seen throughout stay. Patient's BP was maintained with 0.05 levophed and was gradually weaned off. Urine cultures positive for ESBL E. coli, and vanc and zosyn were continued for empiric treatment of aspiration PNA in the setting of emesis and seizures. Propofol gradually weaned off, transitioned to Precedex. Patient self-extubated on 8/27, is off propofol and precedex. Keppra continued for ppx. Neuro cleared pt for transfer to floor, will need MRI once on floors.     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     #neuro  - self extubated, off Precedex and propofol  - continue Keppra 1g BID for ppx  - EEG report:  1. Moderate to severe nonspecific diffuse or multifocal cerebral dysfunction.   2. Skull defect max in the left posterior quadrant.  3. No epileptiform pattern or seizure seen.  - f/u MRI with and without contrast (ordered)  - f/u neuro recs    #CV  - no active issues    #pulmonary  - saturating normally on RA    #ID  - concern for aspiration PNA in setting of emesis and seizures  - urine cultures positive for E. coli  - c/w vanc and zosyn  - BCx 8/26 NGTD    #endo  - glu ranging between 120s-140s    #PPX  - DVT: SCD in setting of possible hemorrhage    #Ethics  -Per Ed note, Ed provider discussed with daughter Janessa - [phone] - patient is FULL CODE    For Followup:  [ ] 8/27PM vanc held (trough elevated). next vanc due @ 6AM; Please check level prior to next dose (ordered)  [ ] follow up neurology recs  [ ] MRI head ordered  [ ] advise pt to see her oncologist, follows Dr. 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 Pt sedated and intubated in ED for airway protection in setting of seizures and 1 episode of NBNB emesis. 8/26, in MICU, patient was monitored for seizure activity by EEG, and remained on keppra 1g BID for prophylaxis. no epileptiform pattern or seizure was seen throughout stay. Patient's BP was maintained with 0.05 levophed and was gradually weaned off. Urine cultures positive for ESBL E. coli, and vanc and zosyn were continued for empiric treatment of aspiration PNA in the setting of emesis and seizures. Propofol gradually weaned off, transitioned to Precedex. Patient self-extubated on 8/27, is off propofol and precedex. Keppra continued for ppx. Neuro cleared pt for transfer to floor, will need MRI once on floors.     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     #neuro  - self extubated, off Precedex and propofol  - continue Keppra 1g BID for ppx  - EEG report:  1. Moderate to severe nonspecific diffuse or multifocal cerebral dysfunction.   2. Skull defect max in the left posterior quadrant.  3. No epileptiform pattern or seizure seen.  - f/u MRI brain with and without contrast (ordered)  - f/u neuro recs    #CV  - no active issues    #pulmonary  - saturating normally on RA    #ID  - concern for aspiration PNA in setting of emesis and seizures  - urine cultures positive for E. coli  - c/w vanc and zosyn  - BCx 8/26 NGTD    #endo  - glu ranging between 120s-140s    #PPX  - DVT: SCD in setting of possible hemorrhage    #Ethics  - per ED note, ED provider discussed with daughter Janessa - [phone] - patient is FULL CODE    For Followup:  [ ] 8/27PM vanc held (trough elevated). next vanc due @ 6AM; Please check level (ordered) prior to next dose.  [ ] follow up neurology recs  [ ] MRI brain ordered  [ ] advise pt to see her oncologist, follows Dr. 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 Pt sedated and intubated in ED for airway protection in setting of seizures and 1 episode of NBNB emesis. 8/26, in MICU, patient was monitored for seizure activity by EEG, and remained on keppra 1g BID for prophylaxis. no epileptiform pattern or seizure was seen throughout stay. Patient's BP was maintained with 0.05 levophed and was gradually weaned off. Urine cultures positive for ESBL E. coli, and vanc and zosyn were continued for empiric treatment of aspiration PNA in the setting of emesis and seizures. Propofol gradually weaned off, transitioned to Precedex. Patient self-extubated on 8/27, is off propofol and precedex. Keppra continued for ppx. Neuro cleared pt for transfer to floor, will need MRI once on floors.    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     #neuro  - self extubated, off Precedex and propofol  - continue Keppra 1g BID for ppx  - EEG report:  1. Moderate to severe nonspecific diffuse or multifocal cerebral dysfunction.   2. Skull defect max in the left posterior quadrant.  3. No epileptiform pattern or seizure seen.  - f/u MRI brain with and without contrast (ordered)  - f/u neuro recs    #CV  - no active issues    #pulmonary  - saturating normally on RA    #ID  - concern for aspiration PNA in setting of emesis and seizures  - urine cultures positive for E. coli  - c/w vanc and zosyn  - BCx 8/26 NGTD    #endo  - glu ranging between 120s-140s    #PPX  - DVT: SCD in setting of possible hemorrhage    #Ethics  - per ED note, ED provider discussed with daughter Janessa - [phone] - patient is FULL CODE    #Other  - Daughter Janessa notified of transfer 8/27    For Followup:  [ ] 8/27PM vanc held (trough elevated). next vanc due @ 6AM; Please check level (ordered) prior to next dose.  [ ] follow up neurology recs  [ ] MRI brain ordered  [ ] advise pt to see her oncologist, follows Dr. Pugh MICU Transfer Note    Transfer from: MICU    Transfer to: (X) Medicine    (  ) Telemetry     (   ) RCU        (    ) Palliative         (   ) Stroke Unit          (   ) __________________    Accepting Physician: Dr. 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 Pt sedated and intubated in ED for airway protection in setting of seizures and 1 episode of NBNB emesis. 8/26, in MICU, patient was monitored for seizure activity by EEG, and remained on Keppra 1g BID for prophylaxis. no epileptiform pattern or seizure was seen throughout stay. Patient's BP was maintained with 0.05 levophed and was gradually weaned off. Urine cultures positive for ESBL E. coli, and vancomycin and zosyn were continued for empiric treatment of aspiration PNA in the setting of emesis and seizures. Propofol gradually weaned off, transitioned to Precedex. Patient self-extubated on 8/27, is off propofol and Precedex. Keppra continued for ppx. Neuro cleared patient for transfer to floor, will need MRI once on floors.    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     #neuro  - self extubated, off Precedex and propofol  - continue Keppra 1g BID for ppx  - EEG report:  1. Moderate to severe nonspecific diffuse or multifocal cerebral dysfunction.   2. Skull defect max in the left posterior quadrant.  3. No epileptiform pattern or seizure seen.  - f/u MRI brain with and without contrast (ordered)  - f/u neuro recs    #CV  - no active issues    #pulmonary  - saturating normally on RA    #ID  - concern for aspiration PNA in setting of emesis and seizures  - urine cultures positive for E. coli  - c/w vanc and zosyn  - BCx 8/26 NGTD    #endo  - glu ranging between 120s-140s    #PPX  - DVT: SCD in setting of possible hemorrhage    #Ethics  - per ED note, ED provider discussed with daughter Janessa - [phone] - patient is FULL CODE    #Other  - Daughter Janessa notified of transfer 8/27    For Followup:  [ ] 8/27PM vanc held (trough elevated). next vanc due @ 6AM; Please check level (ordered) prior to next dose.  [ ] follow up neurology recs  [ ] MRI brain ordered  [ ] advise pt to see her oncologist, follows Dr. Pugh MICU Transfer Note    Transfer from: MICU    Transfer to: (X) Medicine    (  ) Telemetry     (   ) RCU        (    ) Palliative         (   ) Stroke Unit          (   ) __________________    Accepting Physician: Dr. 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 Pt sedated and intubated in ED for airway protection in setting of seizures and 1 episode of NBNB emesis. 8/26, in MICU, patient was monitored for seizure activity by EEG, and remained on Keppra 1g BID for prophylaxis. no epileptiform pattern or seizure was seen throughout stay. Patient's BP was maintained with 0.05 levophed and was gradually weaned off. Urine cultures positive for ESBL E. coli, and vancomycin and zosyn were continued for empiric treatment of aspiration PNA in the setting of emesis and seizures. Propofol gradually weaned off, transitioned to Precedex. Patient self-extubated on 8/27, is off propofol and Precedex. Keppra continued for ppx. Neuro cleared patient for transfer to floor, will need MRI once on floors.    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     #neuro  - self extubated, off Precedex and propofol  - continue Keppra 1g BID for ppx  - EEG report 8/27:  1. Moderate to severe nonspecific diffuse or multifocal cerebral dysfunction.   2. Skull defect max in the left posterior quadrant.  3. No epileptiform pattern or seizure seen.  - C/w EEG monitoring  - f/u MRI brain with and without contrast (ordered)  - f/u neuro recs    #CV  - no active issues    #pulmonary  - saturating normally on RA    #ID  - concern for aspiration PNA in setting of emesis and seizures  - urine cultures positive for E. coli  - c/w vanc and zosyn  - BCx 8/26 NGTD    #endo  - glu ranging between 120s-140s    #PPX  - DVT: SCD in setting of possible hemorrhage    #Ethics  - per ED note, ED provider discussed with daughter Janessa - [phone] - patient is FULL CODE    #Other  - Daughter Janessa notified of transfer on 8/27    For Followup:  [ ] 8/27PM vanc held (trough elevated). next vanc due @ 6AM; Please check level (ordered) prior to next dose.  [ ] follow up neurology recs  [ ] MRI brain ordered  [ ] advise pt to see her oncologist, follows Dr. Pugh

## 2020-08-27 NOTE — PROGRESS NOTE ADULT - ATTENDING COMMENTS
1. Acute hypoxemic respiratory failure due to status epilepticus.  Tolerating extubation today.  2, Neuro. 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 Pt with removal  of occipital lobe tumor in past. Pt was on Keppra as out pat. Continue Keppra and Propofol.   VEEG  negative on  propofol and precedex..  Awaiting MRI.  3. ID . Aspiration pneumonia. Continue vancomycin and zosyn.

## 2020-08-27 NOTE — DIETITIAN INITIAL EVALUATION ADULT. - ADD RECOMMEND
1) Monitor weights, labs, BM's, skin integrity.  2) If pt remains extubated, consider initiation of Clear Liquids.  Advance diet to Regular as tolerated.

## 2020-08-27 NOTE — EEG REPORT - NS EEG TEXT BOX
[facility]   COMPREHENSIVE EPILEPSY CENTER   REPORT OF LONG-TERM VIDEO EEG     [facility]: [address], [place] [address], Ph#: [phone]  [facility]: [address] [place] [address], Ph#: [phone]  [facility]: [address] [place] [address], Ph#: [phone]    Patient Name: DANIELLE ANTONIO  Age and : 65y (53)  MRN #: [id number]  Location: [place] [address]  Referring Physician: Ivan Aburto    Start Time/Date: 12:37 on 20  End Time/Date: 08:00 on 20  Duration: 19hr    _____________________________________________________________  STUDY INFORMATION    EEG Recording Technique:  The patient underwent continuous Video-EEG monitoring, using Telemetry System hardware on the XLTek Digital System. EEG and video data were stored on a computer hard drive with important events saved in digital archive files. The material was reviewed by a physician (electroencephalographer / epileptologist) on a daily basis. Lokesh and seizure detection algorithms were utilized and reviewed. An EEG Technician attended to the patient, and was available throughout daytime work hours.  The epilepsy center neurologist was available in person or on call 24-hours per day.    EEG Placement and Labeling of Electrodes:  The EEG was performed utilizing 20 channel referential EEG connections (coronal over temporal over parasagittal montage) using all standard 10-20 electrode placements with EKG, with additional electrodes placed in the inferior temporal region using the modified 10-10 montage electrode placements for elective admissions, or if deemed necessary. Recording was at a sampling rate of 256 samples per second per channel. Time synchronized digital video recording was done simultaneously with EEG recording. A low light infrared camera was used for low light recording.     _____________________________________________________________  HISTORY    Patient is a 65y old  Female who presents with a chief complaint of seizures (27 Aug 2020 11:31)      PERTINENT MEDICATION:  levETIRAcetam  IVPB 1000 milliGRAM(s) IV Intermittent every 12 hours    _____________________________________________________________  STUDY INTERPRETATION    Findings: The background was continuous, spontaneously variable and reactive. No definitive posterior dominant rhythm seen.    Background Slowing:  Diffuse theta and polymorphic delta slowing.    Focal Slowing:   None were present.    Sleep Background:  Drowsiness was characterized by fragmentation, attenuation, and slowing of the background activity.    Sleep was characterized by the presence of vertex waves, symmetric sleep spindles and K-complexes.    Other Non-Epileptiform Findings:  Breach effect max in left posterior quadrant characterized by higher amplitude, sharply contoured waves and fast activities.    Interictal Epileptiform Activity:   None were present.    Events:  Clinical events: None recorded.  Seizures: None recorded.    Activation Procedures:   Hyperventilation was not performed.    Photic stimulation was not performed.     Artifacts:  Intermittent myogenic and movement artifacts were noted.    ECG:  The heart rate on single channel ECG was predominantly between 60-70 BPM.    _____________________________________________________________  EEG SUMMARY/CLASSIFICATION    Abnormal EEG in the awake, drowsy and asleep states.  - Moderate to severe generalized slowing.  - Breach effect max in left posterior quadrant characterized by higher amplitude, sharply contoured waves and fast activities.    _____________________________________________________________  EEG IMPRESSION/CLINICAL CORRELATE    Abnormal EEG study.  1. Moderate to severe nonspecific diffuse or multifocal cerebral dysfunction.   2. Skull defect max in the left posterior quadrant.  3. No epileptiform pattern or seizure seen.    _____________________________________________________________    Humera Vale MD  Attending Physician, [facility]

## 2020-08-27 NOTE — CHART NOTE - NSCHARTNOTEFT_GEN_A_CORE
No seizures overnight. Episode of agitation this morning was not epileptic.   Pt planned for MRI. Afterwards, weaning sedation as per MICU.   Continue EEG . No change to Keppra dose.

## 2020-08-27 NOTE — CHART NOTE - NSCHARTNOTEFT_GEN_A_CORE
: Dr. Tiffanie Ferreira, PGY-1    INDICATION: Intubated, previous aspiration    PROCEDURE:  [x] LIMITED ECHO  [x] LIMITED CHEST  [ ] LIMITED RETROPERITONEAL  [ ] LIMITED ABDOMINAL  [ ] LIMITED DVT  [ ] NEEDLE GUIDANCE VASCULAR  [ ] NEEDLE GUIDANCE THORACENTESIS  [ ] NEEDLE GUIDANCE PARACENTESIS  [ ] NEEDLE GUIDANCE PERICARDIOCENTESIS  [ ] OTHER    FINDINGS:  A-lines in left anterior view. B-lines in right anterior view. Unable to visualize bases.    Grossly normal LV systolic function.    INTERPRETATION:  B-lines in right anterior view. Grossly normal cardiac function.    Images uploaded on [employer] : Dr. Tiffanie Ferreira, PGY-1    INDICATION: Intubated, previous aspiration    PROCEDURE:  [x] LIMITED ECHO  [x] LIMITED CHEST  [ ] LIMITED RETROPERITONEAL  [ ] LIMITED ABDOMINAL  [ ] LIMITED DVT  [ ] NEEDLE GUIDANCE VASCULAR  [ ] NEEDLE GUIDANCE THORACENTESIS  [ ] NEEDLE GUIDANCE PARACENTESIS  [ ] NEEDLE GUIDANCE PERICARDIOCENTESIS  [ ] OTHER    FINDINGS:  A-lines in left anterior view. B-lines in right anterior view. Unable to visualize bases.     normal LV systolic function.    INTERPRETATION:  B-lines in right anterior view.  normal cardiac function.    Images uploaded on [employer]

## 2020-08-27 NOTE — PROGRESS NOTE ADULT - ASSESSMENT
65F with hx of L lung adenocarcinoma s/p resection followed by adjuvant chemo with metastasis to the brain s/p R occipital craniotomy of hemorrhagic brain lesion 3/15/2020, migraine, and COPD admitted for AMS with 3 episodes of seizures within the ED with concern for status epilepticus, currently sedated and intubated for airway protection in setting of seizures and 1 episode of NBNB emesis    #neuro  - continue propofol infusion  - continue keprra 1g BID for ppx  - EEG today with MRI after per neuro.   - q1h neuro checks  - f/u MRI with and without contrast  - f/u neuro  recs    #CV  - sBP ranging between 100-110  -  On levo 0.05  - EKG showing sinus rhythm at 84, normal axis, no ST elevations or depression    #pulmonary  - intubated:  flow changed form 35- >60 in setting of COPD    #GI  - tube feeds started     #renal  - Na 138, K 3.7; f/u lytes and replete as needed  - f/u Cr    #ID  - fever of 100.7, concern for aspiration PNA in setting of emesis and seizures  - vanc and zosyn  - f/u BCx    #endo  - glu ranging between 120s-140s    #PPX  - DVT: SCD in setting of possible hemorrhage    #Ethics  -Per Ed note, Ed provider discussed with daughter Janessa - [phone] - patient is FULL CODE 65F with hx of L lung adenocarcinoma s/p resection followed by adjuvant chemo with metastasis to the brain s/p R occipital craniotomy of hemorrhagic brain lesion 3/15/2020, migraine, and COPD admitted for AMS with 3 episodes of seizures within the ED with concern for status epilepticus, currently sedated and intubated for airway protection in setting of seizures and 1 episode of NBNB emesis    #neuro  -self extubated, off precdex and propofol   - continue keprra 1g BID for ppx  - EEG reports show no  1. Moderate to severe nonspecific diffuse or multifocal cerebral dysfunction.   2. Skull defect max in the left posterior quadrant.  3. No epileptiform pattern or seizure seen.  - MRI after per neuro.   - f/u MRI with and without contrast  - f/u neuro  recs    #CV  - sBP ranging between 100-110  -  On levo 0.05  - EKG showing sinus rhythm at 84, normal axis, no ST elevations or depression    #pulmonary  saturating well on RA.       #GI  NPO for now. will start liquid diet if tolerates.     #renal    - f/u Cr    #ID  -afebrile ON. concern for aspiration PNA in setting of emesis and seizures  -urine cultures came back positive for E. coli  - c/w vanc and zosyn  - f/u BCx    #endo  - glu ranging between 120s-140s    #PPX  - DVT: SCD in setting of possible hemorrhage    #Ethics  -Per Ed note, Ed provider discussed with daughter Janessa - [phone] - patient is FULL CODE 65F with hx of L lung adenocarcinoma s/p resection followed by adjuvant chemo with metastasis to the brain s/p R occipital craniotomy of hemorrhagic brain lesion 3/15/2020, migraine, and COPD admitted for AMS with 3 episodes of seizures within the ED with concern for status epilepticus, currently sedated and intubated for airway protection in setting of seizures and 1 episode of NBNB emesis    #neuro  -self extubated, off precdex and propofol   - continue keprra 1g BID for ppx  - EEG reports show no  1. Moderate to severe nonspecific diffuse or multifocal cerebral dysfunction.   2. Skull defect max in the left posterior quadrant.  3. No epileptiform pattern or seizure seen.  - MRI after per neuro.   - f/u MRI with and without contrast, can be done on floors.   - f/u neuro  recs, can be d/c from micu after monitoring.     #CV  - sBP ranging between 100-110  -  On levo 0.05  - EKG showing sinus rhythm at 84, normal axis, no ST elevations or depression    #pulmonary  saturating well on RA.       #GI  NPO for now. will start liquid diet if tolerates.     #renal    - f/u Cr    #ID  -afebrile ON. concern for aspiration PNA in setting of emesis and seizures  -urine cultures came back positive for E. coli  - c/w vanc and zosyn  - f/u BCx    #endo  - glu ranging between 120s-140s    #PPX  - DVT: SCD in setting of possible hemorrhage    #Ethics  -Per Ed note, Ed provider discussed with daughter Janessa - [phone] - patient is FULL CODE

## 2020-08-28 NOTE — PHYSICAL THERAPY INITIAL EVALUATION ADULT - ADDITIONAL COMMENTS
Pt lives in apartment with roommate, there are 4 steps to enter and the elevator inside. Pt was independent in all ADL prior to admission. Pt was not using assistive device for ambulation prior to admission.

## 2020-08-28 NOTE — SWALLOW BEDSIDE ASSESSMENT ADULT - COMMENTS
H&P - 65F with hx of L lung adenocarcinoma s/p resection followed by adjuvant chemo with metastasis to the brain s/p R occipital craniotomy of hemorrhagic brain lesion 3/15/2020, migraine, COPD, and depression p/w AMS described by daughter as patient appearing confused and not making sense.    In the ED, pt was afebrile at 98.7, HR 76, /81. In the ED, she had a total of 3 seizures in which she had left eye deviation. She was given 1000mg Keppra load and during the 3rd episode, she was given 2mg IV ativan. Pt was also noted to have 1 episode of NBNB emesis, after which she was given succinylcholine, etomodiate, intubated, Patient self extubated 8/27\  WBC 12.44  CXR 8/25- clear lungs  CT brain 8/25 - Right parieto-occipital gliosis with evolving hemorrhage. There is no acute intracranial hemorrhage, mass effect, midline shift or extra axial collections.    Pt seen for evaluation this date, in bed, alert and following commands. RN reporting no difficulty swallowing medications

## 2020-08-28 NOTE — PROGRESS NOTE ADULT - ASSESSMENT
65F with hx of L lung adenocarcinoma s/p resection followed by adjuvant chemo with metastasis to the brain s/p R occipital craniotomy of hemorrhagic brain lesion 3/15/2020, migraine, and COPD admitted for AMS with 3 episodes of seizures within the ED with concern for status epilepticus, EEG negative for seizure activity.

## 2020-08-28 NOTE — PROGRESS NOTE ADULT - ATTENDING COMMENTS
Pt seen and examined. No fever or chills    Status epilepticus: likely due to new mets or possible compliance issue with Keppra, f/u MRI     Ecoli bacteremia due to UTI: will switch ZOsyn to Ertapenem, will treat for 10 days total. Repeat Cx NGTD    HERACLIO: likely due to vancomycin, will stop Vanc, monitor Cr     Metastatic Lung Cancer: mets to brain, outpt f/u with Onc

## 2020-08-28 NOTE — EEG REPORT - NS EEG TEXT BOX
[facility]   COMPREHENSIVE EPILEPSY CENTER   REPORT OF LONG-TERM VIDEO EEG     [facility]: [address], [place] [address], Ph#: [phone]  [facility]: [address] [address] [place] [address], Ph#: [phone]  [facility]: [address] [place] [address], Ph#: [phone]    Patient Name: DANIELLE ANTONIO  Age and : 65y (53)  MRN #: [id number]  Location: [place] [address]  Referring Physician: Ivan Aburto    Start Time/Date: 08:00 on 20  End Time/Date: 00:13 on 20  Duration: 15hr 25m    _____________________________________________________________  STUDY INFORMATION    EEG Recording Technique:  The patient underwent continuous Video-EEG monitoring, using Telemetry System hardware on the XLTek Digital System. EEG and video data were stored on a computer hard drive with important events saved in digital archive files. The material was reviewed by a physician (electroencephalographer / epileptologist) on a daily basis. Lokesh and seizure detection algorithms were utilized and reviewed. An EEG Technician attended to the patient, and was available throughout daytime work hours.  The epilepsy center neurologist was available in person or on call 24-hours per day.    EEG Placement and Labeling of Electrodes:  The EEG was performed utilizing 20 channel referential EEG connections (coronal over temporal over parasagittal montage) using all standard 10-20 electrode placements with EKG, with additional electrodes placed in the inferior temporal region using the modified 10-10 montage electrode placements for elective admissions, or if deemed necessary. Recording was at a sampling rate of 256 samples per second per channel. Time synchronized digital video recording was done simultaneously with EEG recording. A low light infrared camera was used for low light recording.     _____________________________________________________________  HISTORY    Patient is a 65y old  Female who presents with a chief complaint of seizures (27 Aug 2020 11:31)      PERTINENT MEDICATION:  ALPRAZolam 0.25 milliGRAM(s) Oral at bedtime  levETIRAcetam  IVPB 1000 milliGRAM(s) IV Intermittent every 12 hours    _____________________________________________________________  STUDY INTERPRETATION    Findings: The background was continuous, spontaneously variable and reactive. During wakefulness, the posterior dominant rhythm consisted of symmetric, poorly-modulated 6 Hz activity, with amplitude to 30 uV, that attenuated to eye opening.      Background Slowing:  Excess diffuse polymorphic delta slowing.    Focal Slowing:   None were present.    Sleep Background:  Drowsiness was characterized by fragmentation, attenuation, and slowing of the background activity.    Sleep was characterized by the presence of vertex waves, symmetric sleep spindles and K-complexes.    Other Non-Epileptiform Findings:  Breach effect max in left posterior quadrant characterized by higher amplitude, sharply contoured waves and fast activities.    Interictal Epileptiform Activity:   None were present.    Events:  Clinical events: None recorded.  Seizures: None recorded.    Activation Procedures:   Hyperventilation was not performed.    Photic stimulation was not performed.     Artifacts:  Intermittent myogenic and movement artifacts were noted.    ECG:  The heart rate on single channel ECG was predominantly between 60-70 BPM.    _____________________________________________________________  EEG SUMMARY/CLASSIFICATION    Abnormal EEG in the awake, drowsy and asleep states.  - Moderate generalized slowing.  - Breach effect max in left posterior quadrant characterized by higher amplitude, sharply contoured waves and fast activities.    _____________________________________________________________  EEG IMPRESSION/CLINICAL CORRELATE    Abnormal EEG study.  1. Moderate nonspecific diffuse or multifocal cerebral dysfunction.   2. Skull defect max in the left posterior quadrant.  3. No epileptiform pattern or seizure seen.    _____________________________________________________________    Humera Vale MD  Attending Physician, [facility]ile[facility] [place]

## 2020-08-28 NOTE — CONSULT NOTE ADULT - SUBJECTIVE AND OBJECTIVE BOX
65F with hx of L lung adenocarcinoma s/p resection followed by adjuvant chemo with metastasis to the brain s/p R occipital craniotomy of hemorrhagic brain lesion 3/15/2020, migraine, COPD, and depression p/w AMS. In the ED, she had a total of 3 seizures in which she had left eye deviation. She was given 1000mg Keppra load and during the 3rd episode, she was given 2mg IV ativan. Pt was also noted to have 1 episode of NBNB emesis, after which she was given succinylcholine, etomodiate, intubated, and then started on a propofol drip. Later on, pt was noted to have arm stiffening and received 4mg versed and 100mg fentanyl. Pt self extubated on . Recovering well in the RCU. She is being treated for ESBL UTI.    Onc hx:   s/p resection of stage II lung cancer followed by adjuvant chemo with cisplatin and pemetrexed x 4 cycles which completed in 2016. However, she presented to ED with headache and eye pain on 20. MRI brain noted heterogeneous enhancing lesion involving the right parietal cortical and subcortical region measuring approximately 2.2 x 1.6 x 2.5 cm.  She underwent right occipital craniotomy for resection of right occipital hemorrhagic brain lesion.on 03/15/20 by Dr. Servin with path consistent with metastatic adenocarcinoma from lung. PD-L1 30%.,  BRCA 2 mut (unclear if germline or somatic), KRAS G12C amongst others. Pt was started on immunotherapy with pembrolizumab.       Allergies  No Known Allergies    PAST MEDICAL & SURGICAL HISTORY:  History of antineoplastic chemotherapy  Metastatic adenocarcinoma to brain  Iron deficiency anemia: underwent endoscopy and colonoscopy, reportedly fine  Lung cancer  Migraine  COPD (chronic obstructive pulmonary disease)  History of craniotomy  S/P lobectomy of lung  Benign tumor: left   S/P :     FAMILY HISTORY:  No pertinent family history in first degree relatives      Social History:  former smoker    REVIEW OF SYSTEMS:  CONSTITUTIONAL: +HA, No weakness, fevers or chills  EYES/ENT: No visual changes, no throat pain   RESPIRATORY: No cough, wheezing, hemoptysis; No shortness of breath  CARDIOVASCULAR: No chest pain or palpitations  GASTROINTESTINAL: No abdominal pain, nausea, vomiting, or hematemesis; No diarrhea or constipation. No melena or hematochezia.  GENITOURINARY: No dysuria, frequency or hematuria  MUSCULOSKELETAL: No joint pain.  NEUROLOGICAL: No dizziness, numbness, or weakness  SKIN: No itching, burning, rashes, or lesions   All other review of systems is negative unless indicated above.    VITAL SIGNS:  Vital Signs Last 24 Hrs  T(C): 36.8 (28 Aug 2020 13:10), Max: 37.6 (27 Aug 2020 19:30)  T(F): 98.2 (28 Aug 2020 13:10), Max: 99.6 (27 Aug 2020 19:30)  HR: 79 (28 Aug 2020 13:10) (73 - 91)  BP: 124/65 (28 Aug 2020 13:10) (110/58 - 125/66)  BP(mean): 87 (27 Aug 2020 23:00) (74 - 107)  RR: 19 (28 Aug 2020 13:10) (12 - 25)  SpO2: 96% (28 Aug 2020 13:10) (91% - 98%)      PHYSICAL EXAM:   GENERAL: no acute distress  HEENT: EOMI, neck supple  RESPIRATORY: LCTAB/L, no rhonchi, rales, or wheezing  CARDIOVASCULAR: RRR, no murmurs, gallops, rubs  ABDOMINAL: soft, non-tender, non-distended, positive bowel sounds   EXTREMITIES: no clubbing, cyanosis, or edema  NEUROLOGICAL: alert and oriented x 3, non-focal  SKIN: no rashes or lesions   MUSCULOSKELETAL: no gross joint deformity                          12.8   12.44 )-----------( 215      ( 28 Aug 2020 05:45 )             39.0     -    143  |  112<H>  |  10  ----------------------------<  96  3.5   |  21<L>  |  1.43<H>    Ca    8.2<L>      28 Aug 2020 05:45  Phos  2.8       Mg     2.3         TPro  5.9<L>  /  Alb  3.4  /  TBili  0.4  /  DBili  x   /  AST  53<H>  /  ALT  28  /  AlkPhos  60  08-      MEDICATIONS  (STANDING):  ALPRAZolam 0.25 milliGRAM(s) Oral at bedtime  dextrose 5% + sodium chloride 0.45%. 1000 milliLiter(s) (50 mL/Hr) IV Continuous <Continuous>  ertapenem  IVPB 1000 milliGRAM(s) IV Intermittent every 24 hours  levETIRAcetam  IVPB 1000 milliGRAM(s) IV Intermittent every 12 hours  sertraline 150 milliGRAM(s) Oral at bedtime      < from: CT Head No Cont (20 @ 21:05) >    FINDINGS:  The CT examination demonstrates generalized volume loss as manifested by the enlargement of the ventricles, cisternal spaces, and cortical sulci throughout.    There is no acute intracranial hemorrhage, mass effect, midline shift or extra axial collections.    There is mild periventricular white matter hypodensity, likely the sequela of small vessel ischemic disease.    Patient is status post right parieto-occipital craniotomy. Residual hypodensity in the right parieto-occipital region is likely postsurgicalgliosis. Known evolving hemorrhage is barely noticeable on the current exam. The included paranasal sinuses and mastoid air cells are predominantly clear.    IMPRESSION:  Right parieto-occipital gliosis with evolving hemorrhage. There is no acute intracranial hemorrhage, mass effect, midline shift or extra axial collections.    < end of copied text >

## 2020-08-28 NOTE — PROGRESS NOTE ADULT - PROBLEM SELECTOR PLAN 2
- switch from Zosyn to Ertapenem as ESBL E. coli in UCx  - Ertapenem would cover empiric aspiration pneumonia pathogens  - d/c Vancomycin

## 2020-08-28 NOTE — PROGRESS NOTE ADULT - PROBLEM SELECTOR PLAN 1
Resolved. Likely secondary to new brain metastasis vs. evolving hemorrhage at site of last brain met resection vs. poor compliance to prophylactic antiepileptic.  - MRI ordered  - c/w keppra 1g BID for ppx  - Neurology following, appreciate recs  - passed bedside swallow; dysphagia-soft diet with thin liquids

## 2020-08-28 NOTE — SWALLOW BEDSIDE ASSESSMENT ADULT - SWALLOW EVAL: DIAGNOSIS
The patient presents with mild oral dysphagia with functional pharyngeal phase. Oral phase marked by slow bolus prep and reduced mastication quality. Presence of dentures/missing lower dentition a factor. Pt with appearance of timely pharyngeal swallow trigger, adequate and complete hyolaryngeal movement per palpation. No overt signs of PO entrance into the airway.

## 2020-08-28 NOTE — PROGRESS NOTE ADULT - PROBLEM SELECTOR PLAN 4
Pt reports she is on sumatriptan (imitrex) at home, unsure of dosage 100-125mg  - Per neuro, sumatriptan may lower seizure threshold. Hold.  - Tylenol PRN for headaches

## 2020-08-28 NOTE — PROGRESS NOTE ADULT - PROBLEM SELECTOR PLAN 3
Possibly secondary to Zosyn, Vancomycin vs. obstructive  - CTM creatinine  - avoid nephrotoxic medications

## 2020-08-28 NOTE — SWALLOW BEDSIDE ASSESSMENT ADULT - ADDITIONAL RECOMMENDATIONS
1. soft with thin liquids  2. monitor for signs/symptoms of aspiration  3. reconsult SLP should change in status occur

## 2020-08-28 NOTE — PHYSICAL THERAPY INITIAL EVALUATION ADULT - PERTINENT HX OF CURRENT PROBLEM, REHAB EVAL
65F with hx of L lung adenocarcinoma s/p resection followed by adjuvant chemo with metastasis to the brain s/p R occipital craniotomy of hemorrhagic brain lesion 3/15/2020, migraine, COPD, and depression p/w AMS described by daughter as patient appearing confused and not making sense.

## 2020-08-28 NOTE — CONSULT NOTE ADULT - ASSESSMENT
65F with hx of L lung adenocarcinoma s/p resection followed by adjuvant chemo with metastasis to the brain s/p R occipital craniotomy of hemorrhagic brain lesion 3/15/2020, migraine, COPD, and depression p/w AMS, had seizure in the ED, requiring intubation s/p self extubation (8/27), with CT showing Rt parieto-occipital gliosis with evolving hemorrhage    # seizures  -neurology following - appreciate recs   -focal status epilepticus in the setting of previous hemorrhagic metastasis from lung adenocarcinoma and unclear if further metastasis present contributing to current episode.  -on keppra 1gBID  -pending MRI of head    # lung cancer  -s/p resection of stage II lung cancer followed by adjuvant chemo with cisplatin and pemetrexed x 4 cycles which completed in June of 2016.   -recurred with brain mets   -PD-L1 30%.,  BRCA 2 mut (unclear if germline or somatic), KRAS G12C amongst others.   -Pt was started on immunotherapy with pembrolizumab.   -pt follows up with Dr. Pguh at [facility]    # Migraine  - Pt reports she is on sumatriptan (imitrex) for many years  - Per neuro, sumatriptan may lower seizure threshold, currently held  - Tylenol PRN for headaches.     # ESBL UTI  -on ertapenem      Ben Cedillo MD. PGY 6  [facility]eme-Onc fellow  [phone]; [id number]

## 2020-08-28 NOTE — PROGRESS NOTE ADULT - SUBJECTIVE AND OBJECTIVE BOX
Suresh Shrestha Theresa, PGY1  Pager [phone]    INCOMPLETE NOTE - IN PROGRESS    Patient is a 65y old  Female who presents with a chief complaint of seizures (27 Aug 2020 11:31)      SUBJECTIVE/INTERVAL EVENTS: Patient seen and examined at bedside.    MEDICATIONS  (STANDING):  ALPRAZolam 0.25 milliGRAM(s) Oral at bedtime  dextrose 5% + sodium chloride 0.45%. 1000 milliLiter(s) (50 mL/Hr) IV Continuous <Continuous>  levETIRAcetam  IVPB 1000 milliGRAM(s) IV Intermittent every 12 hours  piperacillin/tazobactam IVPB.. 3.375 Gram(s) IV Intermittent every 8 hours  sertraline 150 milliGRAM(s) Oral at bedtime    MEDICATIONS  (PRN):  acetaminophen   Tablet .. 650 milliGRAM(s) Oral every 6 hours PRN Temp greater or equal to 38C (100.4F), Mild Pain (1 - 3)      VITAL SIGNS:  T(F): 98.5 (20 @ 06:31), Max: 99.6 (20 @ 19:30)  HR: 83 (20 @ 06:31) (44 - 91)  BP: 121/63 (20 @ 06:31) (107/77 - 142/93)  RR: 18 (20 @ 06:31) (12 - 25)  SpO2: 95% (20 @ 06:31) (91% - 100%)    I&O's Summary    26 Aug 2020 07:01  -  27 Aug 2020 07:00  --------------------------------------------------------  IN: 3808.9 mL / OUT: 1890 mL / NET: 1918.9 mL    27 Aug 2020 07:01  -  28 Aug 2020 06:53  --------------------------------------------------------  IN: 2622.6 mL / OUT: 1490 mL / NET: 1132.6 mL      Daily Height in cm: 165.1 (28 Aug 2020 00:30)    Daily Weight in k.3 (27 Aug 2020 16:15)    PHYSICAL EXAM:  Gen: Alert, NAD  HEENT: NCAT, conjunctiva clear, sclera anicteric, no erythema or exudates in the oropharynx, mmm  Neck: Supple, no JVD  CV: RRR, S1S2, no m/r/g  Resp: CTAB, normal respiratory effort  Abd: Soft, nontender, nondistended, normal bowel sounds  Ext: no edema, no clubbing or cyanosis  Neuro: AOx3, CN2-12 grossly intact, AGUILLON  SKIN: warm, perfused    LABS:                        12.2   11.15 )-----------( 202      ( 27 Aug 2020 03:10 )             36.4     Hgb Trend: 12.2<--, 13.8<--, 14.8<--  08    142  |  112<H>  |  10  ----------------------------<  83  4.2   |  19<L>  |  1.26    Ca    8.3<L>      27 Aug 2020 20:30  Phos  3.0       Mg     2.4         TPro  5.5<L>  /  Alb  3.3  /  TBili  0.5  /  DBili  x   /  AST  40<H>  /  ALT  23  /  AlkPhos  56  08    Creatinine Trend: 1.26<--, 0.83<--, 0.93<--, 0.98<--  LIVER FUNCTIONS - ( 27 Aug 2020 03:10 )  Alb: 3.3 g/dL / Pro: 5.5 g/dL / ALK PHOS: 56 u/L / ALT: 23 u/L / AST: 40 u/L / GGT: x                     CAPILLARY BLOOD GLUCOSE          RADIOLOGY & ADDITIONAL TESTS: Reviewed    Imaging Personally Reviewed:    Consultant(s) Notes Reviewed:      Care Discussed with Consultants/Other Providers: Suresh Cruzon, PGY1  Pager [phone]      Patient is a 65y old  Female who presents with a chief complaint of seizures (27 Aug 2020 11:31)      SUBJECTIVE/INTERVAL EVENTS:  Pt was transferred from MICU to floor overnight.  When asked about the events leading up to her presentation to ED, she reports confusion as she was leaving the dressing room of a store.  She does not remember what happens afterwards, but reports waking up with a "tube in my throat" which she pulled out.  She denies needing any anti-epileptic after her craniotomy with brain met resection and denies taking keppra.  She also notes that she starting taking abilify for depression/anxiety about 1 week prior: she took half a pill for 2 days, 1 pill for 2 days, and then abruptly stopped d/t concern for weight gain.    This AM, pt reports headache and requests Imitrex, her home migraine medications. Patient seen and examined at bedside.      MEDICATIONS  (STANDING):  ALPRAZolam 0.25 milliGRAM(s) Oral at bedtime  dextrose 5% + sodium chloride 0.45%. 1000 milliLiter(s) (50 mL/Hr) IV Continuous <Continuous>  levETIRAcetam  IVPB 1000 milliGRAM(s) IV Intermittent every 12 hours  piperacillin/tazobactam IVPB.. 3.375 Gram(s) IV Intermittent every 8 hours  sertraline 150 milliGRAM(s) Oral at bedtime    MEDICATIONS  (PRN):  acetaminophen   Tablet .. 650 milliGRAM(s) Oral every 6 hours PRN Temp greater or equal to 38C (100.4F), Mild Pain (1 - 3)      VITAL SIGNS:  T(F): 98.5 (20 @ 06:31), Max: 99.6 (20 @ 19:30)  HR: 83 (20 @ 06:31) (44 - 91)  BP: 121/63 (20 @ 06:31) (107/77 - 142/93)  RR: 18 (20 @ 06:31) (12 - 25)  SpO2: 95% (20 @ 06:31) (91% - 100%)    I&O's Summary    26 Aug 2020 07:01  -  27 Aug 2020 07:00  --------------------------------------------------------  IN: 3808.9 mL / OUT: 1890 mL / NET: 1918.9 mL    27 Aug 2020 07:01  -  28 Aug 2020 06:53  --------------------------------------------------------  IN: 2622.6 mL / OUT: 1490 mL / NET: 1132.6 mL      Daily Height in cm: 165.1 (28 Aug 2020 00:30)    Daily Weight in k.3 (27 Aug 2020 16:15)    PHYSICAL EXAM:  Gen: Alert, NAD  HEENT: NCAT, conjunctiva clear, sclera anicteric, no erythema or exudates in the oropharynx, mmm  Neck: Supple, no JVD  CV: RRR, S1S2, no m/r/g  Resp: CTAB, normal respiratory effort  Abd: Soft, nontender, nondistended, normal bowel sounds  Ext: no edema, no clubbing or cyanosis  Neuro: AOx3, CN2-12 grossly intact, AGUILLON  SKIN: warm, perfused    LABS:                        12.2   11.15 )-----------( 202      ( 27 Aug 2020 03:10 )             36.4     Hgb Trend: 12.2<--, 13.8<--, 14.8<--      142  |  112<H>  |  10  ----------------------------<  83  4.2   |  19<L>  |  1.26    Ca    8.3<L>      27 Aug 2020 20:30  Phos  3.0       Mg     2.4         TPro  5.5<L>  /  Alb  3.3  /  TBili  0.5  /  DBili  x   /  AST  40<H>  /  ALT  23  /  AlkPhos  56      Creatinine Trend: 1.26<--, 0.83<--, 0.93<--, 0.98<--  LIVER FUNCTIONS - ( 27 Aug 2020 03:10 )  Alb: 3.3 g/dL / Pro: 5.5 g/dL / ALK PHOS: 56 u/L / ALT: 23 u/L / AST: 40 u/L / GGT: x                     CAPILLARY BLOOD GLUCOSE          RADIOLOGY & ADDITIONAL TESTS: Reviewed    Imaging Personally Reviewed:    Consultant(s) Notes Reviewed:      Care Discussed with Consultants/Other Providers:

## 2020-08-29 NOTE — PROGRESS NOTE ADULT - PROBLEM SELECTOR PLAN 2
- switch from Zosyn to Ertapenem as ESBL E. coli in UCx  - Ertapenem would cover empiric aspiration pneumonia pathogens  - d/c Vancomycin ESBL E. coli in UCx, UA negative  - D/c Ertapenem as it decreases seizure threshold   - ID consulted, appreciate recs  - Per ID, ok to d/c ertapenem and monitor off abx as UA was negative.

## 2020-08-29 NOTE — PROGRESS NOTE ADULT - PROBLEM SELECTOR PLAN 3
Possibly secondary to Zosyn, Vancomycin vs. obstructive  - CTM creatinine  - avoid nephrotoxic medications Improving. Possibly secondary to Zosyn, Vancomycin vs. obstructive  - trend creatinine  - avoid nephrotoxic medications

## 2020-08-29 NOTE — PROGRESS NOTE ADULT - PROBLEM SELECTOR PLAN 1
Resolved. Likely secondary to new brain metastasis vs. evolving hemorrhage at site of last brain met resection vs. poor compliance to prophylactic antiepileptic.  - MRI ordered  - c/w keppra 1g BID for ppx  - Neurology following, appreciate recs  - passed bedside swallow; dysphagia-soft diet with thin liquids Resolved. Likely secondary to new brain metastasis vs. evolving hemorrhage at site of last brain met resection vs. poor compliance to prophylactic antiepileptic.  - Brain MRI ordered, MRI safety and Contrast checklist done and in chart.  - c/w keppra 1g BID for ppx  - Neurology following, appreciate recs  - passed bedside swallow; dysphagia-soft diet with thin liquids

## 2020-08-29 NOTE — PROGRESS NOTE ADULT - ATTENDING COMMENTS
Pt seen and examined, case d/w house staff.    Status epilepticus: likely due to new mets or possible compliance issue with Keppra, expedite MRI, f/u neuro    ESBL E coli bacteremia due to UTI: on ertapenem, but can potentially lower seizure threshold, ID consult   Repeat Cx NGTD    HERACLIO: likely due to vancomycin and sepsis, d/c'd vanco, monitor Creat     Metastatic Lung Cancer: mets to brain, MRI brain, outpt f/u with Onc Pt seen and examined, case d/w house staff.    Status epilepticus: likely due to new mets or possible compliance issue with Keppra, expedite MRI, f/u neuro    ESBL E coli UTI: BCx neg, on ertapenem, but can potentially lower seizure threshold, ID consulted, monitor off abx    HERACLIO: likely due to vancomycin and sepsis, d/c'd vanco, monitor Creat     Metastatic Lung Cancer: mets to brain, MRI brain, outpt f/u with Onc

## 2020-08-29 NOTE — CONSULT NOTE ADULT - SUBJECTIVE AND OBJECTIVE BOX
INFECTIOUS DISEASE SERVICE INITIAL CONSULTATION NOTE    HPI:  Pt sedated and intubated; information obtained via chart review.    65F with hx of L lung adenocarcinoma s/p resection followed by adjuvant chemo with metastasis to the brain s/p R occipital craniotomy of hemorrhagic brain lesion 3/15/2020, migraine, COPD, and depression p/w AMS described by daughter as patient appearing confused and not making sense.    In the ED, pt was afebrile at 98.7, HR 76, /81. In the ED, she had a total of 3 seizures in which she had left eye deviation. She was given 1000mg Keppra load and during the 3rd episode, she was given 2mg IV ativan. Pt was also noted to have 1 episode of NBNB emesis, after which she was given succinylcholine, etomodiate, intubated, and then started on a propofol drip. Later on, pt was noted to have arm stiffening and received 4mg versed and 100mg fentanyl. (25 Aug 2020 23:30)    Patient was initially admitted to MICU for status epilepticus. Spiked a fever and give Zosyn for possible aspiration pneumonia. Eventually extubated and moved to 9N.   ID consulted for ESBL E coli on the patient's urine culture, and the team is concerned about giving a carbapenem considering the patient's recent seizures.    The patient reports mild burning with urination, no abdominal pain, fevers, or chills.     PAST MEDICAL & SURGICAL HISTORY:  History of antineoplastic chemotherapy  Metastatic adenocarcinoma to brain  Iron deficiency anemia: underwent endoscopy and colonoscopy, reportedly fine  Lung cancer  Migraine  COPD (chronic obstructive pulmonary disease)  History of craniotomy  S/P lobectomy of lung  Benign tumor: left   S/P :       REVIEW OF SYSTEMS:  Constitutional: no weakness, no fevers, no chills  Dermatologic: no rash  Respiratory: no SOB, no cough  Cardiovascular: no chest pain, no palpitations  Gastrointestinal: no nausea, no vomiting, no diarrhea  Genitourinary: +dysuria, no urinary frequency, no hematuria, no urinary retention  Musculoskeletal:	no weakness, no joint swelling/pain  Neurological: +headache  Endocrine: no polyuria, no polydipsia    ACTIVE ANTIMICROBIAL/ANTIBIOTIC MEDICATIONS:      OTHER MEDICATIONS:  acetaminophen   Tablet .. 650 milliGRAM(s) Oral every 6 hours PRN  ALPRAZolam 0.25 milliGRAM(s) Oral at bedtime  levETIRAcetam  IVPB 1000 milliGRAM(s) IV Intermittent every 12 hours  magnesium oxide 400 milliGRAM(s) Oral daily  metoclopramide Injectable 10 milliGRAM(s) IV Push every 8 hours PRN  pantoprazole    Tablet 40 milliGRAM(s) Oral before breakfast  sertraline 150 milliGRAM(s) Oral at bedtime      ALLERGIES:  Allergies    No Known Allergies    Intolerances    SOCIAL HISTORY: Former smoker.     FAMILY HISTORY:  No pertinent family history in first degree relatives      VITAL SIGNS:  ICU Vital Signs Last 24 Hrs  T(C): 36.9 (29 Aug 2020 05:49), Max: 37 (28 Aug 2020 17:10)  T(F): 98.5 (29 Aug 2020 05:49), Max: 98.6 (28 Aug 2020 17:10)  HR: 73 (29 Aug 2020 05:49) (73 - 86)  BP: 151/98 (29 Aug 2020 05:49) (122/66 - 151/98)  BP(mean): --  ABP: --  ABP(mean): --  RR: 18 (29 Aug 2020 05:49) (17 - 19)  SpO2: 96% (29 Aug 2020 05:49) (94% - 98%)      PHYSICAL EXAM:  Constitutional: Well appearing woman, talking on the phone  Head: NC/AT  Eyes: anicteric sclera  ENMT: no rhinorrhea  Neck: supple; no JVD or LAD  Respiratory: CTA B/L  Cardiovascular: +S1/S2, RRR; no appreciable murmurs  Gastrointestinal: soft, NT/ND; +BS  Extremities: WWP; no clubbing, cyanosis, or edema  Vascular: peripheral IV  Dermatologic: skin warm and dry; no visible rashes or lesions  Neurologic: AAOx3; no focal deficits    LABS:                        12.5   9.11  )-----------( 231      ( 29 Aug 2020 06:00 )             38.3     08-29    141  |  110<H>  |  10  ----------------------------<  96  3.5   |  20<L>  |  1.22    Ca    8.6      29 Aug 2020 06:00  Phos  3.3     08-  Mg     1.9     08-29    TPro  6.0  /  Alb  3.4  /  TBili  0.4  /  DBili  x   /  AST  40<H>  /  ALT  27  /  AlkPhos  57            MICROBIOLOGY:    Culture - Blood (collected 20 @ 02:49)  Source: .Blood Blood-Peripheral  Preliminary Report (20 @ 03:01):    No growth to date.    Culture - Blood (collected 20 @ 02:49)  Source: .Blood Blood-Peripheral  Preliminary Report (20 @ 03:01):    No growth to date.    Culture - Urine (collected 20 @ 02:25)  Source: .Urine Catheterized  Final Report (20 @ 21:38):    >100,000 CFU/ml Escherichia coli ESBL  Organism: Escherichia coli ESBL (20 @ 21:38)  Organism: Escherichia coli ESBL (20 @ 21:38)      -  Amikacin: S <=16      -  Amoxicillin/Clavulanic Acid: S <=8/4      -  Ampicillin: R >16 These ampicillin results predict results for amoxicillin      -  Ampicillin/Sulbactam: S 8/4 Enterobacter, Citrobacter, and Serratia may develop resistance during prolonged therapy (3-4 days)      -  Aztreonam: R >16      -  Cefazolin: R >16 ([id number]) For uncomplicated UTI with K. pneumoniae, E. coli, or P. mirablis: STACEY <=16 is sensitive and STACEY >=32 is resistant. This also predicts results for oral agents cefaclor, cefdinir, cefpodoxime, cefprozil, cefuroxime axetil, cephalexin and locarbef for uncomplicated UTI. Note that some isolates may be susceptible to these agents while testing resistant to cefazolin.      -  Cefepime: R >16      -  Cefoxitin: S <=8      -  Ceftriaxone: R >32 Enterobacter, Citrobacter, and Serratia may develop resistance during prolonged therapy      -  Ciprofloxacin: R >2      -  Ertapenem: S <=0.5      -  Gentamicin: S <=2      -  Imipenem: S <=1      -  Levofloxacin: R >4      -  Meropenem: S <=1      -  Nitrofurantoin: S <=32 Should not be used to treat pyelonephritis      -  Piperacillin/Tazobactam: S <=8      -  Tigecycline: S <=2      -  Tobramycin: S <=2      -  Trimethoprim/Sulfamethoxazole: S <=0.5/9.5      Method Type: STACEY        RADIOLOGY & ADDITIONAL STUDIES:    < from: CT Head No Cont (20 @ 21:05) >  IMPRESSION:  Right parieto-occipital gliosis with evolving hemorrhage. There is no acute intracranial hemorrhage, mass effect, midline shift or extra axial collections.    < end of copied text >    < from: Xray Chest 1 View- PORTABLE-Urgent (20 @ 21:48) >  IMPRESSION:  Lines and tubes in appropriate position.    Clear lungs    < end of copied text >

## 2020-08-29 NOTE — PROGRESS NOTE ADULT - PROBLEM SELECTOR PLAN 4
Pt reports she is on sumatriptan (imitrex) at home, unsure of dosage 100-125mg  - Per neuro, sumatriptan may lower seizure threshold. Hold.  - Tylenol PRN for headaches Pt reports she is on sumatriptan (imitrex) at home, unsure of dosage 100-125mg  - Per neuro, hold sumatriptan may lower seizure threshold. Hold.  - Magnesium and Reglan for migraine headaches

## 2020-08-29 NOTE — PROGRESS NOTE ADULT - ASSESSMENT
65F with hx of L lung adenocarcinoma s/p resection followed by adjuvant chemo with metastasis to the brain s/p R occipital craniotomy of hemorrhagic brain lesion 3/15/2020, migraine, and COPD admitted for AMS with 3 episodes of seizures within the ED with concern for status epilepticus, EEG negative for seizure activity. 65F with hx of L lung adenocarcinoma s/p resection followed by adjuvant chemo with metastasis to the brain s/p R occipital craniotomy of hemorrhagic brain lesion 3/15/2020, migraine, and COPD admitted for status epilepticus, likely secondary to new brain mets vs. non-compliance with ppx keppra.  Intubated for airway protection after vomiting in ED, transferred to MICU.  EEG negative for seizure activity.  Self extubated in MICU, transferred to medicine.

## 2020-08-29 NOTE — CONSULT NOTE ADULT - ASSESSMENT
Mrs Andrea is a 65 year old woman with lung adenocarcinoma with mets to the brain who was admitted to the MICU for status epilepticus, now doing well on 9N. ID has been consulted for urine culture with ESBL E coli, but the team is hesitant to give a carbapenem to this patient with recent seizure activity.   Considering the Ua at the time the urine culture was sent does not have pyuria, do not have high suspicion for severe urinary tract infection. Since the patient is currently having dysuria, but no leukocytosis or fevers, will treat her as cystitis.     Cystitis Mrs Andrea is a 65 year old woman with lung adenocarcinoma with mets to the brain who was admitted to the MICU for status epilepticus, now doing well on 9N. ID has been consulted for urine culture with ESBL E coli, but the team is hesitant to give a carbapenem to this patient with recent seizure activity.   Considering the Ua at the time the urine culture was sent does not have pyuria, do not have high suspicion for severe urinary tract infection.     Recommend:  - Monitor off antibiotics at this time  - Monitor for fevers  - Trend WBCs    Laura Mandel, PGY-5  Infectious Disease Fellow   Pager: [phone]  After 5pm/weekends: [phone] Mrs Andrea is a 65 year old woman with lung adenocarcinoma with mets to the brain who was admitted to the MICU for status epilepticus, now doing well on 9N. ID has been consulted for urine culture with ESBL E coli, but the team is hesitant to give a carbapenem to this patient with recent seizure activity.   Considering the Ua at the time the urine culture was sent does not have pyuria, do not have high suspicion for severe urinary tract infection.      Recommend:  - Monitor off antibiotics at this time  - Monitor for fevers  - Trend WBCs    Laura Mandel, PGY-5  Infectious Disease Fellow   Pager: [phone]  After 5pm/weekends: [phone]

## 2020-08-29 NOTE — CONSULT NOTE ADULT - ATTENDING COMMENTS
Patient already received ertapenem  x 2 doses which should be adequate for cystitis.  Would not continue antibiotics at this time.     Denise Goss MD  Pager: [phone]  After 5 PM or weekends please call fellow on call or office [phone]

## 2020-08-29 NOTE — PROGRESS NOTE ADULT - SUBJECTIVE AND OBJECTIVE BOX
Suresh Cruzon, PGY1  Pager [phone]    INCOMPLETE NOTE - IN PROGRESS    Patient is a 65y old  Female who presents with a chief complaint of seizures (28 Aug 2020 15:38)      SUBJECTIVE/INTERVAL EVENTS: Patient seen and examined at bedside.    MEDICATIONS  (STANDING):  ALPRAZolam 0.25 milliGRAM(s) Oral at bedtime  ertapenem  IVPB 1000 milliGRAM(s) IV Intermittent every 24 hours  levETIRAcetam  IVPB 1000 milliGRAM(s) IV Intermittent every 12 hours  sertraline 150 milliGRAM(s) Oral at bedtime    MEDICATIONS  (PRN):  acetaminophen   Tablet .. 650 milliGRAM(s) Oral every 6 hours PRN Temp greater or equal to 38C (100.4F), Mild Pain (1 - 3)  morphine  - Injectable 2 milliGRAM(s) IV Push every 6 hours PRN Moderate Pain (4 - 6)  ondansetron    Tablet 4 milliGRAM(s) Oral every 8 hours PRN Nausea and/or Vomiting      VITAL SIGNS:  T(F): 98.5 (08-29-20 @ 05:49), Max: 98.6 (08-28-20 @ 10:30)  HR: 73 (08-29-20 @ 05:49) (73 - 86)  BP: 151/98 (08-29-20 @ 05:49) (122/66 - 151/98)  RR: 18 (08-29-20 @ 05:49) (17 - 19)  SpO2: 96% (08-29-20 @ 05:49) (94% - 98%)    I&O's Summary    Daily     Daily     PHYSICAL EXAM:  Gen: Alert, NAD  HEENT: NCAT, conjunctiva clear, sclera anicteric, no erythema or exudates in the oropharynx, mmm  Neck: Supple, no JVD  CV: RRR, S1S2, no m/r/g  Resp: CTAB, normal respiratory effort  Abd: Soft, nontender, nondistended, normal bowel sounds  Ext: no edema, no clubbing or cyanosis  Neuro: AOx3, CN2-12 grossly intact, AGUILLON  SKIN: warm, perfused    LABS:                        12.5   9.11  )-----------( 231      ( 29 Aug 2020 06:00 )             38.3     Hgb Trend: 12.5<--, 12.8<--, 12.2<--, 13.8<--, 14.8<--  08-28    143  |  112<H>  |  10  ----------------------------<  96  3.5   |  21<L>  |  1.43<H>    Ca    8.2<L>      28 Aug 2020 05:45  Phos  2.8     08-28  Mg     2.3     08-28    TPro  5.9<L>  /  Alb  3.4  /  TBili  0.4  /  DBili  x   /  AST  53<H>  /  ALT  28  /  AlkPhos  60  08-28    Creatinine Trend: 1.43<--, 1.26<--, 0.83<--, 0.93<--, 0.98<--  LIVER FUNCTIONS - ( 28 Aug 2020 05:45 )  Alb: 3.4 g/dL / Pro: 5.9 g/dL / ALK PHOS: 60 u/L / ALT: 28 u/L / AST: 53 u/L / GGT: x                     CAPILLARY BLOOD GLUCOSE      POCT Blood Glucose.: 97 mg/dL (29 Aug 2020 06:43)  POCT Blood Glucose.: 96 mg/dL (28 Aug 2020 23:57)  POCT Blood Glucose.: 94 mg/dL (28 Aug 2020 12:16)  POCT Blood Glucose.: 90 mg/dL (28 Aug 2020 07:20)      RADIOLOGY & ADDITIONAL TESTS: Reviewed    Imaging Personally Reviewed:    Consultant(s) Notes Reviewed:      Care Discussed with Consultants/Other Providers: Suresh Cardenas, PGY1  Pager [phone]      Patient is a 65y old  Female who presents with a chief complaint of seizures (28 Aug 2020 15:38)      SUBJECTIVE/INTERVAL EVENTS:  Pt c/o migraine headaches and requests imitrex. Reports no improvement on tylenol or morphine.  Pt re-educated on imitrex lowering seizure threshold.  Requests that something be done for her migraines.  Pt requests for MRI to be done right away.  Patient seen and examined at bedside.      MEDICATIONS  (STANDING):  ALPRAZolam 0.25 milliGRAM(s) Oral at bedtime  ertapenem  IVPB 1000 milliGRAM(s) IV Intermittent every 24 hours  levETIRAcetam  IVPB 1000 milliGRAM(s) IV Intermittent every 12 hours  sertraline 150 milliGRAM(s) Oral at bedtime    MEDICATIONS  (PRN):  acetaminophen   Tablet .. 650 milliGRAM(s) Oral every 6 hours PRN Temp greater or equal to 38C (100.4F), Mild Pain (1 - 3)  morphine  - Injectable 2 milliGRAM(s) IV Push every 6 hours PRN Moderate Pain (4 - 6)  ondansetron    Tablet 4 milliGRAM(s) Oral every 8 hours PRN Nausea and/or Vomiting      VITAL SIGNS:  T(F): 98.5 (08-29-20 @ 05:49), Max: 98.6 (08-28-20 @ 10:30)  HR: 73 (08-29-20 @ 05:49) (73 - 86)  BP: 151/98 (08-29-20 @ 05:49) (122/66 - 151/98)  RR: 18 (08-29-20 @ 05:49) (17 - 19)  SpO2: 96% (08-29-20 @ 05:49) (94% - 98%)    I&O's Summary    Daily     Daily     PHYSICAL EXAM:  Gen: Alert, NAD  HEENT: NCAT, conjunctiva clear, sclera anicteric, mmm  Neck: Supple  CV: RRR, S1S2, no m/r/g  Resp: CTAB, normal respiratory effort  Abd: Soft, nontender, nondistended, normal bowel sounds  Ext: no edema, no clubbing or cyanosis  Neuro: Alert, appropriate speech, CN2-12 grossly intact, AGUILLON  SKIN: warm, perfused      LABS:                        12.5   9.11  )-----------( 231      ( 29 Aug 2020 06:00 )             38.3     Hgb Trend: 12.5<--, 12.8<--, 12.2<--, 13.8<--, 14.8<--  08-28    143  |  112<H>  |  10  ----------------------------<  96  3.5   |  21<L>  |  1.43<H>    Ca    8.2<L>      28 Aug 2020 05:45  Phos  2.8     08-28  Mg     2.3     08-28    TPro  5.9<L>  /  Alb  3.4  /  TBili  0.4  /  DBili  x   /  AST  53<H>  /  ALT  28  /  AlkPhos  60  08-28    Creatinine Trend: 1.43<--, 1.26<--, 0.83<--, 0.93<--, 0.98<--  LIVER FUNCTIONS - ( 28 Aug 2020 05:45 )  Alb: 3.4 g/dL / Pro: 5.9 g/dL / ALK PHOS: 60 u/L / ALT: 28 u/L / AST: 53 u/L / GGT: x                     CAPILLARY BLOOD GLUCOSE      POCT Blood Glucose.: 97 mg/dL (29 Aug 2020 06:43)  POCT Blood Glucose.: 96 mg/dL (28 Aug 2020 23:57)  POCT Blood Glucose.: 94 mg/dL (28 Aug 2020 12:16)  POCT Blood Glucose.: 90 mg/dL (28 Aug 2020 07:20)      RADIOLOGY & ADDITIONAL TESTS: Reviewed    Imaging Personally Reviewed:    Consultant(s) Notes Reviewed:      Care Discussed with Consultants/Other Providers:

## 2020-08-30 NOTE — PROGRESS NOTE ADULT - PROBLEM SELECTOR PLAN 5
- Will need to continue f/u outpatient with Dr. Vanessa (oncologist)

## 2020-08-30 NOTE — DISCHARGE NOTE PROVIDER - NSFOLLOWUPCLINICS_GEN_ALL_ED_FT
[facility]  Neurology  [address] [place] [address] [address] [place] [address]  Phone: [phone]  Fax:   Follow Up Time:

## 2020-08-30 NOTE — PROGRESS NOTE ADULT - PROBLEM SELECTOR PLAN 3
Improving. Possibly secondary to Zosyn, Vancomycin vs. obstructive  - trend creatinine  - avoid nephrotoxic medications

## 2020-08-30 NOTE — PROGRESS NOTE ADULT - PROBLEM SELECTOR PROBLEM 4
Migraine without status migrainosus, not intractable, unspecified migraine type

## 2020-08-30 NOTE — PROGRESS NOTE ADULT - PROBLEM SELECTOR PLAN 2
ESBL E. coli in UCx, UA negative  - D/c Ertapenem as it decreases seizure threshold   - ID consulted, appreciate recs  - monitor for fevers, currently afebrile

## 2020-08-30 NOTE — DISCHARGE NOTE PROVIDER - NSDCCPCAREPLAN_GEN_ALL_CORE_FT
PRINCIPAL DISCHARGE DIAGNOSIS  Diagnosis: Status epilepticus  Assessment and Plan of Treatment: You came in for seizures in the ED, you were in the MICU for 2 days requring intubation. You self extubated yourself and have been able to breath normally on your own. You had CT imaging showing edema concerning for growing mets to your brain. You have been stable so far. You will need to take an antisiezure medication called Keppra to prevent further seizures. You will be on this medication indefinetly. You will be taking 1000mg two times a day to prevent seisures. You will need to followup with your neurologist to monitor your symptoms and ensure you remain stable.      SECONDARY DISCHARGE DIAGNOSES  Diagnosis: Migraines  Assessment and Plan of Treatment: You had migraines. You were taking imitrex at home, PLEASE DO NOT RESUME THIS MEDICATION AS IT CAN INCREASE YOUR RISK OF SEIZURES. You will be discharged on Magnesium oxide which can also help with your migraine symptoms.    Diagnosis: Urinary tract infection due to ESBL Klebsiella  Assessment and Plan of Treatment: You were found to have a certain type of bacteria in your urine, you received antibiotics for the infection. Your symptoms improved and you were taken off antibiotics. You no longer had fevers and your vitals were stable. PRINCIPAL DISCHARGE DIAGNOSIS  Diagnosis: Status epilepticus  Assessment and Plan of Treatment: You came in for seizures in the ED, you were in the MICU for 2 days requring intubation. You self extubated yourself and have been able to breath normally on your own. You had CT imaging showing edema concerning for growing mets to your brain. You have been stable so far. You will need to take an antisiezure medication called Keppra to prevent further seizures. You will be on this medication indefinetly. You will be taking 1000mg two times a day to prevent seisures. You will need to followup with your neurologist to monitor your symptoms and ensure you remain stable.  MRI: IMPRESSION:  Status post right occipital craniotomy for resection of a lesion in the right occipital lobe. There is a small amount of nonenhancing hemorrhage within the surgical cavity, decreased since prior exam. No new abnormal enhancement to suggest new metastatic lesion. Chronic changes as above.        SECONDARY DISCHARGE DIAGNOSES  Diagnosis: Migraines  Assessment and Plan of Treatment: You had migraines. You were taking imitrex at home, PLEASE DO NOT RESUME THIS MEDICATION AS IT CAN INCREASE YOUR RISK OF SEIZURES. You will be discharged on Magnesium oxide which can also help with your migraine symptoms.    Diagnosis: Urinary tract infection due to ESBL Klebsiella  Assessment and Plan of Treatment: You were found to have a certain type of bacteria in your urine, you received antibiotics for the infection. Your symptoms improved and you were taken off antibiotics. You no longer had fevers and your vitals were stable.

## 2020-08-30 NOTE — PROGRESS NOTE ADULT - SUBJECTIVE AND OBJECTIVE BOX
Patient is a 65y old  Female who presents with a chief complaint of seizures (29 Aug 2020 12:44)      SUBJECTIVE / OVERNIGHT EVENTS: No acute events overnight. Afebrile off antibiotics.     MEDICATIONS  (STANDING):  ALPRAZolam 0.25 milliGRAM(s) Oral at bedtime  levETIRAcetam  IVPB 1000 milliGRAM(s) IV Intermittent every 12 hours  magnesium oxide 400 milliGRAM(s) Oral daily  pantoprazole    Tablet 40 milliGRAM(s) Oral before breakfast  sertraline 150 milliGRAM(s) Oral at bedtime    MEDICATIONS  (PRN):  acetaminophen   Tablet .. 650 milliGRAM(s) Oral every 6 hours PRN Temp greater or equal to 38C (100.4F), Mild Pain (1 - 3)  metoclopramide Injectable 10 milliGRAM(s) IV Push every 8 hours PRN Severe migraine headache      Vital Signs Last 24 Hrs  T(C): 37.2 (29 Aug 2020 21:09), Max: 37.2 (29 Aug 2020 21:09)  T(F): 99 (29 Aug 2020 21:09), Max: 99 (29 Aug 2020 21:09)  HR: 78 (29 Aug 2020 21:09) (75 - 78)  BP: 149/78 (29 Aug 2020 21:09) (126/73 - 149/78)  BP(mean): --  RR: 18 (29 Aug 2020 21:09) (18 - 18)  SpO2: 95% (29 Aug 2020 21:09) (93% - 95%)  CAPILLARY BLOOD GLUCOSE        I&O's Summary      PHYSICAL EXAM:  Gen: Alert, NAD  HEENT: NCAT, conjunctiva clear, sclera anicteric, mmm  Neck: Supple  CV: RRR, S1S2, no m/r/g  Resp: CTAB, normal respiratory effort  Abd: Soft, nontender, nondistended, normal bowel sounds  Ext: no edema, no clubbing or cyanosis  Neuro: Alert, appropriate speech, CN2-12 grossly intact, AGUILLON  SKIN: warm, perfused    LABS:                        12.6   9.15  )-----------( 244      ( 30 Aug 2020 05:55 )             36.2     08-30    144  |  109<H>  |  11  ----------------------------<  92  3.3<L>   |  24  |  1.15    Ca    8.9      30 Aug 2020 05:55  Phos  3.1     08-30  Mg     1.6     08-30    TPro  6.0  /  Alb  3.5  /  TBili  0.3  /  DBili  x   /  AST  7   /  ALT  23  /  AlkPhos  55  08-30

## 2020-08-30 NOTE — PROGRESS NOTE ADULT - PROBLEM SELECTOR PLAN 1
Resolved. Likely secondary to new brain metastasis vs. evolving hemorrhage at site of last brain met resection vs. poor compliance to prophylactic antiepileptic.  - Brain MRI ordered, MRI safety and Contrast checklist done and in chart.  - c/w keppra 1g BID for ppx  - Neurology following, appreciate recs  - passed bedside swallow; dysphagia-soft diet with thin liquids

## 2020-08-30 NOTE — PROGRESS NOTE ADULT - ASSESSMENT
65F with hx of L lung adenocarcinoma s/p resection followed by adjuvant chemo with metastasis to the brain s/p R occipital craniotomy of hemorrhagic brain lesion 3/15/2020, migraine, and COPD admitted for status epilepticus, likely secondary to new brain mets vs. non-compliance with ppx keppra.  Intubated for airway protection after vomiting in ED, transferred to MICU.  EEG negative for seizure activity.  Self extubated in MICU, transferred to medicine.

## 2020-08-30 NOTE — DISCHARGE NOTE PROVIDER - NSDCFUSCHEDAPPT_GEN_ALL_CORE_FT
DANIELLE ANTONIO ; 09/22/2020 ; [facility]RA [facility]  DANIELLE ANTONIO ; 09/24/2020 ; CHITRA Rad  [facility]d [facility] DANIELLE ANTONIO ; 09/15/2020 ; NPP NeuroSurg [facility]  DANIELLE ANTONIO ; 09/22/2020 ; NPP [facility]  DANIELLE ANTONIO ; 09/24/2020 ; NPP Rad  [facility]  DANIELLE ANTONIO ; 10/01/2020 ; NPP Neurology [address] [facility]

## 2020-08-30 NOTE — PROGRESS NOTE ADULT - PROBLEM SELECTOR PROBLEM 5
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

## 2020-08-30 NOTE — PROGRESS NOTE ADULT - PROBLEM SELECTOR PLAN 4
Pt reports she is on sumatriptan (imitrex) at home, unsure of dosage 100-125mg  - Per neuro, hold sumatriptan may lower seizure threshold. Hold.  - Magnesium and Reglan for migraine headaches

## 2020-08-30 NOTE — DISCHARGE NOTE NURSING/CASE MANAGEMENT/SOCIAL WORK - PATIENT PORTAL LINK FT
You can access the FollowMyHealth Patient Portal offered by [facility] by registering at the following website: [email or web address]. By joining [facility]’s FollowMyHealth portal, you will also be able to view your health information using other applications (apps) compatible with our system.

## 2020-08-30 NOTE — PROGRESS NOTE ADULT - ATTENDING COMMENTS
Pt seen and examined, case d/w house staff.    Status epilepticus: likely due to new mets or possible compliance issue with Keppra, expedite MRI today, if no acute findings, pt can be discharged home today. Currently on iv keppra 1 gm bid, clarify po dose w/ neuro.     ESBL E coli UTI: BCx neg, was on ertapenem, but can potentially lower seizure threshold, ID consulted, d/c'd abx, pt denies urinary symptoms    HERACLIO: likely due to vancomycin and sepsis, d/c'd vanco, resolved, creat 1.1     Metastatic Lung Cancer: mets to brain, MRI brain, outpt f/u with Onc  dispo: d/c home today if no acute findings on MRI brain  Time spent on discharge 35 minutes coordinating discharge plan and discussing with patient and family.

## 2020-08-30 NOTE — DISCHARGE NOTE PROVIDER - CARE PROVIDER_API CALL
Melita Pugh  HEMATOLOGY  [address] [place] [address] [id number]  Phone: [phone]  Fax: [phone]  Established Patient  Follow Up Time: 1 week

## 2020-08-30 NOTE — DISCHARGE NOTE PROVIDER - NSDCMRMEDTOKEN_GEN_ALL_CORE_FT
acetaminophen 325 mg oral tablet: 2 tab(s) orally every 6 hours, As needed, Mild Pain (1 - 3)  albuterol 90 mcg/inh inhalation aerosol: 2 puff(s) inhaled every 6 hours, As needed, Shortness of Breath and/or Wheezing  ALPRAZolam 0.5 mg oral tablet: 1 tab(s) orally every 12 hours, As needed, anxiety  ARIPiprazole 2 mg oral tablet: 1 tab(s) orally once a day  dexamethasone 2 mg oral tablet: 2 tab(s) orally q8 hours x 2 days then take 1 tab PO q8 hours x 2 days, then take 1 tab PO q 12  x 2 days, then 1 tab daily x 2 days  levETIRAcetam 500 mg oral tablet: 1 tab(s) orally every 12 hours  oxyCODONE 5 mg oral tablet: 1-2 tab(s) orally every 4 hours, As needed, Moderate Pain (4 - 6) to severe pain (7-10) MDD:8 tabs  pantoprazole 40 mg oral delayed release tablet: 1 tab(s) orally once a day (before a meal)  sertraline 50 mg oral tablet: 3 tab(s) orally once a day  tiotropium 18 mcg inhalation capsule: 1 cap(s) inhaled once a day acetaminophen 325 mg oral tablet: 2 tab(s) orally every 6 hours, As needed, Mild Pain (1 - 3)  albuterol 90 mcg/inh inhalation aerosol: 2 puff(s) inhaled every 6 hours, As needed, Shortness of Breath and/or Wheezing  ALPRAZolam 0.5 mg oral tablet: 1 tab(s) orally every 12 hours, As needed, anxiety  ARIPiprazole 2 mg oral tablet: 1 tab(s) orally once a day  Keppra 1000 mg oral tablet: 1 tab(s) orally 2 times a day   magnesium oxide 400 mg (241.3 mg elemental magnesium) oral tablet: 1 tab(s) orally once a day, for migraine headaches   oxyCODONE 5 mg oral tablet: 1-2 tab(s) orally every 4 hours, As needed, Moderate Pain (4 - 6) to severe pain (7-10) MDD:8 tabs  pantoprazole 40 mg oral delayed release tablet: 1 tab(s) orally once a day (before a meal)  sertraline 50 mg oral tablet: 3 tab(s) orally once a day  tiotropium 18 mcg inhalation capsule: 1 cap(s) inhaled once a day

## 2020-08-30 NOTE — DISCHARGE NOTE PROVIDER - HOSPITAL COURSE
HPI:    Natalie Andrea is a 65 year old female with a past medical history of COPD, Migraine, Depression, Iron deficiency anemia, Stage II Lung Adenocarcinoma (diagnosed in 2016) s/p resection followed by adjuvant chemotherapy with cisplatin and pemetrexed x 4 cycles completed in June 2016 (treated at [facility]) presented to the ED after having episodes of suspected but unwitnessed seizures. Collateral history was obtained from the daughter, Janessa [phone]. The patient's first and only reported seizure as per the daughter was in March 2020 when the patient was witnessed to be lethargic and not answering questions without any reported focal shaking, bizarre activities, or reported prodromal symptoms. On this current episode, the suspected seizures were unwitnessed but the patient was at a store with workers stating that she was acting bizarrely but the daughter could not elaborate. The patient subsequently came home and was found in the bathroom by the daughter who stated that the patient was answering questions and following commands but appeared tired, confused, and not always making sense with her statements. She was brought to the ED, where suspected seizures continued described as twitching of the face, gaze deviation to the L, and whole body shaking. She had received 1mg of Ativan and continued having episodes while she was in the CT scanner. Was given 1g of Keppra and continued to have episodes and was subsequently intubated after not returning to baseline and having vomiting episodes which compromised the patient's airway.  As per the daughter, the patient was compliant with her Keppra but she was uncertain as she did not visit her often due to COVID. Additionally, the daughter states that the patient had just recently started to take Abilify in the last week.        Hospital Course:    65F with hx of L lung adenocarcinoma s/p resection followed by adjuvant chemo with metastasis to the brain s/p R occipital craniotomy of hemorrhagic brain lesion 3/15/2020, migraine, and COPD admitted to MICU for AMS with 3 episodes of seizures within the ED with concern for status epilepticus. Pt sedated and intubated in ED for airway protection in setting of seizures and 1 episode of NBNB emesis. 8/26, in MICU, patient was monitored for seizure activity by EEG, and remained on Keppra 1g BID for prophylaxis. no epileptiform pattern or seizure was seen throughout stay. Patient's BP was maintained with 0.05 levophed and was gradually weaned off. Urine cultures positive for ESBL E. coli, and vancomycin and zosyn were continued for empiric treatment of aspiration PNA in the setting of emesis and seizures. Propofol gradually weaned off, transitioned to Precedex. Patient self-extubated on 8/27, is off propofol and Precedex. Keppra continued for ppx. Neuro cleared patient for transfer to floor, will need MRI once on floors.  Admitted to floors, transitioned from zosyn to ertapenem given ESBL E coli. Brain MRI ordered to assess new tumor burden.  Migraine headaches managed with Magnesium and Reglan as home sumatriptan would lower seizure threshold.  D/c'd ertapenem after 2 doses as it would also decrease seizure threshold.  ID consulted: monitor off abx as UA showed low WBC. HPI:    Natalie Andrea is a 65 year old female with a past medical history of COPD, Migraine, Depression, Iron deficiency anemia, Stage II Lung Adenocarcinoma (diagnosed in 2016) s/p resection followed by adjuvant chemotherapy with cisplatin and pemetrexed x 4 cycles completed in June 2016 (treated at [email or web address] presented to the ED after having episodes of suspected but unwitnessed seizures. Collateral history was obtained from the daughter, Janessa [phone]. The patient's first and only reported seizure as per the daughter was in March 2020 when the patient was witnessed to be lethargic and not answering questions without any reported focal shaking, bizarre activities, or reported prodromal symptoms. On this current episode, the suspected seizures were unwitnessed but the patient was at a store with workers stating that she was acting bizarrely but the daughter could not elaborate. The patient subsequently came home and was found in the bathroom by the daughter who stated that the patient was answering questions and following commands but appeared tired, confused, and not always making sense with her statements. She was brought to the ED, where suspected seizures continued described as twitching of the face, gaze deviation to the L, and whole body shaking. She had received 1mg of Ativan and continued having episodes while she was in the CT scanner. Was given 1g of Keppra and continued to have episodes and was subsequently intubated after not returning to baseline and having vomiting episodes which compromised the patient's airway.  As per the daughter, the patient was compliant with her Keppra but she was uncertain as she did not visit her often due to COVID. Additionally, the daughter states that the patient had just recently started to take Abilify in the last week.        Hospital Course:    65F with hx of L lung adenocarcinoma s/p resection followed by adjuvant chemo with metastasis to the brain s/p R occipital craniotomy of hemorrhagic brain lesion 3/15/2020, migraine, and COPD admitted to MICU for AMS with 3 episodes of seizures within the ED with concern for status epilepticus. Pt sedated and intubated in ED for airway protection in setting of seizures and 1 episode of NBNB emesis. 8/26, in MICU, patient was monitored for seizure activity by EEG, and remained on Keppra 1g BID for prophylaxis. no epileptiform pattern or seizure was seen throughout stay. Patient's BP was maintained with 0.05 levophed and was gradually weaned off. Urine cultures positive for ESBL E. coli, and vancomycin and zosyn were continued for empiric treatment of aspiration PNA in the setting of emesis and seizures. Propofol gradually weaned off, transitioned to Precedex. Patient self-extubated on 8/27, is off propofol and Precedex. Keppra continued for ppx. Neuro cleared patient for transfer to floor, will need MRI once on floors.  Admitted to floors, transitioned from zosyn to ertapenem given ESBL E coli. Brain MRI shows no new brain enhancement concerning for new mets. Migraine headaches managed with Magnesium and Reglan as home sumatriptan would lower seizure threshold.  D/c'd ertapenem after 2 doses as it would also decrease seizure threshold.  ID consulted: monitor off abx as UA showed low WBC.

## 2020-08-30 NOTE — PROGRESS NOTE ADULT - PROBLEM SELECTOR PLAN 6
DVT ppx: SCD's given possible evolving hemorrhage in site of last brain met resection

## 2020-08-31 NOTE — PHYSICAL EXAM
She was informed to stop buspar and take zoloft 75 mg, higher dose    [Restricted in physically strenuous activity but ambulatory and able to carry out work of a light or sedentary nature] : Status 1- Restricted in physically strenuous activity but ambulatory and able to carry out work of a light or sedentary nature, e.g., light house work, office work [Thin] : thin [Normal] : affect appropriate

## 2020-09-02 NOTE — HISTORY OF PRESENT ILLNESS
[FreeTextEntry1] : Mrs. Andrea is a 65 yo woman who presented t in 2/2016 with TORRES and a chest CT showed a left lower lobe lesion - PET/CT confirmed this to be abnormal and she underwent a left upper lobectomy on 3/4/16 by Dr. Hernández - adenocarcinoma of the lung.\par \par She was initially treated with 4 cycles of Cisplatin and Pemetrexed which completed in June, 2016. She was well until March, 2020 when she presented to the ER with headache which had been preced by a sensation of flashing lights. An MRI dated 3/14/2020 showed a right parietal cortical solitary heterogeneously enhancing mass measuring 2.2 x 1.6 x 2.5 cm. This was resected by Dr. Jose Manuel Servin on 3/15/2020 - pathology was consistent with metastatic adenocarcinoma of lung origin. PD-L1 - positive 30%.\par \par She was seen by Dr. Rosado who recommended Gamma Knife Radiosurgery. She underwent GKS on 4/13/2020. Dr. Pugh started her on Pembrolizumab on 4/23. It is unclear when she stopped the Keppra. Her depression had worsened and she was prescribed Abilify.\par \par Last week, she was at the mall and a  called her daughter to say that her mother seemed confused - she picked her up and brought her to the ER at [facility] where she was noted to have a few seizures characterized as eyes deviating to the left followed by whole body shaking - she was intubated for airway protection and ultimately pulled that out on her own. A repeat MRI of the brain on 8/30 was without new disease. She was found to have a UTI while there.\par \par She is now taking Keppra 1000 mg bid. Her daughter requested an emergent evaluation because she felt that her mother's behavior was again out of the ordinary.

## 2020-09-02 NOTE — PHYSICAL EXAM
[FreeTextEntry1] : Patient is awake and alert - irritable and confrontational.\par Tardive movement of mouth noted.\par Oriented and fluent.\par She is able to follow commands\par EOMI\par Face is symmetric\par Left VF defect\par No drift\par Strong throughout and ambulating independently.

## 2020-09-02 NOTE — DISCUSSION/SUMMARY
[FreeTextEntry1] : In summary, this is a 64 yo woman with a h/o metastatic adenoca of the lung to the brain - solitary lesion s/p resection and gamma knife.\par S/p seizure - had not been on AED and did have UTI.\par Now on Keppra 1000 mg bid\par Denies any h/o previous psychiatric illness - appears to have tardive dyskinesia mouth.\par Have asked daughter to supervise medication.\par Psychiatric follow up.\par See me in one month.

## 2020-09-03 NOTE — ED PROVIDER NOTE - NS ED ROS FT
GENERAL: no fever, chills, fatigue, weight loss, night sweats  HEENT: no eye pain, discharge, conjunctivitis, ear pain, hearing loss, rhinorrhea, congestion, throat pain  CARDIAC: no chest pain, palpitations, lightheadedness, syncope  PULM: no dyspnea, wheezing  GI: no abdominal pain, nausea, vomiting, diarrhea, constipation, melena, hematochezia  : no urinary dysuria, frequency, incontinence, hematuria  NEURO: + altered mental status  MSK: no joint pain, joint swelling, myalgias  SKIN: no rashes  HEME: no active bleeding, excessive bruising

## 2020-09-03 NOTE — CONSULT NOTE ADULT - ASSESSMENT
65F with hx of L lung adenocarcinoma s/p resection followed by adjuvant chemo with metastasis to the brain s/p R occipital craniotomy of hemorrhagic brain lesion 3/15/2020, migraine, COPD, and depression p/w AMS. Patient was recently admitted to [facility] with the same issue and was found to have a new onset seizure activity.  Oncology is consulted for given h/o metastatic lung adenocarcinoma.    # Metastatic lung adenocarcinoma.  -s/p resection of stage II lung cancer followed by adjuvant chemo with cisplatin and pemetrexed x 4 cycles which completed in June of 2016.   -Recurred with brain mets 3/2020. 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   -Pt was started on immunotherapy with pembrolizumab. Last dose 7/16/2020  -Her course c/b new onset seizure requiring intubation and ICU care.  -Now presenting with another episode of confusion, unclear etiology. Differential includes seizure, metabolic encephalopathy from infection, and medication side effects.  -Infectious w/u as indicated, given recent h/o ESBL UTI. Currently pt is afebrile and other vitals are also stable.  -CT head from today is negative for acute pathology. Recent MRI from 8/30 showed a small amount of nonenhancing hemorrhage within the surgical cavity, decreased since prior exam. No new abnormal enhancement to suggest new metastatic lesion. Please consult neuro-onc(under neurology service) for further evaluation; Dr. Jeri Schaefer.  -There are no plans for inpatient systemic therapy.  -Primary oncologist: Dr. Pugh at [facility].          Maria C Ríos MD  PGY 5, Oncology/Hematology fellow  (P) [phone]  After 5pm, please contact on-call team.

## 2020-09-03 NOTE — ED ADULT NURSE REASSESSMENT NOTE - NS ED NURSE REASSESS COMMENT FT1
Pt re-updated on POC. Pt given a chicken salad sandwich, tea, ginger ale, and cranberry juice. Pt is complaining of increasing and decreasing confusion, increasing lack of coordination and intermittent visual changes. Pt complaining of headache after COVID swab and requesting medication. MD Lomas made aware. Pt resting in bed comfortably eating food.

## 2020-09-03 NOTE — H&P ADULT - NSHPLABSRESULTS_GEN_ALL_CORE
Labs, imaging and EKG tracing personally reviewed 12.7   10.28 )-----------( 395      ( 04 Sep 2020 08:31 )             37.7   09-04    138  |  101  |  18  ----------------------------<  104<H>  3.2<L>   |  22  |  1.23    Ca    9.2      04 Sep 2020 05:17  Phos  3.3     09-03  Mg     1.9     09-03    TPro  x   /  Alb  4.0  /  TBili  x   /  DBili  x   /  AST  x   /  ALT  x   /  AlkPhos  x   09-04    Thyroid Stimulating Hormone, Serum: 82.80 uIU/mL (09.04.20 @ 00:22)    MR Head w/wo IV Cont (08.30.20 @ 12:45) Status post right occipital craniotomy for resection of a lesion in the right occipital lobe. There is a small amount of nonenhancing hemorrhage within the surgical cavity, decreased since prior exam. No new abnormal enhancement to suggest new metastatic lesion.

## 2020-09-03 NOTE — ED PROVIDER NOTE - PROGRESS NOTE DETAILS
Tobias MORGAN: Neurology has been consulted and will evaluate patient JENNIE Lomas MD: Pt signed out pending CTH results and infectious workup. No acute findings seen. Given pt's h/o metastatic CA with brain mets, recent seizures, AMS, and continued AMS, will admit for further w/u.

## 2020-09-03 NOTE — H&P ADULT - ASSESSMENT
65 F Hx COPD, anxiety/depression, migraine, met lung adeno ca to brain s/p resection currently on pembrolizumab, recent hosp with intubation for seizure and UTI p/w intermittent confusion, disorientation and visual changes (intermittent left visual loss) a/w concern for recurrent 65 F Hx COPD, anxiety/depression, migraine, met lung adeno ca to brain s/p resection currently on pembrolizumab, recent hosp with intubation for seizure and UTI p/w intermittent confusion, disorientation and visual changes (intermittent left visual loss) a/w concern for recurrent seizure and hypothyroidism due to immunotherapy. 65 LHF h/o metastatic lung adenoca with R. parieto-occipital met s/p resection & then SRS on immunotherapy with recent seizures p/w worsening disorientation, visual illusions of [place] syndrome, disorientation and anxiety.     IMPRESSION: Worsening AMS manifest as periodic disorientation, visual distortions and anxiety of multi-factorial etiology including hypothyroidism (TSH 82.80), UTI and possibly subclinical seizures.      AMS:  [] 24hr vEEG; seizure and fall precautions.   [] Will consider trial dose of decadron to see if PT improves in event PT has worsening edema (though recent scan stable)  [] Switch from Keppra 1000 BID to  BID given depression and mood issues.   []B12, B6, Folate, methylmalonic acid, homocysteine.   [] Send VPA level as well as CMP including albumin in AM. Check NH3   [] Psychiatry evaluation; re depression and poor reaction to abilify  [] Neuro checks q4h    UTI  [] CTX , urine cx     Hypothyroidism.    [] Check Free T4, T3,   [] Start synthroid 50 mcg daily-->Endo consult. 65 LH-F with h/o metastatic lung adenocarcinoma with R. parieto-occipital met s/p resection, SRS and immunotherapy with recently diagnosed new seizures p/w worsening disorientation, visual illusions, disorientation and anxiety.  Worsening AMS manifest likely multi-factorial etiology including hypothyroidism (TSH 82.80), UTI and possibly subclinical seizures.  Low suspicion for worsening of underlying metastatic CA in brain given recent stable MRI on 08/30, but could should still evaluate as possibility.        Plan   [] 24hr vEEG; seizure and fall precautions.   [] Will consider trial dose of decadron to see if PT improves in event PT has worsening edema (though recent scan stable)  [] Switch from Keppra 1000 BID to  BID given depression and mood issues.   []B12, B6, Folate, methylmalonic acid, homocysteine.   [] Send VPA level as well as CMP including albumin in AM. Check NH3   [] Psychiatry evaluation; re depression and poor reaction to Abilify  [] Neuro checks q4h  [] Check Free T4, T3,   [] Start synthroid-->Endo consult, appreciate input.

## 2020-09-03 NOTE — H&P ADULT - PROBLEM SELECTOR PLAN 7
stopped Abilify, since she suspected all these symptoms started after initiating Abilify, and maybe related to it.  She is very worried about weight gain  - will continue with Zoloft 150mg qd

## 2020-09-03 NOTE — H&P ADULT - NSHPSOCIALHISTORY_GEN_ALL_CORE
lives with lives with a room mate, , works as a teacher, former 30 pack year tobacco use, social alcohol use

## 2020-09-03 NOTE — H&P ADULT - PROBLEM SELECTOR PLAN 6
Patient is still using Imitrex although instructed not to  - Neuro f/u regarding Migraine control Patient is still using Imitrex although instructed not to.    Will address migraines and discuss prophylactic measure

## 2020-09-03 NOTE — ED PROVIDER NOTE - CLINICAL SUMMARY MEDICAL DECISION MAKING FREE TEXT BOX
Clem: brain tumor, resection, recent sz and hospitalization for sz. now with vision changes, confusion, ?hallucinations. no fever, no cough. will check for causes.

## 2020-09-03 NOTE — H&P ADULT - ADDITIONAL PE
T(F): 98.3 (03 Sep 2020 22:53), Max: 98.5 (03 Sep 2020 18:19)  HR: 71 (03 Sep 2020 22:53) (61 - 71)  BP: 123/83 (03 Sep 2020 22:53) (123/83 - 162/98)  RR: 18 - 22  SpO2: 94% - 96%

## 2020-09-03 NOTE — H&P ADULT - PROBLEM SELECTOR PLAN 3
- Neuro eval appreciated  - will check 24 hr EEG to r/o subclinical seizure  - will change Keppra to VPA due to depression

## 2020-09-03 NOTE — ED ADULT NURSE NOTE - OBJECTIVE STATEMENT
64 yo f c/o of confusion. recently admitted at [facility] for Seizures. C/o of worsening confusion, and vision changes from Left eye onset few days ago and symptoms have been intermittent. Pt denies any other neurological deficits. No Cp dizziness or weakness. IV line placed labs drawn and sent. Provider at bedside evaluating.

## 2020-09-03 NOTE — CONSULT NOTE ADULT - SUBJECTIVE AND OBJECTIVE BOX
Oncology Consult Note    HPI: 65F with hx of L lung adenocarcinoma s/p resection followed by adjuvant chemo with metastasis to the brain s/p R occipital craniotomy of hemorrhagic brain lesion 3/15/2020, migraine, COPD, and depression p/w AMS. Patient was recently admitted to [facility] and was discharged 4 days ago. At [facility] ED, she had a total of 3 seizures in which she had left eye deviation. She was given 1000mg Keppra load and during the 3rd episode, she was given 2mg IV ativan. Pt was also noted to have 1 episode of NBNB emesis, after which she was given succinylcholine, etomodiate, intubated, and then started on a propofol drip. Later on, pt was noted to have arm stiffening and received 4mg versed and 100mg fentanyl. Pt self extubated on , recovered at [facility], and was started treatement for ESBL UTI.    Onc hx:   s/p resection of stage II lung cancer followed by adjuvant chemo with cisplatin and pemetrexed x 4 cycles which completed in 2016. However, she presented to ED with headache and eye pain on 20. MRI brain noted heterogeneous enhancing lesion involving the right parietal cortical and subcortical region measuring approximately 2.2 x 1.6 x 2.5 cm. She underwent right occipital craniotomy for resection of right occipital hemorrhagic brain lesion on 03/15/20 by Dr. Servin; path consistent with metastatic adenocarcinoma from lung. PD-L1 30%, BRCA 2 mut (unclear if germline or somatic), KRAS G12C amongst others. Pt was started on immunotherapy with pembrolizumab in 2020.        Allergies    No Known Allergies    Intolerances        MEDICATIONS  (STANDING):  levETIRAcetam  IVPB 1000 milliGRAM(s) IV Intermittent once    MEDICATIONS  (PRN):      PAST MEDICAL & SURGICAL HISTORY:  History of antineoplastic chemotherapy  Metastatic adenocarcinoma to brain  Iron deficiency anemia: underwent endoscopy and colonoscopy, reportedly fine  Lung cancer  Migraine  COPD (chronic obstructive pulmonary disease)  History of craniotomy  S/P lobectomy of lung  Benign tumor: left   S/P :       FAMILY HISTORY:  No pertinent family history in first degree relatives      SOCIAL HISTORY: No EtOH, no tobacco    REVIEW OF SYSTEMS:    CONSTITUTIONAL: No weakness, fevers or chills  EYES/ENT: No visual changes;  No vertigo or throat pain   NECK: No pain or stiffness  RESPIRATORY: No cough, wheezing, hemoptysis; No shortness of breath  CARDIOVASCULAR: No chest pain or palpitations  GASTROINTESTINAL: No abdominal or epigastric pain. No nausea, vomiting.  GENITOURINARY: No dysuria, frequency or hematuria  NEUROLOGICAL: No numbness or weakness  SKIN: No itching, burning, rashes, or lesions   All other review of systems is negative unless indicated above.    Height (cm): 165.1 ( @ :49)  Weight (kg): 59 (:49)  BMI (kg/m2): 21.6 (:49)  BSA (m2): 1.65 (:49)    T(F): 97.9 (20 @ :49), Max: 97.9 (20 @ :49)  HR: 71 (20 @ :49)  BP: 142/81 (20 @ :49)  RR: 18 (20 @ :49)  SpO2: 95% (20:49)  Wt(kg): --    Physical Exam  GENERAL: NAD, well-developed  HEAD: Atraumatic, Normocephalic  EYES: EOMI, conjunctiva and sclera clear  NECK: Supple  CHEST/LUNG: Clear to auscultation bilaterally; coarse breathing sound B/L  HEART: Regular rate and rhythm; S1S2 present  ABDOMEN: Soft, Nontender, Nondistended; Bowel sounds present  EXTREMITIES:  2+ Peripheral Pulses, No clubbing, cyanosis, or edema  NEUROLOGY: non-focal. AAOx2-3 however confused and seems to have visual hallucination.  SKIN: No rashes or lesions                          12.5   9.05  )-----------( 332      ( 03 Sep 2020 16:52 )             38.0       -    139  |  102  |  17  ----------------------------<  89  3.6   |  22  |  1.14    Ca    9.3      03 Sep 2020 16:52  Phos  3.3     -  Mg     1.9         TPro  6.5  /  Alb  3.8  /  TBili  0.4  /  DBili  x   /  AST  37  /  ALT  29  /  AlkPhos  65        Magnesium, Serum: 1.9 mg/dL ( @ 14:31)  Phosphorus Level, Serum: 3.3 mg/dL ( @ 14:31)    < from: CT Head No Cont (20 @ 17:27) >  IMPRESSION:  No evidence of acute transcortical infarct, acute intracranial hemorrhage, or mass effect.    < end of copied text >    < from: MR Head w/wo IV Cont (20 @ 12:45) >  IMPRESSION:    Status post right occipital craniotomy for resection of a lesion in the right occipital lobe. There is a small amount of nonenhancing hemorrhage within the surgical cavity, decreased since prior exam. No new abnormal enhancement to suggest new metastatic lesion. Chronic changes as above.    < end of copied text >

## 2020-09-03 NOTE — ED ADULT NURSE NOTE - NSIMPLEMENTINTERV_GEN_ALL_ED
Implemented All Fall Risk Interventions:  [place] to call system. Call bell, personal items and telephone within reach. Instruct patient to call for assistance. Room bathroom lighting operational. Non-slip footwear when patient is off stretcher. Physically safe environment: no spills, clutter or unnecessary equipment. Stretcher in lowest position, wheels locked, appropriate side rails in place. Provide visual cue, wrist band, yellow gown, etc. Monitor gait and stability. Monitor for mental status changes and reorient to person, place, and time. Review medications for side effects contributing to fall risk. Reinforce activity limits and safety measures with patient and family.

## 2020-09-03 NOTE — H&P ADULT - PROBLEM SELECTOR PLAN 1
possibly due to immunotherapy, intermittent confusion, disorientation, but hemodynamically stable otherwise  - will start with Synthroid 100mg po daily for now, d/w Endo (Dr. Lynch overnight)  - will check free T4/TFT  - Endo f/u in am

## 2020-09-03 NOTE — ED ADULT TRIAGE NOTE - CHIEF COMPLAINT QUOTE
recent d/c from [facility], was intubated r/t new onset seizure activity  now p/w AMS, acting differently, worsening confusion  on immunotherapy not chemo at this time

## 2020-09-03 NOTE — H&P ADULT - ATTENDING COMMENTS
NEURO-ONCOLOGY: [phone]    CC: LUNG CANCER, BRAIN METS, SEIZURES, AND VISUAL SYMPTOMATOLOGY    PATIENT SEEN & EXAMINED WITH RESIDENTS ON 9/3/2020  [facility] RECORDS REVIEWED  ALLSCRIPTS RECORDS REVIEWED  LABS REVIEWED  IMAGING REVIEWED     66 yo LH woman with PMHx lung adenocarcinoma (EGFR and ALK negative) with right occipital brain met, now with seizures and odd visual phenomenon.  ONCOLOGIC HISTORY  FEB 2016 - Her LLL lung cancer was found when she presented with TORRES.  3/4/2016 – s/p resection (Edgar), s/p Cisplatin + Alimpta/Pemetrexed (completed June 2016). She was offered clinical trial with nivolumab and declined.  MARCH 2020 – Workup for flashing lights revealed solitary right occipital lobe metastasis  3/15/2020 – s/p resection (Malathi), PATH demonstrated PDL1 positivity and KRAS mutation. Also tumor itself had BRCA2 mutation.  4/13/2020 – s/p SRS (Alonzo)  4/23/2020 – started pembrolizumab on q6week schedule.  8/13/2020 – last saw Oncology (Keaton) with unremarkable CT-PET  8/26/2020 – s/p seizure leading to intubation and admission to [facility] ? Keppra [phone] started.  9/2/2020 – Saw Neuro-oncology (Silvano) urgently for abnormal behavior  On evaluation today, she complains of difficulty with vision, with objects appearing large or small and absolutely not normal. It is distressing to her. She also reports trouble driving in the past, before the brain tumor resection with seeing normally on the left hemiworld (she is left handed).  There are no complaints of headache, nausea/vomiting, other visual changes like flashing lights or any signs of increased ICP. No recent seizures, nor any seizure-like symptomatology. There are no other cranial nerve complaints: no double-vision, no blurry vision, no facial asymmetry, no trouble swallowing, no hearing loss.  EXAMINATION  CONSTITUTIONAL	Vitals as documented in [employer] chart		  	Healthy appearing, well-groomed, and without deformities.  MENTAL STATUS	Awake and oriented to person, place and date/time  	Attention span & concentration are mostly intact, but she appears to wholly neglect the left shola-world and does not notice when I remove my suit coat so that the right half of my torso (her left half) is out of the suit jacket while the right side remains normally attired.  	Speech – normal naming, repetition, and comprehension.  	Recent and remote memory.  	Fund of knowledge, including current events; Provides full history.  CRANIAL NERVES	II: Visual fields with left homonymous hemianopsia intermittently.   	III, IV, and VI: PERRLA. EOMs are normal.  	V: Facial sensation is normal bilaterally  	VII: Facial strength is normal bilaterally.  	VIII: Hearing is intact.  	IX, X: Palate is midline and gag intact.  	XI: SCM and trapezius have normal strength bilaterally.  	XII: Tongue is midline and there is no atrophy or fasciculations.  MOTOR	Muscle tone: no evidence of rigidity or resistance. No drift.  Muscle strength: arms and legs, proximal and distal, flexors and extensors are normal. No atrophy or fasciculations.  SENSATION	Normal in arms, legs, and trunk.  COORDINATION	Rapid alternating movements are normal in the upper and lower extremities. Finger/nose & heel/knee/shin are normal bilaterally.  REFLEXES	All present and normal at biceps, triceps, and brachioradialis bilaterally. Knees and ankle jerks are normal bilaterally as well. Toes are downgoing.  GAIT	Normal, including heels, toes and tandem.  CARDIOVASCULAR	No peripheral edema.  HEENT	Atraumatic.  IMAGING  i personally and independently reviewed available MR and CT imaging, for the following interpretation: The NEUROimaging reviewed is dated  3/14/2020	6/23/2020	9/3/2020 (ct)	    In reviewing these images, I find A RESECTED RIGHT OCCIPITAL LESION WITH SURROUNDING HYPODENSITY TO SUGGEST EITHER TUMOR REGROWTH OR CEREBRAL EDEMA. The MR imaging demonstrates what is probably a gross total resection of the metastases, although some resectional enhancement remains.  IMPRESSION/PLAN  SEIZURES – Although she has not had rocael seizures, the intermittent visual phenomenon suggest focal sensory spells from the region treated with surgery and SRS. Will check video EEG to establish whether intermittent spells are contributing to her visual dysfunction. Will change to Depakote, given depression.  CEREBRAL EDEMA – None on imaging, will avoid decadron for now.  VISUAL DYSFUNCTION – She does not need to have seizures in order to have impairment of vision. The metastasis, the surgery, and the RT could all have damaged sensory integration cortex and be leading to these difficult to manage visual illusions. This is well described in the literature. It may persist despite AEDs and without epileptiform discharges.  COORDINATION OF CARE – I discussed with the patient the nature of their disease and its attendant symptoms. I reviewed what new symptoms may develop and the available treatment plans. After answering their questions, we discussed expected outcomes and prognosis.   DISPO – As this condition continues to pose significant immediate risk for morbidity and mortality, I instructed the patient to call [phone] for further, continued outpatient care with Dr Schaefer.    TOTAL TIME SPENT WAS 96 MINUTES, OF WHICH HALF IN COUNSELLING AND COORDINATION OF CARE.

## 2020-09-03 NOTE — ED ADULT NURSE REASSESSMENT NOTE - NS ED NURSE REASSESS COMMENT FT1
Report received from ANTONY Tinsley. Pt is resting comfortably in bed asking for updates. Pt has been updated repeatedly by ANTONY Tinsley on POC. Pt A&Ox4. Pt informed of POC and awaiting results. Pt is complaining of vision changes but they have since resolved. Pt has no complaints at this time. Pt safety maintained.

## 2020-09-03 NOTE — H&P ADULT - NSHPPHYSICALEXAM_GEN_ALL_CORE
Vital Signs Last 24 Hrs  T(C): 36.7 (04 Sep 2020 08:29), Max: 36.9 (03 Sep 2020 18:19)  T(F): 98.1 (04 Sep 2020 08:29), Max: 98.5 (03 Sep 2020 18:19)  HR: 58 (04 Sep 2020 08:29) (58 - 71)  BP: 116/77 (04 Sep 2020 08:29) (109/69 - 162/98)  BP(mean): 119 (03 Sep 2020 19:20) (119 - 119)  RR: 18 (04 Sep 2020 08:29) (18 - 22)  SpO2: 95% (04 Sep 2020 08:29) (94% - 96%)    Mental Status: Awake, nervous affect, inattentive, perseverative.  Oriented oriented to person, place, situation, time.  Follows all commands but often needs repeated instruction.   Language: Speech is clear, fluent with preserved naming, repetition and comprehension.    Cranial Nerves: PERRLA (R = 3mm, L = 3mm).  LHH. EOMI no nystagmus. V1-3 intact to light touch.  No facial asymmetry b/l, full eye closure strength b/l. Hearing grossly normal to conversation.  Symmetric palate elevation in midline.  Head turning & shoulder shrug intact b/l. Tongue midline, normal movements, no atrophy.  Motor: Normal muscle bulk & tone. No noticeable tremor, myoclonus.  Possible slight RUE pronator drift, otherwise 5/5 strength throughout.   Sensation: Symmetric B/L preserved sensation to light touch, pin prick, position.    Cortical: No extinction on double simultaneous touch and no signs of neglect.   Reflexes: 2/4 throughout.  Plantar Responses are downgoing B/L.   Coordination: Intact rapid-alternating movements. No dysmetria on finger-to-nose or heel-to-shin.    Gait: Normal stance, stride length, touch off, arm swing and rosa m. Normal Romberg. No postural instability. Vital Signs Last 24 Hrs  T(C): 36.7 (04 Sep 2020 08:29), Max: 36.9 (03 Sep 2020 18:19)  T(F): 98.1 (04 Sep 2020 08:29), Max: 98.5 (03 Sep 2020 18:19)  HR: 58 (04 Sep 2020 08:29) (58 - 71)  BP: 116/77 (04 Sep 2020 08:29) (109/69 - 162/98)  BP(mean): 119 (03 Sep 2020 19:20) (119 - 119)  RR: 18 (04 Sep 2020 08:29) (18 - 22)  SpO2: 95% (04 Sep 2020 08:29) (94% - 96%)    Mental Status: Awake, nervous affect, inattentive, perseverative.  Oriented to person, place, situation, time.  Follows all commands but often needs repeated instruction.   Language: Speech is clear, fluent with preserved naming, repetition and comprehension.    Cranial Nerves: PERRLA (R = 3mm, L = 3mm).  LHH. EOMI no nystagmus. V1-3 intact to light touch.  No facial asymmetry b/l, full eye closure strength b/l. Hearing grossly normal to conversation.  Symmetric palate elevation in midline.  Head turning & shoulder shrug intact b/l. Tongue midline, normal movements, no atrophy.  Motor: Normal muscle bulk & tone. No noticeable tremor, myoclonus.  Possible slight RUE pronator drift, otherwise 5/5 strength throughout.   Sensation: Symmetric B/L preserved sensation to light touch, pin prick, position.    Cortical: No extinction on double simultaneous touch and no signs of neglect.   Reflexes: 2/4 throughout.  Plantar Responses are downgoing B/L.   Coordination: Intact rapid-alternating movements. No dysmetria on finger-to-nose or heel-to-shin.    Gait: Normal stance, stride length, touch off, arm swing and rosa m. Normal Romberg. No postural instability.

## 2020-09-03 NOTE — ED ADULT NURSE NOTE - CHPI ED NUR SYMPTOMS NEG
no numbness/no loss of consciousness/no change in level of consciousness/no blurred vision/no vomiting/no weakness/no nausea

## 2020-09-03 NOTE — ED ADULT NURSE NOTE - CHIEF COMPLAINT QUOTE
recent d/c from [facility], was intubated r/t new onset seizure activity  now p/w AMS, acting differently, worsening confusion  on immunotherapy not chemo at this time

## 2020-09-03 NOTE — H&P ADULT - PROBLEM SELECTOR PLAN 2
likely multifactorial - hypothyroid, subclinical seizure, r/o other metabolic or infectious causes, CTH stable likely multifactorial - hypothyroid, subclinical seizure, r/o other metabolic or infectious causes, CTH stable  - UA+, but recently treated ESBL UTI at [facility], difficult to eval if patient is symptomatic or not (due to tangential thoughts, repeating frequently "Can you give me tea").  Afebrile, no leukocytosis,  Will observe off Abx for now.  f/u UCx and ID eval

## 2020-09-03 NOTE — ED PROVIDER NOTE - OBJECTIVE STATEMENT
65F with hx of COPD, lung adenocarcinoma s/p lung resection with mets to the brain s/p resection on immunotherapy, seizure presenting with 1 episode of seizure last week, admitted to [facility] s/p intubation presenting with auditory hallucinations, difficulty seeing from left eye, confusion and disorientation that comes and goes. Patient's daughter says patient is repeating herself a lot. Let her cat out of the house. Went to wrong floor and thought she was living there. Normally alert and oriented at baseline.

## 2020-09-03 NOTE — H&P ADULT - NSICDXFAMILYHX_GEN_ALL_CORE_FT
FAMILY HISTORY:  No pertinent family history in first degree relatives FAMILY HISTORY:  FH: diabetes mellitus, father   FH: kidney disease, mother

## 2020-09-03 NOTE — ED PROVIDER NOTE - PHYSICAL EXAMINATION
GENERAL: non-toxic appearing, in NAD  HEAD: atraumatic, normocephalic  EYES: vision grossly intact, no conjunctivitis or discharge  EARS: hearing grossly intact  NOSE: no nasal discharge, epistaxis   CARDIAC: RRR, normal S1S2,  no appreciable murmurs, no cyanosis, cap refill < 2 seconds  PULM: no respiratory distress, oxygen saturation on RA wnl, CTAB, no crackles, rales, rhonchi, or wheezing  GI: abdomen nondistended, soft, nontender, no guarding or rebound tenderness, no palpable masses  NEURO: awake and alert, follows commands, normal speech, PERRLA, visual acuity 20/40 bilaterally, EOMI, no focal motor or sensory deficits, normal gait  MSK: spine appears normal, no joint swelling or erythema, no gross deformities of extremities  EXT: no peripheral edema, calf tenderness, redness or swelling  SKIN: warm, dry, and intact, no rashes  PSYCH: pressured speech, anxious

## 2020-09-03 NOTE — H&P ADULT - HISTORY OF PRESENT ILLNESS
65F with hx of COPD, lung adenocarcinoma s/p lung resection with mets to the brain s/p resection on immunotherapy, seizure presenting with 1 episode of seizure last week, admitted to [facility] s/p intubation presenting with auditory hallucinations, difficulty seeing from left eye, confusion and disorientation that comes and goes. Patient's daughter says patient is repeating herself a lot. Let her cat out of the house. Went to wrong floor and thought she was living there. Normally alert and oriented at baseline. 65F with hx of COPD, lung adenocarcinoma s/p lung resection with mets to the brain s/p resection on immunotherapy, seizure presenting with 1 episode of seizure last week, admitted to [facility] s/p intubation presenting with auditory hallucinations, difficulty seeing from left eye, confusion and disorientation that comes and goes. Patient's daughter says patient is repeating herself a lot. Let her cat out of the house. Went to wrong floor and thought she was living there. Normally alert and oriented at baseline.    65F with hx of L lung adenocarcinoma s/p resection followed by adjuvant chemo with metastasis to the brain s/p R occipital craniotomy of hemorrhagic brain lesion 3/15/2020, migraine, COPD, and depression p/w AMS. Patient was recently admitted to [facility] and was discharged 4 days ago. At [facility] ED, she had a total of 3 seizures in which she had left eye deviation. She was given 1000mg Keppra load and during the 3rd episode, she was given 2mg IV ativan. Pt was also noted to have 1 episode of NBNB emesis, after which she was given succinylcholine, etomodiate, intubated, and then started on a propofol drip. Later on, pt was noted to have arm stiffening and received 4mg versed and 100mg fentanyl. Pt self extubated on 8/27, recovered at [facility], and was started treatement for ESBL UTI.    Onc hx:   s/p resection of stage II lung cancer followed by adjuvant chemo with cisplatin and pemetrexed x 4 cycles which completed in June of 2016. However, she presented to ED with headache and eye pain on 03/14/20. MRI brain noted heterogeneous enhancing lesion involving the right parietal cortical and subcortical region measuring approximately 2.2 x 1.6 x 2.5 cm. She underwent right occipital craniotomy for resection of right occipital hemorrhagic brain lesion on 03/15/20 by Dr. Servin; path consistent with metastatic adenocarcinoma from lung. PD-L1 30%, BRCA 2 mut (unclear if germline or somatic), KRAS G12C amongst others. Pt was started on immunotherapy with pembrolizumab in 4/2020. 65F with hx of COPD, L lung adenocarcinoma s/p resection followed by adj chemo w/ brain mets s/p R occipital craniotomy of hemorrhagic brain lesion 3/15/2020, migraine, seizure, depression, recent [facility] hospitalization requiring intubation for seizures and UTI now p/w auditory hallucinations, difficulty seeing from left eye, confusion and disorientation that comes and goes. Patient's daughter says patient is repeating herself a lot. Let her cat out of the house. Went to wrong floor and thought she was living there. Normally alert and oriented at baseline. 65F with hx of COPD, L lung adenocarcinoma s/p resection followed by adj chemo w/ brain mets s/p R occipital craniotomy of hemorrhagic brain lesion 3/15/2020, migraine, seizure, depression, recent [facility] hospitalization requiring intubation for seizures and UTI now p/w intermittent confusion, disorientation and visual changes. Patient's daughter says patient is repeating herself a lot. Let her cat out of the house. Went to wrong floor and thought she was living there. Normally alert and oriented at baseline. 65F with hx of COPD, L lung adenocarcinoma s/p resection followed by adj chemo w/ brain mets s/p R occipital craniotomy of hemorrhagic brain lesion 3/15/2020, migraine, seizure, depression, recent [facility] hospitalization requiring intubation for seizures and UTI now p/w intermittent confusion, disorientation and visual changes. Patient's daughter says patient is repeating herself a lot. Let her cat out of the house. Went to wrong floor and thought she was living there. Patient has been on immunotherapy with pembrolizumab in 4/2020. 65F with hx of COPD, L lung adenocarcinoma s/p resection followed by adj chemo w/ brain mets s/p R occipital craniotomy of hemorrhagic brain lesion 3/15/2020, migraine, seizure, depression, recent [facility] hospitalization requiring intubation for seizures and UTI now p/w intermittent confusion, disorientation and visual changes. Patient's daughter is concerned for recurrent seizure w/ possible post ictal state (daughter does not live with her), called [facility], instructed to come to ED.  Patient states "I feel like I am in a movie, I go in and out of this state where I am disoriented (repeating herself, going to the wrong floor of her appartment), seeing distorted objects/people".   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 Patient has been on immunotherapy with pembrolizumab since 4/2020.   Currently, c/o nausea which is aura for migraine.  She is very nervous and scared "Am I going to die". 65F with hx of COPD, L lung adenocarcinoma s/p resection followed by adj chemo w/ brain mets s/p R occipital craniotomy of hemorrhagic brain lesion 3/15/2020, migraine, seizure, depression, recent [facility] hospitalization requiring intubation for seizures and UTI now p/w intermittent confusion, disorientation and visual changes. Patient's daughter is concerned for recurrent seizure w/ possible post ictal state (daughter does not live with her), called [facility], instructed to come to ED.  Patient states "I feel like I am in a movie, I go in and out of this state where I am disoriented (repeating herself, going to the wrong floor of her appartment), seeing distorted objects/people".   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 Patient has been on immunotherapy with pembrolizumab since 4/2020 s/p gamma knife.   Currently, c/o nausea which is aura for migraine.  She is very nervous and scared "Am I going to die". 65F with hx of COPD, L lung adenocarcinoma s/p resection followed by adj chemo w/ brain mets s/p R occipital craniotomy of hemorrhagic brain lesion 3/15/2020, migraine, seizure, depression, recent [facility] hospitalization requiring intubation for seizures and UTI now p/w intermittent confusion, disorientation and visual changes. Patient's daughter is concerned for recurrent seizure w/ possible post ictal state (daughter does not live with her), called [facility], instructed to come to ED.  Patient states "I feel like I am in a movie or on LSD, I go in and out of this state where I am disoriented, and seeing distorted objects/people".   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 Patient has been on immunotherapy with pembrolizumab since 4/2020 s/p gamma knife.   Currently, c/o nausea which is aura for migraine.  She is very nervous and scared "Am I going to die". 65 LHF h/o COPD, migraines, depression, seizures and lung adenoca w/ R. occipitoparietal met s/p resection, SRS and CT p/w worsening AMS x 1 week.  AMS manifest as disorientation, frequent repetition of questions of where she is/what she is doing, visual illuisions of micropsia a/w macropsia and panic.    Was recently admitted to [facility] after being found wandering mall disoriented followed by seizure with L. eye deviation and subsequent GTC.  Admitted for concern of status epilepticus, found to have UTI, required intubation.  Was started on Keppra 1000mg BID, successfully extubated, weaned off sedation and discharged.      Followed up outpatient with first time apt w/ Dr. Schaefer on 09/02 due to persistent concern by daughter that PT Was still very confused and different from baseline.  Had MRI brain w/wo 08/30/20 which showed no worsening/new intracranial disease activity.  Was continued on KEppra 1000mg BID and referred for psychiatry eval due to ongoing depression.     On current admission found to also have UTI, hypothroidism and CT head stable without evidence of worsening vasogenic edema/acute new intracranial lesion.     Prior to 2 weeks ago, PT was A&Ox3 still working as teacher.      Initially Dx with CARLOTA adenocarcinoma 02/2016 after p/w TORRES.  CT chest demonstrated LLL carcinoma and PT had CARLOTA lobectomy 03/04/16.  Subsequent CT with 4 cycles of cisplatin and pamtrizumab.      Had been stable until 03/2020 when she presented with HA a/w flashing light aura and word finding difficulty.  MRI brain 03/15/20 showed R. parietal heterogenously enhancing mass 2.2 x 1.6 x 2.5 cm  now s/p resection 03/15/20.  Pathology came back as PD-L1 30% + metastatic adenocarcinoma of lung.  Subsquently had SRS 04/13/20 with Dr. Rosado and started on immunotherapy with pembrolizumab since 4/23/20, last infusion 07/2020. 65F h/o COPD, migraines, depression, seizures and lung adenocarcinoma w/ R. occipito-parietal met s/p resection, SRS and CT p/w worsening AMS x 1 week.  AMS manifest as disorientation, frequent repetition of questions of where she is/what she is doing, visual illusions of micropsia a/w macropsia and panic.  Was recently admitted to [facility] after being found wandering mall disoriented followed by seizure with L. eye deviation and subsequent GTC.  On admission was found to have a UTI and continued to have clinical seizures, was intubated for airway protection and admitted to MICU.  She was started on Keppra 1000mg BID, weaned off sedation, extubated, had improvement in her EEG and discharged. Had outpatient MRI brain w/wo 08/30/20 which showed no worsening/new intracranial disease activity.  Followed up outpatient with Dr. Schaefer on 09/02 due to persistent concern by daughter that PT was still very confused and different from baseline.  She was continued on Keppra 1000mg BID and referred for psychiatry eval due to ongoing depression. PT presenting now due to persistent symptoms of disorientation, worsening confusion over last several days as well as c/o visual illusions of macropsia and micropsia.  PT states she is having difficulty focusing, constantly asks same questions, is often disoriented to her location or situation.  On presentation, found to also have UTI, hypothyroidism and CT head stable without evidence of worsening vasogenic edema/acute new intracranial lesion. Per chart review, prior to 2 weeks ago, PT was A&Ox3 still working as teacher.      Cancer history:  Initially Dx with CARLOTA adenocarcinoma 02/2016 after p/w TORRES.  CT chest demonstrated LLL carcinoma and PT had CARLOTA lobectomy 03/04/16.  Subsequent adjuvent CT with 4 cycles of cisplatin and pemetrexed, completed in 06/2016.  She presented to ED with HA and eye pain 03/14/20.  MRI brain noted heterogeneous enhancing lesion involving the right parietal cortical and subcortical region measuring approximately 2.2 x 1.6 x 2.5 cm. She underwent right occipital craniotomy for resection of right occipital hemorrhagic brain lesion on 03/15/20 b Dr. Servin; path consistent with metastatic adenocarcinoma from lung. PD-L1 30%, BRCA 2 mut (unclear if germline or somatic), KRAS G12C amongst others. Pt was started on immunotherapy with pembrolizumab in 4/2020.  Had been stable until 03/2020 when she presented with HA a/w flashing light aura and word finding difficulty.  MRI brain 03/15/20 showed R. parietal heterogenously enhancing mass 2.2 x 1.6 x 2.5 cm  now s/p resection 03/15/20.  Pathology came back as PD-L1 30% + metastatic adenocarcinoma of lung.  Subsquently had SRS 04/13/20 with Dr. Rosado and started on immunotherapy with pembrolizumab since 4/23/20, last infusion 07/2020. 65F h/o COPD, migraines, depression, seizures and lung adenocarcinoma w/ R. occipito-parietal met s/p resection, SRS and CT p/w worsening AMS x 1 week.  AMS manifest as disorientation, frequent repetition of questions of where she is/what she is doing, visual illusions of micropsia a/w macropsia and panic.  Was recently admitted to [facility] after being found wandering mall disoriented followed by seizure with L. eye deviation and subsequent GTC.  On admission was found to have a UTI and continued to have clinical seizures, was intubated for airway protection and admitted to MICU.  She was started on Keppra 1000mg BID, weaned off sedation, extubated, had improvement in her EEG and discharged. Had outpatient MRI brain w/wo 08/30/20 which showed no worsening/new intracranial disease activity.  Followed up outpatient with Dr. Schaefer on 09/02 due to persistent concern by daughter that PT was still very confused and different from baseline.  She was continued on Keppra 1000mg BID and referred for psychiatry eval due to ongoing depression. PT presenting now due to persistent symptoms of disorientation, worsening confusion over last several days as well as c/o visual illusions of macropsia and micropsia.  PT states she is having difficulty focusing, constantly asks same questions, is often disoriented to her location or situation.  On presentation, found to also have UTI, hypothyroidism and CT head stable without evidence of worsening vasogenic edema/acute new intracranial lesion. Per chart review, prior to 2 weeks ago, PT was A&Ox3 still working as teacher.      Cancer history:  Initially Dx with CARLOTA adenocarcinoma 02/2016 after p/w TORRES.  CT chest demonstrated LLL carcinoma and PT had CARLOTA lobectomy 03/04/16.  Subsequent adjuvent CT with 4 cycles of cisplatin and pemetrexed, completed in 06/2016.  She presented to ED with HA and eye pain 03/14/20.  MRI brain showed heterogeneous 2.2 x 1.6x 2.5 cm enhancing lesion of R. parietal cortical & subcortical region.  Then had R. occipital craniectomy 03/15/20 by Dr. Servin.  Pathology c/w metastatic adenocarcinoma from lung which was PD-L! 30% +, BRCA 2 mutation (unclear germline or somatic) and KRAS G12C +.  Subsequently had SRS on 04/13/20 with Dr. Rosado and was started on pembrolizumab 04/23/20, last infusion 07/2020.

## 2020-09-04 NOTE — CHART NOTE - NSCHARTNOTEFT_GEN_A_CORE
65F with hx of COPD, L lung adenocarcinoma s/p resection followed by adj chemo w/ brain mets s/p R occipital craniotomy of hemorrhagic brain lesion 3/15/2020, gamma knife radiosurgery 04/13/20, migraine, seizure, depression, recent [facility] hospitalization requiring intubation for seizures and UTI now p/w intermittent confusion, disorientation and visual changes. Per primary team, daughter state's mental status is not at baseline. Per chart, patient's daughter says patient is repeating herself a lot. Let her cat out of the house. Went to wrong floor and thought she was living there. Patient has been on immunotherapy with pembrolizumab in 4/2020. Endocrine consulted for elevated TSH level of 82.8. No history of thyroid disease per primary team.  Vital signs normal. Per primary team, pt is AAOx3 but repeats herself a lot on exam. Labs reviewed: sodium normal, glucose normal.     Hypothyroidism possibly second pembrolizumab therapy as endocrinopathies is known adverse effect    Recommendations:  - start weight based levothyroxine 100 mcg this AM and continue qAM. Give on empty stomach at least 1/2 hr prior to food or other medications  - f/u free T4 (may adjust LT4 dose based on Free T4)    D/W Team    Formal consult today 65F with hx of COPD, L lung adenocarcinoma s/p resection followed by adj chemo w/ brain mets s/p R occipital craniotomy of hemorrhagic brain lesion 3/15/2020, gamma knife radiosurgery 04/13/20, migraine, seizure, depression, recent [facility] hospitalization requiring intubation for seizures and UTI now p/w intermittent confusion, disorientation and visual changes. Per primary team, daughter state's mental status is not at baseline. Per chart, patient's daughter says patient is repeating herself a lot. Let her cat out of the house. Went to wrong floor and thought she was living there. Patient has been on immunotherapy with pembrolizumab in 4/2020. Endocrine consulted for elevated TSH level of 82.8. No history of thyroid disease per primary team.  Vital signs normal. Per primary team, pt is AAOx3 but repeats herself a lot on exam. Labs reviewed: sodium normal, glucose normal.     Hypothyroidism possibly second pembrolizumab therapy as endocrinopathies is known adverse effect. Pt is not in Myxedema Coma.    Recommendations:  - start weight based levothyroxine 100 mcg this AM and continue qAM. Give on empty stomach at least 1/2 hr prior to food or other medications  - f/u free T4 (may adjust LT4 dose based on Free T4)    D/W Team    Formal consult today

## 2020-09-04 NOTE — DISCHARGE NOTE PROVIDER - PROVIDER TOKENS
PROVIDER:[TOKEN:[[id number]]],PROVIDER:[TOKEN:[[id number]]],PROVIDER:[TOKEN:[[id number]]]

## 2020-09-04 NOTE — CONSULT NOTE ADULT - ASSESSMENT
INCOMPLETE  Assessment:  65y LHF w/ ho metastatic lung adenocarcinoma w/ mets to brain s/p R. occipital craniotomy 03/15/20 with recent [facility] admission for suspected status epilepticus who p/w recurrent episodes of AMS/    IMPRESSION: Worsening AMS manifest as periodic disorientation, visual distortions and anxiety of unclear etiology but could be subclinical seizures vs. complex migraines with resultant [place] syndrome.     Plan  [] vEEG  [] Continue home AEDs  [] Further recs pending discussion with attending.     Jose Manuel Pompa, DO  PGY-3 Neurology Service INCOMPLETE  Assessment:  65y LHF w/ ho metastatic lung adenocarcinoma w/ mets to brain s/p R. occipital craniotomy 03/15/20 with recent [facility] admission for suspected status epilepticus who p/w recurrent episodes of AMS/    IMPRESSION: Worsening AMS manifest as periodic disorientation, visual distortions and anxiety of unclear etiology but could be subclinical seizures vs. complex migraines with resultant [place] syndrome.     Plan  [] 24hr vEEG; seizure and fall precautions.   [] Continue home AED of Keppra 1000mg BID   [] Psychiatry evaluation; re depression and poor reaction to abilify  [] Neuro checks q4h  [] Further recs pending discussion with attending.     Jose Manuel Pompa, DO  PGY-3 Neurology Service INCOMPLETE  Assessment:  65y LHF w/ ho metastatic lung adenocarcinoma w/ mets to brain s/p R. occipital craniotomy 03/15/20 with recent [facility] admission for suspected status epilepticus who p/w recurrent episodes of AMS/    IMPRESSION: Worsening AMS manifest as periodic disorientation, visual distortions and anxiety of multi-factorial etiology including hypothyroidism (TSH 82.80), UTI and possibly subclinical seizures.      Plan  [] 24hr vEEG; seizure and fall precautions.   [] Switch from Keppra 1000 BID to  BID given depression and mood issues.   [] Send VPA level as well as CMP including albumin in AM. Check NH3   [] Check Free T4, T3, B12, B6, Folate, methylmalonic acid, homocysteine.   [] Psychiatry evaluation; re depression and poor reaction to abilify  [] Neuro checks q4h  [] Further recs pending discussion with attending.     Jose Manuel Pompa, DO  PGY-3 Neurology Service

## 2020-09-04 NOTE — DISCHARGE NOTE PROVIDER - NSDCFUSCHEDAPPT_GEN_ALL_CORE_FT
DANIELLE ANTONIO ; 09/22/2020 ; N[facility]  DANIELLE ANTONIO ; 09/24/2020 ; NPP Rad  Opd Lkvl  DANIELLE ANTONIO ; 10/01/2020 ; NPP Neurology [facility] DANIELLE ANTONIO ; 09/22/2020 ; [facility]  DANIELLE ANTONIO ; 09/24/2020 ; NPP Rad  Opd Lkvl  DANIELLE ANTONIO ; 10/01/2020 ; NPP Neurology [address] [facility] DANIELLE ANTONIO ; 09/22/2020 ; [facility]  DANIELLE ANTONIO ; 09/24/2020 ; NPP Rad  Opd Lkvl  DANIELLE ANTONIO ; 10/01/2020 ; NPP Neurology [facility]

## 2020-09-04 NOTE — CHART NOTE - NSCHARTNOTEFT_GEN_A_CORE
Called Epilepsy Fellow overnight for reevaluation of EEG. Upon review, noted worsening of EEG findings and patient at risk for potential epileptic event. Per EMU fellow suggested Load Vimpat 250 mg IVPB STAT and then Vimpat 100 mg BID IVPB maintenance dose. EKG completed prior to giving Vimpat dosage AK interval within normal limits. Will obtain EKG after Vimpat load for reevaluation. Patient at neurological baseline per objective exam, however, subjectively complains of "confusion and fogginess." Will continue to monitor closely for change.     Vital Signs Last 24 Hrs  T(C): 36.9 (04 Sep 2020 19:55), Max: 36.9 (04 Sep 2020 19:55)  T(F): 98.5 (04 Sep 2020 19:55), Max: 98.5 (04 Sep 2020 19:55)  HR: 68 (04 Sep 2020 19:55) (58 - 71)  BP: 127/85 (04 Sep 2020 19:55) (109/69 - 142/83)  RR: 18 (04 Sep 2020 19:55) (17 - 18)  SpO2: 94% (04 Sep 2020 19:55) (94% - 96%)

## 2020-09-04 NOTE — CONSULT NOTE ADULT - ASSESSMENT
65 yr old F with COPD, migraines, depression, seizures and lung adenoca w/ R. occipitoparietal met s/p resection, SRS and CT p/w worsening AMS. Currently undergoig neurology evaluation. Endocrien consulted for elevated TSh 82. Free t4 is penidng. Cy has priamry hypothryodism as a reuslt of PD-1 inhibtor (pembrolizumb) use. 65 yr old F with COPD, migraines, depression, seizures and lung adenoca w/ R. occipitoparietal met s/p resection, SRS and CT p/w worsening AMS. Currently undergoig neurology evaluation. Endocrine consulted for elevated TSH 82. Free T4 is pending. Likely has primary hypothyroidism as a result of PD-1 inhibitor (pembrolizumab) use.

## 2020-09-04 NOTE — DISCHARGE NOTE PROVIDER - HOSPITAL COURSE
HPI: 65F h/o COPD, migraines, depression, seizures and lung adenocarcinoma w/ R. occipito-parietal met s/p resection, SRS and CT p/w worsening AMS x 1 week.  AMS manifest as disorientation, frequent repetition of questions of where she is/what she is doing, visual illusions of micropsia a/w macropsia and panic.  Was recently admitted to [facility] after being found wandering mall disoriented followed by seizure with L. eye deviation and subsequent GTC.  On admission was found to have a UTI and continued to have clinical seizures, was intubated for airway protection and admitted to MICU.  She was started on Keppra 1000mg BID, weaned off sedation, extubated, had improvement in her EEG and discharged. Had outpatient MRI brain w/wo 08/30/20 which showed no worsening/new intracranial disease activity.  Followed up outpatient with Dr. Schaefer on 09/02 due to persistent concern by daughter that PT was still very confused and different from baseline.  She was continued on Keppra 1000mg BID and referred for psychiatry eval due to ongoing depression. PT presenting now due to persistent symptoms of disorientation, worsening confusion over last several days as well as c/o visual illusions of macropsia and micropsia.  PT states she is having difficulty focusing, constantly asks same questions, is often disoriented to her location or situation.  On presentation, found to also have UTI, hypothyroidism and CT head stable without evidence of worsening vasogenic edema/acute new intracranial lesion. Per chart review, prior to 2 weeks ago, PT was A&Ox3 still working as teacher.          Cancer history:    Initially Dx with CARLOTA adenocarcinoma 02/2016 after p/w TORRES.  CT chest demonstrated LLL carcinoma and PT had CARLOTA lobectomy 03/04/16.  Subsequent adjuvent CT with 4 cycles of cisplatin and pemetrexed, completed in 06/2016.  She presented to ED with HA and eye pain 03/14/20.  MRI brain showed heterogeneous 2.2 x 1.6x 2.5 cm enhancing lesion of R. parietal cortical & subcortical region.  Then had R. occipital craniectomy 03/15/20 by Dr. Servin.  Pathology c/w metastatic adenocarcinoma from lung which was PD-L! 30% +, BRCA 2 mutation (unclear germline or somatic) and KRAS G12C +.  Subsequently had SRS on 04/13/20 with Dr. Rosado and was started on pembrolizumab 04/23/20, last infusion 07/2020.          Hospital Course: HPI: 65F h/o COPD, migraines, depression, seizures and lung adenocarcinoma w/ R. occipito-parietal met s/p resection, SRS and CT p/w worsening AMS x 1 week.  AMS manifest as disorientation, frequent repetition of questions of where she is/what she is doing, visual illusions of micropsia a/w macropsia and panic.  Was recently admitted to [facility] after being found wandering mall disoriented followed by seizure with L. eye deviation and subsequent GTC.  On admission was found to have a UTI and continued to have clinical seizures, was intubated for airway protection and admitted to MICU.  She was started on Keppra 1000mg BID, weaned off sedation, extubated, had improvement in her EEG and discharged. Had outpatient MRI brain w/wo 08/30/20 which showed no worsening/new intracranial disease activity.  Followed up outpatient with Dr. Schaefer on 09/02 due to persistent concern by daughter that PT was still very confused and different from baseline.  She was continued on Keppra 1000mg BID and referred for psychiatry eval due to ongoing depression.  On presentation, found to also have UTI, hypothyroidism and CT head stable without evidence of worsening vasogenic edema/acute new intracranial lesion. EEG showed evidence of focal status epilepticus w/ focus from R parietal region and patient was started on Depakote 750mg BID and Vimpat 100mg BID. MRI brain did not show any evidence of worsening mets or edema. She was found to have severely elevated TSH, likely in setting of chemotherapy, and was started on levothyroxine. She will need outpatient f/u with Endocrine in 4-6 weeks to repeat TFTs.     Patient was given prescription for Antibiotics for E coli cystitis for which she will continue as outpatient.     Patient was counseled not to drive for at least 6 months.         Neurologically stable for discharge home today.

## 2020-09-04 NOTE — EEG REPORT - NS EEG TEXT BOX
[facility]   COMPREHENSIVE EPILEPSY CENTER   REPORT OF ROUTINE VIDEO EEG     [facility]: [address] [place] [address] [address], [place] [address], Ph#: [phone]  [facility]: [id number], [place] [address], Ph#: [phone]  [facility]: [address] [place] [address], Ph#: [phone]    Patient Name: DANIELLE ANTONIO  Age and : 65y (53)  MRN #: [id number]  Location: [place] [address]  Referring Physician: Moises Carvalho    Study Date: 20    _____________________________________________________________  TECHNICAL INFORMATION    Placement and Labeling of Electrodes:  The EEG was performed utilizing 20 channels referential EEG connections (coronal over temporal over parasagittal montage) using all standard 10-20 electrode placements with EKG.  Recording was at a sampling rate of 256 samples per second per channel.  Time synchronized digital video recording was done simultaneously with EEG recording.  A low light infrared camera was used for low light recording.  Lokesh and seizure detection algorithms were utilized.    _____________________________________________________________  HISTORY    Patient is a 65y old  Female who presents with a chief complaint of confusion (04 Sep 2020 14:00)      PERTINENT MEDICATION:  LORazepam   Injectable 0.5 milliGRAM(s) IV Push once  valproate sodium IVPB 1750 milliGRAM(s) IV Intermittent once  valproate sodium IVPB 750 milliGRAM(s) IV Intermittent two times a day    _____________________________________________________________  STUDY INTERPRETATION    Findings: The background was continuous, spontaneously variable and reactive. No PDR seen    Background Slowing:  Diffuse theta and polymorphic delta slowing.    Sleep Background:  Drowsiness and stage II sleep transients were not recorded.    Other Non-Epileptiform Findings:  None were present.    Interictal Epileptiform Activity:   Continuous lateralized right parietal max P4 static 1.5 Hz discharges  (LPD)    Events:  clear seizure event seen on reeg at 12:34 right parietal LPDs are seen to increase in frequency to 2 hz and evolve to 3hz frequency before ecterographic offset at 12:36  Additional episode at 12:19 evolution of LPDs seen from 1 hz to 2 and then 3 Hz before electrographic offset at 12:21    Activation Procedures:   Hyperventilation was not performed.    Photic stimulation was not performed.   .     Artifacts:  Intermittent myogenic and movement artifacts were noted.    ECG:  The heart rate on single channel ECG was predominantly between 55-65 BPM.    _____________________________________________________________  EEG SUMMARY/CLASSIFICATION    Abnormal EEG in in an altered / a sedated patient.  - Right focal seizures evolving out of PLEDs seen at 12:36 and 12:19  - Continuous lateralized right parietal max P4 static 1.5 Hz discharges  (LPD)  - Moderate to severe generalized slowing.    _____________________________________________________________  EEG IMPRESSION/CLINICAL CORRELATE    Abnormal EEG study.  - Right focal seizures evolving out of PLEDs seen at 12:36 and 12:19  - Right Parietal Cortical hyperexcitability in the   - Moderate to severe nonspecific diffuse or multifocal cerebral dysfunction    Logan Corrales MD PGY-5  Epilepsy Fellow    This Preliminary report is based on fellow review. Final report pending attending review. [facility]   COMPREHENSIVE EPILEPSY CENTER   REPORT OF ROUTINE VIDEO EEG     [facility]: [address] [place] [address] [address], [place] [address], Ph#: [phone]  [facility]: [id number], [place] [address], Ph#: [phone]  [facility]: [address] [place] [address], Ph#: [phone]    Patient Name: DANIELLE ANTONIO  Age and : 65y (53)  MRN #: [id number]  Location: [place] [address]  Referring Physician: Moises Carvalho    Study Date: 20    _____________________________________________________________  TECHNICAL INFORMATION    Placement and Labeling of Electrodes:  The EEG was performed utilizing 20 channels referential EEG connections (coronal over temporal over parasagittal montage) using all standard 10-20 electrode placements with EKG.  Recording was at a sampling rate of 256 samples per second per channel.  Time synchronized digital video recording was done simultaneously with EEG recording.  A low light infrared camera was used for low light recording.  Lokesh and seizure detection algorithms were utilized.    _____________________________________________________________  HISTORY    Patient is a 65y old  Female who presents with a chief complaint of confusion (04 Sep 2020 14:00)      PERTINENT MEDICATION:  LORazepam   Injectable 0.5 milliGRAM(s) IV Push once  valproate sodium IVPB 1750 milliGRAM(s) IV Intermittent once  valproate sodium IVPB 750 milliGRAM(s) IV Intermittent two times a day    _____________________________________________________________  STUDY INTERPRETATION    Findings: The background was continuous, spontaneously variable and reactive. No PDR seen    Focal Slowing:  Continuous polymorphic delta/ theta slowing over the right posterior quadrant.     Background Slowing:  Moderate diffuse theta and polymorphic delta slowing.    Sleep Background:  Drowsiness and stage II sleep transients were not recorded.    Other Non-Epileptiform Findings:  None were present.    Interictal Epileptiform Activity:   Continuous lateralized right parietal max P4 static discharges at 1.5Hz  (LPD)    Events:  Two focal seizures at 12:34 and 12:19 when LPDs increase in frequency to 3hz with abrupt electrographic offset 2 minutes later.   No video was available for clinical correlate.    Activation Procedures:   Hyperventilation was not performed.    Photic stimulation was not performed.       Artifacts:  Intermittent myogenic and movement artifacts were noted.    ECG:  The heart rate on single channel ECG was predominantly between 55-65 BPM.    _____________________________________________________________  EEG SUMMARY/CLASSIFICATION    Abnormal EEG in in an altered / a sedated patient.  - Two focal right parietal seizures evolving out of PLEDs seen at 12:36 and 12:19 lasting 2 minutes  - Continuous lateralized right parietal (max P4) static 1.5 Hz discharges  (LPD)  - Continuous polymorphic delta/ theta slowing over the right posterior quadrant  - Moderate generalized slowing.    _____________________________________________________________  EEG IMPRESSION/CLINICAL CORRELATE    Abnormal EEG study.  - Right focal seizures evolving out of PLEDs seen at 12:36 and 12:19  - Right parietal cortical hyperexcitability   - Structural of functional cerebral dysfunction in the right posterior quadrant  - Moderate nonspecific diffuse or multifocal cerebral dysfunction    Findings discussed with neurology team.    Logan Corrales MD PGY-5  Epilepsy Fellow [facility]   COMPREHENSIVE EPILEPSY CENTER   REPORT OF ROUTINE VIDEO EEG     [facility]: [address] [place] [address] [address], [place] [address], Ph#: [phone]  [facility]: [id number], [place] [address], Ph#: [phone]  [facility]: [address] [place] [address], Ph#: [phone]    Patient Name: DANIELLE ANTONIO  Age and : 65y (53)  MRN #: [id number]  Location: [place] [address]  Referring Physician: Moises Carvalho    Study Date: 20    _____________________________________________________________  TECHNICAL INFORMATION    Placement and Labeling of Electrodes:  The EEG was performed utilizing 20 channels referential EEG connections (coronal over temporal over parasagittal montage) using all standard 10-20 electrode placements with EKG.  Recording was at a sampling rate of 256 samples per second per channel.  Time synchronized digital video recording was done simultaneously with EEG recording.  A low light infrared camera was used for low light recording.  Lokesh and seizure detection algorithms were utilized.    _____________________________________________________________  HISTORY    Patient is a 65y old  Female who presents with a chief complaint of confusion (04 Sep 2020 14:00)      PERTINENT MEDICATION:  LORazepam   Injectable 0.5 milliGRAM(s) IV Push once  valproate sodium IVPB 1750 milliGRAM(s) IV Intermittent once  valproate sodium IVPB 750 milliGRAM(s) IV Intermittent two times a day  Keppra 1g bid    _____________________________________________________________  STUDY INTERPRETATION    Findings: The background was continuous, spontaneously variable and reactive. No PDR seen    Focal Slowing:  Continuous polymorphic delta/ theta slowing over the right posterior quadrant.     Background Slowing:  Moderate diffuse theta and polymorphic delta slowing.    Sleep Background:  Drowsiness and stage II sleep transients were not recorded.    Other Non-Epileptiform Findings:  None were present.    Interictal Epileptiform Activity:   Continuous lateralized right parietal max P4 static discharges at 1.5Hz  (LPD)    Events:  Two focal seizures at 12:34 and 12:19 when LPDs increase in frequency to 3hz with abrupt electrographic offset 2 minutes later.   No video was available for clinical correlate.    Activation Procedures:   Hyperventilation was not performed.    Photic stimulation was not performed.       Artifacts:  Intermittent myogenic and movement artifacts were noted.    ECG:  The heart rate on single channel ECG was predominantly between 55-65 BPM.    _____________________________________________________________  EEG SUMMARY/CLASSIFICATION    Abnormal EEG in in an altered / a sedated patient.  - Two focal right parietal seizures evolving out of PLEDs seen at 12:36 and 12:19 lasting 2 minutes.   - Continuous lateralized right parietal (max P4) static 1.5 Hz discharges  (LPD)  - Continuous polymorphic delta/ theta slowing over the right posterior quadrant  - Moderate generalized slowing.    _____________________________________________________________  EEG IMPRESSION/CLINICAL CORRELATE    Abnormal EEG study.  - Two focal seizures evolving out of the right parietal LPDs seen at 12:36 and 12:19, lasting 2 minutes. No video was available to evaluate for possible clinical correlates. Overall in combination with the continuous LPDs this record is approaching a focal status epilepticus pattern.    - Structural of functional cerebral dysfunction in the right posterior quadrant  - Moderate nonspecific diffuse or multifocal cerebral dysfunction    Discussed with neurology team.    Logan Corrales MD PGY-5  Epilepsy Fellow

## 2020-09-04 NOTE — CONSULT NOTE ADULT - SUBJECTIVE AND OBJECTIVE BOX
HPI:  65 LHF h/o COPD, migraines, depression, seizures and lung adenoca w/ R. occipitoparietal met s/p resection, SRS and CT p/w worsening AMS x 1 week.  AMS manifest as disorientation, frequent repetition of questions of where she is/what she is doing, visual illuisions of micropsia a/w macropsia and panic.    Was recently admitted to [facility] after being found wandering mall disoriented followed by seizure with L. eye deviation and subsequent GTC.  Admitted for concern of status epilepticus, found to have UTI, required intubation.  Was started on Keppra 1000mg BID, successfully extubated, weaned off sedation and discharged.      Followed up outpatient with first time apt w/ Dr. Schaefer on  due to persistent concern by daughter that PT Was still very confused and different from baseline.  Had MRI brain w/wo 20 which showed no worsening/new intracranial disease activity.  Was continued on KEppra 1000mg BID and referred for psychiatry eval due to ongoing depression.     On current admission found to also have UTI, hypothroidism and CT head stable without evidence of worsening vasogenic edema/acute new intracranial lesion.     Prior to 2 weeks ago, PT was A&Ox3 still working as teacher.      Initially Dx with CARLOTA adenocarcinoma 2016 after p/w TORRES.  CT chest demonstrated LLL carcinoma and PT had CARLOTA lobectomy 16.  Subsequent CT with 4 cycles of cisplatin and pamtrizumab.      Had been stable until 2020 when she presented with HA a/w flashing light aura and word finding difficulty.  MRI brain 03/15/20 showed R. parietal heterogenously enhancing mass 2.2 x 1.6 x 2.5 cm  now s/p resection 03/15/20.  Pathology came back as PD-L1 30% + metastatic adenocarcinoma of lung.  Subsquently had SRS 20 with Dr. Rosado and started on immunotherapy with pembrolizumab since 20, last infusion 2020. (03 Sep 2020 23:52)      PAST MEDICAL & SURGICAL HISTORY:  History of antineoplastic chemotherapy  Metastatic adenocarcinoma to brain  Iron deficiency anemia: underwent endoscopy and colonoscopy, reportedly fine  Lung cancer  Migraine  COPD (chronic obstructive pulmonary disease)  History of craniotomy  S/P lobectomy of lung  Benign tumor: left   S/P :       FAMILY HISTORY:  FH: kidney disease: mother   FH: diabetes mellitus: father       Social History:    Outpatient Medications:    MEDICATIONS  (STANDING):  levothyroxine 100 MICROGram(s) Oral daily  pantoprazole    Tablet 40 milliGRAM(s) Oral before breakfast  sertraline 150 milliGRAM(s) Oral daily  tiotropium 18 MICROgram(s) Capsule 1 Capsule(s) Inhalation daily  valproate sodium IVPB 750 milliGRAM(s) IV Intermittent two times a day    MEDICATIONS  (PRN):  ALBUTerol    90 MICROgram(s) HFA Inhaler 2 Puff(s) Inhalation every 6 hours PRN Shortness of Breath and/or Wheezing  ALPRAZolam 0.5 milliGRAM(s) Oral daily PRN anxiety      Allergies    No Known Allergies    Intolerances      Review of Systems:  Constitutional: No fever  Eyes: No blurry vision  Neuro: No tremors  HEENT: No pain  Cardiovascular: No chest pain, palpitations  Respiratory: No SOB, no cough  GI: No nausea, vomiting, abdominal pain  : No dysuria  Skin: no rash  Psych: no depression  Endocrine: no polyuria, polydipsia  Hem/lymph: no swelling  Osteoporosis: no fractures    ALL OTHER SYSTEMS REVIEWED AND NEGATIVE    UNABLE TO OBTAIN    PHYSICAL EXAM:  VITALS: T(C): 36.5 (20 @ 09:06)  T(F): 97.7 (20 @ 09:06), Max: 98.5 (20 @ 18:19)  HR: 58 (20 @ 09:06) (58 - 71)  BP: 142/83 (20 @ 09:06) (109/69 - 162/98)  RR:  (17 - 22)  SpO2:  (94% - 96%)  Wt(kg): --  GENERAL: NAD, well-groomed, well-developed  EYES: No proptosis, no lid lag, anicteric  HEENT:  Atraumatic, Normocephalic, moist mucous membranes  THYROID: Normal size, no palpable nodules  RESPIRATORY: Clear to auscultation bilaterally; No rales, rhonchi, wheezing, or rubs  CARDIOVASCULAR: Regular rate and rhythm; No murmurs; no peripheral edema  GI: Soft, nontender, non distended, normal bowel sounds  SKIN: Dry, intact, No rashes or lesions  MUSCULOSKELETAL: Full range of motion, normal strength  NEURO: sensation intact, extraocular movements intact, no tremor, normal reflexes  PSYCH: Alert and oriented x 3, normal affect, normal mood  CUSHING'S SIGNS: no striae                              12.7   10.28 )-----------( 395      ( 04 Sep 2020 08:31 )             37.7           138  |  101  |  18  ----------------------------<  104<H>  3.2<L>   |  22  |  1.23    EGFR if : 53<L>  EGFR if non : 46<L>    Ca    9.2        Mg     1.9       Phos  3.3         TPro  x   /  Alb  4.0  /  TBili  x   /  DBili  x   /  AST  x   /  ALT  x   /  AlkPhos  x         Thyroid Function Tests:   @ 00:22 TSH 82.80 FreeT4 -- T3 -- Anti TPO -- Anti Thyroglobulin Ab -- TSI --              Radiology: HPI:  65 LHF h/o COPD, migraines, depression, seizures and lung adenoca w/ R. occipitoparietal met s/p resection, SRS and CT p/w worsening AMS x 1 week.  AMS manifest as disorientation, frequent repetition of questions of where she is/what she is doing, visual illuisions of micropsia a/w macropsia and panic.    Was recently admitted to [facility] after being found wandering mall disoriented followed by seizure with L. eye deviation and subsequent GTC.  Admitted for concern of status epilepticus, found to have UTI, required intubation.  Was started on Keppra 1000mg BID, successfully extubated, weaned off sedation and discharged.      Followed up outpatient with first time apt w/ Dr. Schaefer on  due to persistent concern by daughter that PT Was still very confused and different from baseline.  Had MRI brain w/wo 20 which showed no worsening/new intracranial disease activity.  Was continued on KEppra 1000mg BID and referred for psychiatry eval due to ongoing depression.     On current admission found to also have UTI, hypothroidism and CT head stable without evidence of worsening vasogenic edema/acute new intracranial lesion.     Prior to 2 weeks ago, PT was A&Ox3 still working as teacher.      Initially Dx with CARLOTA adenocarcinoma 2016 after p/w TORRES.  CT chest demonstrated LLL carcinoma and PT had CARLOTA lobectomy 16.  Subsequent CT with 4 cycles of cisplatin and pamtrizumab.      Had been stable until 2020 when she presented with HA a/w flashing light aura and word finding difficulty.  MRI brain 03/15/20 showed R. parietal heterogenously enhancing mass 2.2 x 1.6 x 2.5 cm  now s/p resection 03/15/20.  Pathology came back as PD-L1 30% + metastatic adenocarcinoma of lung.  Subsquently had SRS 20 with Dr. Rosado and started on immunotherapy with pembrolizumab since 20, last infusion 2020. (03 Sep 2020 23:52)      Endocirne History: 65 yr old F wiuth COPD, migraines, depression, seizures and lung adenoca w/ R. occipitoparietal met s/p resection, SRS and CT p/w worsening AMS. Patient is being treated with pembrolizumab (last dose 6 weeks ago). This is a PD-1 inhubntor assocaited with endocriniopathies. Endocrien cosnutled for elavted TSH 82. No hypotension, hypothermia, hypoglcyemai or hyponatremia. HR 50s-60s. Patient is awake and alert and oriented X 3. She compalins of disroreienation and havong visual and adustory distorations. No Hx of thyroid noduels. No lithium or amiodaroen. No dsypagahia or dysphnoia. No radiatoin or suergy to neck. No FH ofthrodi disease. Has fatgiue nut no cold intoalrence, constipation or weight gain. No signs/symptoms of AI. No ligtheadedness/nausea/vomiting or abdominal pain.       PAST MEDICAL & SURGICAL HISTORY:  History of antineoplastic chemotherapy  Metastatic adenocarcinoma to brain  Iron deficiency anemia: underwent endoscopy and colonoscopy, reportedly fine  Lung cancer  Migraine  COPD (chronic obstructive pulmonary disease)  History of craniotomy  S/P lobectomy of lung  Benign tumor: left   S/P :       FAMILY HISTORY:  FH: kidney disease: mother   FH: diabetes mellitus: father       Social History: Former smoker, works as teacher    Outpatient Medications:  · 	pantoprazole 40 mg oral delayed release tablet: Last Dose Taken:  , 1 tab(s) orally once a day (before a meal)  · 	tiotropium 18 mcg inhalation capsule: Last Dose Taken:  , 1 cap(s) inhaled once a day  · 	albuterol 90 mcg/inh inhalation aerosol: Last Dose Taken:  , 2 puff(s) inhaled every 6 hours, As needed, Shortness of Breath and/or Wheezing  · 	ALPRAZolam 0.5 mg oral tablet: Last Dose Taken:  , 1 tab(s) orally once a day, As Needed  · 	sertraline 50 mg oral tablet: Last Dose Taken:  , 3 tab(s) orally once a day  · 	Imitrex 100 mg oral tablet: Last Dose Taken:  , 1 tab(s) orally once, As Needed  · 	Keppra 1000 mg oral tablet: Last Dose Taken:  , 1 tab(s) orally 2 times a day    MEDICATIONS  (STANDING):  levothyroxine 100 MICROGram(s) Oral daily  pantoprazole    Tablet 40 milliGRAM(s) Oral before breakfast  sertraline 150 milliGRAM(s) Oral daily  tiotropium 18 MICROgram(s) Capsule 1 Capsule(s) Inhalation daily  valproate sodium IVPB 750 milliGRAM(s) IV Intermittent two times a day    MEDICATIONS  (PRN):  ALBUTerol    90 MICROgram(s) HFA Inhaler 2 Puff(s) Inhalation every 6 hours PRN Shortness of Breath and/or Wheezing  ALPRAZolam 0.5 milliGRAM(s) Oral daily PRN anxiety      Allergies    No Known Allergies    Intolerances      Review of Systems:  Constitutional: No fever  Eyes: No blurry vision  Neuro: + visual and auditory distortions  HEENT: No pain  Cardiovascular: No chest pain, palpitations  Respiratory: No SOB, no cough  GI: No nausea, vomiting, abdominal pain  : No dysuria  Skin: no rash  Psych: no depression  Endocrine: no polyuria, polydipsia      ALL OTHER SYSTEMS REVIEWED AND NEGATIVE        PHYSICAL EXAM:  VITALS: T(C): 36.5 (20 @ 09:06)  T(F): 97.7 (20 @ 09:06), Max: 98.5 (20 @ 18:19)  HR: 58 (20 @ 09:06) (58 - 71)  BP: 142/83 (20 @ 09:06) (109/69 - 162/98)  RR:  (17 - 22)  SpO2:  (94% - 96%)  Wt(kg): --  GENERAL: NAD, well-groomed, well-developed  EYES: No proptosis, no lid lag, anicteric  HEENT:  Atraumatic, Normocephalic, moist mucous membranes  THYROID: Normal size, no palpable nodules  RESPIRATORY: Clear to auscultation bilaterally; No rales, rhonchi, wheezing, or rubs  CARDIOVASCULAR: Regular rate and rhythm  GI: Soft, nontender, non distended, normal bowel sounds  SKIN: Dry, intact, No rashes or lesions  sPSYCH: Alert and oriented x 3, normal affect, normal mood                                12.7   10.28 )-----------( 395      ( 04 Sep 2020 08:31 )             37.7           138  |  101  |  18  ----------------------------<  104<H>  3.2<L>   |  22  |  1.23    EGFR if : 53<L>  EGFR if non : 46<L>    Ca    9.2        Mg     1.9       Phos  3.3         TPro  x   /  Alb  4.0  /  TBili  x   /  DBili  x   /  AST  x   /  ALT  x   /  AlkPhos  x         Thyroid Function Tests:   @ 00:22 TSH 82.80 FreeT4 -- T3 -- Anti TPO -- Anti Thyroglobulin Ab -- TSI -- HPI:  65 LHF h/o COPD, migraines, depression, seizures and lung adenoca w/ R. occipitoparietal met s/p resection, SRS and CT p/w worsening AMS x 1 week.  AMS manifest as disorientation, frequent repetition of questions of where she is/what she is doing, visual illuisions of micropsia a/w macropsia and panic.    Was recently admitted to [facility] after being found wandering mall disoriented followed by seizure with L. eye deviation and subsequent GTC.  Admitted for concern of status epilepticus, found to have UTI, required intubation.  Was started on Keppra 1000mg BID, successfully extubated, weaned off sedation and discharged.      Followed up outpatient with first time apt w/ Dr. Schaefer on  due to persistent concern by daughter that PT Was still very confused and different from baseline.  Had MRI brain w/wo 20 which showed no worsening/new intracranial disease activity.  Was continued on KEppra 1000mg BID and referred for psychiatry eval due to ongoing depression.     On current admission found to also have UTI, hypothroidism and CT head stable without evidence of worsening vasogenic edema/acute new intracranial lesion.     Prior to 2 weeks ago, PT was A&Ox3 still working as teacher.      Initially Dx with CARLOTA adenocarcinoma 2016 after p/w TORRES.  CT chest demonstrated LLL carcinoma and PT had CARLOTA lobectomy 16.  Subsequent CT with 4 cycles of cisplatin and pamtrizumab.      Had been stable until 2020 when she presented with HA a/w flashing light aura and word finding difficulty.  MRI brain 03/15/20 showed R. parietal heterogenously enhancing mass 2.2 x 1.6 x 2.5 cm  now s/p resection 03/15/20.  Pathology came back as PD-L1 30% + metastatic adenocarcinoma of lung.  Subsquently had SRS 20 with Dr. Rosado and started on immunotherapy with pembrolizumab since 20, last infusion 2020. (03 Sep 2020 23:52)      Endocirne History: 65 yr old F wiuth COPD, migraines, depression, seizures and lung adenoca w/ R. occipitoparietal met s/p resection, SRS and CT p/w worsening AMS. Patient is being treated with pembrolizumab (last dose 6 weeks ago). This is a PD-1 inhibitor associated with endocrinopathies. Endocrine consulted for elevated TSH 82. No hypotension, hypothermia, hypoglycemia or hyponatremia. HR 50s-60s. Patient is awake and alert and oriented X 3. She complains of disorientation and having visual and auditory distortions. No Hx of thyroid nodules. No lithium or amiodarone use.  No dysphagia or dysphonia. No radiation or surgery to neck. No FH of thyroid disease. Has fatigue but no cold intolerance, constipation or weight gain. No signs/symptoms of AI. No lightheadedness/nausea/vomiting or abdominal pain.       PAST MEDICAL & SURGICAL HISTORY:  History of antineoplastic chemotherapy  Metastatic adenocarcinoma to brain  Iron deficiency anemia: underwent endoscopy and colonoscopy, reportedly fine  Lung cancer  Migraine  COPD (chronic obstructive pulmonary disease)  History of craniotomy  S/P lobectomy of lung  Benign tumor: left   S/P :       FAMILY HISTORY:  FH: kidney disease: mother   FH: diabetes mellitus: father       Social History: Former smoker, works as teacher    Outpatient Medications:  · 	pantoprazole 40 mg oral delayed release tablet: Last Dose Taken:  , 1 tab(s) orally once a day (before a meal)  · 	tiotropium 18 mcg inhalation capsule: Last Dose Taken:  , 1 cap(s) inhaled once a day  · 	albuterol 90 mcg/inh inhalation aerosol: Last Dose Taken:  , 2 puff(s) inhaled every 6 hours, As needed, Shortness of Breath and/or Wheezing  · 	ALPRAZolam 0.5 mg oral tablet: Last Dose Taken:  , 1 tab(s) orally once a day, As Needed  · 	sertraline 50 mg oral tablet: Last Dose Taken:  , 3 tab(s) orally once a day  · 	Imitrex 100 mg oral tablet: Last Dose Taken:  , 1 tab(s) orally once, As Needed  · 	Keppra 1000 mg oral tablet: Last Dose Taken:  , 1 tab(s) orally 2 times a day    MEDICATIONS  (STANDING):  levothyroxine 100 MICROGram(s) Oral daily  pantoprazole    Tablet 40 milliGRAM(s) Oral before breakfast  sertraline 150 milliGRAM(s) Oral daily  tiotropium 18 MICROgram(s) Capsule 1 Capsule(s) Inhalation daily  valproate sodium IVPB 750 milliGRAM(s) IV Intermittent two times a day    MEDICATIONS  (PRN):  ALBUTerol    90 MICROgram(s) HFA Inhaler 2 Puff(s) Inhalation every 6 hours PRN Shortness of Breath and/or Wheezing  ALPRAZolam 0.5 milliGRAM(s) Oral daily PRN anxiety      Allergies    No Known Allergies    Intolerances      Review of Systems:  Constitutional: No fever  Eyes: No blurry vision  Neuro: + visual and auditory distortions  HEENT: No pain  Cardiovascular: No chest pain, palpitations  Respiratory: No SOB, no cough  GI: No nausea, vomiting, abdominal pain  : No dysuria  Skin: no rash  Psych: no depression  Endocrine: no polyuria, polydipsia      ALL OTHER SYSTEMS REVIEWED AND NEGATIVE        PHYSICAL EXAM:  VITALS: T(C): 36.5 (20 @ 09:06)  T(F): 97.7 (20 @ 09:06), Max: 98.5 (20 @ 18:19)  HR: 58 (20 @ 09:06) (58 - 71)  BP: 142/83 (20 @ 09:06) (109/69 - 162/98)  RR:  (17 - 22)  SpO2:  (94% - 96%)  Wt(kg): --  GENERAL: NAD, well-groomed, well-developed  EYES: No proptosis, no lid lag, anicteric  HEENT:  Atraumatic, Normocephalic, moist mucous membranes  THYROID: Normal size, no palpable nodules  RESPIRATORY: Clear to auscultation bilaterally; No rales, rhonchi, wheezing, or rubs  CARDIOVASCULAR: Regular rate and rhythm  GI: Soft, nontender, non distended, normal bowel sounds  SKIN: Dry, intact, No rashes or lesions  sPSYCH: Alert and oriented x 3, normal affect, normal mood                                12.7   10.28 )-----------( 395      ( 04 Sep 2020 08:31 )             37.7       09-04    138  |  101  |  18  ----------------------------<  104<H>  3.2<L>   |  22  |  1.23    EGFR if : 53<L>  EGFR if non : 46<L>    Ca    9.2      -  Mg     1.9       Phos  3.3         TPro  x   /  Alb  4.0  /  TBili  x   /  DBili  x   /  AST  x   /  ALT  x   /  AlkPhos  x         Thyroid Function Tests:   @ 00:22 TSH 82.80 FreeT4 -- T3 -- Anti TPO -- Anti Thyroglobulin Ab -- TSI --

## 2020-09-04 NOTE — DISCHARGE NOTE PROVIDER - NSDCMRMEDTOKEN_GEN_ALL_CORE_FT
albuterol 90 mcg/inh inhalation aerosol: 2 puff(s) inhaled every 6 hours, As needed, Shortness of Breath and/or Wheezing  ALPRAZolam 0.5 mg oral tablet: 1 tab(s) orally once a day, As Needed  Imitrex 100 mg oral tablet: 1 tab(s) orally once, As Needed  Keppra 1000 mg oral tablet: 1 tab(s) orally 2 times a day  pantoprazole 40 mg oral delayed release tablet: 1 tab(s) orally once a day (before a meal)  sertraline 50 mg oral tablet: 3 tab(s) orally once a day  tiotropium 18 mcg inhalation capsule: 1 cap(s) inhaled once a day albuterol 90 mcg/inh inhalation aerosol: 2 puff(s) inhaled every 6 hours, As needed, Shortness of Breath and/or Wheezing  ALPRAZolam 0.5 mg oral tablet: 1 tab(s) orally once a day, As Needed  Bactrim 400 mg-80 mg oral tablet: 2 tab(s) orally 2 times a day   Imitrex 100 mg oral tablet: 1 tab(s) orally once, As Needed  levothyroxine 100 mcg (0.1 mg) oral tablet: 1 tab(s) orally once a day  pantoprazole 40 mg oral delayed release tablet: 1 tab(s) orally once a day (before a meal)  sertraline 50 mg oral tablet: 3 tab(s) orally once a day  tiotropium 18 mcg inhalation capsule: 1 cap(s) inhaled once a day  valproic acid 250 mg oral capsule: 3 cap(s) orally 2 times a day

## 2020-09-04 NOTE — CONSULT NOTE ADULT - ATTENDING COMMENTS
Pt conversant and A + O x 3 but perseverates on certain words. Had stated decreased vision: half of visual field that she feels is better upon waking from nap. Currently seeing clearly and without any double vision. She has worsening confusion from baseline with normal electrolytes and no overt signs of infection. It is difficult to gauge from family or pt if she was taking Keppra or taking with her migraine medications that may have affected seizure thresh hold. CT of the head without any overt changes. Would see if neuro-oncology would feel any benefit of repeat MRI to see if any changes that would be consistent with immune therapy: rare incidences of optic neuritis / encephalitis. Ongoing evaluation of seizures and other metabolic causes of confusion.
Steffanie Penny (pager [phone])  On evenings and weekends, please call [phone] or page endocrine fellow on call.   Please note that this patient may be followed by different provider tomorrow. If no answer, contact endocrine fellow on call.

## 2020-09-04 NOTE — DISCHARGE NOTE PROVIDER - NSDCCPCAREPLAN_GEN_ALL_CORE_FT
PRINCIPAL DISCHARGE DIAGNOSIS  Diagnosis: Focal seizure  Assessment and Plan of Treatment: please take Depakote and Vimpat as prescribed. Please follow up outpatient with Dr. Schaefer

## 2020-09-04 NOTE — DISCHARGE NOTE PROVIDER - CARE PROVIDER_API CALL
Claudine Schaefer  NEUROLOGY  [facility], [address] [place] [address]  Phone: [phone]  Fax: [phone]  Follow Up Time:     Steffanie Penny  INTERNAL MEDICINE  [address] [place] [address]  Phone: [phone]  Fax: [phone]  Follow Up Time:     Melita Pugh  HEMATOLOGY  [address] [place], [place] [id number]  Phone: [phone]  Fax: [phone]  Follow Up Time:

## 2020-09-04 NOTE — CONSULT NOTE ADULT - PROBLEM SELECTOR RECOMMENDATION 9
-Follow up Free T4  -Contineu LT4 100mcg po daily-give on empty stpoamch in AM and wait 30 min before food or oterh medicaitons, give 4 hours aprat form MVI and calcium  -May need adjsutment of dose based on Free t4  -Will need repeat TFTs in 4-6 weeks and close endocrinology follow up   -She can f/u at [facility] [place] [phone] -Follow up Free T4  -Continue LT4 100mcg po daily-give on empty stomach in AM and wait 30 min before food or other medications, give 4 hours apart from MVI and calcium  -May need adjustment of dose based on Free T4  - Check Thyroperoxidase Ab  -Will need repeat TFTs in 4-6 weeks and close endocrinology follow up   -She can f/u at Endocrine Office [address] [place] [address] [place] [phone]

## 2020-09-04 NOTE — CONSULT NOTE ADULT - SUBJECTIVE AND OBJECTIVE BOX
NEUROLOGY CONSULT   HPI: 66y/o woman with a PMH of lung adenoca with mets to brain s/p resection and gamma knife who presented on 20 with worsening AMS.    Initially had presented in 2016 w/ TORRES.  CT chest demonstrated LLL carcinoma and PT had CARLOTA lobectomy 16.      Was doing well until 2020 when she presented to ED w/ HA with flashing light aura.  MRI brain 03/15/20 showed R. parietal heterogenously enhancing mass 2.2 x 1.6 x 2.5 cm  now s/p resection 03/15/20.  Pathology came back as PD-L1 30% + metastatic adenocarcinoma of lung.      Subsequently seen by Dr. Rosado and antoinette gamma knife radiosurgery 20.  Started on pembrolizumab .    At end of 2020, PT was at the mall and noted to be confused, roaming around.  She then had a witnessed seizure described as L. eye deviation with subsequent GTC.  Went to [facility] was intubated for airway protection, admitted to MICU for suspected status epilepticus.  PT had to be intubated, sedated and weaned off versed.  Repeat MRI brain  was without evidence of new disease.      Was successfully extubated and weaned off sedation and subsequently discharged on Keppra 1000 mg bid.  PT saw Dr. Schaefer, neuro-oncologist, on  as PT's daughter reported the PT was still acting bizarre.  At that time, plan was to continue Keppra 1000mg BID, have psychiatry eval and f/u in 1 month.     PT now re-presents for periodic confusion.  She describes disorientation, dizziness, distortion of images described as alternating between small and large.         ROS: A 10-system ROS was performed and is negative except for those items noted above.     PMH:  History of antineoplastic chemotherapy  Metastatic adenocarcinoma to brain  Iron deficiency anemia  History of iron deficiency  Lung cancer  Migraine  COPD (chronic obstructive pulmonary disease)      Home Medications:  acetaminophen 325 mg oral tablet: 2 tab(s) orally every 6 hours, As needed, Mild Pain (1 - 3) (26 Aug 2020 14:37)  albuterol 90 mcg/inh inhalation aerosol: 2 puff(s) inhaled every 6 hours, As needed, Shortness of Breath and/or Wheezing (26 Aug 2020 14:37)  ALPRAZolam 0.5 mg oral tablet: 1 tab(s) orally every 12 hours, As needed, anxiety (26 Aug 2020 14:37)  ARIPiprazole 2 mg oral tablet: 1 tab(s) orally once a day (26 Aug 2020 14:37)  sertraline 50 mg oral tablet: 3 tab(s) orally once a day (26 Aug 2020 14:37)  tiotropium 18 mcg inhalation capsule: 1 cap(s) inhaled once a day (26 Aug 2020 14:37)    MEDICATIONS  (STANDING):    MEDICATIONS  (PRN):      ALLERGIES: No Known Allergies      PSH:   History of craniotomy  S/P lobectomy of lung  Benign tumor  S/P       Social History:    FH:  No pertinent family history in first degree relatives      OBJECTIVE  Vital Signs Last 24 Hrs  T(C): 36.7 (04 Sep 2020 00:08), Max: 36.9 (03 Sep 2020 18:19)  T(F): 98.1 (04 Sep 2020 00:08), Max: 98.5 (03 Sep 2020 18:19)  HR: 61 (04 Sep 2020 00:08) (61 - 71)  BP: 125/75 (04 Sep 2020 00:08) (123/83 - 162/98)  BP(mean): 119 (03 Sep 2020 19:20) (119 - 119)  RR: 18 (04 Sep 2020 00:08) (18 - 22)  SpO2: 95% (04 Sep 2020 00:08) (94% - 96%)  I&O's Summary      PHYSICAL EXAM:  General: female appears stated age, in NAD  Cardiac: S1, S2 normal. RRR with regular pulse.  No murmurs, rubs or gallops.  Respiratory: CTA throughout with good air entry.  MSK: No erythema, tenderness or deformities.   Skin: No rashes, lesions or color changes.  Neurologic:  Mental Status: Awake, alert, oriented to person, place, situation, time. Normal affect. Follows all commands.    Language: Speech is clear, fluent with preserved naming, repetition and comprehension.      Cranial Nerves: PERRLA (R = 3mm, L = 3mm). Visual fields intact. EOMI no nystagmus. V1-3 intact to light touch.  No facial asymmetry b/l, full eye closure strength b/l. Hearing grossly normal to conversation.  Symmetric palate elevation in midline.  Head turning & shoulder shrug intact b/l. Tongue midline, normal movements, no atrophy.  Motor: Normal muscle bulk & tone. No noticeable tremor, myoclonus or pronator drift. ***/5 strength.	  Sensation: Symmetric B/L preserved sensation to light touch, pin prick, position.    Cortical: No extinction on double simultaneous touch and no signs of neglect.   Reflexes: ***/4.  Plantar Responses are ***.   Coordination: Intact rapid-alternating movements. No dysmetria on finger-to-nose or heel-to-shin.  No dysdiadodyskinesia  Gait: Normal stance, stride length, touch off, arm swing and rosa m.   Normal Romberg. No postural instability.     Psychiatric: Normal mood and congruent affect.     CBC:                        12.5   9.05  )-----------( 332      ( 03 Sep 2020 16:52 )             38.0       CMP:      139  |  102  |  17  ----------------------------<  89  3.6   |  22  |  1.14    Ca    9.3      03 Sep 2020 16:52  Phos  3.3     -  Mg     1.9     -    TPro  6.5  /  Alb  3.8  /  TBili  0.4  /  DBili  x   /  AST  37  /  ALT  29  /  AlkPhos  65  -03    LIVER FUNCTIONS - ( 03 Sep 2020 16:52 )  Alb: 3.8 g/dL / Pro: 6.5 g/dL / ALK PHOS: 65 U/L / ALT: 29 U/L / AST: 37 U/L / GGT: x             COAGS:      UA:  Urinalysis Basic - ( 03 Sep 2020 18:27 )    Color: Yellow / Appearance: Slightly Turbid / S.015 / pH: x  Gluc: x / Ketone: Negative  / Bili: Negative / Urobili: Negative   Blood: x / Protein: Trace / Nitrite: Negative   Leuk Esterase: Large / RBC: 7 /hpf / WBC 7 /HPF   Sq Epi: x / Non Sq Epi: 9 /hpf / Bacteria: Negative        Cardiac:        ABG:      MICRO/CULTURES:        Neuro LABS    CSF Results:       Imaging/Studies:      CT Head No Cont:   EXAM:  CT BRAIN                            PROCEDURE DATE:  2020            INTERPRETATION:  CLINICAL INDICATION: Altered mental status.    TECHNIQUE: Axial CT scanning of the brain was obtained from the skull base to the vertex without the administration of intravenous contrast. Reformatted coronal and sagittal images were subsequently obtained and reviewed.    COMPARISON: CT brain 2020 and MRI brain 2020    FINDINGS:  There is no CT evidence of acute transcortical infarct. Age-related involutional changes and chronic microvascular ischemic changes. Small focus of encephalomalacia involving the right posterior parietal lobe.    There is no hydrocephalus, mass effect, or acute intracranial hemorrhage. No extra-axial collection. Basal cisterns are patent.    The visualized paranasal sinuses and mastoid air cells are clear.    Right parieto-occipital craniotomy. No acute calvarial fracture.    IMPRESSION:  No evidence of acute transcortical infarct, acute intracranial hemorrhage, or mass effect.                      SON CONNOLLY M.D., ATTENDING RADIOLOGIST  This document has been electronically signed. Sep  3 2020  5:40PM             (20)  CT Head No Cont:   EXAM:  CT BRAIN        PROCEDURE DATE:  Aug 25 2020         INTERPRETATION:  PROCEDURE: CT head without contrast.    INDICATION: Seizures; new or progressive    TECHNIQUE: Multiple axial sections were obtained at 5 mm intervals. The images were reviewed in brain and bone windows. Imaging is performed using helical low-dose technique, and sagittal and coronal reformations are provided.    COMPARISON: 2020 (MR of the brain), Noncontrast CT scan of the head 3/16/2020    FINDINGS:  The CT examination demonstrates generalized volume loss as manifested by the enlargement of the ventricles, cisternal spaces, and cortical sulci throughout.    There is no acute intracranial hemorrhage, mass effect, midline shift or extra axial collections.    There is mild periventricular white matter hypodensity, likely the sequela of small vessel ischemic disease.    Patient is status post right parieto-occipital craniotomy. Residual hypodensity in the right parieto-occipital region is likely postsurgicalgliosis. Known evolving hemorrhage is barely noticeable on the current exam. The included paranasal sinuses and mastoid air cells are predominantly clear.    IMPRESSION:  Right parieto-occipital gliosis with evolving hemorrhage. There is no acute intracranial hemorrhage, mass effect, midline shift or extra axial collections.                  PRATIMA GERARD M.D., RADIOLOGY RESIDENT  This document has been electronically signed.  MICHELE WILLIAMSON M.D., ATTENDING RADIOLOGIST  This document has been electronically signed. Aug 25 2020 10:04PM               (20)

## 2020-09-05 NOTE — EEG REPORT - NS EEG TEXT BOX
[facility]   COMPREHENSIVE EPILEPSY CENTER   REPORT OF LTM EEG    [facility]: [address] [place] [address] [address], [place] [address], Ph#: [phone]  [facility]:  [place] [address], Ph#: [phone]  [facility]: [address] [place] [address], Ph#: [phone]    Patient Name: DANIELLE ANTONIO  Age and : 65y ([birth year])  MRN #: [id number]  Location: [place] [address]  Referring Physician: Moises Carvalho    Study Date:  13;:20  x18.5hr  _____________________________________________________________  TECHNICAL INFORMATION    Placement and Labeling of Electrodes:  The EEG was performed utilizing 20 channels referential EEG connections (coronal over temporal over parasagittal montage) using all standard 10-20 electrode placements with EKG.  Recording was at a sampling rate of 256 samples per second per channel.  Time synchronized digital video recording was done simultaneously with EEG recording.  A low light infrared camera was used for low light recording.  Lokesh and seizure detection algorithms were utilized.    _____________________________________________________________  HISTORY  Patient is a 65y old  Female who presents with a chief complaint of confusion (04 Sep 2020 14:00)    MEDICATIONS  (STANDING):  enoxaparin Injectable 40 milliGRAM(s) SubCutaneous daily  lacosamide IVPB 100 milliGRAM(s) IV Intermittent every 12 hours  levothyroxine 100 MICROGram(s) Oral daily  pantoprazole    Tablet 40 milliGRAM(s) Oral before breakfast  sertraline 150 milliGRAM(s) Oral daily  sodium chloride 0.9%. 1000 milliLiter(s) (75 mL/Hr) IV Continuous <Continuous>  tiotropium 18 MICROgram(s) Capsule 1 Capsule(s) Inhalation daily  valproate sodium IVPB 750 milliGRAM(s) IV Intermittent two times a day    _____________________________________________________________  STUDY INTERPRETATION    Findings: The background was continuous, spontaneously variable and reactive. No PDR seen over the right, up to 7.5-8hz over the left to 25uV  diffuse theta and polymorphic delta slowing.    Focal Slowing:  Continuous polymorphic delta/ theta slowing over the right posterior quadrant.     Sleep Background:  Drowsiness was observed with relative attenuation of the background and intermittent central maximal theta activity.  Symmetrical stage II sleep transients were recorded consisting of spindles, and K-complexes.    Interictal Epileptiform Activity:   Continuous lateralized right parietal max P4 static discharges at 1.5Hz  with overriding fast component (LPDs+F)     Events:  Focal seizures with right parietal sharp alpha and regional LPDs/IRDA increase in frequency to 3hz with abrupt electrographic offset 1-2 minutes later.  No overt clinical change on video.  No video was available for clinical correlate.    Activation Procedures:   Hyperventilation was not performed.    Photic stimulation was not performed.     Artifacts:  Intermittent myogenic and movement artifacts were noted.  EEG dislodged from 21:05-00:57 reconnected limiting interpretation    ECG:  The heart rate on single channel ECG was predominantly between 55-65 BPM.    _____________________________________________________________  EEG SUMMARY/CLASSIFICATION    Abnormal EEG in in an altered / a sedated patient.  - Four additional focal right parieto-temporal onset seizures lasting 1-2 minutes.  No overt clinical change on video.  - Continuous lateralized right parietal (max P4>P8 O2) static 1.5 Hz polyspike wave discharges  (LPDs+F)  - Continuous polymorphic delta/ theta slowing over the right posterior quadrant  - Mild generalized slowing.    _____________________________________________________________  EEG IMPRESSION/CLINICAL CORRELATE    Abnormal EEG study.  - Active seizure focus in the right parietal region. No further seizures after 16:19  - Structural of functional cerebral dysfunction in the right posterior quadrant, with focal associated cortical irritability evident.  - Moderate nonspecific diffuse or multifocal cerebral dysfunction    Discussed with neurology team.    MD BERENICE BeachES  Director, Continuous EEG Monitoring Program, [facility] and [facility]   and Epilepsy Fellowship ,   Department of Neurology, [facility]   COMPREHENSIVE EPILEPSY CENTER   REPORT OF LTM EEG    [facility]: [address] [place] [address] [address], [place] [address], Ph#: [phone]  [facility]:  [place] [address], Ph#: [phone]  [facility]: [address] [place] [address], Ph#: [phone]    Patient Name: DANIELLE ANTONIO  Age and : 65y (53)  MRN #: [id number]  Location: [place] [address]  Referring Physician: Moises Carvalho    Study Date:  13;:20  x18.5hr  _____________________________________________________________  TECHNICAL INFORMATION    Placement and Labeling of Electrodes:  The EEG was performed utilizing 20 channels referential EEG connections (coronal over temporal over parasagittal montage) using all standard 10-20 electrode placements with EKG.  Recording was at a sampling rate of 256 samples per second per channel.  Time synchronized digital video recording was done simultaneously with EEG recording.  A low light infrared camera was used for low light recording.  Lokesh and seizure detection algorithms were utilized.    _____________________________________________________________  HISTORY  Patient is a 65y old  Female who presents with a chief complaint of confusion (04 Sep 2020 14:00)    MEDICATIONS  (STANDING):  enoxaparin Injectable 40 milliGRAM(s) SubCutaneous daily  lacosamide IVPB 100 milliGRAM(s) IV Intermittent every 12 hours  levothyroxine 100 MICROGram(s) Oral daily  pantoprazole    Tablet 40 milliGRAM(s) Oral before breakfast  sertraline 150 milliGRAM(s) Oral daily  sodium chloride 0.9%. 1000 milliLiter(s) (75 mL/Hr) IV Continuous <Continuous>  tiotropium 18 MICROgram(s) Capsule 1 Capsule(s) Inhalation daily  valproate sodium IVPB 750 milliGRAM(s) IV Intermittent two times a day    _____________________________________________________________  STUDY INTERPRETATION    Findings: The background was continuous, spontaneously variable and reactive. No PDR seen over the right, up to 7.5-8hz over the left to 25uV  diffuse theta and polymorphic delta slowing.    Focal Slowing:  Continuous polymorphic delta/ theta slowing over the right posterior quadrant.     Sleep Background:  Drowsiness was observed with relative attenuation of the background and intermittent central maximal theta activity.  Symmetrical stage II sleep transients were recorded consisting of spindles, and K-complexes.    Interictal Epileptiform Activity:   Continuous lateralized right parietal max P4 static discharges at 1.5Hz  with overriding fast component (LPDs+F)     Events:  Focal seizures with right parietal sharp alpha and regional LPDs/IRDA increase in frequency to 3hz with abrupt electrographic offset 1-2 minutes later.  No overt clinical change on video.    Activation Procedures:   Hyperventilation was not performed.    Photic stimulation was not performed.     Artifacts:  Intermittent myogenic and movement artifacts were noted.  EEG dislodged from 21:05-00:57 reconnected limiting interpretation    ECG:  The heart rate on single channel ECG was predominantly between 55-65 BPM.    _____________________________________________________________  EEG SUMMARY/CLASSIFICATION    Abnormal EEG in an altered / a sedated patient.  - Four additional focal right parieto-temporal onset seizures lasting 1-2 minutes.  No overt clinical change on video.  - Continuous lateralized right parietal (max P4>P8 O2) static 1.5 Hz polyspike wave discharges  (LPDs+F)  - Continuous polymorphic delta/ theta slowing over the right posterior quadrant  - Mild generalized slowing.    _____________________________________________________________  EEG IMPRESSION/CLINICAL CORRELATE    Abnormal EEG study.  - Active seizure focus in the right parietal region. No further seizures after 16:19  - Structural of functional cerebral dysfunction in the right posterior quadrant, with focal associated cortical irritability evident.  - Moderate nonspecific diffuse or multifocal cerebral dysfunction    Discussed with neurology team.    MD ANABEL Beach  Director, Continuous EEG Monitoring Program, [facility]   and Epilepsy Fellowship ,   Department of Neurology, [facility]

## 2020-09-05 NOTE — OCCUPATIONAL THERAPY INITIAL EVALUATION ADULT - ANTICIPATED DISCHARGE DISPOSITION, OT EVAL
TBD pending functional/cognitive eval. No needs vs outpatient cognitive home w/ outpatient/outpatient cognitive

## 2020-09-05 NOTE — OCCUPATIONAL THERAPY INITIAL EVALUATION ADULT - LIVES WITH, PROFILE
significant other/Pt lives in an apt with a roommate, 0 steps to enter, +elevator. (I) ADLs and functional transfers without AD/DME

## 2020-09-05 NOTE — OCCUPATIONAL THERAPY INITIAL EVALUATION ADULT - PRECAUTIONS/LIMITATIONS, REHAB EVAL
fall precautions/Was recently admitted to [facility] after being found wandering mall disoriented followed by seizure with L. eye deviation and subsequent GTC.  On admission was found to have a UTI and continued to have clinical seizures, was intubated for airway protection and admitted to MICU.  She was started on Keppra 1000mg BID, weaned off sedation, extubated, had improvement in her EEG and discharged. Had outpatient MRI brain w/wo 08/30/20 which showed no worsening/new intracranial disease activity.  Followed up outpatient with Dr. Schaefer on 09/02 due to persistent concern by daughter that PT was still very confused and different from baseline.  She was continued on Keppra 1000mg BID and referred for psychiatry eval due to ongoing depression.

## 2020-09-05 NOTE — PROGRESS NOTE ADULT - ATTENDING COMMENTS
NEURO-ONCOLOGY: [phone]    CC: LUNG CANCER, BRAIN METS, SEIZURES, AND VISUAL SYMPTOMATOLOGY    PATIENT SEEN & EXAMINED WITH RESIDENTS ON 9/3/2020  SUNRISE RECORDS REVIEWED  ALLSCRIPTS RECORDS REVIEWED  LABS REVIEWED  IMAGING REVIEWED     66 yo LH woman with PMHx lung adenocarcinoma (EGFR and ALK negative) with right occipital brain met, now with seizures and odd visual phenomenon.  The EEG performed this admission demonstrates focal status epilepticus, which is good news, as it indicates her higher order cerebral dysfunction may well improve with fewer seizures.   There are no complaints of headache, nausea/vomiting, other visual changes like flashing lights or any signs of increased ICP. No recent seizures, nor any seizure-like symptomatology. There are no other cranial nerve complaints: no double-vision, no blurry vision, no facial asymmetry, no trouble swallowing, no hearing loss.  EXAMINATION  LESS LEFT HEMINEGLECT THIS AM  IMPRESSION/PLAN  SEIZURES – Continue Depakote, establish therapeutic levels, based upon continuous EEG and serum level prior to discharge.  CEREBRAL EDEMA – None on imaging, will avoid decadron for now.  VISUAL DYSFUNCTION – Related to seizures, likely to markedly improve or wholly resolve.  COORDINATION OF CARE – I discussed with the patient the nature of their disease and its attendant symptoms. I reviewed what new symptoms may develop and the available treatment plans. After answering their questions, we discussed expected outcomes and prognosis.   DISPO – As this condition continues to pose significant immediate risk for morbidity and mortality, I instructed the patient to call [phone] for further, continued outpatient care with Dr Schaefer.    TOTAL TIME SPENT WAS 36 MINUTES, OF WHICH HALF IN COUNSELLING AND COORDINATION OF CARE.

## 2020-09-05 NOTE — OCCUPATIONAL THERAPY INITIAL EVALUATION ADULT - COGNITIVE, VISUAL PERCEPTUAL, OT EVAL
Pt will recall 5/5 items after 5 min delay without cues within 2 weeks Pt will recall 5/5 items after 5 min delay without cues within 2 weeks. Pt will set time clock without cues within 2 weeks

## 2020-09-05 NOTE — PHYSICAL THERAPY INITIAL EVALUATION ADULT - GENERAL OBSERVATIONS, REHAB EVAL
Pt tolerated 35min PT initial evaluation +video EEG 24/hr, IVL, agreeable to PT. OOB held 2/2 24/hr EEG.

## 2020-09-05 NOTE — OCCUPATIONAL THERAPY INITIAL EVALUATION ADULT - PERTINENT HX OF CURRENT PROBLEM, REHAB EVAL
65F h/o COPD, migraines, depression, seizures and lung adenocarcinoma w/ R. occipito-parietal met s/p resection, SRS and CT p/w worsening AMS x 1 week.  AMS manifest as disorientation, frequent repetition of questions of where she is/what she is doing, visual illusions of micropsia a/w macropsia and panic.

## 2020-09-05 NOTE — PHYSICAL THERAPY INITIAL EVALUATION ADULT - ADDITIONAL COMMENTS
Pt reports she lives in an apartment with a room-mate with no stairs to enter & elevator access. Pt reports limited contact with room-mate. Pt was independent with all mobility, active, driving, & working as special edu teacher.

## 2020-09-05 NOTE — OCCUPATIONAL THERAPY INITIAL EVALUATION ADULT - ADDITIONAL COMMENTS
CT Head 9/3- No evidence of acute transcortical infarct, acute intracranial hemorrhage, or mass effect.

## 2020-09-05 NOTE — OCCUPATIONAL THERAPY INITIAL EVALUATION ADULT - DIAGNOSIS, OT EVAL
Patient with deficits in ADL status and functional mobility due to impaired balance, strength, ROM, coordination Impaired cognition and vision

## 2020-09-05 NOTE — OCCUPATIONAL THERAPY INITIAL EVALUATION ADULT - ASR WT BEARING STATUS EVAL
no weight-bearing restrictions/PT presenting now due to persistent symptoms of disorientation, worsening confusion over last several days as well as c/o visual illusions of macropsia and micropsia.  PT states she is having difficulty focusing, constantly asks same questions, is often disoriented to her location or situation.  On presentation, found to also have UTI, hypothyroidism and CT head stable without evidence of worsening vasogenic edema/acute new intracranial lesion. Per chart review, prior to 2 weeks ago, PT was A&Ox3 still working as teacher.

## 2020-09-05 NOTE — OCCUPATIONAL THERAPY INITIAL EVALUATION ADULT - MD ORDER
OT evaluate and treat  Ambulate with assistance OT evaluate and treat  Ambulate with assistance  OT functional eval completed 9/6

## 2020-09-05 NOTE — PHYSICAL THERAPY INITIAL EVALUATION ADULT - PERTINENT HX OF CURRENT PROBLEM, REHAB EVAL
65 LH-F with h/o metastatic lung adenocarcinoma with R. parieto-occipital met s/p resection, SRS & immunotherapy with recently dx new seizures p/w worsening disorientation, visual illusions, disorientation & anxiety. Worsening AMS manifest likely multi-factorial etiology including hypothyroidism, UTI & possibly subclinical seizures. Low suspicion for worsening of underlying metastatic CA in brain given recent stable MRI on 8/30, but could should still evaluate as possibility.

## 2020-09-06 NOTE — PROGRESS NOTE ADULT - ASSESSMENT
Assessment:  65 LH-F with h/o metastatic lung adenocarcinoma with R. parieto-occipital met s/p resection, SRS and immunotherapy with recently diagnosed new seizures p/w worsening disorientation, visual illusions, disorientation and anxiety.  Worsening AMS manifest likely multi-factorial etiology secondary to seizures/hypothyroidism. Vital signs significant for 's/90's. Physical exam showed significant improvement in visual fields.     Plan:   Imaginhr vEEG from : showed focal seizures from the R. parietal region, which stopped after 16:19 on .     Labs:  B12, Folate, homocysteine wnl   B6, methylmalonic acid, TPO Ab pending  T4 1.1, TSH 97.90, T3 24     Medications:  Continue synthroid 100 mcg daily  Continue depakote 750 mg IV BID  Continue vimpat 100 mg IV BID    Other:  Endocrinology recommendations appreciated  Outpatient endocrinology f/u when ready for discharge/repeat TFT's in 4-6 weeks  Seizure and fall precautions  Neurochecks q4h  F/u PT/OT    Case discussed with neurology attending, Dr. Carvalho
65 LH-F with h/o metastatic lung adenocarcinoma (EGFR and ALK neg) with R. parieto-occipital met s/p resection, SRS and immunotherapy with recently diagnosed new seizures p/w worsening disorientation, visual illusions, disorientation and anxiety.  Worsening AMS manifest likely multi-factorial etiology secondary to seizures w/ visual phenomenon andhypothyroidism.     Plan:   - vEEG from 9/4: showed focal seizures from the R. parietal region, which stopped after 16:19 on 9/4, no further seizure episodes seen since 9/4.   - Continue depakote 750 mg IV BID, vimpat 100 mg IV BID  - TSH elevated at 97.90 with T4 1.1, T3 24, likely hypothyroidism in setting of chemotherapy. Stared on levothyroxine 100mcg PO qhs. Will f/u outpatient with Endocrinology for TFTs in 4-6 weeks.   - discharge home today, no PT needs  - outpatient f/u with Dr. Schaefer     D/w Dr. Fairchild

## 2020-09-06 NOTE — EEG REPORT - NS EEG TEXT BOX
[facility]   COMPREHENSIVE EPILEPSY CENTER   REPORT OF LTM EEG    [facility]: [address] [place] [address] , [address], [place] [address], Ph#: [phone]  [facility]:  [place] [address], Ph#: [phone]  [facility]: [address] [place] [address], Ph#: [phone]    Patient Name: DANIELLE ANTONIO  Age and : 65y (53)  MRN #: [id number]  Location: [place] [address]  Referring Physician: Moises Carvalho    Study Date:  08:00 - 08:00 20  off for MRI total x21hr   _____________________________________________________________  TECHNICAL INFORMATION    Placement and Labeling of Electrodes:  The EEG was performed utilizing 20 channels referential EEG connections (coronal over temporal over parasagittal montage) using all standard 10-20 electrode placements with EKG.  Recording was at a sampling rate of 256 samples per second per channel.  Time synchronized digital video recording was done simultaneously with EEG recording.  A low light infrared camera was used for low light recording.  Lokesh and seizure detection algorithms were utilized.    _____________________________________________________________  HISTORY  Patient is a 65y old  Female who presents with a chief complaint of confusion (04 Sep 2020 14:00)    MEDICATIONS  (STANDING):  enoxaparin Injectable 40 milliGRAM(s) SubCutaneous daily  lacosamide IVPB 100 milliGRAM(s) IV Intermittent every 12 hours  levothyroxine 100 MICROGram(s) Oral daily  pantoprazole    Tablet 40 milliGRAM(s) Oral before breakfast  sertraline 150 milliGRAM(s) Oral daily  sodium chloride 0.9%. 1000 milliLiter(s) (75 mL/Hr) IV Continuous <Continuous>  tiotropium 18 MICROgram(s) Capsule 1 Capsule(s) Inhalation daily  valproate sodium IVPB 750 milliGRAM(s) IV Intermittent two times a day    _____________________________________________________________  STUDY INTERPRETATION    Findings: The background was continuous, spontaneously variable and reactive. No PDR seen over the right, up to 7.5-8hz over the left to 25uV  diffuse theta and polymorphic delta slowing.    Focal Slowing:  Continuous polymorphic delta/ theta slowing over the right posterior quadrant.     Sleep Background:  Drowsiness was observed with relative attenuation of the background and intermittent central maximal theta activity.  Symmetrical stage II sleep transients were recorded consisting of spindles, and K-complexes.    Interictal Epileptiform Activity:   Continuous lateralized right parietal max P4 static discharges at 1-1.5Hz  with overriding fast component (LPDs+F)     Events:  No seizures    Activation Procedures:   Hyperventilation was not performed.    Photic stimulation was not performed.     Artifacts:  Intermittent myogenic and movement artifacts were noted.    ECG:  The heart rate on single channel ECG was predominantly between 55-65 BPM.    _____________________________________________________________  EEG SUMMARY/CLASSIFICATION    Abnormal EEG in a patient awake and asleep.  - Continuous lateralized right parietal (max P4>P8 O2) static 1.5 Hz polyspike wave discharges  (LPDs+F)  - Continuous polymorphic delta/ theta slowing over the right posterior quadrant  - Mild generalized slowing.    _____________________________________________________________  EEG IMPRESSION/CLINICAL CORRELATE    Abnormal EEG study.  - Risk of focal onset seizures from the right parietal region. No further seizures recorded  - Structural of functional cerebral dysfunction in the right posterior quadrant, with focal associated cortical irritability evident.  - Moderate nonspecific diffuse or multifocal cerebral dysfunction      Corona Mera MD FAGABRIELLA  Director, Continuous EEG Monitoring Program, [facility]   and Epilepsy Fellowship ,   Department of Neurology, [facility]

## 2020-09-06 NOTE — DISCHARGE NOTE NURSING/CASE MANAGEMENT/SOCIAL WORK - PATIENT PORTAL LINK FT
You can access the FollowMyHealth Patient Portal offered by [facility] by registering at the following website: [email or web address]. By joining [facility]’s FollowMyHealth portal, you will also be able to view your health information using other applications (apps) compatible with our system.

## 2020-09-06 NOTE — EEG REPORT - NS EEG TEXT BOX
[facility]   COMPREHENSIVE EPILEPSY CENTER   REPORT OF LTM EEG    [facility]: [address] [place] [address] , [address], [place] [address], Ph#: [phone]  [facility]:  [address] [place] [address], Ph#: [phone]  [facility]: [address] [place] [address], Ph#: [phone]    Patient Name: DANIELLE ANTONIO  Age and : 65y (53)  MRN #: [id number]  Location: [place] [address]  Referring Physician: Moises Carvalho    Study Date:  08:00 - 12:15 20  off for MRI total x4hr   _____________________________________________________________  TECHNICAL INFORMATION    Placement and Labeling of Electrodes:  The EEG was performed utilizing 20 channels referential EEG connections (coronal over temporal over parasagittal montage) using all standard 10-20 electrode placements with EKG.  Recording was at a sampling rate of 256 samples per second per channel.  Time synchronized digital video recording was done simultaneously with EEG recording.  A low light infrared camera was used for low light recording.  Lokesh and seizure detection algorithms were utilized.    _____________________________________________________________  HISTORY  Patient is a 65y old  Female who presents with a chief complaint of confusion (04 Sep 2020 14:00)    MEDICATIONS  (STANDING):  enoxaparin Injectable 40 milliGRAM(s) SubCutaneous daily  lacosamide IVPB 100 milliGRAM(s) IV Intermittent every 12 hours  levothyroxine 100 MICROGram(s) Oral daily  pantoprazole    Tablet 40 milliGRAM(s) Oral before breakfast  sertraline 150 milliGRAM(s) Oral daily  sodium chloride 0.9%. 1000 milliLiter(s) (75 mL/Hr) IV Continuous <Continuous>  tiotropium 18 MICROgram(s) Capsule 1 Capsule(s) Inhalation daily  valproate sodium IVPB 750 milliGRAM(s) IV Intermittent two times a day    _____________________________________________________________  STUDY INTERPRETATION    Findings: The background was continuous, spontaneously variable and reactive. No PDR seen over the right, up to 7.5-8hz over the left to 25uV  diffuse theta and polymorphic delta slowing.    Focal Slowing:  Continuous polymorphic delta/ theta slowing over the right posterior quadrant.     Sleep Background:  Drowsiness was observed with relative attenuation of the background and intermittent central maximal theta activity.      Interictal Epileptiform Activity:   Continuous lateralized right parietal max P4 static discharges at 1-1.5Hz  with overriding fast component (LPDs+F)     Events:  No seizures    Activation Procedures:   Hyperventilation was not performed.    Photic stimulation was not performed.     Artifacts:  Intermittent myogenic and movement artifacts were noted.     ECG:  The heart rate on single channel ECG was predominantly between 55-65 BPM.    _____________________________________________________________  EEG SUMMARY/CLASSIFICATION    Abnormal EEG   - Continuous lateralized right parietal (max P4>P8 O2) static 1.5 Hz polyspike wave discharges  (LPDs+F)  - Continuous polymorphic delta/ theta slowing over the right posterior quadrant  - Mild generalized slowing.    _____________________________________________________________  EEG IMPRESSION/CLINICAL CORRELATE    Abnormal EEG study.  - Risk of focal onset seizures from the right parietal region. No further seizures recorded  - Structural of functional cerebral dysfunction in the right posterior quadrant, with focal associated cortical irritability evident.  - Moderate nonspecific diffuse or multifocal cerebral dysfunction    Frequent motion artifacts    Corona Mera MD FAES  Director, Continuous EEG Monitoring Program, [facility]   and Epilepsy Fellowship ,   Department of Neurology, [facility]

## 2020-09-06 NOTE — PROGRESS NOTE ADULT - SUBJECTIVE AND OBJECTIVE BOX
SUBJECTIVE: No overnight events. Anxious to go home today.     Vital Signs Last 24 Hrs  T(C): 36.6 (06 Sep 2020 06:59), Max: 36.8 (05 Sep 2020 11:04)  T(F): 97.8 (06 Sep 2020 06:59), Max: 98.3 (05 Sep 2020 15:35)  HR: 64 (06 Sep 2020 06:59) (60 - 73)  BP: 129/80 (06 Sep 2020 06:59) (120/76 - 150/82)  BP(mean): --  RR: 18 (06 Sep 2020 06:59) (18 - 18)  SpO2: 96% (06 Sep 2020 06:59) (94% - 96%    Neurological (>12):  MS: Awake, alert, oriented to person, place, situation, time. Normal affect. Follows all commands.  Language: Speech is clear, fluent with good comprehension.  CNs:  VF grossly intact. EOMI. No facial asymmetry b/l. Gag reflex deferred. Head turning intact b/l. Tongue midline, normal movements, no atrophy.  Fundoscopic: deferred  Motor: Moves all extremities spontaneously and against gravity.   Coordination: deferred  Gait: deferred    LABORATORY:  CBC                       12.2   8.82  )-----------( 426      ( 06 Sep 2020 06:12 )             36.8     Chem 09-06    139  |  101  |  22  ----------------------------<  93  3.4<L>   |  25  |  1.21    Ca    9.3      06 Sep 2020 06:12      MEDICATIONS  (STANDING):  diphenhydrAMINE 25 milliGRAM(s) Oral once  enoxaparin Injectable 40 milliGRAM(s) SubCutaneous daily  hydrocortisone 1% Cream 1 Application(s) Topical three times a day  lacosamide IVPB 100 milliGRAM(s) IV Intermittent every 12 hours  levothyroxine 100 MICROGram(s) Oral daily  pantoprazole    Tablet 40 milliGRAM(s) Oral before breakfast  sertraline 150 milliGRAM(s) Oral daily  sodium chloride 0.9%. 1000 milliLiter(s) (75 mL/Hr) IV Continuous <Continuous>  tiotropium 18 MICROgram(s) Capsule 1 Capsule(s) Inhalation daily  valproate sodium IVPB 750 milliGRAM(s) IV Intermittent two times a day    MEDICATIONS  (PRN):  ALBUTerol    90 MICROgram(s) HFA Inhaler 2 Puff(s) Inhalation every 6 hours PRN Shortness of Breath and/or Wheezing  ALPRAZolam 0.5 milliGRAM(s) Oral daily PRN anxiety    _____________________________________________________________  EEG SUMMARY/CLASSIFICATION 9/6/2020    Abnormal EEG in a patient awake and asleep.  - Continuous lateralized right parietal (max P4>P8 O2) static 1.5 Hz polyspike wave discharges  (LPDs+F)  - Continuous polymorphic delta/ theta slowing over the right posterior quadrant  - Mild generalized slowing.    _____________________________________________________________  EEG IMPRESSION/CLINICAL CORRELATE    Abnormal EEG study.  - Risk of focal onset seizures from the right parietal region. No further seizures recorded  - Structural of functional cerebral dysfunction in the right posterior quadrant, with focal associated cortical irritability evident.  - Moderate nonspecific diffuse or multifocal cerebral dysfunction
SUBJECTIVE: Patient seen and examined at the bedside. Patient states that her vision has significantly improved.     Interval history: Patient had confusion and high risk pattern on EEG. Patient received vimpat 250 IV once. Started on vimpat 100 mg BID maintenance.    PAST MEDICAL & SURGICAL HISTORY:  History of antineoplastic chemotherapy  Metastatic adenocarcinoma to brain  Iron deficiency anemia: underwent endoscopy and colonoscopy, reportedly fine  Lung cancer  Migraine  COPD (chronic obstructive pulmonary disease)  History of craniotomy  S/P lobectomy of lung  Benign tumor: left   S/P :     FAMILY HISTORY:  FH: kidney disease: mother   FH: diabetes mellitus: father     SOCIAL HISTORY:   T/E/D:   Occupation:   Lives with:     MEDICATIONS (HOME):  Home Medications:  albuterol 90 mcg/inh inhalation aerosol: 2 puff(s) inhaled every 6 hours, As needed, Shortness of Breath and/or Wheezing (04 Sep 2020 06:05)  ALPRAZolam 0.5 mg oral tablet: 1 tab(s) orally once a day, As Needed (04 Sep 2020 06:05)  Imitrex 100 mg oral tablet: 1 tab(s) orally once, As Needed (04 Sep 2020 06:05)  Keppra 1000 mg oral tablet: 1 tab(s) orally 2 times a day (04 Sep 2020 06:05)  sertraline 50 mg oral tablet: 3 tab(s) orally once a day (04 Sep 2020 06:05)  tiotropium 18 mcg inhalation capsule: 1 cap(s) inhaled once a day (04 Sep 2020 06:05)    MEDICATIONS  (STANDING):  enoxaparin Injectable 40 milliGRAM(s) SubCutaneous daily  lacosamide IVPB 100 milliGRAM(s) IV Intermittent every 12 hours  levothyroxine 100 MICROGram(s) Oral daily  pantoprazole    Tablet 40 milliGRAM(s) Oral before breakfast  sertraline 150 milliGRAM(s) Oral daily  sodium chloride 0.9%. 1000 milliLiter(s) (75 mL/Hr) IV Continuous <Continuous>  tiotropium 18 MICROgram(s) Capsule 1 Capsule(s) Inhalation daily  valproate sodium IVPB 750 milliGRAM(s) IV Intermittent two times a day    MEDICATIONS  (PRN):  ALBUTerol    90 MICROgram(s) HFA Inhaler 2 Puff(s) Inhalation every 6 hours PRN Shortness of Breath and/or Wheezing  ALPRAZolam 0.5 milliGRAM(s) Oral daily PRN anxiety    ALLERGIES/INTOLERANCES:  Allergies  Fish Products (Rash)  No Known Drug Allergies    Intolerances    VITALS & EXAMINATION:  Vital Signs Last 24 Hrs  T(C): 36.8 (05 Sep 2020 11:04), Max: 36.9 (04 Sep 2020 19:55)  T(F): 98.2 (05 Sep 2020 11:04), Max: 98.5 (04 Sep 2020 19:55)  HR: 73 (05 Sep 2020 11:04) (58 - 73)  BP: 139/84 (05 Sep 2020 11:04) (120/82 - 148/88)  BP(mean): --  RR: 18 (05 Sep 2020 11:04) (18 - 18)  SpO2: 94% (05 Sep 2020 11:04) (93% - 95%)    General: Female, appears stated age, in no apparent distress including pain    Neurological (>12):  MS: Awake, alert, oriented to person, place, situation, time. Normal affect. Follows all commands.  Language: Speech is clear, fluent with good comprehension.  CNs:  VF grossly intact. EOMI. No facial asymmetry b/l. Gag reflex deferred. Head turning intact b/l. Tongue midline, normal movements, no atrophy.  Fundoscopic: deferred  Motor: Moves all extremities spontaneously and against gravity.   Coordination: deferred  Gait: deferred    LABORATORY:  CBC                       12.1   9.14  )-----------( 404      ( 05 Sep 2020 05:46 )             36.6     Chem 09-05    140  |  102  |  26<H>  ----------------------------<  97  3.6   |  27  |  1.38<H>    Ca    9.5      05 Sep 2020 05:46  Phos  3.3     09-03  Mg     1.9     09-03    TPro  x   /  Alb  4.0  /  TBili  x   /  DBili  x   /  AST  x   /  ALT  x   /  AlkPhos  x   09-04    LFTs LIVER FUNCTIONS - ( 04 Sep 2020 05:17 )  Alb: 4.0 g/dL / Pro: x     / ALK PHOS: x     / ALT: x     / AST: x     / GGT: x           Coagulopathy   Lipid Panel   A1c   Cardiac enzymes     U/A Urinalysis Basic - ( 03 Sep 2020 18:27 )    Color: Yellow / Appearance: Slightly Turbid / S.015 / pH: x  Gluc: x / Ketone: Negative  / Bili: Negative / Urobili: Negative   Blood: x / Protein: Trace / Nitrite: Negative   Leuk Esterase: Large / RBC: 7 /hpf / WBC 7 /HPF   Sq Epi: x / Non Sq Epi: 9 /hpf / Bacteria: Negative      CSF  Immunological  Other    STUDIES & IMAGING:  Studies (EKG, EEG, EMG, etc):     Radiology (XR, CT, MR, U/S, TTE/PARVEEN):

## 2020-09-14 NOTE — PROCEDURE
[FreeTextEntry1] : [facility]\par    [facility] Department of Radiology\par   Radiology Report\par \par \par Patient Name: MARISELA SANTOS   Report Date: 06-Sep-2020 10:46.00 \par Patient ID: [id number] ([id number]), [id number] (EPI)  Accession No.: [id number] \par Patient Birth Date: 1955  Report Status: F \par Referring Physician: [id number] ZAKIYA BARR   Reason For Study: GBM  \par \par \par \par \par \par EXAM: MR BRAIN WAW IC \par \par \par PROCEDURE DATE: 09/05/2020 \par \par \par \par \par INTERPRETATION: HISTORY: Lung cancer with metastatic disease to the brain status post resection on immunotherapy. Presenting with seizures. Auditory hallucinations. Confusion, disoriented patient. \par \par Description: MRI of the brain with and without gadolinium contrast was performed. \par \par COMPARISON: Brain MRI 08/30/2020, brain MRI 06/23/2020, brain MRI 03/14/2020.. \par \par Sagittal T1, coronal T2, coronal FLAIR, axial T1, T2, FLAIR, SWI, GRE, and diffusion-weighted series were obtained before contrast. After intravenous gadolinium contrast administration, sagittal, coronal, and axial T1 postcontrast series were obtained. \par \par 5 cc intravenous Gadovist gadolinium contrast was administered, 2.5 cc contrast was discarded. \par \par Right occipital craniotomy changes are again noted. \par \par Evolving postoperative changes are again visualized status post resection of right occipital lobe mass. Areas of increased T1 signal involve the surgical bed on the precontrast series, partially limiting evaluation for enhancement. No nodular areas of enhancement are appreciated in or about the surgical cavity with the limitations of this study. Patchy and linear nonnodular areas of enhancement are noted at the surgical cavity which appear stable compared to the 08/30/2020 exam. No new areas of abnormal enhancement are present. \par \par Scattered small foci of nonenhancing increased T2 and FLAIR signal are again noted in the white matter, stable, most likely reflecting chronic microvascular ischemic changes given the patient's age. Chronic white matter changes are also again noted within the fritz. \par \par There is no evidence for acute infarct, acute hemorrhage, or hydrocephalus. Mild cerebral volume loss is noted. \par \par IMPRESSION: \par \par No gross evidence for recurrent tumor in or about the right occipital surgical cavity. \par \par No acute intracranial abnormality is noted. \par \par \par \par \par \par \par \par \par ZACHERY JARRETT M.D., ATTENDING RADIOLOGIST \par This document has been electronically signed. Sep 6 2020 10:46AM \par \par \par \par \par  \par \par \par \par \par \par \par \par [facility]\par    [facility] Department of Radiology\par   Radiology Report\par \par \par Patient Name: MARISELA SANTOS   Report Date: 24-Jun-2020 15:14.00 \par Patient ID: [id number] (00), [id number] (EPI)  Accession No.: [id number] \par Patient Birth Date: 1955  Report Status: F \par Referring Physician: [id number] JOE YANES   Reason For Study: TO EVAULATE RESPONSE TO GAMMA KNIFE TREATMENT  \par \par \par \par \par \par \par \par \par \par EXAM: MR BRAIN WAW IC \par \par \par PROCEDURE DATE: 06/23/2020 \par \par \par \par INTERPRETATION: Contrast-enhanced MRI of the brain. \par \par CLINICAL INDICATION: Status post gamma knife treatment for metastatic disease \par \par TECHNIQUE: Multiplanar, multisequence MR images of the brain were obtained \par with images acquired prior to and following the intravenous administration \par of 6 cc of gadovist. \par \par COMPARISON: A brain MRI dated 3/16/2020 \par \par FINDINGS: Redemonstrated is a right parietal craniotomy with subjacent \par encephalomalacia and gliosis. There is evolving subacute hemorrhage within \par the postsurgical cavity. There is thin rim enhancement of the postsurgical \par cavity cavity with a new focus of increased linear enhancement extending \par anteriorly from the postsurgical cavity. This may be posttreatment related. \par Surrounding FLAIR/T2 signal abnormality has improved when compared with the \par prior study. \par \par No new areas of enhancement are identified. \par \par Age-appropriate involutional changes and mild microvascular ischemic change \par are similar in appearance. \par \par The orbits are not remarkable in appearance. \par \par The visualized paranasal sinuses and tympanomastoid cavities are free of \par acute disease. \par \par The continues to be marrow heterogeneity of the visualized clivus and \par cervical spine, unchanged compared with the prior study. \par \par Impression: \par \par Right parietal postsurgical cavity with residual evolving subacute \par hemorrhage and slight increase in enhancement compared with the previous \par examination with associated decrease in surrounding edema. Findings may be \par posttreatment related. Close continued follow-up is recommended. \par \par \par \par \par \par \par \par \par WINDY ARAGON M.D., ATTENDING RADIOLOGIST \par This document has been electronically signed. Jun 24 2020 3:14PM \par \par \par \par \par \par  \par \par \par \par \par \par \par \par \par Patient Name: MARISELA SANTOS   Report Date: 16-Mar-2020 12:32.00 \par Patient ID: [id number] ([facility]), [id number] (EPI)  Accession No.: [id number] \par Patient Birth Date: 1955  Report Status: F \par Referring Physician: [id number] RHIANNON MCCALL   Reason For Study: postop  \par \par \par \par \par \par EXAM: MR BRAIN WAW IC \par \par \par PROCEDURE DATE: 03/16/2020 \par \par \par \par \par INTERPRETATION: CLINICAL INDICATION: Status post resection of a right \par parietal hemorrhagic mass. Lung cancer. \par \par TECHNIQUE: MRI of the brain was performed without and with contrast using \par the following sequences: Axial DWI/ADC map, axial SWI, axial gradient echo, \par axial SPGR, sagittal T1 FLAIR, axial T2 FLAIR, axial T2 FSE axial T1 SPGR \par postcontrast and axial/coronal/sagittal T1 spin-echo postcontrast. \par \par 6 mls of Gadavist was administered intravenously without complication and \par 1.5 mls were discarded. \par \par COMPARISON: MRI brain dated 3/14/2020. \par \par FINDINGS: \par \par There is been interval right parieto-occipital craniotomy for resection of a \par parietal hemorrhagic mass. There is persistent vasogenic edema surrounding \par the postoperative bed. There is minimal postoperative hemorrhage within the \par surgical bed. There is a thin extra-axial collection overlying the parietal \par lobe measuring approximately 4 mm. \par \par Ventricles and sulci are age-appropriate in size. \par \par There is nonenhancing periventricular and scattered subcortical and pontine \par T2 and FLAIR signal hyperintensity consistent with microvascular-type \par changes. \par \par There is no extra-axial fluid collection. \par \par Major intracranial flow-voids are preserved. \par \par There is a unchanged small region of susceptibility-weighted artifact within \par the right cerebellar hemisphere likely representing a small cavernoma or \par chronic microhemorrhage. \par \par The orbits, sellar and suprasellar structures, and craniocervical junction \par are unremarkable. \par \par There is heterogeneous low marrow signal involving the upper vertebral \par bodies. \par \par The visualized paranasal sinuses and tympanomastoid cavities are clear. \par \par IMPRESSION: \par \par Interval right parietal occipital craniotomy for resection of a parietal \par hemorrhagic mass with expected postoperative changes and thin extra-axial \par fluid collection underlying the craniotomy site compared with 3/14/2020. \par \par Similar-appearing heterogeneous low marrow signal involving the upper \par vertebral bodies which may represent infiltrative marrow process, metastatic \par disease, chronic anemia, or drug therapy. \par \par \par \par \par \par \par \par CELESTINO RUANO M.D., RADIOLOGY FELLOW \par This document has been electronically signed. \par BRITNEY LYNCH M.D., ATTENDING RADIOLOGIST \par This document has been electronically signed. Mar 16 2020 12:32PM \par \par \par \par \par  \par

## 2020-09-14 NOTE — HISTORY OF PRESENT ILLNESS
[FreeTextEntry1] : Mrs. Andrea is a 63 yo female with PMH of stage 2 left lung adenocarcinoma s/p resection and adjuvant chemo with Cisplatin/gem 6/2016, depression, anxiety, and ANGELINA, s/p 3/15/20 Right occipital craniotomy for resection of Right occipital hemorrhagic brain lesion.  Pathology returned metastatic adenocarcinoma of lung origin.  Initial MRI showed Right occipital hemorrhagic lesion.  \par \par She arrives for initial HFU since recent admission 9/3/20 with confusion and seizures d/c home 9/6/20.\par \par 9/5/20 MRI Brain w/wo - PACS report below shows no evidence of recurrence \par \par 6/23/2020 MRI Brain w/wo - PACS/ report below \par \par 4/13/20 s/p GKRS with Dr. Rosado\par \par Pending MRI appt 6/23/20

## 2020-09-14 NOTE — HISTORY OF PRESENT ILLNESS
[FreeTextEntry1] : Natalie Alvarado is a 65 yo woman who presented at this office for a neuro oncology follow up\par In brief\par 2/2016 with TORRES and a chest CT showed a left lower lobe lesion - PET/CT confirmed this to be abnormal and she underwent a left upper lobectomy on 3/4/16 by Dr. Hernández - adenocarcinoma of the lung.\par \par She was initially treated with 4 cycles of Cisplatin and Pemetrexed which completed in June, 2016.\par Followed expectantly since then\par 3/2020 -  Presented to the ER with headache which had been preceded by a sensation of flashing lights. An MRI dated 3/14/2020 showed a right parietal cortical solitary heterogeneously enhancing mass measuring 2.2 x 1.6 x 2.5 cm. This was resected by Dr. Jose Manuel Servin on 3/15/2020 - pathology was consistent with metastatic adenocarcinoma of lung origin. PD-L1 - positive 30%.\par \par She was seen by Dr. Rosado who recommended Gamma Knife Radiosurgery. She underwent GKS on 4/13/2020. Dr. Pugh started her on Pembrolizumab on 4/23. It is unclear when she stopped the Keppra. Her depression had worsened and she was prescribed Abilify.\par \par Last week of August, 2020 -  she was at the mall and a  called her daughter to say that her mother seemed confused - she picked her up and brought her to the ER at [facility] where she was noted to have a few seizures characterized as eyes deviating to the left followed by whole body shaking - she was intubated for airway protection and ultimately pulled that out on her own. A repeat MRI of the brain on 8/30 was without new disease. She was found to have a UTI while there.\par 9/2/2020- Follow up with Dr Schaefer sec to strange behaviors. Recommended follow up with Psychiatry\par 9/3 -9/6/2020 - Patient got admitted sec to confusion. EEG showed focal status epilepticus. Keppra was stopped and was started on Valproic acid 750 mg bid and Vimpat 100 mg bid. \par 9/10- Patient here for a follow up. She is frustrated with what has been going on . Reports that she is unsure on what medications she is on and she is very forgetful. Her hallucinations has improved since discharge\par Denies headaches, seizures\par

## 2020-09-14 NOTE — PHYSICAL EXAM
[General Appearance - Alert] : alert [General Appearance - In No Acute Distress] : in no acute distress [General Appearance - Well Nourished] : well nourished [Respiration, Rhythm And Depth] : normal respiratory rhythm and effort [] : no respiratory distress [Exaggerated Use Of Accessory Muscles For Inspiration] : no accessory muscle use [Edema] : there was no peripheral edema [FreeTextEntry1] : Patient seen and examined\par Alert, awake , Oriented X 3. Speech clear and fluent. Irritable\par PERRLA, EOMI, VFF\par Face symmetrical. Tongue protrudes midline\par AGUILLON x 4 with good and equal strength\par FFM, coordination equal bilaterally\par Sensation intact bilaterally\par Gait steady. Ambulating independently\par

## 2020-09-14 NOTE — REASON FOR VISIT
[de-identified] : s/p 3/15/2020 Right occipital craniotomy for resection of Right occipital hemorrhagic brain lesion [de-identified] : 9/5/20 MRI Brain w/wo - in PACS/ report below\par 6/23/20 MRI Brain w/wo in PACS/ report below

## 2020-09-14 NOTE — DISCUSSION/SUMMARY
[FreeTextEntry1] : Patient seen and examined\par Clinically not worse\par She will call us tomorrow to give an update on what medications she is actually taking\par Discussed the importance of being compliant with Anti - epilepticus\par Will do a clinical follow up on 9/22. Will do Valproic acid level that day\par In the interim, if any concerns patient knows to call me\par

## 2020-09-18 PROBLEM — G40.109 FOCAL AND PARTIAL SEIZURES: Status: ACTIVE | Noted: 2020-01-01

## 2020-09-18 PROBLEM — T88.7XXA MEDICATION SIDE EFFECT: Status: ACTIVE | Noted: 2020-01-01

## 2020-09-18 PROBLEM — C79.31 BRAIN METASTASES: Status: ACTIVE | Noted: 2020-01-01

## 2020-09-18 PROBLEM — R56.9 SEIZURES: Status: ACTIVE | Noted: 2020-01-01

## 2020-09-18 NOTE — HISTORY OF PRESENT ILLNESS
[FreeTextEntry1] : Mrs. Andrea is a 65 yo female with PMH of stage 2 left lung adenocarcinoma s/p resection and adjuvant chemo with Cisplatin/gem 6/2016, depression, anxiety, and ANGELINA, s/p 3/15/20 Right occipital craniotomy for resection of Right occipital hemorrhagic brain lesion.  Pathology returned metastatic adenocarcinoma of lung origin.  Initial MRI showed Right occipital hemorrhagic lesion.  \par \par She arrives for initial HFU since recent admission 9/3/20 with confusion and seizures d/c home 9/6/20.\par \par 9/5/20 MRI Brain w/wo - PACS report below shows no evidence of recurrence \par \par 6/23/2020 MRI Brain w/wo - PACS/ report below \par \par 4/13/20 s/p GKRS with Dr. Rosado\par \par Pending MRI appt 6/23/20

## 2020-09-18 NOTE — PHYSICAL EXAM
[General Appearance - Alert] : alert [General Appearance - In No Acute Distress] : in no acute distress [Oriented To Time, Place, And Person] : oriented to person, place, and time [Sclera] : the sclera and conjunctiva were normal [Hearing Threshold Finger Rub Not [place]] : hearing was normal [Neck Appearance] : the appearance of the neck was normal [] : no respiratory distress [Respiration, Rhythm And Depth] : normal respiratory rhythm and effort [Edema] : there was no peripheral edema [Abnormal Walk] : normal gait [Involuntary Movements] : no involuntary movements were seen [Motor Tone] : muscle strength and tone were normal [Skin Color & Pigmentation] : normal skin color and pigmentation

## 2020-09-18 NOTE — ED PROVIDER NOTE - CARE PLAN
Principal Discharge DX:	Delirium  Secondary Diagnosis:	Dehydration, mild  Secondary Diagnosis:	Hypokalemia

## 2020-09-18 NOTE — ED ADULT TRIAGE NOTE - CHIEF COMPLAINT QUOTE
sent from neurologist for admission  patient reports 2 weeks of fatigue and forgetfulness   was started on new seizure medication approx. 2 weeks ago

## 2020-09-18 NOTE — ED PROVIDER NOTE - OBJECTIVE STATEMENT
66yo F with PMH COPD, migraines, depression, seizures, lung adenocarcinoma w/ R occipito-parietal met s/p resection, recent admission for AMS 9/3-9/6/2020 c/b status epilepticus, pt on keppra and started on depakote and vimpa, also found to have elevated TSH and UTI during admission BIBEMS from neurologist office today for persistent AMS. Per EMS/pt/pt's daughter, pt with confusion, disorientation, and increased fatigue since medication changes. Pt unable to use the door at office today. EMS called by neuro office. 64yo F with PMH COPD, migraines, depression, seizures, lung adenocarcinoma w/ R occipito-parietal met s/p resection, recent admission for AMS 9/3-9/6/2020 c/b status epilepticus, pt on keppra and then started on depakote and vimpat, also found to have elevated TSH and UTI during admission BIBEMS from neurologist office today for persistent AMS. Per EMS/pt/pt's daughter, pt with confusion, disorientation, and severely increased fatigue since medication changes. Pt unable to use the door at office today and EMS called by neuro office. Pt reports she had a planned upcoming admission to adjust seizure meds. Reports poor appetite. Lives alone. Denies any fever/chills, HA, cough, abd pain, vomiting, new numbness/tingling/focal weakness. 66yo F with PMH COPD, migraines, depression, seizures, lung adenocarcinoma w/ R occipito-parietal met s/p resection, recent admission for AMS 9/3-9/6/2020 c/b status epilepticus, pt on keppra and then started on depakote and vimpat, also found to have elevated TSH and UTI during admission BIBEMS from neurologist office today for persistent AMS. Per EMS/pt/pt's daughter, pt with confusion, disorientation, and severely increased fatigue since medication changes. Pt unable to use the door at office today and EMS called by neuro office. Pt reports she had a planned upcoming admission to adjust seizure meds. Reports poor appetite. No recent seizures. Lives alone. Pt reports mild HA, has not taken anything for pain, was told not to take her Imitrex. Denies any fever/chills, cough, abd pain, vomiting, new numbness/tingling/focal weakness.

## 2020-09-18 NOTE — REVIEW OF SYSTEMS
[As Noted in HPI] : as noted in HPI [Seizures] : convulsions [Negative] : Musculoskeletal [Abdominal Pain] : no abdominal pain [Vomiting] : no vomiting [Diarrhea] : no diarrhea [Dysuria] : no dysuria [Incontinence] : no incontinence [Skin Lesions] : no skin lesions [Easy Bleeding] : no tendency for easy bleeding [Easy Bruising] : no tendency for easy bruising [de-identified] : feeling fatigue and foggy from new antiseizure meds

## 2020-09-18 NOTE — ED PROVIDER NOTE - ENMT, MLM
Airway patent, Nasal mucosa clear. Mouth with dry normal mucosa. Airway patent, Nasal mucosa clear. Mouth with dry mucosa.

## 2020-09-18 NOTE — ASSESSMENT
[FreeTextEntry1] : MRI shows no evidence of recurrence.  Epilepsy neurology f/u for recent seizures and continue medical management with hem/onc.\par \par f/u PRN \par \par

## 2020-09-18 NOTE — REASON FOR VISIT
[Follow-Up: _____] : a [unfilled] follow-up visit [FreeTextEntry1] : 9/5/2020 MRI Brain w/wo - in PACS report below \par \par 6/23/20 MRI Brain w/wo in PACS

## 2020-09-18 NOTE — ED PROVIDER NOTE - CADM POA CENTRAL LINE
Size Of Lesion In Cm: 0.9
X Size Of Lesion In Cm: 0.7
Bill For Surgical Tray: no
Render Post-Care Instructions In Note?: yes
Cryotherapy Text: The wound bed was treated with cryotherapy after the biopsy was performed.
Dressing: no dressing applied
Wound Care: Mupirocin
Electrodesiccation And Curettage Text: The wound bed was treated with electrodesiccation and curettage after the biopsy was performed.
Detail Level: Detailed
Curettage Text: The wound bed was treated with curettage after the biopsy was performed.
Biopsy Method: Personna blade
Hemostasis: Electrocautery
Lab Facility: [id number]
Electrodesiccation Text: The wound bed was treated with electrodesiccation after the biopsy was performed.
Billing Type: Third-Party Bill
Anesthesia Type: 2% lidocaine with epinephrine
Lab: [id number]
Notification Instructions: Patient will be notified of biopsy results. However, patient instructed to call the office if not contacted within 2 weeks.
Consent: Verbal consent was obtained and risks were reviewed including but not limited to scarring, infection, bleeding, scabbing, incomplete removal, nerve damage and allergy to anesthesia.
Silver Nitrate Text: The wound bed was treated with silver nitrate after the biopsy was performed.
Anesthesia Volume In Cc (Will Not Render If 0): 0.2
Biopsy Type: H and E
Post-Care Instructions: I reviewed with the patient in detail post-care instructions. Patient is to keep the biopsy site dry overnight, and then apply bacitracin twice daily until healed. Patient may apply hydrogen peroxide soaks to remove any crusting.
Additional Anesthesia Volume In Cc (Will Not Render If 0): 0
Type Of Destruction Used: Curettage
No

## 2020-09-18 NOTE — ED ADULT NURSE REASSESSMENT NOTE - NS ED NURSE REASSESS COMMENT FT1
Pt resting in bed. VSS/NAD. ED MD states he is unsure who provider is placing other orders at this time and to only follow ed orders. Neuro flowsheet in chart.

## 2020-09-18 NOTE — RESULTS/DATA
[FreeTextEntry1] : [facility]\par    [facility] Department of Radiology\par   Radiology Report\par \par \par Patient Name: MARISELA SANTOS   Report Date: 06-Sep-2020 10:46.00 \par Patient ID: [id number] ([id number]), [id number] (EPI)  Accession No.: [id number] \par Patient Birth Date: 1955  Report Status: F \par Referring Physician: [id number] ZAKIYA BARR   Reason For Study: GBM  \par \par EXAM: MR BRAIN WAW IC \par \par PROCEDURE DATE: 09/05/2020 \par \par \par INTERPRETATION: HISTORY: Lung cancer with metastatic disease to the brain status post resection on immunotherapy. Presenting with seizures. Auditory hallucinations. Confusion, disoriented patient. \par \par Description: MRI of the brain with and without gadolinium contrast was performed. \par \par COMPARISON: Brain MRI 08/30/2020, brain MRI 06/23/2020, brain MRI 03/14/2020.. \par \par Sagittal T1, coronal T2, coronal FLAIR, axial T1, T2, FLAIR, SWI, GRE, and diffusion-weighted series were obtained before contrast. After intravenous gadolinium contrast administration, sagittal, coronal, and axial T1 postcontrast series were obtained. \par \par 5 cc intravenous Gadovist gadolinium contrast was administered, 2.5 cc contrast was discarded. \par \par Right occipital craniotomy changes are again noted. \par \par Evolving postoperative changes are again visualized status post resection of right occipital lobe mass. Areas of increased T1 signal involve the surgical bed on the precontrast series, partially limiting evaluation for enhancement. No nodular areas of enhancement are appreciated in or about the surgical cavity with the limitations of this study. Patchy and linear nonnodular areas of enhancement are noted at the surgical cavity which appear stable compared to the 08/30/2020 exam. No new areas of abnormal enhancement are present. \par \par Scattered small foci of nonenhancing increased T2 and FLAIR signal are again noted in the white matter, stable, most likely reflecting chronic microvascular ischemic changes given the patient's age. Chronic white matter changes are also again noted within the fritz. \par \par There is no evidence for acute infarct, acute hemorrhage, or hydrocephalus. Mild cerebral volume loss is noted. \par \par IMPRESSION: \par \par No gross evidence for recurrent tumor in or about the right occipital surgical cavity. \par \par No acute intracranial abnormality is noted. \par \par \par ZACHERY JARRETT M.D., ATTENDING RADIOLOGIST \par This document has been electronically signed. Sep 6 2020 10:46AM \par

## 2020-09-18 NOTE — H&P ADULT - NSHPPHYSICALEXAM_GEN_ALL_CORE
Vital Signs Last 24 Hrs  T(C): 37 (18 Sep 2020 17:43), Max: 37 (18 Sep 2020 17:43)  T(F): 98.6 (18 Sep 2020 17:43), Max: 98.6 (18 Sep 2020 17:43)  HR: 78 (18 Sep 2020 17:43) (78 - 78)  BP: 117/80 (18 Sep 2020 17:43) (117/80 - 117/80)  BP(mean): --  RR: 16 (18 Sep 2020 17:43) (16 - 16)  SpO2: 97% (18 Sep 2020 17:43) (97% - 97%)    Mental Status: Awake, lethargic, inattentive. Oriented to person, place, situation, time.  Follows all commands for a time, requires frequent re-instruction   	Language: Speech is slow, clear, fluent with preserved naming, repetition and comprehension.    	Cranial Nerves:   Slight left sided ptosis. PERRLA (R = 3mm, L = 3mm).   2-3 beats left beating nystagmus on right lateral gaze.   V1-3 intact to light touch.    Slight left facial droop, full eye closure strength b/l.   Hearing grossly normal to conversation.    Symmetric palate elevation in midline.    Head turning & shoulder shrug intact b/l.   Tongue midline, normal movements, no atrophy.  	Motor:   Normal muscle bulk & tone. No noticeable tremor, myoclonus, no pronator drift. 5/5 strength throughout.   	Sensation:   Symmetric B/L preserved sensation to light touch, pin prick, position. Tingling pain "pins and needles" sensation in toes.   	Cortical:   No extinction on double simultaneous touch and no signs of neglect.   	Reflexes:   2/4 throughout.  Plantar Responses are downgoing B/L.   	Coordination:   Intact rapid-alternating movements. No dysmetria on finger-to-nose or heel-to-shin.                  Gait:   Slow gait, some instability when ambulating. Romberg and other gait deferred. Vital Signs Last 24 Hrs  T(C): 37 (18 Sep 2020 17:43), Max: 37 (18 Sep 2020 17:43)  T(F): 98.6 (18 Sep 2020 17:43), Max: 98.6 (18 Sep 2020 17:43)  HR: 78 (18 Sep 2020 17:43) (78 - 78)  BP: 117/80 (18 Sep 2020 17:43) (117/80 - 117/80)  BP(mean): --  RR: 16 (18 Sep 2020 17:43) (16 - 16)  SpO2: 97% (18 Sep 2020 17:43) (97% - 97%)    Mental Status: eyes open, alert but appears drowsy, somewhat inattentive. Oriented to person, place, situation, time.  Follows all commands for a time, requires frequent re-instruction   	Language: Speech is slow, clear, fluent with preserved naming, repetition and comprehension.    	Cranial Nerves:   Slight left sided ptosis. PERRL (R = 3mm, L = 3mm).   2-3 beats left beating nystagmus on right lateral gaze.   V1-3 intact to light touch.    mild left facial droop, full eye closure strength b/l.   Hearing grossly normal to conversation.    Symmetric palate elevation in midline.    Head turning & shoulder shrug intact b/l.   Tongue midline, normal movements, no atrophy.  	Motor:   Normal muscle bulk & tone. No noticeable tremor, myoclonus, no pronator drift. 5/5 strength throughout.   	Sensation:   Symmetric B/L preserved sensation to light touch  	Cortical:   No extinction on double simultaneous touch and no signs of neglect.   	Reflexes:   2/4 throughout.  Plantar Responses are downgoing B/L.   	Coordination:   Intact rapid-alternating movements. No dysmetria on finger-to-nose.                  Gait:   Slow gait, some instability limited to headache pain when ambulating. Romberg deferred

## 2020-09-18 NOTE — ED PROVIDER NOTE - ATTENDING CONTRIBUTION TO CARE
------------ATTENDING NOTE------------   pt sent to ED from her neurologist office for concerns of weeks of increasing delirium, describing generalized malaise, increased confusion/forgetfulness, decreased PO, complicated as recent neurosurgery for metastatic lung cancer complicated by seizure, pt/daughter attributing to starting depakote, no recent fevers/illness, no recent trauma, no recent drug/intoxicant use, awaiting labs/imaging and Neuro consult for admission -->  - Jose Manuel Carrera MD    --------------------------------------------- ------------ATTENDING NOTE------------   pt sent to ED from her neurologist office for concerns of weeks of increasing delirium, describing generalized malaise, increased confusion/forgetfulness, decreased PO, complicated as recent neurosurgery for metastatic lung cancer complicated by seizure, pt/daughter attributing to starting depakote, no recent fevers/illness, no recent trauma, no recent drug/intoxicant use, awaiting labs/imaging and Neuro consult for admission --> admitting Neuro for continued evaluation, monitoring, testing.  - Jose Manuel Carrera MD    ---------------------------------------------

## 2020-09-18 NOTE — ED ADULT NURSE NOTE - OBJECTIVE STATEMENT
65 yr old female with past h/o lung ca with mets to the brain with new seizures came in today due to extreme fatigue for the past 2 weeks. was in her neurologist office today and was very weak so he sent her to er. on assessment a and o x 3 lungs clear abd soft non tender no swelling in extremities no n/v/d no fevers. pt has not been eating or drinking and looks dehydrated.

## 2020-09-18 NOTE — H&P ADULT - NSHPLABSRESULTS_GEN_ALL_CORE
Urinalysis + Microscopic Examination (09.18.20 @ 19:25)   Urine Appearance: Clear   Urobilinogen: Negative   Specific Gravity: 1.022   Protein, Urine: Trace   pH Urine: 7.0   Leukocyte Esterase Concentration: Moderate   Nitrite: Negative   Ketone - Urine: Negative   Bilirubin: Negative   Color: Yellow   Glucose Qualitative, Urine: Negative   Blood, Urine: Moderate   Red Blood Cell - Urine: 23 /hpf   White Blood Cell - Urine: 3 /HPF   Epithelial Cells: 8 /hpf   Hyaline Casts: 3 /lpf   Bacteria: Negative     Activated Partial Thromboplastin Time in AM (09.18.20 @ 18:32)   Activated Partial Thromboplastin Time: 28.8: The recommended therapeutic heparin range (full dose) is 58-99 seconds.   Prothrombin Time and INR, Plasma in AM (09.18.20 @ 18:32)   Prothrombin Time, Plasma: 13.3 sec    Troponin T, High Sensitivity (09.18.20 @ 18:09)   Troponin T, High Sensitivity Result: 7    Magnesium, Serum (09.18.20 @ 18:09)   Magnesium, Serum: 1.8 mg/dL     Valproic Acid Level, Serum: 94 ug/mL (09.18.20 @ 18:09)     Comprehensive Metabolic Panel (09.18.20 @ 18:09)   Sodium, Serum: 144 mmol/L   Potassium, Serum: 3.0 mmol/L   Chloride, Serum: 102 mmol/L   Carbon Dioxide, Serum: 30 mmol/L   Anion Gap, Serum: 12 mmol/L   Blood Urea Nitrogen, Serum: 17 mg/dL   Creatinine, Serum: 0.95 mg/dL   Glucose, Serum: 107 mg/dL   Calcium, Total Serum: 9.3 mg/dL   Protein Total, Serum: 6.5 g/dL   Albumin, Serum: 4.0 g/dL   Bilirubin Total, Serum: 0.3 mg/dL   Alkaline Phosphatase, Serum: 60 U/L   Aspartate Aminotransferase (AST/SGOT): 30 U/L   Alanine Aminotransferase (ALT/SGPT): 18 U/L     Complete Blood Count + Automated Diff (09.18.20 @ 18:09)   WBC Count: 8.28 K/uL   RBC Count: 3.64 M/uL   Hemoglobin: 11.9 g/dL   Hematocrit: 35.7 %   Mean Cell Volume: 98.1 fl   Mean Cell Hemoglobin: 32.7 pg   Mean Cell Hemoglobin Conc: 33.3 gm/dL   Red Cell Distrib Width: 13.8 %   Platelet Count - Automated: 215 K/uL   Auto Neutrophil #: 5.20 K/uL   Auto Lymphocyte #: 1.54 K/uL   Auto Monocyte #: 1.15 K/uL   Auto Eosinophil #: 0.24 K/uL   Auto Basophil #: 0.03 K/uL   Auto Neutrophil %: 62.8: Differential percentages must be correlated with absolute numbers for   clinical significance. %   Auto Lymphocyte %: 18.6 %   Auto Monocyte %: 13.9 %   Auto Eosinophil %: 2.9 %   Auto Basophil %: 0.4 %   Auto Immature Granulocyte %: 1.4 %   Nucleated RBC: 0 /100 WBCs    EXAM:  MR BRAIN WAW IC                        PROCEDURE DATE:  09/05/2020      IMPRESSION:  No gross evidence for recurrent tumor in or about the right occipital surgical cavity.  No acute intracranial abnormality is noted.    ******PRELIMINARY REPORT******    XAM:  XR CHEST PORTABLE URGENT 1V                        PROCEDURE DATE:  09/18/2020      INTERPRETATION:  Clear lungs.  ******PRELIMINARY REPORT******

## 2020-09-18 NOTE — H&P ADULT - ASSESSMENT
INCOMPLETE 65 y old left handed woman with PMHx of COPD, migraines, depressions, seizures and lung adenocarcinoma with R occipito-parietal mets s/p resection, stereotactic radiosurgery, and chemotherapy presenting with worsening AMS, lethargy, confusion, stomach upset w/ black stools, and pins and needles sensation in the toes over the past 10 days following her recent d/c from the EMU on 9/6/2020 admitted now to EMU for concern for depakote toxicity and AED optimization.     Plan:  admit to EMU  vEEG monitoring  seizure and aspiration precautions  9/18 c/w home dose of depakote, increase vimpat to 150mg bid  9/19: decrease depakote to 500mg bid, c/w vimpat 150mg bid  plan to ultimately taper depakote  CBC, CMP, valproic acid, lacosamide level, ammonia  Autoimmune encephalitis serum panel [Encephalopathy, Autoimmune Evaluation, Serum]  med rec completed    RESCUE MEDICATIONS:  ativan 2mg IVP x 1 for GTC or seizure activity > 3-5 min  valproic acid 1500mg IV x 1 if refractory    mgmt d/w Epilepsy Dr. Awad

## 2020-09-19 NOTE — EEG REPORT - NS EEG TEXT BOX
[facility]   COMPREHENSIVE EPILEPSY CENTER   REPORT OF LONG-TERM VIDEO EEG     [facility]: [address], [place] [address], Ph#: [phone]  [facility]: [id number] [place] [address], Ph#: [phone]  [facility]: [address] [place] [address], Ph#: [phone]    Patient Name: DANIELLE ANTONIO  Age and : 65y ([birth year])  MRN #: [id number]  Location: [place] [address]  Referring Physician: Elyssa Awad    Start Time/Date: 00:29 on 2020  End Time/Date: 08:00 on 2020  Duration: 6:53    _____________________________________________________________  STUDY INFORMATION    EEG Recording Technique:  The patient underwent continuous Video-EEG monitoring, using Telemetry System hardware on the XLTek Digital System. EEG and video data were stored on a computer hard drive with important events saved in digital archive files. The material was reviewed by a physician (electroencephalographer / epileptologist) on a daily basis. Lokesh and seizure detection algorithms were utilized and reviewed. An EEG Technician attended to the patient, and was available throughout daytime work hours.  The epilepsy center neurologist was available in person or on call 24-hours per day.    EEG Placement and Labeling of Electrodes:  The EEG was performed utilizing 20 channel referential EEG connections (coronal over temporal over parasagittal montage) using all standard 10-20 electrode placements with EKG, with additional electrodes placed in the inferior temporal region using the modified 10-10 montage electrode placements for elective admissions, or if deemed necessary. Recording was at a sampling rate of 256 samples per second per channel. Time synchronized digital video recording was done simultaneously with EEG recording. A low light infrared camera was used for low light recording.     _____________________________________________________________  HISTORY    Patient is a 65y old  Female who presents with a chief complaint of Confusion, fatigue, pins and needles sensation of foot concerning for valproate toxicity (18 Sep 2020 18:51)      PERTINENT MEDICATION:  lacosamide 150 milliGRAM(s) Oral two times a day  valproic acid 500 milliGRAM(s) Oral two times a day    _____________________________________________________________  STUDY INTERPRETATION    Findings: The background was continuous, spontaneously variable and reactive. Entire recording captured drowsy and asleep states only; no posterior dominant rhythm seen.    Background Slowing:  No generalized background slowing was present.    Focal Slowing:   None were present.    Sleep Background:  Drowsiness was characterized by fragmentation, attenuation, and slowing of the background activity.    Sleep was characterized by the presence of vertex waves, symmetric sleep spindles and K-complexes. POST seen    Other Non-Epileptiform Findings:  None were present.    Interictal Epileptiform Activity:   None were present.    Events:  Clinical events: None recorded.  Seizures: None recorded.    Activation Procedures:   Hyperventilation was not performed.    Photic stimulation was not performed.     Artifacts:  Intermittent myogenic and movement artifacts were noted.    ECG:  The heart rate on single channel ECG was predominantly between 70-80 BPM.    _____________________________________________________________  EEG SUMMARY/CLASSIFICATION    Normal EEG in the drowsy and asleep states.    _____________________________________________________________  EEG IMPRESSION/CLINICAL CORRELATE    Normal EEG study.  No epileptiform pattern or seizure seen.  suboptimal study due to lack of wakefulness captured on EEG    Logan Corrales MD PGY-5  Epilepsy Fellow    This Preliminary report is based on fellow review. Final report pending attending review. [facility] Epilepsy Monitoring Unit Report  [facility]: [address], [place] [address], Ph#: [phone] [facility]: [id number] [place] [address], Ph#: [phone] Office: [address] [place] [address], [facility] [address], [place] [address] Ph#: [phone]  Patient Name: Natalie Andrea   Age: 65 year, : 1955 Patient ID: -, MRN #: MRN [id number], Leos: EMU 462D EMU 462D Referring Physician: ER admit   Study Started: 00:49:13 AM on 2020 Study Ended: pending discharge           Study Information:  EEG Recording Technique: The patient underwent continuous Video-EEG monitoring, using Telemetry System hardware on the XLTek Digital System. EEG and video data were stored on a computer hard drive with important events saved in digital archive files. The material was reviewed by a physician (electroencephalographer / epileptologist) on a daily basis. Lokesh and seizure detection algorithms were utilized and reviewed. An EEG Technician attended to the patient, and was available throughout daytime work hours.  The epilepsy center neurologist was available in person or on call 24-hours per day.  EEG Placement and Labeling of Electrodes: The EEG was performed utilizing 20 channel referential EEG connections (coronal over temporal over parasagittal montage) using all standard 10-20 electrode placements with EKG, with additional electrodes placed in the inferior temporal region using the modified 10-10 montage electrode placements for elective admissions, or if deemed necessary. Recording was at a sampling rate of 256 samples per second per channel. Time synchronized digital video recording was done simultaneously with EEG recording. A low light infrared camera was used for low light recording.   History: 64 y/o Female  HO: COPD, Migraines, Depression, Seizures, Lung cancer, Metastatic Adenocarcinoma to brain PW: Seizure capture  Home Antiepileptic Medication and Device VPA 750mg BID, LCM 100mg BID  Interpretation:  Starting: Day 1      2020      Time: 00:49       Duration: 6hr 53m  Daily EEG Visual Analysis FINDINGS:  The background was continuous, spontaneously variable and reactive. During wakefulness, the posterior dominant rhythm consisted of fragments of 7 Hz activity, with amplitude to 30 uV, that attenuated to eye opening.    Background Slowing: Excess diffuse delta slowing.  Focal Slowing:  Continuous theta/delta slowing in the right posterior quadrant.  Sleep Background: Drowsiness was characterized by fragmentation, attenuation, and slowing of the background activity.   Sleep was characterized by the presence of vertex waves, symmetric sleep spindles and K-complexes.  Other Non-Epileptiform Findings: Breach effect in the right posterior quadrant characterized by higher amplitude, sharply contoured waveforms.  Interictal Epileptiform Activity:  Abundant right occipitotemporal  (O2>>P8) spikes.  Events: No events or seizures recorded.  Artifacts: Intermittent myogenic and movement artifacts were noted.  ECG: The heart rate on single channel ECG was predominantly between 60-70BPM.  EEG Summary: Abnormal EEG in the awake, drowsy and asleep states. - Abundant right occipitotemporal  (O2>>P8) spikes. - Continuous theta/delta slowing in the right posterior quadrant. - Mild to moderate generalized slowing - Breach effect in the right posterior quadrant characterized by higher amplitude, sharply contoured waveforms.  Impression/Clinical Correlate: Potential epileptogenic focus in the right occipitotemporal region, structural and skull defect in the right posterior quadrant and mild nonspecific diffuse cerebral dysfunction. No event or seizure captured.  ________________________________________  Logan Corrales MD Epilepsy Fellow, [facility] [place]  Humera Vale MD Attending Physician, [facility] [place]

## 2020-09-19 NOTE — DISCHARGE NOTE PROVIDER - CARE PROVIDER_API CALL
Elyssa Awad)  EEGEpilepsy; Neurology  [address], [address]  [place] [address]  Phone: [phone]  Fax: [phone])-  Follow Up Time: 1 week

## 2020-09-19 NOTE — DISCHARGE NOTE PROVIDER - NSDCFUSCHEDAPPT_GEN_ALL_CORE_FT
DANIELLE ANTONIO ; 09/22/2020 ; NPP [facility]  DANIELLE ANTONIO ; 09/24/2020 ; NPP Rad  [facility] [facility]  DANIELLE ANTONIO ; 10/01/2020 ; NPP Neurology [address]  DANIELLE ANTONIO ; 10/01/2020 ; NPP Rad [address]

## 2020-09-19 NOTE — DISCHARGE NOTE PROVIDER - NSDCMRMEDTOKEN_GEN_ALL_CORE_FT
albuterol 90 mcg/inh inhalation aerosol: 2 puff(s) inhaled every 6 hours, As needed, Shortness of Breath and/or Wheezing  ALPRAZolam 0.5 mg oral tablet: 1 tab(s) orally once a day, As Needed  Cognitive Therapy : 1 application buccal once a day   Imitrex 100 mg oral tablet: 1 tab(s) orally once, As Needed  levothyroxine 100 mcg (0.1 mg) oral tablet: 1 tab(s) orally once a day  pantoprazole 40 mg oral delayed release tablet: 1 tab(s) orally once a day (before a meal)  sertraline 50 mg oral tablet: 3 tab(s) orally once a day  tiotropium 18 mcg inhalation capsule: 1 cap(s) inhaled once a day  valproic acid 250 mg oral capsule: 3 cap(s) orally 2 times a day   Vimpat 100 mg oral tablet: 1 tab(s) orally 2 times a day   albuterol 90 mcg/inh inhalation aerosol: 2 puff(s) inhaled every 6 hours, As needed, Shortness of Breath and/or Wheezing  ALPRAZolam 0.5 mg oral tablet: 1 tab(s) orally once a day, As Needed  Imitrex 100 mg oral tablet: 1 tab(s) orally once, As Needed  lacosamide 150 mg oral tablet: 1 tab(s) orally 2 times a day  levothyroxine 100 mcg (0.1 mg) oral tablet: 1 tab(s) orally once a day  pantoprazole 40 mg oral delayed release tablet: 1 tab(s) orally once a day (before a meal)  sertraline 50 mg oral tablet: 3 tab(s) orally once a day  tiotropium 18 mcg inhalation capsule: 1 cap(s) inhaled once a day  valproic acid (as valproate sodium) 100 mg/mL intravenous solution: 15 milliliter(s) intravenous once, As needed, GTC or seizure activity &gt; 3-5 min if ativan does not break seizure  valproic acid 250 mg oral capsule: 2 cap(s) orally 2 times a day   albuterol 90 mcg/inh inhalation aerosol: 2 puff(s) inhaled every 6 hours, As needed, Shortness of Breath and/or Wheezing  ALPRAZolam 0.5 mg oral tablet: 1 tab(s) orally once a day, As Needed  Imitrex 100 mg oral tablet: 1 tab(s) orally once, As Needed  lacosamide 150 mg oral tablet: 1 tab(s) orally 2 times a day  levothyroxine 100 mcg (0.1 mg) oral tablet: 1 tab(s) orally once a day  pantoprazole 40 mg oral delayed release tablet: 1 tab(s) orally once a day (before a meal)  sertraline 50 mg oral tablet: 3 tab(s) orally once a day  tiotropium 18 mcg inhalation capsule: 1 cap(s) inhaled once a day  valproic acid 250 mg oral capsule: 2 cap(s) orally 2 times a day

## 2020-09-19 NOTE — DISCHARGE NOTE PROVIDER - HOSPITAL COURSE
65 y old left handed woman with PMHx of COPD, migraines, depressions, seizures and lung adenocarcinoma with R occipito-parietal mets s/p resection, stereotactic radiosurgery, and chemotherapy presenting with worsening AMS, lethargy, confusion, stomach upset w/ black stools, and pins and needles sensation in the toes over the past 10 days following her recent d/c from the EMU on 9/6/2020 admitted now to EMU for concern for depakote toxicity and AED optimization.  Since her discharge on 9/6 the patient states that she has been fatigued and unable to keep her head up throughout most of the day. 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 She states that she has been feeling "pins and needles" in her toes throughout the week. She also reports upset stomach and black stools during this time with no fever or chills. She presented to her outpatient appt with Dr. Awad (epileptologist) today 9/18/20 and had difficulties navigating the office and opening the door, raising concerns of medication toxicity.     She was admitted to [facility] on 8/25/2020 due to an episode of disorientation followed by a seizure with L eye deviation and subsequent GTC. She continued to have clinical seizures on admission and was found to have a UTI, was subsequently intubated for airway protection and admitted to the MICU. Keppra 1000 mg BID was started for seizures and she was subsequently extubated with improvement in her EEG and discharged. She followed up outpatient  after discharge on 9/2 with Dr. Schaefer and her daughter stated at that time she was concerned as the patient was persistently confused and altered from baseline. Keppra 1000 mg BID was continued and she was given a referral for psychiatry to address her ongoing depression. The patient was re-admitted 9/3 due to persisting disorientation, confusion, and visual illusions including macropsia and micropsia. At that time the patient stated that she was having difficulty focusing, constantly asked the same questions, and was often confused and disoriented to her location or situation. She was also found to have a UTI and hypothyroidism at that time, CT head (September 3rd) was stable with no evidence of increasing vasogenic edema or any new intracranial lesions. She was discharged on depakote 750 mg BID and vimpat 100 q12h for seizures.    Presumed Seizure Semiology:  visuospatial disturbances: macropsia, micropsia, disorientation, anxiety/panic that originate from R parietal lobe, likely the TPO [place]    Cancer history:   2/2016 CARLOTA adenocarcinoma  3/2016 CARLOTA lobectomy  6/2016 Completed adjuvant chemotherapy: 4 cycles of cisplatin and pemetrexed  3/14/2020 Headache and eye pain, MRI brain 2.2 x 1.6 x 2.5 cm enhancing lesion of R parietal cortical & subcortical region  3/15/2020 R occipital craniectomy Dr. Servin  - pathology c/w metastatic adenocarcinoma from lung which was PD-L1 30%+, BRCA 2+ (germline vs somatic unknown), KRAS G12C+.   4/13/2020 stereotactic radiosurgery Dr. Pennington  4/23/2020 Pembrolizumab - completed 7/2020 65 y old left handed woman with PMHx of COPD, migraines, depressions, seizures and lung adenocarcinoma with R occipito-parietal mets s/p resection, stereotactic radiosurgery, and chemotherapy presenting with worsening AMS, lethargy, confusion, stomach upset w/ black stools, and pins and needles sensation in the toes over the past 10 days following her recent d/c from the EMU on 9/6/2020 admitted now to EMU for concern for depakote toxicity and AED optimization.  Since her discharge on 9/6 the patient states that she has been fatigued and unable to keep her head up throughout most of the day. 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 She states that she has been feeling "pins and needles" in her toes throughout the week. She also reports upset stomach and black stools during this time with no fever or chills. She presented to her outpatient appt with Dr. Awad (epileptologist) today 9/18/20 and had difficulties navigating the office and opening the door, raising concerns of medication toxicity.     She was admitted to [facility] on 8/25/2020 due to an episode of disorientation followed by a seizure with L eye deviation and subsequent GTC. She continued to have clinical seizures on admission and was found to have a UTI, was subsequently intubated for airway protection and admitted to the MICU. Keppra 1000 mg BID was started for seizures and she was subsequently extubated with improvement in her EEG and discharged. She followed up outpatient  after discharge on 9/2 with Dr. Schaefer and her daughter stated at that time she was concerned as the patient was persistently confused and altered from baseline. Keppra 1000 mg BID was continued and she was given a referral for psychiatry to address her ongoing depression. The patient was re-admitted 9/3 due to persisting disorientation, confusion, and visual illusions including macropsia and micropsia. At that time the patient stated that she was having difficulty focusing, constantly asked the same questions, and was often confused and disoriented to her location or situation. She was also found to have a UTI and hypothyroidism at that time, CT head (September 3rd) was stable with no evidence of increasing vasogenic edema or any new intracranial lesions. She was discharged on depakote 750 mg BID and vimpat 100 q12h for seizures.    Presumed Seizure Semiology:  visuospatial disturbances: macropsia, micropsia, disorientation, anxiety/panic that originate from R parietal lobe, likely the TPO [place]    Cancer history:   2/2016 CARLOTA adenocarcinoma  3/2016 CARLOTA lobectomy  6/2016 Completed adjuvant chemotherapy: 4 cycles of cisplatin and pemetrexed  3/14/2020 Headache and eye pain, MRI brain 2.2 x 1.6 x 2.5 cm enhancing lesion of R parietal cortical & subcortical region  3/15/2020 R occipital craniectomy Dr. Servin  - pathology c/w metastatic adenocarcinoma from lung which was PD-L1 30%+, BRCA 2+ (germline vs somatic unknown), KRAS G12C+.   4/13/2020 stereotactic radiosurgery Dr. Pennington  4/23/2020 Pembrolizumab - completed 7/2020    Hospital Course:     Patient presented w/ numbness/tingling in her feet as well as generalized fatigue thought to be secondary to VPA toxicity. Her Depakote was decreased to 500mg BID, Vimpat increased to 150mg BID. Patient requresting to go home. She was discharged in no acute distress. She will follow up with Dr. Awad within 1 week. 65 y old left handed woman with PMHx of COPD, migraines, depressions, seizures and lung adenocarcinoma with R occipito-parietal mets s/p resection, stereotactic radiosurgery, and chemotherapy presenting with worsening AMS, lethargy, confusion, stomach upset w/ black stools, and pins and needles sensation in the toes over the past 10 days following her recent d/c from the EMU on 9/6/2020 admitted now to EMU for concern for depakote toxicity and AED optimization.  Since her discharge on 9/6 the patient states that she has been fatigued and unable to keep her head up throughout most of the day. 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 She states that she has been feeling "pins and needles" in her toes throughout the week. She also reports upset stomach and black stools during this time with no fever or chills. She presented to her outpatient appt with Dr. Awad (epileptologist) today 9/18/20 and had difficulties navigating the office and opening the door, raising concerns of medication toxicity.     She was admitted to [facility] on 8/25/2020 due to an episode of disorientation followed by a seizure with L eye deviation and subsequent GTC. She continued to have clinical seizures on admission and was found to have a UTI, was subsequently intubated for airway protection and admitted to the MICU. Keppra 1000 mg BID was started for seizures and she was subsequently extubated with improvement in her EEG and discharged. She followed up outpatient  after discharge on 9/2 with Dr. Schaefer and her daughter stated at that time she was concerned as the patient was persistently confused and altered from baseline. Keppra 1000 mg BID was continued and she was given a referral for psychiatry to address her ongoing depression. The patient was re-admitted 9/3 due to persisting disorientation, confusion, and visual illusions including macropsia and micropsia. At that time the patient stated that she was having difficulty focusing, constantly asked the same questions, and was often confused and disoriented to her location or situation. She was also found to have a UTI and hypothyroidism at that time, CT head (September 3rd) was stable with no evidence of increasing vasogenic edema or any new intracranial lesions. She was discharged on depakote 750 mg BID and vimpat 100 q12h for seizures.    Presumed Seizure Semiology:  visuospatial disturbances: macropsia, micropsia, disorientation, anxiety/panic that originate from R parietal lobe, likely the TPO [place]    Cancer history:   2/2016 CARLOTA adenocarcinoma  3/2016 CARLOTA lobectomy  6/2016 Completed adjuvant chemotherapy: 4 cycles of cisplatin and pemetrexed  3/14/2020 Headache and eye pain, MRI brain 2.2 x 1.6 x 2.5 cm enhancing lesion of R parietal cortical & subcortical region  3/15/2020 R occipital craniectomy Dr. Servin  - pathology c/w metastatic adenocarcinoma from lung which was PD-L1 30%+, BRCA 2+ (germline vs somatic unknown), KRAS G12C+.   4/13/2020 stereotactic radiosurgery Dr. Pennington  4/23/2020 Pembrolizumab - completed 7/2020    Hospital Course:     Patient presented w/ numbness/tingling in her feet as well as generalized fatigue thought to be secondary to VPA toxicity. Her Depakote was decreased to 500mg BID, Vimpat increased to 150mg BID. Patient requresting to go home. She was discharged in no acute distress. She will follow up with Dr. Awad within 1 week.         Discharge planning time >35 minutes.

## 2020-09-19 NOTE — DISCHARGE NOTE PROVIDER - NSDCCPCAREPLAN_GEN_ALL_CORE_FT
PRINCIPAL DISCHARGE DIAGNOSIS  Diagnosis: Valproic acid toxicity  Assessment and Plan of Treatment:       SECONDARY DISCHARGE DIAGNOSES  Diagnosis: Hypokalemia  Assessment and Plan of Treatment:     Diagnosis: Dehydration, mild  Assessment and Plan of Treatment:

## 2020-09-19 NOTE — DISCHARGE NOTE NURSING/CASE MANAGEMENT/SOCIAL WORK - PATIENT PORTAL LINK FT
You can access the FollowMyHealth Patient Portal offered by [facility] by registering at the following website: [email or web address]. By joining [facility]’s FollowMyHealth portal, you will also be able to view your health information using other applications (apps) compatible with our system.

## 2020-09-20 NOTE — HISTORY OF PRESENT ILLNESS
[FreeTextEntry1] : *** 09/18/2020 ***\par \par DANIELLE ANTONIO is a 65 years old with metastatic adenocarcinoma( brain) s/p resection .\par Was recently admitted to [facility] for status epilepticus which required intubation and high dose AEDs. she failed keppra and later she developed side effect( somnolence and mood problems)\par She was switched to  BID and Vimpat 100 bid was later added( unclear why).\par Now she is progressively confused, sleepy and has diffuse abdominal pain.\par \par She lives alone and she is concern that she cannot perform her job.\par She is a special .\par \par further history( FROM EMR)\par \par History of Present Illness:\par 65 y old left handed woman with PMHx of COPD, migraines, depressions, seizures\par and lung adenocarcinoma with R occipito-parietal mets s/p resection,\par stereotactic radiosurgery, and chemotherapy presenting with worsening AMS,\par lethargy, confusion, stomach upset w/ black stools, and pins and needles\par sensation in the toes over the past 10 days [facility] admitted now to EMU for concern for depakote toxicity and AED\par optimization. Since her discharge on 9/6 the patient states that she has been\par fatigued and unable to keep her head up throughout most of the day. 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 She states that\par she has been feeling "pins and needles" in her toes throughout the week. She\par also reports upset stomach and black stools during this time with no fever or\par chills. \par \par She was admitted to [facility] on 8/25/2020 due to an episode of disorientation\par followed by a seizure with L eye deviation and subsequent GTC. She continued to\par have clinical seizures on admission and was found to have a UTI, was\par subsequently intubated for airway protection and admitted to the MICU. Keppra\par 1000 mg BID was started for seizures and she was subsequently extubated with\par improvement in her EEG and discharged. She followed up outpatient after\par discharge on 9/2 with Dr. Schaefer and her daughter stated at that time she was\par concerned as the patient was persistently confused and altered from baseline.\par Keppra 1000 mg BID was continued and she was given a referral for psychiatry to\par address her ongoing depression. The patient was re-admitted 9/3 due to\par persisting disorientation, confusion, and visual illusions including macropsia\par and micropsia. At that time the patient stated that she was having difficulty\par focusing, constantly asked the same questions, and was often confused and\par disoriented to her location or situation. She was also found to have a UTI and\par hypothyroidism at that time, CT head (September 3rd) was stable with no\par evidence of increasing vasogenic edema or any new intracranial lesions. She was\par discharged on depakote 750 mg BID and vimpat 100 q12h for seizures.\par \par \par Presumed Seizure Semiology:\par visuospatial disturbances: macropsia, micropsia, disorientation, anxiety/panic\par that originate from R parietal lobe, likely the TPO [place]\par \par Cancer history:\par 2/2016 CARLOTA adenocarcinoma\par 3/2016 CARLOTA lobectomy\par 6/2016 Completed adjuvant chemotherapy: 4 cycles of cisplatin and pemetrexed\par 3/14/2020 Headache and eye pain, MRI brain 2.2 x 1.6 x 2.5 cm enhancing lesion\par of R parietal cortical & subcortical region\par 3/15/2020 R occipital craniectomy Dr. Servin\par - pathology c/w metastatic adenocarcinoma from lung which was PD-L1 30%+, BRCA\par 2+ (germline vs somatic unknown), KRAS G12C+.\par 4/13/2020 stereotactic radiosurgery Dr. Pennington\par 4/23/2020 Pembrolizumab - completed 7/2020\par \par \par \par

## 2020-09-20 NOTE — ASSESSMENT
[FreeTextEntry1] : DANIELLE ANTONIO is a 65 years old with a history of symptomatic seizures in the context of recent metastatic brain disease, surgically removed. She was in status epileticus recently and now has probable medication side effect.\par My initial intention was to slowly court her VPA  as outpatient\par \par She was extremely confused and unable to open the door on her way out.\par Her MS is altered likely due to a combination of primary disease and medication side effect.\par \par Plan: ED eval\par EMU admit\par send VPA levels, CBC, CMP and ammonia\par \par VEEG for now\par \par \par Further assessment after reviewing patient complete history in EMR:\par \par Her clinical presentation with new onset seizures and later status epilepticus in the context of stable resection, 3 months after Pembrolizumab ( checkpoint inhibitor) and with abnormal EEG ( acute findings) as well as recent change in mental status with increased memory disturbance and somnolence with therapeutic AEDs level 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

## 2020-09-20 NOTE — PHYSICAL EXAM
[FreeTextEntry1] : MS: alert & oriented to person, place time, good fund of knowledge but poor recall for recent and remote events. Vey lethargic \par CN: VFF to confrontation, EOM full without nystagmus, PERRL, V1-V3 intact to light touch, face symmetrical, hears finger rub bilaterally, palate elevates symmetrically, shoulders elevate symmetrically, tongue midline\par MOTOR: normal tone x 4 extremities, Power 5/5 proximally and distally bilaterally, no drift, normal rapid alternating movements. \par SENSORY: intact LT x 4 extremities\par REFLEXES: biceps 2+ bilaterally, triceps 2+ bilaterally, brachioradialis 2+ bilaterally, patella 2+ bilaterally, ankle 2+ bilaterally, plantar flexor bilaterally\par COORD: normal FNF, no tremor or dysmetria\par GAIT: normal, but very slwo\par \par

## 2020-09-21 NOTE — ED ADULT TRIAGE NOTE - CHIEF COMPLAINT QUOTE
"I was just here yesterday and today I am feeling tired, my hands are blowing up and I have pains on my legs and arms"

## 2020-09-21 NOTE — ED PROVIDER NOTE - CLINICAL SUMMARY MEDICAL DECISION MAKING FREE TEXT BOX
Attending Shireen Mckenzie: 66 y/o female with complex PMH including h/l lung c Attending Shireen Mckenzie: 64 y/o female OPD, migraines, depressions, seizures and lung adenocarcinoma with R occipito-parietal mets s/p resection, stereotactic radiosurgery, and chemotherapy with recent admission for depakote toxicity presenting with weakness, unsteadiness and not feeling well. upon arrival pt hemodynamically stable and afebrile. moving all extreimties. does have mild upper extremitity edema. pt denies any falls or trauma. d/w neurology upon arrival who recommended EMU evaluation. CT chest performed with upper extremity swelling to evaluate for mediastinal mass causing compression and CT head to further evaluate for worsening metastatic process. pt will need admission, labs and neurology eval

## 2020-09-21 NOTE — ED CLERICAL - NS ED CLERK NOTE PRE-ARRIVAL INFORMATION; ADDITIONAL PRE-ARRIVAL INFORMATION
CC/Reason For referral: CHANGE IN MENTAL STATUS, SOB, BILATERAL LOWER EXTREMITY PAIN  Preferred Consultant(if applicable):  Who admits for you (if needed):  Do you have documents you would like to fax over?  Would you still like to speak to an ED attending?  PLEASE CALL AFTER PATIENT IS SEEN

## 2020-09-21 NOTE — H&P ADULT - HISTORY OF PRESENT ILLNESS
HPI: 65 y old left handed woman with PMHx of COPD, migraines, depressions, seizures and lung adenocarcinoma with R occipito-parietal mets s/p resection, stereotactic radiosurgery, and chemotherapy with recent admission to [facility] (09/18/20) for presumed Depakote toxicity presenting with lethargy, swelling in the fingers of both hands, intermittent numbness/tingling of the bilateral upper extremities, and generalized weakness. Patient states she has felt "off" since her recent discharge and since starting the new medications she was prescribed at that time. On her most recent admission, she was discharged on Vimpat 150MG BID and Depakote 500MG BID. She reports that she lives alone and she is unable to manage taking care of herself and taking her medications without help. She denies HA, LOC, seizures, tremors, tinnitus. She endorses intermittent shortness of breath.    Presumed Seizure Semiology:  Visuospatial disturbances: macropsia, micropsia, disorientation, anxiety/panic that originate from R parietal lobe, likely the TPO [place]    Cancer history:   2/2016 CARLOTA adenocarcinoma  3/2016 CARLOTA lobectomy  6/2016 Completed adjuvant chemotherapy: 4 cycles of cisplatin and pemetrexed  3/14/2020 Headache and eye pain, MRI brain 2.2 x 1.6 x 2.5 cm enhancing lesion of R parietal cortical & subcortical region  3/15/2020 R occipital craniectomy Dr. Servin  - pathology c/w metastatic adenocarcinoma from lung which was PD-L1 30%+, BRCA 2+ (germline vs somatic unknown), KRAS G12C+.   4/13/2020 stereotactic radiosurgery Dr. Pennington  4/23/2020 Pembrolizumab - completed 7/2020

## 2020-09-21 NOTE — H&P ADULT - ATTENDING COMMENTS
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

## 2020-09-21 NOTE — ED PROVIDER NOTE - ATTENDING CONTRIBUTION TO CARE
Attending MD Shireen Mckenzie:  I personally have seen and examined this patient.  Resident note reviewed and agree on plan of care and except where noted.  See HPI, PE, and MDM for details.

## 2020-09-21 NOTE — ED ADULT NURSE NOTE - OBJECTIVE STATEMENT
65 year old female A&OX4 with a past medical hx of Lung CA with metastasis to brain, presents with generalized weakness over the course over the last several days. Patient also has a seizure history and reports being prescribed an unknown seizure medication that she reports is responsible for her symptomatology. Bilateral hand swelling reported but not observed on exam. Denies nausea, vomiting, chest pain, shortness of breath, palpitations.

## 2020-09-21 NOTE — ED PROVIDER NOTE - OBJECTIVE STATEMENT
Attending Shireen Mckenzie: 65 y old left handed woman with PMHx of COPD, migraines, depressions, seizures and lung adenocarcinoma with R occipito-parietal mets s/p resection, stereotactic radiosurgery, and chemotherapy presenting with worsening AMS, lethargy. was recently admitted for report of depakote toxicity, now lowering depakote and increasing vinpat. pt states since leaving the hospital has not been feeling well. reports increased weakness and transient swelling to her hands. no fevers or chills. has been takig medications but reports feeling confused about what medications she should be taking. no black or bloody stools. also reports increased weakness to upper extremities. no falls or trauma. pt states lives alone

## 2020-09-21 NOTE — H&P ADULT - NSHPPHYSICALEXAM_GEN_ALL_CORE
Mental Status: Lethargic, eyes open to voice. Seems drowsy throughout exam, somewhat inattentive. Oriented to person, place, situation, time.  Follows all commands.   Language: Speech is slow, clear, fluent.   Cranial Nerves: VFF, EOMI, no nystagmus. PERRL (R = 3mm, L = 3mm). V1-3 intact to light touch. Face symmetric. Hearing grossly normal to conversation. Symmetric palate elevation in midline. Tongue midline, normal movements, no atrophy.  Motor: Normal muscle bulk. No noticeable tremor, myoclonus, no pronator drift. 5/5 strength throughout.   Sensation: Symmetric B/L preserved sensation to light touch.   Reflexes: 2+ throughout.  Coordination: No dysmetria on finger-to-nose bilaterally.  Gait: Deferred.

## 2020-09-21 NOTE — H&P ADULT - NSHPLABSRESULTS_GEN_ALL_CORE
LABS:                        11.2   6.55  )-----------( 225      ( 21 Sep 2020 17:27 )             34.2   09-21    142  |  103  |  16  ----------------------------<  93  3.2<L>   |  29  |  1.09    Ca    8.7      21 Sep 2020 20:54  Mg     2.0     09-21    TPro  6.2  /  Alb  3.6  /  TBili  0.2  /  DBili  x   /  AST  37  /  ALT  21  /  AlkPhos  57  09-21

## 2020-09-21 NOTE — H&P ADULT - ASSESSMENT
Assessment: 65 y old left handed woman with PMHx of COPD, migraines, depressions, seizures and lung adenocarcinoma with R occipito-parietal mets s/p resection, stereotactic radiosurgery, and chemotherapy with recent admission to EMU (09/18/20) for presumed Depakote toxicity now presenting with lethargy, swelling in the fingers of both hands, intermittent numbness/tingling of the bilateral upper extremities, and generalized weakness. Patient states she has felt "off" since her recent discharge and since starting the new medications she was prescribed at that time. On her most recent admission, she was discharged on Vimpat 150MG BID and Depakote 500MG BID. 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    Plan:  [] Admit to EMU  [] vEEG monitoring  [] Seizure and aspiration precautions  [] 9/21: continue with home dose Vimpat 150MG PO BID, Depakote 500MG PO BID  [] LP in the AM, NO LOVENOX DVT PPX FOR NOW  [] Solumedrol 1G IV daily for 5 days, to start tomorrow  [] Dr. Baires consulted for SOB, f/u recommendations (will see patient tomorrow)  [] Venodynes for DVT PPx  [] Rescue meds: ativan 2mg IVP x 1 for GTC or seizure activity > 3-5 min; vimpat 200mg IV x 1 if refractory    Case discussed with Epilepsy attending Dr. Awad.

## 2020-09-22 NOTE — CONSULT NOTE ADULT - ASSESSMENT
dyspnea  - CT of chest noted  - PE protocol was not done   - but low suspicion for PE as she is not tachycardic and not hypoxic  - check echo ( echo lab was called to expedite)  - check BNP and d dimer  - c/w spiriva and prn albuterol    hypokalemia  - supplement k    hypothyroid  - c/w synthroid  - check TSH    depression  - c/w zoloft    seizures  - AED as per neurology  - w/u for AIE in prgress    GERD  - c/w protonix    DVT px  - lovenox 40 qd if ok with neurology        
HPI: 65 y old left handed woman with PMHx of COPD, migraines, depressions, seizures and lung adenocarcinoma with R occipito-parietal mets s/p resection, stereotactic radiosurgery, and chemotherapy with recent admission to [facility] (09/18/20) for presumed Depakote toxicity presenting with lethargy, swelling in the fingers of both hands, intermittent numbness/tingling of the bilateral upper extremities, and generalized weakness. Patient states she has felt "off" since her recent discharge and since starting the new medications she was prescribed at that time. On her most recent admission, she was discharged on Vimpat 150MG BID and Depakote 500MG BID. She reports that she lives alone and she is unable to manage taking care of herself and taking her medications without help. She denies HA, LOC, seizures, tremors, tinnitus. She endorses intermittent shortness of breath.  pt well known to me from the last admission with paresthesia and swelling fingers, and sob  echo noted with lvh and diastolic dysfunction, doubt cause  of sob.  covid negative but with chest x ray abnormality  ?repeat echo with hx of pericardial effusion and low voltage qrs but no mention of pericardial effusion on the chest ct  dvt prophylaxis  will adjust meds  awaiting LP

## 2020-09-22 NOTE — PROCEDURE NOTE - NSDIAGNOSTIC_RESP_A_CORE
Chief Complaint:  Patient is a 86y old  Female who presents with a chief complaint of Weakness (07 Aug 2019 22:34)      HPI:  Patient with anemia and leucopenia and asked to evaluate.  She denies a history of low blood counts.  the WBC was normal on admission, but is now low.  She has been anemic for some time.  She does have RA and is being treated with MTX.  Cultures have been negative to date.  She does have some kidney disease.      Medications:  ALBUTerol    0.083% 2.5 milliGRAM(s) Nebulizer every 6 hours PRN  buDESOnide 160 MICROgram(s)/formoterol 4.5 MICROgram(s) Inhaler 2 Puff(s) Inhalation two times a day  calcium carbonate   1250 mG (OsCal) 1 Tablet(s) Oral daily  chlorhexidine 2% Cloths 1 Application(s) Topical daily  cholecalciferol 1000 Unit(s) Oral daily  dextrose 40% Gel 15 Gram(s) Oral once PRN  dextrose 50% Injectable 12.5 Gram(s) IV Push once  dextrose 50% Injectable 25 Gram(s) IV Push once  dextrose 50% Injectable 25 Gram(s) IV Push once  folic acid 1 milliGRAM(s) Oral daily  glucagon  Injectable 1 milliGRAM(s) IntraMuscular once PRN  guaiFENesin   Syrup  (Sugar-Free) 200 milliGRAM(s) Oral every 6 hours PRN  heparin  Injectable 5000 Unit(s) SubCutaneous every 8 hours  insulin glargine Injectable (LANTUS) 12 Unit(s) SubCutaneous at bedtime  insulin lispro (HumaLOG) corrective regimen sliding scale   SubCutaneous three times a day before meals  insulin lispro Injectable (HumaLOG) 2 Unit(s) SubCutaneous three times a day with meals  lactated ringers. 1000 milliLiter(s) IV Continuous <Continuous>  nystatin Powder 1 Application(s) Topical two times a day    Allergies:  No Known Allergies    FAMILY HISTORY:  No pertinent family history in first degree relatives      Social history: lives with .  Denies ETOH, IVDA, and tobacco.    PAST MEDICAL & SURGICAL HISTORY:  History of interstitial lung disease  Aortic stenosis  RA (rheumatoid arthritis)  Asthma  DM (diabetes mellitus), type 2  HTN (hypertension), benign  After-cataract of left eye  Tubal occlusion    REVIEW OF SYSTEMS      General: she is tired.  Appetite decreased but she is eating.  Some weight loss here in the hospital.  No fevers or chills or sweats	    Skin/Breast: No rash, itching or bruising  	  Ophthalmologic: No vision changes  	  ENMT:	No hearing loss, nosebleeds, or sore throat    Respiratory and Thorax: No SOB, cough, wheeze, hemoptysis  	  Cardiovascular:	No CP or palpitations    Gastrointestinal:	No N/V/C, abd pain, BRBPR    Genitourinary:	No dysuria or hematuria    Musculoskeletal:	 No back pain.  She has joint pain.  No leg swelling    Neurological:	Has a HA.  Was dizzy overnight and that is better.    Vitals:  Vital Signs Last 24 Hrs  T(C): 36.4 (08 Aug 2019 05:41), Max: 37.4 (07 Aug 2019 16:29)  T(F): 97.5 (08 Aug 2019 05:41), Max: 99.3 (07 Aug 2019 16:29)  HR: 88 (08 Aug 2019 05:41) (88 - 102)  BP: 136/78 (08 Aug 2019 05:41) (127/71 - 165/82)  BP(mean): --  RR: 18 (08 Aug 2019 05:41) (18 - 18)  SpO2: 94% (08 Aug 2019 05:41) (93% - 96%)    Pex:  alert NAD  EOMI anicteric sclera  Neck Supple No LNA  Cv s1 S2 RRR  Lungs clear B/L  abd soft NT ND +BS  No LE edema or tenderness    Labs:                        8.6    2.9   )-----------( 275      ( 07 Aug 2019 21:09 )             24.4     CBC Full  -  ( 07 Aug 2019 21:09 )  WBC Count : 2.9 K/uL  RBC Count : 2.51 M/uL  Hemoglobin : 8.6 g/dL  Hematocrit : 24.4 %  Platelet Count - Automated : 275 K/uL  Mean Cell Volume : 97.2 fl  Mean Cell Hemoglobin : 34.3 pg  Mean Cell Hemoglobin Concentration : 35.3 gm/dL  Auto Neutrophil # : x  Auto Lymphocyte # : x  Auto Monocyte # : x  Auto Eosinophil # : x  Auto Basophil # : x  Auto Neutrophil % : x  Auto Lymphocyte % : x  Auto Monocyte % : x  Auto Eosinophil % : x  Auto Basophil % : x    08-07    141  |  105  |  24<H>  ----------------------------<  143<H>  4.0   |  23  |  1.19    Ca    9.2      07 Aug 2019 07:12        [id number] Diagnostic

## 2020-09-22 NOTE — CONSULT NOTE ADULT - SUBJECTIVE AND OBJECTIVE BOX
65 y old left handed woman with PMHx of COPD, migraines, depressions, seizures and lung adenocarcinoma with R occipito-parietal mets s/p resection, stereotactic radiosurgery, and chemotherapy with recent admission to [facility] (20) for presumed Depakote toxicity presenting with lethargy, swelling in the fingers of both hands, intermittent numbness/tingling of the bilateral upper extremities, and generalized weakness. Patient states she has felt "off" since her recent discharge and since starting the new medications she was prescribed at that time. On her most recent admission, she was discharged on Vimpat 150MG BID and Depakote 500MG BID. She reports that she lives alone and she is unable to manage taking care of herself and taking her medications without help. She denies HA, LOC, seizures, tremors, tinnitus. She endorses intermittent shortness of breath.    Presumed Seizure Semiology:  Visuospatial disturbances: macropsia, micropsia, disorientation, anxiety/panic that originate from R parietal lobe, likely the TPO [place]    Cancer history:   2016 CARLOTA adenocarcinoma  3/2016 CARLOTA lobectomy  2016 Completed adjuvant chemotherapy: 4 cycles of cisplatin and pemetrexed  3/14/2020 Headache and eye pain, MRI brain 2.2 x 1.6 x 2.5 cm enhancing lesion of R parietal cortical & subcortical region  3/15/2020 R occipital craniectomy Dr. Servin  - pathology c/w metastatic adenocarcinoma from lung which was PD-L1 30%+, BRCA 2+ (germline vs somatic unknown), KRAS G12C+.   2020 stereotactic radiosurgery Dr. Pennington  2020 Pembrolizumab - completed 2020 (21 Sep 2020 20:59)      PAST MEDICAL & SURGICAL HISTORY:  History of antineoplastic chemotherapy    Metastatic adenocarcinoma to brain    Iron deficiency anemia  underwent endoscopy and colonoscopy, reportedly fine    Lung cancer    Migraine    COPD (chronic obstructive pulmonary disease)    History of craniotomy    S/P lobectomy of lung    Benign tumor  left     S/P           Review of Systems:   CONSTITUTIONAL: No fever, weight loss, or fatigue  EYES: No eye pain, visual disturbances, or discharge  ENMT:  No difficulty hearing, tinnitus, vertigo; No sinus or throat pain  NECK: No pain or stiffness  BREASTS: No pain, masses, or nipple discharge  RESPIRATORY: No cough, wheezing, chills or hemoptysis; c/o  shortness of breath  CARDIOVASCULAR: No chest pain, palpitations, dizziness, or leg swelling  GASTROINTESTINAL: No abdominal or epigastric pain. No nausea, vomiting, or hematemesis; No diarrhea or constipation. No melena or hematochezia.  GENITOURINARY: No dysuria, frequency, hematuria, or incontinence  NEUROLOGICAL: as above  SKIN: No itching, burning, rashes, or lesions   LYMPH NODES: No enlarged glands  ENDOCRINE: No heat or cold intolerance; No hair loss  MUSCULOSKELETAL: No joint pain or swelling; No muscle, back, or extremity pain  PSYCHIATRIC: No depression, anxiety, mood swings, or difficulty sleeping  HEME/LYMPH: No easy bruising, or bleeding gums  ALLERY AND IMMUNOLOGIC: No hives or eczema    Allergies    Fish Products (Rash)  No Known Drug Allergies    Intolerances        Social History:     FAMILY HISTORY:  FH: kidney disease  mother     FH: diabetes mellitus  father         MEDICATIONS  (STANDING):  gabapentin 100 milliGRAM(s) Oral two times a day  lacosamide 150 milliGRAM(s) Oral two times a day  lacosamide 150 milliGRAM(s) Oral once  levothyroxine 100 MICROGram(s) Oral daily  pantoprazole    Tablet 40 milliGRAM(s) Oral before breakfast  sertraline 150 milliGRAM(s) Oral daily  tiotropium 18 MICROgram(s) Capsule 1 Capsule(s) Inhalation daily  valproic acid 500 milliGRAM(s) Oral two times a day  valproic acid 500 milliGRAM(s) Oral once    MEDICATIONS  (PRN):  acetaminophen   Tablet .. 650 milliGRAM(s) Oral every 6 hours PRN Temp greater or equal to 38C (100.4F), Mild Pain (1 - 3), Moderate Pain (4 - 6), Severe Pain (7 - 10)  ALBUTerol    90 MICROgram(s) HFA Inhaler 2 Puff(s) Inhalation every 6 hours PRN Shortness of Breath and/or Wheezing  lacosamide Injectable 200 milliGRAM(s) IV Push once PRN GTC or seizure activity > 3 min refractory to ativan  LORazepam   Injectable 2 milliGRAM(s) IV Push once PRN GTC or seizure activity > 3 min      Vital Signs Last 24 Hrs  T(C): 36.7 (22 Sep 2020 11:52), Max: 37.3 (21 Sep 2020 22:57)  T(F): 98 (22 Sep 2020 11:52), Max: 99.2 (21 Sep 2020 22:57)  HR: 79 (22 Sep 2020 11:52) (66 - 83)  BP: 131/82 (22 Sep 2020 11:52) (100/63 - 134/84)  BP(mean): --  RR: 18 (22 Sep 2020 11:52) (18 - 18)  SpO2: 95% (22 Sep 2020 11:52) (93% - 98%)  CAPILLARY BLOOD GLUCOSE        I&O's Summary      PHYSICAL EXAM:  GENERAL: NAD, well-developed  HEAD:  Atraumatic, Normocephalic  EYES: EOMI, PERRLA, conjunctiva and sclera clear  NECK: Supple, No JVD  CHEST/LUNG: bi basilar crackles  HEART: Regular rate and rhythm; No murmurs, rubs, or gallops  ABDOMEN: Soft, Nontender, Nondistended; Bowel sounds present  EXTREMITIES:  2+ Peripheral Pulses, No clubbing, cyanosis, swelling of RUE  PSYCH: AAOx3  NEUROLOGY: non-focal  SKIN: No rashes or lesions    LABS:                        11.2   6.55  )-----------( 225      ( 21 Sep 2020 17:27 )             34.2     -21    142  |  103  |  16  ----------------------------<  93  3.2<L>   |  29  |  1.09    Ca    8.7      21 Sep 2020 20:54  Mg     2.0         TPro  6.2  /  Alb  3.6  /  TBili  0.2  /  DBili  x   /  AST  37  /  ALT  21  /  AlkPhos  57  -          Urinalysis Basic - ( 21 Sep 2020 20:24 )    Color: Light Yellow / Appearance: Clear / S.039 / pH: x  Gluc: x / Ketone: Negative  / Bili: Negative / Urobili: Negative   Blood: x / Protein: Trace / Nitrite: Negative   Leuk Esterase: Negative / RBC: 6 /hpf / WBC 2 /HPF   Sq Epi: x / Non Sq Epi: 1 /hpf / Bacteria: Negative        RADIOLOGY & ADDITIONAL TESTS:    Imaging Personally Reviewed:      < from: CT Head No Cont (20 @ 18:44) >  FINDINGS:    Status post right parieto-occipital craniotomy. A subjacent surgical cavity is again seen, similar appearance when compared with the prior CT exam.    No CT evidence of acute intracranial hemorrhage, extra-axial collection, edema, mass effect, midline shift, central herniation, or hydrocephalus. Grey-white matter junction is well-preserved.    A dystrophic calcification along the left wall of the fourth ventricle appears unchanged.    There is age-related cerebral volume loss.    Visualized paranasal sinuses are well-aerated. Bilateral mastoid air cells and middle ear cavities are clear.    IMPRESSION: No acute intracranial hemorrhage.    Redemonstration of a right-sided parietal craniotomy and similar-appearing subjacent surgical cavity.    If clinical symptoms are new and persistent, consider further evaluation with contrast-enhanced brain MRI examination, if there are noMRI contraindications.      < end of copied text >  < from: CT Chest w/ IV Cont (20 @ 18:43) >  FINDINGS:    LUNGS AND AIRWAYS: Patent central airways.  Status post left upper lobectomy with stable postsurgical changes. Emphysema. Bibasilar subsegmental atelectasis. Subtle ground glass opacities in the right upper and left lower lobe are new.  PLEURA: No pleural effusion.  MEDIASTINUMAND SUSAN: Small mediastinal lymph nodes, unchanged.  VESSELS: Atherosclerotic changes of the aorta.  HEART: Heart size is normal. Small pericardial effusion, new from 2020.  CHEST WALL AND LOWER NECK: Within normal limits.  VISUALIZED UPPER ABDOMEN: Within normal limits.  BONES: Within normal limits.    IMPRESSION:  Nonspecific subtle groundglass opacities in the right upper and left lower lobe, which are new and of uncertain etiology. Recommend short interval follow-up.    New small pericardial effusion.      < end of copied text >      Consultant(s) Notes Reviewed:      Care Discussed with Consultants/Other Providers:  
CHIEF COMPLAINT:Patient is a 65y old  Female who presents with a chief complaint of Possible autoimmune encephalitis (22 Sep 2020 13:14)      HPI:  HPI: 65 y old left handed woman with PMHx of COPD, migraines, depressions, seizures and lung adenocarcinoma with R occipito-parietal mets s/p resection, stereotactic radiosurgery, and chemotherapy with recent admission to [facility] (20) for presumed Depakote toxicity presenting with lethargy, swelling in the fingers of both hands, intermittent numbness/tingling of the bilateral upper extremities, and generalized weakness. Patient states she has felt "off" since her recent discharge and since starting the new medications she was prescribed at that time. On her most recent admission, she was discharged on Vimpat 150MG BID and Depakote 500MG BID. She reports that she lives alone and she is unable to manage taking care of herself and taking her medications without help. She denies HA, LOC, seizures, tremors, tinnitus. She endorses intermittent shortness of breath.    Presumed Seizure Semiology:  Visuospatial disturbances: macropsia, micropsia, disorientation, anxiety/panic that originate from R parietal lobe, likely the TPO [place]    Cancer history:   2016 CARLOTA adenocarcinoma  3/2016 CARLOTA lobectomy  2016 Completed adjuvant chemotherapy: 4 cycles of cisplatin and pemetrexed  3/14/2020 Headache and eye pain, MRI brain 2.2 x 1.6 x 2.5 cm enhancing lesion of R parietal cortical & subcortical region  3/15/2020 R occipital craniectomy Dr. Servin  - pathology c/w metastatic adenocarcinoma from lung which was PD-L1 30%+, BRCA 2+ (germline vs somatic unknown), KRAS G12C+.   2020 stereotactic radiosurgery Dr. Pennington  2020 Pembrolizumab - completed 2020 (21 Sep 2020 20:59)      PAST MEDICAL & SURGICAL HISTORY:  History of antineoplastic chemotherapy    Metastatic adenocarcinoma to brain    Iron deficiency anemia  underwent endoscopy and colonoscopy, reportedly fine    Lung cancer    Migraine    COPD (chronic obstructive pulmonary disease)    History of craniotomy    S/P lobectomy of lung    Benign tumor  left     S/P           MEDICATIONS  (STANDING):  dextrose 5%. 1000 milliLiter(s) (50 mL/Hr) IV Continuous <Continuous>  dextrose 50% Injectable 12.5 Gram(s) IV Push once  dextrose 50% Injectable 25 Gram(s) IV Push once  dextrose 50% Injectable 25 Gram(s) IV Push once  gabapentin 100 milliGRAM(s) Oral two times a day  insulin lispro (HumaLOG) corrective regimen sliding scale   SubCutaneous three times a day before meals  insulin lispro (HumaLOG) corrective regimen sliding scale   SubCutaneous at bedtime  lacosamide 150 milliGRAM(s) Oral two times a day  lacosamide 150 milliGRAM(s) Oral once  levothyroxine 100 MICROGram(s) Oral daily  methylPREDNISolone sodium succinate IVPB 1000 milliGRAM(s) IV Intermittent daily  pantoprazole    Tablet 40 milliGRAM(s) Oral before breakfast  sertraline 150 milliGRAM(s) Oral daily  sodium chloride 0.9% Bolus 500 milliLiter(s) IV Bolus once  tiotropium 18 MICROgram(s) Capsule 1 Capsule(s) Inhalation daily  valproic acid 500 milliGRAM(s) Oral two times a day  valproic acid 500 milliGRAM(s) Oral once    MEDICATIONS  (PRN):  acetaminophen   Tablet .. 650 milliGRAM(s) Oral every 6 hours PRN Temp greater or equal to 38C (100.4F), Mild Pain (1 - 3), Moderate Pain (4 - 6), Severe Pain (7 - 10)  ALBUTerol    90 MICROgram(s) HFA Inhaler 2 Puff(s) Inhalation every 6 hours PRN Shortness of Breath and/or Wheezing  dextrose 40% Gel 15 Gram(s) Oral once PRN Blood Glucose LESS THAN 70 milliGRAM(s)/deciliter  glucagon  Injectable 1 milliGRAM(s) IntraMuscular once PRN Glucose LESS THAN 70 milligrams/deciliter  lacosamide Injectable 200 milliGRAM(s) IV Push once PRN GTC or seizure activity > 3 min refractory to ativan  LORazepam   Injectable 2 milliGRAM(s) IV Push once PRN GTC or seizure activity > 3 min      FAMILY HISTORY:  FH: kidney disease  mother     FH: diabetes mellitus  father         SOCIAL HISTORY:    [ ] Non-smoker  [ ] Smoker  [ ] Alcohol    Allergies    Fish Products (Rash)  No Known Drug Allergies    Intolerances    	    REVIEW OF SYSTEMS:  CONSTITUTIONAL: No fever, weight loss, or fatigue  EYES: No eye pain, visual disturbances, or discharge  ENT:  No difficulty hearing, tinnitus, vertigo; No sinus or throat pain  NECK: No pain or stiffness  RESPIRATORY: No cough, wheezing, chills or hemoptysis;+ Shortness of Breath  CARDIOVASCULAR: No chest pain, palpitations, passing out, dizziness, or leg swelling  GASTROINTESTINAL: No abdominal or epigastric pain. No nausea, vomiting, or hematemesis; No diarrhea or constipation. No melena or hematochezia.  GENITOURINARY: No dysuria, frequency, hematuria, or incontinence  NEUROLOGICAL: No headaches, memory loss, loss of strength, numbness, or tremors  SKIN: No itching, burning, rashes, or lesions   LYMPH Nodes: No enlarged glands  ENDOCRINE: No heat or cold intolerance; No hair loss  MUSCULOSKELETAL: No joint pain or swelling; No muscle, back, or extremity pain  PSYCHIATRIC: No depression, anxiety, mood swings, or difficulty sleeping  HEME/LYMPH: No easy bruising, or bleeding gums  ALLERGY AND IMMUNOLOGIC: No hives or eczema	    [ ] All others negative	  [ ] Unable to obtain    PHYSICAL EXAM:  T(C): 36.4 (20 @ 16:20), Max: 37.3 (20 @ 22:57)  HR: 70 (20 @ 16:20) (66 - 83)  BP: 118/70 (20 @ 16:20) (100/63 - 134/84)  RR: 18 (20 @ 16:20) (18 - 18)  SpO2: 95% (20 @ 11:52) (93% - 98%)  Wt(kg): --  I&O's Summary      Appearance: Normal	  HEENT:   Normal oral mucosa, PERRL, EOMI	  Lymphatic: No lymphadenopathy  Cardiovascular: Normal S1 S2, No JVD, + murmurs, No edema  Respiratory: Lungs clear to auscultation	  Gastrointestinal:  Soft, Non-tender, + BS	  Skin: No rashes, No ecchymoses, No cyanosis	  Neurologic: Non-focal  Extremities: Normal range of motion, No clubbing, cyanosis or edema  Vascular: Peripheral pulses palpable 2+ bilaterally        < from: CT Chest w/ IV Cont (20 @ 18:43) >  Nonspecific subtle groundglass opacities in the right upper and left lower lobe, which are new and of uncertain etiology. Recommend short interval follow-up.    New small pericardial effusion.    < end of copied text >                            11.2   6.55  )-----------( 225      ( 21 Sep 2020 17:27 )             34.2         142  |  103  |  16  ----------------------------<  93  3.2<L>   |  29  |  1.09    Ca    8.7      21 Sep 2020 20:54  Mg     2.0         TPro  6.2  /  Alb  3.6  /  TBili  0.2  /  DBili  x   /  AST  37  /  ALT  21  /  AlkPhos  57      proBNP: Serum Pro-Brain Natriuretic Peptide: 220 pg/mL ( @ 15:12)    COVID-19 PCR . (20 @ 20:54)    COVID-19 PCR: NotDetec: Testing is performed using polymerase chain reaction (PCR) or  transcription mediated amplification (TMA). This COVID-19 (SARS-CoV-2)  nucleic acid amplification test was validated by [employer] and is  in use under the FDA Emergency Use Authorization (EUA) for clinical labs  CLIA-certified to perform high complexity testing. Test results should be  correlated with clinical presentation, patient history, and epidemiology.          PREVIOUS DIAGNOSTIC TESTING:       < from: 12 Lead ECG (20 @ 16:34) >  Diagnosis Line NORMAL SINUS RHYTHM  LOW VOLTAGE QRS  ANTEROSEPTAL INFARCT (CITED ON OR BEFORE 13-MAR-2020) , AGE UNDETERMINED  ABNORMAL ECG  WHEN COMPARED WITH ECG OF 18-SEP-2020 18:23,FINDINGS APPEAR SIMILAR      < from: Transthoracic Echocardiogram (03.15.20 @ 07:35) >  Mitral Valve: Normal mitral valve. Minimal mitral  regurgitation.  Aortic Valve/Aorta: Normal trileaflet aortic valve.  Normal aortic root size. (Ao: 3.7 cm at the sinuses of  Valsalva).  Left Atrium: Normal left atrium.  LA volume index = 26  cc/m2.  Left Ventricle: Normal left ventricular systolic function.  No segmental wall motion abnormalities. Normal left  ventricular internal dimensions and wall thicknesses.  Right Heart: Normal right atrium. Normal right ventricular  size and function. Normal tricuspid valve. Minimal  tricuspid regurgitation. Normal pulmonic valve.  Pericardium/Pleura: Normal pericardium with no pericardial  effusion.  Hemodynamic: Estimated right atrial pressure is 8 mm Hg.  ------------------------------------------------------------------------  Conclusions:  1. Normal mitral valve. Minimal mitral regurgitation.  2. Normal trileaflet aortic valve.  3. Normal left ventricular internal dimensions and wall  thicknesses.  4. Normal left ventricular systolicfunction. No segmental  wall motion abnormalities.  5. Normal right ventricular size and function.      < from: CT Head No Cont (20 @ 18:44) >  IMPRESSION: No acute intracranial hemorrhage.    Redemonstration of a right-sided parietal craniotomy and similar-appearing subjacent surgical cavity.    If clinical symptoms are new and persistent, consider further evaluation with contrast-enhanced brain MRI examination, if there are noMRI contraindications.      [] Admit to EMU  [] vEEG monitoring  [] Seizure and aspiration precautions  [] : continue with home dose Vimpat 150MG PO BID, Depakote 500MG PO BID  [] LP in the AM, NO LOVENOX DVT PPX FOR NOW  [] Solumedrol 1G IV daily for 5 days, to start tomorrow  [] Dr. Baires consulted for SOB, f/u recommendations (will see patient tomorrow)  [] Venodynes for DVT PPx  [] Rescue meds: ativan 2mg IVP x 1 for GTC or seizure activity > 3-5 min; vimpat 200mg IV x 1 if refractory

## 2020-09-22 NOTE — EEG REPORT - NS EEG TEXT BOX
Starting: Day 1      9/22/2020      Time: 1:38:57 AM Duration: 6h 22m    Daily EEG Visual Analysis    FINDINGS:  The background was continuous, spontaneously variable and reactive. During wakefulness, the posterior dominant rhythm consisted of fragments of 7 Hz activity, with amplitude to 30 uV, that attenuated to eye opening.      Background Slowing:  Excess diffuse delta slowing.    Focal Slowing:   Continuous theta/delta slowing in the right posterior quadrant.    Sleep Background:  Drowsiness was characterized by fragmentation, attenuation, and slowing of the background activity.    Sleep was characterized by the presence of vertex waves, symmetric sleep spindles and K-complexes.    Other Non-Epileptiform Findings:  Breach effect in the right posterior quadrant characterized by higher amplitude, sharply contoured waveforms.    Interictal Epileptiform Activity:   Abundant right occipitotemporal  (O2>>P8) spikes.    Events:  No events or seizures recorded.    Artifacts:  Intermittent myogenic and movement artifacts were noted.    ECG:  The heart rate on single channel ECG was predominantly between 60-70BPM.    EEG Summary:  Abnormal EEG in the awake, drowsy and asleep states.  - Abundant right occipitotemporal  (O2>>P8) spikes.  - Continuous theta/delta slowing in the right posterior quadrant.  - Mild to moderate generalized slowing  - Breach effect in the right posterior quadrant characterized by higher amplitude, sharply contoured waveforms.    Impression/Clinical Correlate:  Potential epileptogenic focus in the right occipitotemporal region, structural and skull defect in the right posterior quadrant and mild nonspecific diffuse cerebral dysfunction. No event or seizure      Gamaliel Sánchez MD

## 2020-09-23 NOTE — PROGRESS NOTE ADULT - REASON FOR ADMISSION
I am trying to contact Pt but the phone number has been disconnected. Her medications are getting denied because she needs to schedule an appointment with PCP to have meds refilled.   Possible autoimmune encephalitis

## 2020-09-23 NOTE — PROVIDER CONTACT NOTE (OTHER) - SITUATION
pt refusing to take morning meds at this time until morning team rounds. pt requesting to take meds later

## 2020-09-23 NOTE — PROGRESS NOTE ADULT - SUBJECTIVE AND OBJECTIVE BOX
Patient is a 65y old  Female who presents with a chief complaint of Possible autoimmune encephalitis (23 Sep 2020 09:03)      SUBJECTIVE / OVERNIGHT EVENTS:  sob has improved.   upper ext swelling has improved    MEDICATIONS  (STANDING):  dextrose 5%. 1000 milliLiter(s) (50 mL/Hr) IV Continuous <Continuous>  dextrose 50% Injectable 12.5 Gram(s) IV Push once  dextrose 50% Injectable 25 Gram(s) IV Push once  dextrose 50% Injectable 25 Gram(s) IV Push once  gabapentin 100 milliGRAM(s) Oral two times a day  insulin lispro (HumaLOG) corrective regimen sliding scale   SubCutaneous three times a day before meals  insulin lispro (HumaLOG) corrective regimen sliding scale   SubCutaneous at bedtime  lacosamide 150 milliGRAM(s) Oral two times a day  lacosamide 150 milliGRAM(s) Oral once  levothyroxine 100 MICROGram(s) Oral daily  methylPREDNISolone sodium succinate IVPB 1000 milliGRAM(s) IV Intermittent daily  pantoprazole    Tablet 40 milliGRAM(s) Oral before breakfast  sertraline 150 milliGRAM(s) Oral daily  tiotropium 18 MICROgram(s) Capsule 1 Capsule(s) Inhalation daily  valproic acid 500 milliGRAM(s) Oral once  valproic acid 250 milliGRAM(s) Oral two times a day    MEDICATIONS  (PRN):  acetaminophen   Tablet .. 650 milliGRAM(s) Oral every 6 hours PRN Temp greater or equal to 38C (100.4F), Mild Pain (1 - 3), Moderate Pain (4 - 6), Severe Pain (7 - 10)  ALBUTerol    90 MICROgram(s) HFA Inhaler 2 Puff(s) Inhalation every 6 hours PRN Shortness of Breath and/or Wheezing  dextrose 40% Gel 15 Gram(s) Oral once PRN Blood Glucose LESS THAN 70 milliGRAM(s)/deciliter  glucagon  Injectable 1 milliGRAM(s) IntraMuscular once PRN Glucose LESS THAN 70 milligrams/deciliter  lacosamide Injectable 200 milliGRAM(s) IV Push once PRN GTC or seizure activity > 3 min refractory to ativan  LORazepam   Injectable 2 milliGRAM(s) IV Push once PRN GTC or seizure activity > 3 min      Vital Signs Last 24 Hrs  T(C): 36.7 (23 Sep 2020 08:05), Max: 36.9 (22 Sep 2020 23:42)  T(F): 98.1 (23 Sep 2020 08:05), Max: 98.4 (22 Sep 2020 23:42)  HR: 70 (23 Sep 2020 08:05) (67 - 79)  BP: 122/78 (23 Sep 2020 08:05) (104/66 - 131/82)  BP(mean): --  RR: 18 (23 Sep 2020 08:05) (18 - 18)  SpO2: 94% (23 Sep 2020 08:05) (93% - 95%)  CAPILLARY BLOOD GLUCOSE      POCT Blood Glucose.: 131 mg/dL (23 Sep 2020 08:12)  POCT Blood Glucose.: 175 mg/dL (22 Sep 2020 21:11)  POCT Blood Glucose.: 99 mg/dL (22 Sep 2020 17:10)    I&O's Summary    22 Sep 2020 07:01  -  23 Sep 2020 07:00  --------------------------------------------------------  IN: 120 mL / OUT: 0 mL / NET: 120 mL        PHYSICAL EXAM:  GENERAL: NAD, well-developed  HEAD:  Atraumatic, Normocephalic  EYES: EOMI, PERRLA, conjunctiva and sclera clear  NECK: Supple, No JVD  CHEST/LUNG: Clear to auscultation bilaterally; No wheeze  HEART: Regular rate and rhythm; No murmurs, rubs, or gallops  ABDOMEN: Soft, Nontender, Nondistended; Bowel sounds present  EXTREMITIES:  2+ Peripheral Pulses, No clubbing, cyanosis, or edema  PSYCH: AAOx3  NEUROLOGY: non-focal  SKIN: No rashes or lesions    LABS:                        11.4   7.46  )-----------( 277      ( 23 Sep 2020 06:21 )             35.0     09-23    143  |  105  |  22  ----------------------------<  134<H>  3.6   |  25  |  0.88    Ca    9.1      23 Sep 2020 06:21  Phos  2.8     09-23  Mg     2.0     09-23    TPro  6.5  /  Alb  3.8  /  TBili  0.2  /  DBili  <0.1  /  AST  43<H>  /  ALT  31  /  AlkPhos  60  09-23      CARDIAC MARKERS ( 23 Sep 2020 06:21 )  x     / x     / 22 U/L / x     / x          Urinalysis Basic - ( 21 Sep 2020 20:24 )    Color: Light Yellow / Appearance: Clear / S.039 / pH: x  Gluc: x / Ketone: Negative  / Bili: Negative / Urobili: Negative   Blood: x / Protein: Trace / Nitrite: Negative   Leuk Esterase: Negative / RBC: 6 /hpf / WBC 2 /HPF   Sq Epi: x / Non Sq Epi: 1 /hpf / Bacteria: Negative        RADIOLOGY & ADDITIONAL TESTS:    Imaging Personally Reviewed:    Consultant(s) Notes Reviewed:      Care Discussed with Consultants/Other Providers:

## 2020-09-23 NOTE — PROGRESS NOTE ADULT - ATTENDING COMMENTS
AIE sec to checkpoint inhibitors  CSF w high protein and 3 cell  day 2/5 solumedrol  mild improvement but still very confused    plan: continue solumedrol for 5 rounds  decrease VPA to 250 bid  continue Vimpat 150 bid  IVIG was denied by insurance

## 2020-09-23 NOTE — EEG REPORT - NS EEG TEXT BOX
Starting: Day 2      9/23/2020      Time: 08:00 AM Duration: 22 h 20m    Daily EEG Visual Analysis    FINDINGS:  The background was continuous, spontaneously variable and reactive. During wakefulness, the posterior dominant rhythm on the left side consisted of fragments of 7 Hz activity, with amplitude to 30 uV, that attenuated to eye opening.  PDR obscured on the right side by slowing.     Background Slowing:  Excess diffuse theta and polymorphic delta slowing.    Focal Slowing:   Continuous theta/delta slowing in the right posterior quadrant.    Sleep Background:  Drowsiness was characterized by fragmentation, attenuation, and slowing of the background activity.    Sleep was characterized by the presence of vertex waves, symmetric sleep spindles and K-complexes.    Other Non-Epileptiform Findings:  Breach effect in the right posterior quadrant characterized by higher amplitude, sharply contoured waveforms.    Interictal Epileptiform Activity:   Abundant right occipitotemporal  (O2>>P8) spikes.    Events:  No events or seizures recorded.    Artifacts:  Intermittent myogenic and movement artifacts were noted.    ECG:  The heart rate on single channel ECG was predominantly between 60-70BPM.    EEG Summary:  Abnormal EEG in the awake, drowsy and asleep states.  - Abundant right occipitotemporal (O2>>P8) spikes.  - Continuous theta/delta slowing in the right posterior quadrant.  - mild to moderate generalized slowing  - Breach effect in the right posterior quadrant characterized by higher amplitude, sharply contoured waveforms.    Impression/Clinical Correlate:  Potential epileptogenic focus in the right occipitotemporal region, structural and skull defect in the right posterior quadrant and mild nonspecific diffuse cerebral dysfunction. No event or seizure captured.

## 2020-09-23 NOTE — PROGRESS NOTE ADULT - SUBJECTIVE AND OBJECTIVE BOX
Subjective:  Patient feels that she is worse than when she came in. She explains that the right hand swelling is the same and that the confusion hasn't changed much. Cannot specify what symptoms are worse other than she feels that her coordination might be worse.    MEDICATIONS  (STANDING):  dextrose 5%. 1000 milliLiter(s) (50 mL/Hr) IV Continuous <Continuous>  dextrose 50% Injectable 12.5 Gram(s) IV Push once  dextrose 50% Injectable 25 Gram(s) IV Push once  dextrose 50% Injectable 25 Gram(s) IV Push once  enoxaparin Injectable 40 milliGRAM(s) SubCutaneous daily  gabapentin 100 milliGRAM(s) Oral two times a day  insulin lispro (HumaLOG) corrective regimen sliding scale   SubCutaneous three times a day before meals  insulin lispro (HumaLOG) corrective regimen sliding scale   SubCutaneous at bedtime  lacosamide 150 milliGRAM(s) Oral two times a day  lacosamide 150 milliGRAM(s) Oral once  levothyroxine 100 MICROGram(s) Oral daily  methylPREDNISolone sodium succinate IVPB 1000 milliGRAM(s) IV Intermittent daily  pantoprazole    Tablet 40 milliGRAM(s) Oral before breakfast  sertraline 150 milliGRAM(s) Oral daily  tiotropium 18 MICROgram(s) Capsule 1 Capsule(s) Inhalation daily  valproic acid 500 milliGRAM(s) Oral once  valproic acid 250 milliGRAM(s) Oral two times a day    MEDICATIONS  (PRN):  acetaminophen   Tablet .. 650 milliGRAM(s) Oral every 6 hours PRN Temp greater or equal to 38C (100.4F), Mild Pain (1 - 3), Moderate Pain (4 - 6), Severe Pain (7 - 10)  ALBUTerol    90 MICROgram(s) HFA Inhaler 2 Puff(s) Inhalation every 6 hours PRN Shortness of Breath and/or Wheezing  dextrose 40% Gel 15 Gram(s) Oral once PRN Blood Glucose LESS THAN 70 milliGRAM(s)/deciliter  glucagon  Injectable 1 milliGRAM(s) IntraMuscular once PRN Glucose LESS THAN 70 milligrams/deciliter  lacosamide Injectable 200 milliGRAM(s) IV Push once PRN GTC or seizure activity > 3 min refractory to ativan  LORazepam   Injectable 2 milliGRAM(s) IV Push once PRN GTC or seizure activity > 3 min      Vital Signs Last 24 Hrs  T(C): 36.7 (23 Sep 2020 08:05), Max: 36.9 (22 Sep 2020 23:42)  T(F): 98.1 (23 Sep 2020 08:05), Max: 98.4 (22 Sep 2020 23:42)  HR: 70 (23 Sep 2020 08:05) (67 - 70)  BP: 122/78 (23 Sep 2020 08:05) (104/66 - 122/78)  RR: 18 (23 Sep 2020 08:05) (18 - 18)  SpO2: 94% (23 Sep 2020 08:05) (93% - 94%)    Physical Exam: Neurological Exam:  	Mental Status: Orientated to self, date and place.  Attention is poor. Patient is perseverating on how many days she will require admission and stating that she needs to get back to her life.  No dysarthria, aphasia or neglect.   	Cranial Nerves: PERRL, EOMI, no nystagmus or diplopia. No facial asymmetry.  	Motor: moving all 4 extremities equally w 5/5 strength  	Tone: normal.                  	Pronator drift: none                 	Dysmetria: None to finger-nose-finger  	No truncal ataxia.    	Tremor: No resting, postural or action tremor.  No myoclonus.  	Sensation: intact to light touch     Labs:                        11.4   7.46  )-----------( 277      ( 23 Sep 2020 06:21 )             35.0     09-23    143  |  105  |  22  ----------------------------<  134<H>  3.6   |  25  |  0.88    Ca    9.1      23 Sep 2020 06:21  Phos  2.8     09-  Mg     2.0     -    TPro  6.5  /  Alb  3.8  /  TBili  0.2  /  DBili  <0.1  /  AST  43<H>  /  ALT  31  /  AlkPhos  60  -    Urinalysis:  Color: Light Yellow / Appearance: Clear / S.039 / pH: x  Gluc: x / Ketone: Negative  / Bili: Negative / Urobili: Negative   Blood: x / Protein: Trace / Nitrite: Negative   Leuk Esterase: Negative / RBC: 6 /hpf / WBC 2 /HPF   Sq Epi: x / Non Sq Epi: 1 /hpf / Bacteria: Negative    CSF:  Total Nucleated Cell Count, CSF: 3 /uL (20 @ 14:29)  RBC Count - Spinal Fluid: 0 /uL (20 @ 14:29)  Glucose, CSF: 61 mg/dL (20 @ 14:30)  Protein, CSF: 67 mg/dL (20 @ 14:30)    TSH: 29.50

## 2020-09-23 NOTE — PROGRESS NOTE ADULT - SUBJECTIVE AND OBJECTIVE BOX
CARDIOLOGY     PROGRESS  NOTE   ________________________________________________    CHIEF COMPLAINT:Patient is a 65y old  Female who presents with a chief complaint of Possible autoimmune encephalitis (22 Sep 2020 17:19)  no complain.  	  REVIEW OF SYSTEMS:  CONSTITUTIONAL: No fever, weight loss, or fatigue  EYES: No eye pain, visual disturbances, or discharge  ENT:  No difficulty hearing, tinnitus, vertigo; No sinus or throat pain  NECK: No pain or stiffness  RESPIRATORY: No cough, wheezing, chills or hemoptysis; No Shortness of Breath  CARDIOVASCULAR: No chest pain, palpitations, passing out, dizziness, or leg swelling  GASTROINTESTINAL: No abdominal or epigastric pain. No nausea, vomiting, or hematemesis; No diarrhea or constipation. No melena or hematochezia.  GENITOURINARY: No dysuria, frequency, hematuria, or incontinence  NEUROLOGICAL: No headaches, memory loss, loss of strength, numbness, or tremors  SKIN: No itching, burning, rashes, or lesions   LYMPH Nodes: No enlarged glands  ENDOCRINE: No heat or cold intolerance; No hair loss  MUSCULOSKELETAL: No joint pain or swelling; No muscle, back, or extremity pain  PSYCHIATRIC: No depression, anxiety, mood swings, or difficulty sleeping  HEME/LYMPH: No easy bruising, or bleeding gums  ALLERGY AND IMMUNOLOGIC: No hives or eczema	    [ ] All others negative	  [ ] Unable to obtain    PHYSICAL EXAM:  T(C): 36.7 (09-23-20 @ 08:05), Max: 36.9 (09-22-20 @ 23:42)  HR: 70 (09-23-20 @ 08:05) (67 - 79)  BP: 122/78 (09-23-20 @ 08:05) (104/66 - 131/82)  RR: 18 (09-23-20 @ 08:05) (18 - 18)  SpO2: 94% (09-23-20 @ 08:05) (93% - 95%)  Wt(kg): --  I&O's Summary    22 Sep 2020 07:01  -  23 Sep 2020 07:00  --------------------------------------------------------  IN: 120 mL / OUT: 0 mL / NET: 120 mL        Appearance: Normal	  HEENT:   Normal oral mucosa, PERRL, EOMI	  Lymphatic: No lymphadenopathy  Cardiovascular: Normal S1 S2, No JVD,+ murmurs, No edema  Respiratory: Lungs clear to auscultation	  Gastrointestinal:  Soft, Non-tender, + BS	  Skin: No rashes, No ecchymoses, No cyanosis	  Neurologic: Non-focal  Extremities: Normal range of motion, No clubbing, cyanosis or edema  Vascular: Peripheral pulses palpable 2+ bilaterally    MEDICATIONS  (STANDING):  dextrose 5%. 1000 milliLiter(s) (50 mL/Hr) IV Continuous <Continuous>  dextrose 50% Injectable 12.5 Gram(s) IV Push once  dextrose 50% Injectable 25 Gram(s) IV Push once  dextrose 50% Injectable 25 Gram(s) IV Push once  gabapentin 100 milliGRAM(s) Oral two times a day  insulin lispro (HumaLOG) corrective regimen sliding scale   SubCutaneous three times a day before meals  insulin lispro (HumaLOG) corrective regimen sliding scale   SubCutaneous at bedtime  lacosamide 150 milliGRAM(s) Oral two times a day  lacosamide 150 milliGRAM(s) Oral once  levothyroxine 100 MICROGram(s) Oral daily  methylPREDNISolone sodium succinate IVPB 1000 milliGRAM(s) IV Intermittent daily  pantoprazole    Tablet 40 milliGRAM(s) Oral before breakfast  sertraline 150 milliGRAM(s) Oral daily  tiotropium 18 MICROgram(s) Capsule 1 Capsule(s) Inhalation daily  valproic acid 500 milliGRAM(s) Oral two times a day  valproic acid 500 milliGRAM(s) Oral once      TELEMETRY: 	    ECG:  	  RADIOLOGY:  OTHER: 	  	  LABS:	 	    CARDIAC MARKERS:  CARDIAC MARKERS ( 23 Sep 2020 06:21 )  x     / x     / 22 U/L / x     / x                                    11.4   7.46  )-----------( 277      ( 23 Sep 2020 06:21 )             35.0     09-23    143  |  105  |  22  ----------------------------<  134<H>  3.6   |  25  |  0.88    Ca    9.1      23 Sep 2020 06:21  Phos  2.8     09-23  Mg     2.0     09-23    TPro  6.5  /  Alb  3.8  /  TBili  0.2  /  DBili  <0.1  /  AST  43<H>  /  ALT  31  /  AlkPhos  60  09-23    proBNP: Serum Pro-Brain Natriuretic Peptide: 296 pg/mL (09-23 @ 06:21)  Serum Pro-Brain Natriuretic Peptide: 220 pg/mL (09-22 @ 15:12)    Lipid Profile:   HgA1c:   TSH: Thyroid Stimulating Hormone, Serum: 29.50 uIU/mL (09-19 @ 04:47)  Thyroid Stimulating Hormone, Serum: 97.90 uIU/mL (09-04 @ 09:20)  Thyroid Stimulating Hormone, Serum: 82.80 uIU/mL (09-04 @ 00:22)    < from: Transthoracic Echocardiogram (03.15.20 @ 07:35) >  Mitral Valve: Normal mitral valve. Minimal mitral  regurgitation.  Aortic Valve/Aorta: Normal trileaflet aortic valve.  Normal aortic root size. (Ao: 3.7 cm at the sinuses of  Valsalva).  Left Atrium: Normal left atrium.  LA volume index = 26  cc/m2.  Left Ventricle: Normal left ventricular systolic function.  No segmental wall motion abnormalities. Normal left  ventricular internal dimensions and wall thicknesses.  Right Heart: Normal right atrium. Normal right ventricular  size and function. Normal tricuspid valve. Minimal  tricuspid regurgitation. Normal pulmonic valve.  Pericardium/Pleura: Normal pericardium with no pericardial  effusion.  Hemodynamic: Estimated right atrial pressure is 8 mm Hg.  ------------------------------------------------------------------------  Conclusions:  1. Normal mitral valve. Minimal mitral regurgitation.  2. Normal trileaflet aortic valve.  3. Normal left ventricular internal dimensions and wall  thicknesses.  4. Normal left ventricular systolic function. No segmental  wall motion abnormalities.  5. Normal right ventricular size and function.    < end of copied text >      Abnormal EEG in the awake, drowsy and asleep states.  - Abundant right occipitotemporal (O2>>P8) spikes.  - Continuous theta/delta slowing in the right posterior quadrant.  - mild to moderate generalized slowing  - Breach effect in the right posterior quadrant characterized by higher amplitude, sharply contoured waveforms.    Impression/Clinical Correlate:  Potential epileptogenic focus in the right occipitotemporal region, structural and skull defect in the right posterior quadrant and mild nonspecific diffuse cerebral dysfunction. No event or seizure captured.       Assessment and plan  ---------------------------  HPI: 65 y old left handed woman with PMHx of COPD, migraines, depressions, seizures and lung adenocarcinoma with R occipito-parietal mets s/p resection, stereotactic radiosurgery, and chemotherapy with recent admission to [facility] (09/18/20) for presumed Depakote toxicity presenting with lethargy, swelling in the fingers of both hands, intermittent numbness/tingling of the bilateral upper extremities, and generalized weakness. Patient states she has felt "off" since her recent discharge and since starting the new medications she was prescribed at that time. On her most recent admission, she was discharged on Vimpat 150MG BID and Depakote 500MG BID. She reports that she lives alone and she is unable to manage taking care of herself and taking her medications without help. She denies HA, LOC, seizures, tremors, tinnitus. She endorses intermittent shortness of breath.  pt well known to me from the last admission with paresthesia and swelling fingers, and sob  echo noted with lvh and diastolic dysfunction, doubt cause  of sob.  covid negative but with chest x ray abnormality  ?repeat echo with hx of pericardial effusion and low voltage qrs but no mention of pericardial effusion on the chest ct  dvt prophylaxis  will adjust meds   LP noted  eeg noted

## 2020-09-24 NOTE — OCCUPATIONAL THERAPY INITIAL EVALUATION ADULT - COGNITIVE, VISUAL PERCEPTUAL, OT EVAL
GOAL: Pt will demonstrate competency of visual compensatory strategies during OT sessions with ADL tasks in 4 weeks

## 2020-09-24 NOTE — PROGRESS NOTE ADULT - SUBJECTIVE AND OBJECTIVE BOX
MRN-[id number]  Patient is a 65y old  Female who presents with a chief complaint of Possible autoimmune encephalitis (24 Sep 2020 13:06)    HPI:  HPI: 65 y old left handed woman with PMHx of COPD, migraines, depressions, seizures and lung adenocarcinoma with R occipito-parietal mets s/p resection, stereotactic radiosurgery, and chemotherapy with recent admission to [facility] (20) for presumed Depakote toxicity presenting with lethargy, swelling in the fingers of both hands, intermittent numbness/tingling of the bilateral upper extremities, and generalized weakness. Patient states she has felt "off" since her recent discharge and since starting the new medications she was prescribed at that time. On her most recent admission, she was discharged on Vimpat 150MG BID and Depakote 500MG BID. She reports that she lives alone and she is unable to manage taking care of herself and taking her medications without help. She denies HA, LOC, seizures, tremors, tinnitus. She endorses intermittent shortness of breath.    Presumed Seizure Semiology:  Visuospatial disturbances: macropsia, micropsia, disorientation, anxiety/panic that originate from R parietal lobe, likely the TPO carrour    Cancer history:   2016 CARLOTA adenocarcinoma  3/2016 CARLOTA lobectomy  2016 Completed adjuvant chemotherapy: 4 cycles of cisplatin and pemetrexed  3/14/2020 Headache and eye pain, MRI brain 2.2 x 1.6 x 2.5 cm enhancing lesion of R parietal cortical & subcortical region  3/15/2020 R occipital craniectomy Dr. Servin  - pathology c/w metastatic adenocarcinoma from lung which was PD-L1 30%+, BRCA 2+ (germline vs somatic unknown), KRAS G12C+.   2020 stereotactic radiosurgery Dr. Pennington  2020 Pembrolizumab - completed 2020 (21 Sep 2020 20:59)    Interval Hisxory:      PAST MEDICAL & SURGICAL HISTORY:  History of antineoplastic chemotherapy    Metastatic adenocarcinoma to brain    Iron deficiency anemia  underwent endoscopy and colonoscopy, reportedly fine    Lung cancer    Migraine    COPD (chronic obstructive pulmonary disease)    History of craniotomy    S/P lobectomy of lung    Benign tumor  left     S/P         FAMILY HISTORY:  FH: kidney disease  mother     FH: diabetes mellitus  father       Social Hx:  Nonsmoker, no drug or alcohol use    Home Medications:  albuterol 90 mcg/inh inhalation aerosol: 2 puff(s) inhaled every 6 hours, As needed, Shortness of Breath and/or Wheezing (04 Sep 2020 06:05)  ALPRAZolam 0.5 mg oral tablet: 1 tab(s) orally once a day, As Needed (04 Sep 2020 06:05)  Imitrex 100 mg oral tablet: 1 tab(s) orally once, As Needed (04 Sep 2020 06:05)  lacosamide 150 mg oral tablet: 1 tab(s) orally 2 times a day (19 Sep 2020 17:56)  sertraline 50 mg oral tablet: 3 tab(s) orally once a day (04 Sep 2020 06:05)  tiotropium 18 mcg inhalation capsule: 1 cap(s) inhaled once a day (04 Sep 2020 06:05)  valproic acid 250 mg oral capsule: 2 cap(s) orally 2 times a day (19 Sep 2020 15:55)    MEDICATIONS  (STANDING):  dextrose 5%. 1000 milliLiter(s) (50 mL/Hr) IV Continuous <Continuous>  dextrose 50% Injectable 12.5 Gram(s) IV Push once  dextrose 50% Injectable 25 Gram(s) IV Push once  dextrose 50% Injectable 25 Gram(s) IV Push once  enoxaparin Injectable 40 milliGRAM(s) SubCutaneous daily  gabapentin 100 milliGRAM(s) Oral two times a day  insulin lispro (HumaLOG) corrective regimen sliding scale   SubCutaneous three times a day before meals  insulin lispro (HumaLOG) corrective regimen sliding scale   SubCutaneous at bedtime  lacosamide 150 milliGRAM(s) Oral two times a day  lacosamide 150 milliGRAM(s) Oral once  levothyroxine 100 MICROGram(s) Oral daily  methylPREDNISolone sodium succinate IVPB 1000 milliGRAM(s) IV Intermittent daily  pantoprazole    Tablet 40 milliGRAM(s) Oral before breakfast  polyethylene glycol 3350 17 Gram(s) Oral daily  senna 2 Tablet(s) Oral at bedtime  sertraline 150 milliGRAM(s) Oral daily  tiotropium 18 MICROgram(s) Capsule 1 Capsule(s) Inhalation daily  valproic acid 500 milliGRAM(s) Oral once    MEDICATIONS  (PRN):  acetaminophen   Tablet .. 650 milliGRAM(s) Oral every 6 hours PRN Temp greater or equal to 38C (100.4F), Mild Pain (1 - 3), Moderate Pain (4 - 6), Severe Pain (7 - 10)  ALBUTerol    90 MICROgram(s) HFA Inhaler 2 Puff(s) Inhalation every 6 hours PRN Shortness of Breath and/or Wheezing  dextrose 40% Gel 15 Gram(s) Oral once PRN Blood Glucose LESS THAN 70 milliGRAM(s)/deciliter  glucagon  Injectable 1 milliGRAM(s) IntraMuscular once PRN Glucose LESS THAN 70 milligrams/deciliter  lacosamide Injectable 200 milliGRAM(s) IV Push once PRN GTC or seizure activity > 3 min refractory to ativan  LORazepam   Injectable 2 milliGRAM(s) IV Push once PRN GTC or seizure activity > 3 min    Allergies  Fish Products (Rash)  No Known Drug Allergies    Intolerances      REVIEW OF SYSTEMS  General:	  Skin/Breast:	  Ophthalmologic:  ENMT:	  Respiratory and Thorax:	  Cardiovascular:	  Gastrointestinal:	  Genitourinary:	  Musculoskeletal:	  Neurological:	  Psychiatric:	  Hematology/Lymphatics:	  Endocrine:	  Allergic/Immunologic:	    ROS: Pertinent positives in HPI, all other ROS were reviewed and are negative.      Vital Signs Last 24 Hrs  T(C): 36.7 (24 Sep 2020 16:10), Max: 37.1 (23 Sep 2020 23:20)  T(F): 98.1 (24 Sep 2020 16:10), Max: 98.8 (23 Sep 2020 23:20)  HR: 63 (24 Sep 2020 16:10) (57 - 93)  BP: 118/76 (24 Sep 2020 16:10) (103/57 - 145/75)  BP(mean): --  RR: 17 (24 Sep 2020 16:10) (17 - 18)  SpO2: 95% (24 Sep 2020 16:10) (94% - 98%)    GENERAL EXAM:  Constitutional: awake and alert. NAD  HEENT: PERRLA, EOMI  Neck: Supple  Respiratory: Breath sounds are clear bilaterally  Cardiovascular: S1 and S2, regular / irregular rhythm  Gastrointestinal: soft, nontender  Extremities: no edema, no cyanosis  Vascular: no carotid bruits  Musculoskeletal: no joint swelling/tenderness, no abnormal movements  Skin: no rashes    NEUROLOGICAL EXAM:  MS: AAOX3, fluent, attends b/l; recent and remote memory intact; normal attention, language and fund of knowledge.   CN: VFF, EOMI, PERRL, no RADHA, no APD,  V1-3 intact, no facial asymmetry, t/p midline, SCM/trap intact.  Eyes-Fundi: no papilledema.  Motor: Strength: 5/5 4x. Tone: normal. Bulk: normal. DTR 2+ symm.  Plantar flex b/l. Sensation: intact to LT/PP/Vibration/Position/Temperature 4x.   Coordination: intact 4x.   Gait:  Romberg negative, pull test negative; walks with narrow base, pivots in 2 steps.    NIHSS  mRS    Labs:   cbc                      11.4   7.46  )-----------( 277      ( 23 Sep 2020 06:21 )             35.0     [id number]    143  |  105  |  22  ----------------------------<  134<H>  3.6   |  25  |  0.88    Ca    9.1      23 Sep 2020 06:21  Phos  2.8     -  Mg     2.0     -    TPro  6.5  /  Alb  3.8  /  TBili  0.2  /  DBili  <0.1  /  AST  43<H>  /  ALT  31  /  AlkPhos  60  -23    Coags  [id number] Chol 249<H> <H> HDL 29<L> Trig 245<H>  A1C  CardiacMarkersCARDIAC MARKERS ( 23 Sep 2020 06:21 )  x     / x     / 22 U/L / x     / x          LFTsLIVER FUNCTIONS - ( 23 Sep 2020 06:21 )  Alb: 3.8 g/dL / Pro: 6.5 g/dL / ALK PHOS: 60 U/L / ALT: 31 U/L / AST: 43 U/L / GGT: x           UA  CSF  Immunological Labs    Radiology:  -CT Head  -MRI brain  -MRA brain/Carotids  -EEG  -EKG  -TTE/PARVEEN Interval History: 66 yo female seen and examined at bedside. A&Ox3 but reports continued confusion although states her hand swelling and numbness/tingling has improved a little bit. Requested that her daughter be updated, which was done over the phone. Planning for patient to be discharged Saturday after final dose of IV Solumedrol. Denies HA, blurry vision, SOB, chest pain.    PAST MEDICAL & SURGICAL HISTORY:  History of antineoplastic chemotherapy    Metastatic adenocarcinoma to brain    Iron deficiency anemia  underwent endoscopy and colonoscopy, reportedly fine    Lung cancer    Migraine    COPD (chronic obstructive pulmonary disease)    History of craniotomy    S/P lobectomy of lung    Benign tumor  left     S/P         FAMILY HISTORY:  FH: kidney disease  mother     FH: diabetes mellitus  father     Social Hx:  Smoker, no drug or alcohol use    Home Medications:  albuterol 90 mcg/inh inhalation aerosol: 2 puff(s) inhaled every 6 hours, As needed, Shortness of Breath and/or Wheezing (04 Sep 2020 06:05)  ALPRAZolam 0.5 mg oral tablet: 1 tab(s) orally once a day, As Needed (04 Sep 2020 06:05)  Imitrex 100 mg oral tablet: 1 tab(s) orally once, As Needed (04 Sep 2020 06:05)  lacosamide 150 mg oral tablet: 1 tab(s) orally 2 times a day (19 Sep 2020 17:56)  sertraline 50 mg oral tablet: 3 tab(s) orally once a day (04 Sep 2020 06:05)  tiotropium 18 mcg inhalation capsule: 1 cap(s) inhaled once a day (04 Sep 2020 06:05)  valproic acid 250 mg oral capsule: 2 cap(s) orally 2 times a day (19 Sep 2020 15:55)    MEDICATIONS  (STANDING):  dextrose 5%. 1000 milliLiter(s) (50 mL/Hr) IV Continuous <Continuous>  dextrose 50% Injectable 12.5 Gram(s) IV Push once  dextrose 50% Injectable 25 Gram(s) IV Push once  dextrose 50% Injectable 25 Gram(s) IV Push once  enoxaparin Injectable 40 milliGRAM(s) SubCutaneous daily  gabapentin 100 milliGRAM(s) Oral two times a day  insulin lispro (HumaLOG) corrective regimen sliding scale   SubCutaneous three times a day before meals  insulin lispro (HumaLOG) corrective regimen sliding scale   SubCutaneous at bedtime  lacosamide 150 milliGRAM(s) Oral two times a day  lacosamide 150 milliGRAM(s) Oral once  levothyroxine 100 MICROGram(s) Oral daily  methylPREDNISolone sodium succinate IVPB 1000 milliGRAM(s) IV Intermittent daily  pantoprazole    Tablet 40 milliGRAM(s) Oral before breakfast  polyethylene glycol 3350 17 Gram(s) Oral daily  senna 2 Tablet(s) Oral at bedtime  sertraline 150 milliGRAM(s) Oral daily  tiotropium 18 MICROgram(s) Capsule 1 Capsule(s) Inhalation daily  valproic acid 500 milliGRAM(s) Oral once    MEDICATIONS  (PRN):  acetaminophen   Tablet .. 650 milliGRAM(s) Oral every 6 hours PRN Temp greater or equal to 38C (100.4F), Mild Pain (1 - 3), Moderate Pain (4 - 6), Severe Pain (7 - 10)  ALBUTerol    90 MICROgram(s) HFA Inhaler 2 Puff(s) Inhalation every 6 hours PRN Shortness of Breath and/or Wheezing  dextrose 40% Gel 15 Gram(s) Oral once PRN Blood Glucose LESS THAN 70 milliGRAM(s)/deciliter  glucagon  Injectable 1 milliGRAM(s) IntraMuscular once PRN Glucose LESS THAN 70 milligrams/deciliter  lacosamide Injectable 200 milliGRAM(s) IV Push once PRN GTC or seizure activity > 3 min refractory to ativan  LORazepam   Injectable 2 milliGRAM(s) IV Push once PRN GTC or seizure activity > 3 min    Allergies  Fish Products (Rash)  No Known Drug Allergies	    ROS: See above.    Vital Signs Last 24 Hrs  T(C): 36.7 (24 Sep 2020 16:10), Max: 37.1 (23 Sep 2020 23:20)  T(F): 98.1 (24 Sep 2020 16:10), Max: 98.8 (23 Sep 2020 23:20)  HR: 63 (24 Sep 2020 16:10) (57 - 93)  BP: 118/76 (24 Sep 2020 16:10) (103/57 - 145/75)  RR: 17 (24 Sep 2020 16:10) (17 - 18)  SpO2: 95% (24 Sep 2020 16:10) (94% - 98%)    EXAM:  Mental Status: Alert. Oriented to person, place, situation, time.  Follows all commands.   Language: Speech is clear, fluent.   Cranial Nerves: EOMI, no nystagmus. PERRL. V1-3 intact to light touch. Face symmetric.  Motor: 5/5 strength throughout.   Sensation: Symmetric B/L preserved sensation to light touch.   Coordination: No dysmetria on finger-to-nose bilaterally.  Gait: Deferred.    Labs:   cbc                      11.4   7.46  )-----------( 277      ( 23 Sep 2020 06:21 )             35.0     [id number]    143  |  105  |  22  ----------------------------<  134<H>  3.6   |  25  |  0.88    Ca    9.1      23 Sep 2020 06:21  Phos  2.8       Mg     2.0         TPro  6.5  /  Alb  3.8  /  TBili  0.2  /  DBili  <0.1  /  AST  43<H>  /  ALT  31  /  AlkPhos  60      Lipids:  Chol 249<H> <H> HDL 29<L> Trig 245<H>    Cardiac Markers: CARDIAC MARKERS ( 23 Sep 2020 06:21 )  x     / x     / 22 U/L / x     / x        LFTs: LIVER FUNCTIONS - ( 23 Sep 2020 06:21 )  Alb: 3.8 g/dL / Pro: 6.5 g/dL / ALK PHOS: 60 U/L / ALT: 31 U/L / AST: 43 U/L / GGT: x           Radiology:  - CT H: No acute intracranial hemorrhage. Redemonstration of a right-sided parietal craniotomy and similar-appearing subjacent surgical cavity.  - EEG Report: Potential epileptogenic focus in the right occipitotemporal region, structural and skull defect in the right posterior quadrant and mild nonspecific diffuse cerebral dysfunction. No event or seizure  - EEG Report: Potential epileptogenic focus in the right occipitotemporal region, structural and skull defect in the right posterior quadrant and mild nonspecific diffuse cerebral dysfunction. No event or seizure captured.   - EEG: Potential epileptogenic focus in the right occipitotemporal region, structural and skull defect in the right posterior quadrant and mild nonspecific diffuse cerebral dysfunction. No event or seizure captured. The occurrence of right occipitotemporal spikes is improved compared to yesterday.

## 2020-09-24 NOTE — PHYSICAL THERAPY INITIAL EVALUATION ADULT - PRECAUTIONS/LIMITATIONS, REHAB EVAL
CONTINUED: Patient states she has felt "off" since her recent discharge and since starting the new medications she was prescribed at that time. On her most recent admission, she was discharged on Vimpat 150MG BID and Depakote 500MG BID. She reports that she lives alone and she is unable to manage taking care of herself and taking her medications without help. She denies HA, LOC, seizures, tremors, tinnitus. She endorses intermittent shortness of breath. CONTINUED: Patient states she has felt "off" since her recent discharge and since starting the new medications she was prescribed at that time. On her most recent admission, she was discharged on Vimpat 150MG BID and Depakote 500MG BID. She reports that she lives alone and she is unable to manage taking care of herself and taking her medications without help. She denies HA, LOC, seizures, tremors, tinnitus. She endorses intermittent shortness of breath./fall precautions

## 2020-09-24 NOTE — PHYSICAL THERAPY INITIAL EVALUATION ADULT - LIVES WITH, PROFILE
As per pt, pt lives in an apartment, no stairs to enter, +elevator to 2nd floor. 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 Pt does not drive./alone

## 2020-09-24 NOTE — PHYSICAL THERAPY INITIAL EVALUATION ADULT - GENERAL OBSERVATIONS, REHAB EVAL
Pt received semi-supine in bed in NAD, VSS, +IVL, +EEG and agreeable to treatment at this time Pt received semi-supine in bed in NAD, VSS, +IVL, +vEEG and agreeable to treatment at this time

## 2020-09-24 NOTE — PHYSICAL THERAPY INITIAL EVALUATION ADULT - ADDITIONAL COMMENTS
IMAGING:   CT CHEST W/ IV CONT (9/21/20): Nonspecific subtle groundglass opacities in the right upper and left lower lobe, which are new and of uncertain etiology. Recommend short interval follow-up. New small pericardial effusion.  CT HEAD NO CONT (9/21/20):  No acute intracranial hemorrhage. Redemonstration of a right-sided parietal craniotomy and similar-appearing subjacent surgical cavity. If clinical symptoms are new and persistent, consider further evaluation with contrast-enhanced brain MRI examination, if there are no MRI contraindications.

## 2020-09-24 NOTE — PHYSICAL THERAPY INITIAL EVALUATION ADULT - BALANCE TRAINING, PT EVAL
Goal: Pt will improve standing static/dynamic balance by at least half a grade within 3-4 weeks to decrease fall risk.

## 2020-09-24 NOTE — OCCUPATIONAL THERAPY INITIAL EVALUATION ADULT - IADL RETRAINING, OT EVAL
GOAL: Pt will be Independent with communication management via phone/computer in order to increase independence with work tasks in 4 weeks

## 2020-09-24 NOTE — PROGRESS NOTE ADULT - SUBJECTIVE AND OBJECTIVE BOX
CARDIOLOGY     PROGRESS  NOTE   ________________________________________________    CHIEF COMPLAINT:Patient is a 65y old  Female who presents with a chief complaint of Possible autoimmune encephalitis (23 Sep 2020 13:02)  sleeping.  	  REVIEW OF SYSTEMS:  CONSTITUTIONAL: No fever, weight loss, or fatigue  EYES: No eye pain, visual disturbances, or discharge  ENT:  No difficulty hearing, tinnitus, vertigo; No sinus or throat pain  NECK: No pain or stiffness  RESPIRATORY: No cough, wheezing, chills or hemoptysis; No Shortness of Breath  CARDIOVASCULAR: No chest pain, palpitations, passing out, dizziness, or leg swelling  GASTROINTESTINAL: No abdominal or epigastric pain. No nausea, vomiting, or hematemesis; No diarrhea or constipation. No melena or hematochezia.  GENITOURINARY: No dysuria, frequency, hematuria, or incontinence  NEUROLOGICAL: No headaches, memory loss, loss of strength, numbness, or tremors  SKIN: No itching, burning, rashes, or lesions   LYMPH Nodes: No enlarged glands  ENDOCRINE: No heat or cold intolerance; No hair loss  MUSCULOSKELETAL: No joint pain or swelling; No muscle, back, or extremity pain  PSYCHIATRIC: No depression, anxiety, mood swings, or difficulty sleeping  HEME/LYMPH: No easy bruising, or bleeding gums  ALLERGY AND IMMUNOLOGIC: No hives or eczema	    [ ] All others negative	  [x ] Unable to obtain    PHYSICAL EXAM:  T(C): 36.8 (09-24-20 @ 07:02), Max: 37.1 (09-23-20 @ 23:20)  HR: 74 (09-24-20 @ 07:02) (57 - 93)  BP: 125/80 (09-24-20 @ 07:02) (103/57 - 145/75)  RR: 18 (09-24-20 @ 07:02) (17 - 18)  SpO2: 97% (09-24-20 @ 07:02) (94% - 98%)  Wt(kg): --  I&O's Summary    23 Sep 2020 07:01  -  24 Sep 2020 07:00  --------------------------------------------------------  IN: 840 mL / OUT: 1 mL / NET: 839 mL        Appearance: Normal	  HEENT:   Normal oral mucosa, PERRL, EOMI	  Lymphatic: No lymphadenopathy  Cardiovascular: Normal S1 S2, No JVD, + murmurs, No edema  Respiratory: Lungs clear to auscultation	  Psychiatry: A & O x 3, Mood & affect appropriate  Gastrointestinal:  Soft, Non-tender, + BS	  Skin: No rashes, No ecchymoses, No cyanosis	  Neurologic: Non-focal  Extremities: Normal range of motion, No clubbing, cyanosis or edema  Vascular: Peripheral pulses palpable 2+ bilaterally    MEDICATIONS  (STANDING):  dextrose 5%. 1000 milliLiter(s) (50 mL/Hr) IV Continuous <Continuous>  dextrose 50% Injectable 12.5 Gram(s) IV Push once  dextrose 50% Injectable 25 Gram(s) IV Push once  dextrose 50% Injectable 25 Gram(s) IV Push once  enoxaparin Injectable 40 milliGRAM(s) SubCutaneous daily  gabapentin 100 milliGRAM(s) Oral two times a day  insulin lispro (HumaLOG) corrective regimen sliding scale   SubCutaneous three times a day before meals  insulin lispro (HumaLOG) corrective regimen sliding scale   SubCutaneous at bedtime  lacosamide 150 milliGRAM(s) Oral two times a day  lacosamide 150 milliGRAM(s) Oral once  levothyroxine 100 MICROGram(s) Oral daily  methylPREDNISolone sodium succinate IVPB 1000 milliGRAM(s) IV Intermittent daily  pantoprazole    Tablet 40 milliGRAM(s) Oral before breakfast  sertraline 150 milliGRAM(s) Oral daily  tiotropium 18 MICROgram(s) Capsule 1 Capsule(s) Inhalation daily  valproic acid 500 milliGRAM(s) Oral once  valproic acid 250 milliGRAM(s) Oral two times a day      TELEMETRY: 	    ECG:  	  RADIOLOGY:  OTHER: 	  	  LABS:	 	    CARDIAC MARKERS:  CARDIAC MARKERS ( 23 Sep 2020 06:21 )  x     / x     / 22 U/L / x     / x                                    11.4   7.46  )-----------( 277      ( 23 Sep 2020 06:21 )             35.0     09-23    143  |  105  |  22  ----------------------------<  134<H>  3.6   |  25  |  0.88    Ca    9.1      23 Sep 2020 06:21  Phos  2.8     09-23  Mg     2.0     09-23    TPro  6.5  /  Alb  3.8  /  TBili  0.2  /  DBili  <0.1  /  AST  43<H>  /  ALT  31  /  AlkPhos  60  09-23    proBNP: Serum Pro-Brain Natriuretic Peptide: 296 pg/mL (09-23 @ 06:21)  Serum Pro-Brain Natriuretic Peptide: 220 pg/mL (09-22 @ 15:12)    Lipid Profile: Cholesterol 249    HDL 29      HgA1c:   TSH: Thyroid Stimulating Hormone, Serum: 8.18 uIU/mL (09-23 @ 13:21)  Thyroid Stimulating Hormone, Serum: 29.50 uIU/mL (09-19 @ 04:47)  Thyroid Stimulating Hormone, Serum: 97.90 uIU/mL (09-04 @ 09:20)  Thyroid Stimulating Hormone, Serum: 82.80 uIU/mL (09-04 @ 00:22)          Assessment and plan  ---------------------------  65 y old left handed woman with PMHx of COPD, migraines, depressions, seizures and lung adenocarcinoma with R occipito-parietal mets s/p resection, stereotactic radiosurgery, and chemotherapy with recent admission to [facility] (09/18/20) for presumed Depakote toxicity presenting with lethargy, swelling in the fingers of both hands, intermittent numbness/tingling of the bilateral upper extremities, and generalized weakness. Patient states she has felt "off" since her recent discharge and since starting the new medications she was prescribed at that time. On her most recent admission, she was discharged on Vimpat 150MG BID and Depakote 500MG BID. She reports that she lives alone and she is unable to manage taking care of herself and taking her medications without help. She denies HA, LOC, seizures, tremors, tinnitus. She endorses intermittent shortness of breath.  pt well known to me from the last admission with paresthesia and swelling fingers, and sob  echo noted with lvh and diastolic dysfunction, doubt cause  of sob.  covid negative but with chest x ray abnormality  ?repeat echo with hx of pericardial effusion and low voltage qrs but no mention of pericardial effusion on the chest ct  dvt prophylaxis  will adjust meds   LP noted  eeg noted   hypothyroid is improving

## 2020-09-24 NOTE — PHYSICAL THERAPY INITIAL EVALUATION ADULT - BED MOBILITY TRAINING, PT EVAL
Goal: Pt will be able to perform all bed mobility independently within 3-4 weeks to improve functional mobility

## 2020-09-24 NOTE — OCCUPATIONAL THERAPY INITIAL EVALUATION ADULT - RANGE OF MOTION EXAMINATION, LOWER EXTREMITY
bilateral LE Active ROM was WFL  (within functional limits)/reporting trouble typing/texting 2* decrease fine motor coordination/weakness

## 2020-09-24 NOTE — DISCHARGE NOTE NURSING/CASE MANAGEMENT/SOCIAL WORK - NSDCPEPTSTRK_GEN_ALL_CORE
Need for follow up after discharge/Call 911 for stroke/Stroke support groups for patients, families, and friends/Risk factors for stroke/Prescribed medications/Stroke education booklet/Stroke warning signs and symptoms/Signs and symptoms of stroke

## 2020-09-24 NOTE — PHYSICAL THERAPY INITIAL EVALUATION ADULT - IMPAIRMENTS FOUND, PT EVAL
muscle strength/aerobic capacity/endurance/fine motor/cognitive impairment/gait, locomotion, and balance

## 2020-09-24 NOTE — PHYSICAL THERAPY INITIAL EVALUATION ADULT - GAIT TRAINING, PT EVAL
Goal: Pt will be able to amb at least 250 ft independently with no AD within 3-4 weeks to increase endurance.

## 2020-09-24 NOTE — PHYSICAL THERAPY INITIAL EVALUATION ADULT - PERTINENT HX OF CURRENT PROBLEM, REHAB EVAL
Pt is a 66 y/o old LHF with PMHx of COPD, migraines, depressions, seizures and lung adenocarcinoma with R occipito-parietal mets s/p resection, stereotactic radiosurgery, and chemotherapy with recent admission to [facility] (09/18/20) for presumed Depakote toxicity presenting with lethargy, swelling in the fingers of both hands, intermittent numbness/tingling of the bilateral upper extremities, and generalized weakness. Pt is a 66 y/o old LHF with PMHx of COPD, migraines, depression, seizures and lung adenocarcinoma with R occipito-parietal mets s/p resection, stereotactic radiosurgery, and chemotherapy with recent admission to [facility] (09/18/20) for presumed Depakote toxicity presenting with lethargy, swelling in the fingers of both hands, intermittent numbness/tingling of the bilateral upper extremities, and generalized weakness.

## 2020-09-24 NOTE — PROGRESS NOTE ADULT - ATTENDING COMMENTS
continue to improve slowly.  paraneoplestic panel pending  plan: compleat Solumedrol 1000 daily for 5 days  endocrinology eval( high TSH, likely autoimmune in etiology in this context)  SW and PT for dispo    long term plan: continue Keytruda as needed for her primary condition BUT add IVIG monthly for AIE

## 2020-09-24 NOTE — OCCUPATIONAL THERAPY INITIAL EVALUATION ADULT - DIAGNOSIS, OT EVAL
Pt presents with decreased fine motor coordination, weakness and vision impairments impacting [place] with ADLs/iADLs

## 2020-09-24 NOTE — OCCUPATIONAL THERAPY INITIAL EVALUATION ADULT - NAME OF CLINICIAN
"Subjective:       Patient ID: Alexandrea Méndez is a 23 y.o. female.    Vitals:  height is 5' 5" (1.651 m) and weight is 68 kg (150 lb). Her temperature is 98.5 °F (36.9 °C). Her blood pressure is 100/60 and her pulse is 76. Her oxygen saturation is 98%.     Chief Complaint: Ear Problem    Ms. Méndez presents for evaluation of infection to right facial (anterior to tragus) piercing.  Yesterday, she noticed a bump with pus beneath it & mild redness.  She removed the piercing.  She has not put anything on it.            Constitution: Negative for chills, sweating, fatigue and fever.   HENT: Negative for ear pain, ear discharge, congestion, postnasal drip, sinus pain, sinus pressure and sore throat.    Eyes: Negative for eye discharge, eye itching and eye redness.   Gastrointestinal: Negative for nausea, vomiting and constipation.   Genitourinary: Negative for dysuria, frequency and urgency.   Musculoskeletal: Negative for pain and trauma.   Skin: Positive for erythema and abscess. Negative for rash.       Objective:      Physical Exam   Constitutional: She is oriented to person, place, and time. She appears well-developed and well-nourished.   HENT:   Head: Normocephalic and atraumatic. Head is without abrasion, without contusion and without laceration.       Right Ear: External ear normal.   Left Ear: External ear normal.   Nose: Nose normal.   Mouth/Throat: Oropharynx is clear and moist.   0.5cm bump with underlying pus.  No surrounding erythema.  Scabbing to the lower piercing site.    Eyes: Pupils are equal, round, and reactive to light. Conjunctivae, EOM and lids are normal.   Neck: Trachea normal, full passive range of motion without pain and phonation normal. Neck supple.   Cardiovascular: Normal rate, regular rhythm and normal heart sounds.   Pulmonary/Chest: Effort normal and breath sounds normal. No stridor. No respiratory distress. She has no decreased breath sounds. She has no wheezes. She has no rhonchi. " She has no rales.   Musculoskeletal: Normal range of motion.   Neurological: She is alert and oriented to person, place, and time.   Skin: Skin is warm, dry and intact. Capillary refill takes less than 2 seconds. No abrasion, no bruising, no burn, no ecchymosis, no laceration, no lesion and no rash noted. There is erythema.   Psychiatric: She has a normal mood and affect. Her speech is normal and behavior is normal. Judgment and thought content normal. Cognition and memory are normal.   Nursing note and vitals reviewed.      Area was cleansed with alcohol & pierced with 25G needle.  Moderate amount of pus drained from the area.  Bactroban applied.   Assessment:       1. Complication of right ear piercing, initial encounter        Plan:         Complication of right ear piercing, initial encounter    Other orders  -     mupirocin (BACTROBAN) 2 % ointment; Apply topically 2 (two) times daily.  Dispense: 15 g; Refill: 0      Patient Instructions     PLEASE READ YOUR DISCHARGE INSTRUCTIONS ENTIRELY AS IT CONTAINS IMPORTANT INFORMATION.  Apply antibiotic ointment twice daily until healed.  After healed, you may start using cocoa butter or other scar cream for healing.   - Rest.    - Drink plenty of fluids.    - Tylenol or Ibuprofen as directed as needed for fever/pain.    - Follow up with your PCP or specialty clinic as directed in the next 1-2 weeks if not improved or as needed.  You can call [phone] to schedule an appointment with the appropriate provider.    - If you were prescribed antibiotics, please take them to completion.  - If you were prescribed a narcotic medication, do not drive or operate heavy equipment or machinery while taking these medications.  - If you  smoke, please stop smoking.  -You must understand that you've received an Urgent Care treatment only and that you may be released before all your medical problems are known or treated. You, the patient, will    arrange for follow up care as  instructed.  - Please return to Urgent Care or to the Emergency Department if your symptoms worsen.    Patient aware and verbalized understanding.    Wound Care  Taking proper care of your wound will help it heal. Your healthcare provider may show you how to clean and dress the wound. He or she will also explain how to tell if the wound is healing normally. If you are unsure of how to take care of the wound, be sure to clarify what dressing to use and how often you should change the bandages. Here are the basic steps.     A wound that's not healing normally may be dark in color or have white streaks.   Wash your hands  Tips for washing your hands include:  · Use liquid soap and lather for 2 minutes. Scrub between your fingers and under your nails.  · Rinse with warm water, keeping your fingers pointing down.  · Use a paper towel to dry your hands and to turn off the faucet.  Remove the used dressing  Here are suggestions for removing the dressing:  · If dressing changes cause you pain, be sure to take your pain medicine as prescribed by your healthcare provider 30 minutes before dressing changes.  · Set up your supplies.  · Put on disposable gloves if youre dressing a wound for someone else or your wound is infected.  · Loosen the tape by pulling gently toward the wound.  · Gently take off the old dressing. If the dressing is stuck to the wound, moisten it with saline (if available) or clean water.  · If you have a drain or tube in the wound, be careful not to pull on it.  · Remove the dressing 1 layer at a time and put it in a plastic bag.  · Remove your gloves.  Inspect and dress the wound  Check the wound carefully:  · Each time you change the dressing, inspect the wound carefully to be sure its healing normally by making sure your wound appears to be pink and moist, and is free of infection.    · Wash your hands again. Put on a new pair of gloves.  · Clean and dress the wound as directed by your healthcare  provider or nurse. Do not put anything in the wound that is not prescribed or directed by your healthcare provider. If you have a drain or tube, be careful not to pull on it. Make sure to secure the drain or tube as well.  · Put all supplies in a plastic bag. Seal the bag and put it in the trash.  · Be sure to wash your hands again.  Call your healthcare provider  Call your healthcare provider if you see any of the following signs of a problem:  · Bleeding that soaks the dressing  · Pink fluid weeping from the wound  · Increased drainage or drainage that is yellow, yellow-green, or foul-smelling  · Increased swelling or pain, or redness or swelling in the skin around the wound  · A change in the color of the wound, or if streaks develop in a direction away from the wound  · The area between any stitches opens up  · An increase in the size of the wound  · A fever of 100.4°F (38°C) or higher, or as directed by your healthcare provider  · Chills, increased fatigue, or a loss of appetite      Date Last Reviewed: 7/30/2015  © 4642-1271 [employer]. [address] [place] [address]. All rights reserved. This information is not intended as a substitute for professional medical care. Always follow your healthcare professional's instructions.                ANTONY Jean

## 2020-09-24 NOTE — OCCUPATIONAL THERAPY INITIAL EVALUATION ADULT - RANGE OF MOTION EXAMINATION, UPPER EXTREMITY
bilateral UE Active ROM was WFL  (within functional limits)/reporting trouble typing/texting 2* decrease fine motor coordination/weakness

## 2020-09-24 NOTE — PHYSICAL THERAPY INITIAL EVALUATION ADULT - CRITERIA FOR SKILLED THERAPEUTIC INTERVENTIONS
anticipated discharge recommendation/impairments found/therapy frequency/predicted duration of therapy intervention

## 2020-09-24 NOTE — PHYSICAL THERAPY INITIAL EVALUATION ADULT - DIAGNOSIS, PT EVAL
Decreased standing balance, decreased strength, decreased endurance, decreased fine motor coordination all impacting ability to perform ADLs and functional mobility.

## 2020-09-24 NOTE — OCCUPATIONAL THERAPY INITIAL EVALUATION ADULT - PERTINENT HX OF CURRENT PROBLEM, REHAB EVAL
65 y old left handed woman with PMHx of COPD, migraines, depressions, seizures and lung adenocarcinoma with R occipito-parietal mets s/p resection, stereotactic radiosurgery, and chemotherapy with recent admission to [facility] (09/18/20) for presumed Depakote toxicity presenting with lethargy, swelling in the fingers of both hands, intermittent numbness/tingling of the bilateral upper extremities, and generalized weakness.

## 2020-09-24 NOTE — EEG REPORT - NS EEG TEXT BOX
Starting: Day 3      9/23/2020      Time: 08:00 AM Duration: 24 hours     Daily EEG Visual Analysis    FINDINGS:  Limited evaluation for few hours during initial portion due to artifacts. The background was continuous, spontaneously variable and reactive. During wakefulness, the posterior dominant rhythm on the left side consisted of fragments of 7 Hz activity, with amplitude to 30 uV, that attenuated to eye opening.  PDR obscured on the right side by slowing.     Background Slowing:  Excess diffuse theta and polymorphic delta slowing.    Focal Slowing:   Continuous theta/delta slowing in the right posterior quadrant.    Sleep Background:  Drowsiness was characterized by fragmentation, attenuation, and slowing of the background activity.    Sleep was characterized by the presence of vertex waves, symmetric sleep spindles and K-complexes.    Other Non-Epileptiform Findings:  Breach effect in the right posterior quadrant characterized by higher amplitude, sharply contoured waveforms.    Interictal Epileptiform Activity:   Occasional right occipitotemporal  (O2>>P8) spikes.    Events:  No events or seizures recorded.    Artifacts:  Intermittent myogenic and movement artifacts were noted.    ECG:  The heart rate on single channel ECG was predominantly between 60-70BPM.      EEG Summary:  Abnormal EEG in the awake, drowsy and asleep states.  - Occasional right occipitotemporal (O2>>P8) spikes.  - Continuous theta/delta slowing in the right posterior quadrant.  - mild to moderate generalized slowing  - Breach effect in the right posterior quadrant characterized by higher amplitude, sharply contoured waveforms.    Impression/Clinical Correlate:  Potential epileptogenic focus in the right occipitotemporal region, structural and skull defect in the right posterior quadrant and mild nonspecific diffuse cerebral dysfunction.   No event or seizure captured.      Starting: Day 3      9/23/2020      Time: 08:00 AM Duration: 24 hours     Daily EEG Visual Analysis    FINDINGS:  Limited evaluation for few hours during initial portion due to artifacts. The background was continuous, spontaneously variable and reactive. During wakefulness, the posterior dominant rhythm on the left side consisted of fragments of 7 Hz activity, with amplitude to 30 uV, that attenuated to eye opening.  PDR obscured on the right side by slowing.     Background Slowing:  Excess diffuse theta and polymorphic delta slowing.    Focal Slowing:   Continuous theta/delta slowing in the right posterior quadrant.    Sleep Background:  Drowsiness was characterized by fragmentation, attenuation, and slowing of the background activity.    Sleep was characterized by the presence of vertex waves, symmetric sleep spindles and K-complexes.    Other Non-Epileptiform Findings:  Breach effect in the right posterior quadrant characterized by higher amplitude, sharply contoured waveforms.    Interictal Epileptiform Activity:   Occasional right occipitotemporal  (O2>>P8) spikes. The occurrence of right occipitotemporal spikes is improved compared to yesterday.     Events:  No events or seizures recorded.    Artifacts:  Intermittent myogenic and movement artifacts were noted.    ECG:  The heart rate on single channel ECG was predominantly between 60-70BPM.      EEG Summary:  Abnormal EEG in the awake, drowsy and asleep states.  - Occasional right occipitotemporal (O2>>P8) spikes.  - Continuous theta/delta slowing in the right posterior quadrant.  - mild to moderate generalized slowing  - Breach effect in the right posterior quadrant characterized by higher amplitude, sharply contoured waveforms.    Impression/Clinical Correlate:  Potential epileptogenic focus in the right occipitotemporal region, structural and skull defect in the right posterior quadrant and mild nonspecific diffuse cerebral dysfunction.   No event or seizure captured.   The occurrence of right occipitotemporal spikes is improved compared to yesterday.

## 2020-09-24 NOTE — OCCUPATIONAL THERAPY INITIAL EVALUATION ADULT - ADDITIONAL COMMENTS
Patient states she has felt "off" since her recent discharge and since starting the new medications she was prescribed at that time. On her most recent admission, she was discharged on Vimpat 150MG BID and Depakote 500MG BID. She reports that she lives alone and she is unable to manage taking care of herself and taking her medications without help. She denies HA, LOC, seizures, tremors, tinnitus. She endorses intermittent shortness of breath. 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 CSF protein elevation suggests AIE.  CTH (-) CT Chest: Nonspecific subtle groundglass opacities in the right upper and left lower lobe, which are new and of uncertain etiology. Recommend short interval follow-up.

## 2020-09-24 NOTE — OCCUPATIONAL THERAPY INITIAL EVALUATION ADULT - LIVES WITH, PROFILE
Pt lives with a roommate in 6th floor [address], [address] with elevator. Pt was Independent PTA and uses no AD or DME.

## 2020-09-24 NOTE — DISCHARGE NOTE NURSING/CASE MANAGEMENT/SOCIAL WORK - PATIENT PORTAL LINK FT
You can access the FollowMyHealth Patient Portal offered by [facility] by registering at the following website: [email or web address]. By joining [employer]’s FollowMyHealth portal, you will also be able to view your health information using other applications (apps) compatible with our system.

## 2020-09-24 NOTE — PHYSICAL THERAPY INITIAL EVALUATION ADULT - TRANSFER TRAINING, PT EVAL
Goal: Pt will be able to perform all transfers independently within 3-4 weeks to improve functional mobility.

## 2020-09-24 NOTE — PROGRESS NOTE ADULT - SUBJECTIVE AND OBJECTIVE BOX
Patient is a 65y old  Female who presents with a chief complaint of Possible autoimmune encephalitis (24 Sep 2020 08:20)      SUBJECTIVE / OVERNIGHT EVENTS:  No chest pain. No shortness of breath. No complaints. No events overnight. no bm for 3 days    MEDICATIONS  (STANDING):  dextrose 5%. 1000 milliLiter(s) (50 mL/Hr) IV Continuous <Continuous>  dextrose 50% Injectable 12.5 Gram(s) IV Push once  dextrose 50% Injectable 25 Gram(s) IV Push once  dextrose 50% Injectable 25 Gram(s) IV Push once  enoxaparin Injectable 40 milliGRAM(s) SubCutaneous daily  gabapentin 100 milliGRAM(s) Oral two times a day  insulin lispro (HumaLOG) corrective regimen sliding scale   SubCutaneous three times a day before meals  insulin lispro (HumaLOG) corrective regimen sliding scale   SubCutaneous at bedtime  lacosamide 150 milliGRAM(s) Oral two times a day  lacosamide 150 milliGRAM(s) Oral once  levothyroxine 100 MICROGram(s) Oral daily  methylPREDNISolone sodium succinate IVPB 1000 milliGRAM(s) IV Intermittent daily  pantoprazole    Tablet 40 milliGRAM(s) Oral before breakfast  polyethylene glycol 3350 17 Gram(s) Oral daily  senna 2 Tablet(s) Oral at bedtime  sertraline 150 milliGRAM(s) Oral daily  tiotropium 18 MICROgram(s) Capsule 1 Capsule(s) Inhalation daily  valproic acid 500 milliGRAM(s) Oral once    MEDICATIONS  (PRN):  acetaminophen   Tablet .. 650 milliGRAM(s) Oral every 6 hours PRN Temp greater or equal to 38C (100.4F), Mild Pain (1 - 3), Moderate Pain (4 - 6), Severe Pain (7 - 10)  ALBUTerol    90 MICROgram(s) HFA Inhaler 2 Puff(s) Inhalation every 6 hours PRN Shortness of Breath and/or Wheezing  dextrose 40% Gel 15 Gram(s) Oral once PRN Blood Glucose LESS THAN 70 milliGRAM(s)/deciliter  glucagon  Injectable 1 milliGRAM(s) IntraMuscular once PRN Glucose LESS THAN 70 milligrams/deciliter  lacosamide Injectable 200 milliGRAM(s) IV Push once PRN GTC or seizure activity > 3 min refractory to ativan  LORazepam   Injectable 2 milliGRAM(s) IV Push once PRN GTC or seizure activity > 3 min      Vital Signs Last 24 Hrs  T(C): 36.8 (24 Sep 2020 11:01), Max: 37.1 (23 Sep 2020 23:20)  T(F): 98.2 (24 Sep 2020 11:01), Max: 98.8 (23 Sep 2020 23:20)  HR: 68 (24 Sep 2020 11:52) (57 - 93)  BP: 133/82 (24 Sep 2020 11:52) (103/57 - 145/75)  BP(mean): --  RR: 18 (24 Sep 2020 11:01) (18 - 18)  SpO2: 94% (24 Sep 2020 11:52) (94% - 98%)  CAPILLARY BLOOD GLUCOSE      POCT Blood Glucose.: 158 mg/dL (24 Sep 2020 12:25)  POCT Blood Glucose.: 113 mg/dL (24 Sep 2020 08:41)  POCT Blood Glucose.: 120 mg/dL (23 Sep 2020 21:34)  POCT Blood Glucose.: 153 mg/dL (23 Sep 2020 17:22)    I&O's Summary    23 Sep 2020 07:01  -  24 Sep 2020 07:00  --------------------------------------------------------  IN: 840 mL / OUT: 1 mL / NET: 839 mL        PHYSICAL EXAM:  GENERAL: NAD, well-developed  HEAD:  Atraumatic, Normocephalic  EYES: EOMI, PERRLA, conjunctiva and sclera clear  NECK: Supple, No JVD  CHEST/LUNG: Clear to auscultation bilaterally; No wheeze  HEART: Regular rate and rhythm; No murmurs, rubs, or gallops  ABDOMEN: Soft, Nontender, Nondistended; Bowel sounds present  EXTREMITIES:  2+ Peripheral Pulses, No clubbing, cyanosis, or edema  PSYCH: AAOx3  NEUROLOGY: non-focal  SKIN: No rashes or lesions    LABS:                        11.4   7.46  )-----------( 277      ( 23 Sep 2020 06:21 )             35.0     09-23    143  |  105  |  22  ----------------------------<  134<H>  3.6   |  25  |  0.88    Ca    9.1      23 Sep 2020 06:21  Phos  2.8     09-23  Mg     2.0     09-23    TPro  6.5  /  Alb  3.8  /  TBili  0.2  /  DBili  <0.1  /  AST  43<H>  /  ALT  31  /  AlkPhos  60  09-23      CARDIAC MARKERS ( 23 Sep 2020 06:21 )  x     / x     / 22 U/L / x     / x              RADIOLOGY & ADDITIONAL TESTS:    Imaging Personally Reviewed:      Consultant(s) Notes Reviewed:      Care Discussed with Consultants/Other Providers:

## 2020-09-25 NOTE — DISCHARGE NOTE PROVIDER - CARE PROVIDER_API CALL
Elyssa Awad)  EEGEpilepsy; Neurology  [address] [facility], [address]  [place] [address]  Phone: [phone]  Fax: ()-  Follow Up Time: Routine    Melita Pugh  HEMATOLOGY  [address] [place] [id number]  Phone: [phone]  Fax: [phone]  Follow Up Time: Routine   Elyssa Awad)  EEGEpilepsy; Neurology  [address] [facility], [address]  [place] [address]  Phone: [phone]  Fax: ()-  Follow Up Time: Routine    Melita Pugh  HEMATOLOGY  [address] [place], [place] [id number]  Phone: [phone]  Fax: [phone]  Follow Up Time: Routine    Gus Zuniga  ENDOCRINOLOGY/METAB/DIABETES  [address] [place] [address]  Phone: [phone]  Fax: [phone]  Follow Up Time: Routine    Nabil Sharma  CARDIOVASCULAR DISEASE  [address] [facility], [address]  [place] [address]  Phone: [phone]  Fax: [phone]  Follow Up Time: Routine

## 2020-09-25 NOTE — DISCHARGE NOTE PROVIDER - PROVIDER TOKENS
PROVIDER:[TOKEN:[[id number]],FOLLOWUP:[Routine]],PROVIDER:[TOKEN:[[id number]],FOLLOWUP:[Routine]] PROVIDER:[TOKEN:[[id number]],FOLLOWUP:[Routine]],PROVIDER:[TOKEN:[[id number]],FOLLOWUP:[Routine]],PROVIDER:[TOKEN:[[id number]],FOLLOWUP:[Routine]],PROVIDER:[TOKEN:[[id number]],FOLLOWUP:[Routine]]

## 2020-09-25 NOTE — DISCHARGE NOTE PROVIDER - CARE PROVIDERS DIRECT ADDRESSES
[email or web address]

## 2020-09-25 NOTE — DISCHARGE NOTE PROVIDER - NSDCFUSCHEDAPPT_GEN_ALL_CORE_FT
DANIELLE ANTONIO ; 10/01/2020 ; [facility] Neurology [address] [facility]  DANIELLE ANTONIO ; 10/01/2020 ; [facility] Rad Cat [address]

## 2020-09-25 NOTE — DISCHARGE NOTE PROVIDER - NSDCCPCAREPLAN_GEN_ALL_CORE_FT
PRINCIPAL DISCHARGE DIAGNOSIS  Diagnosis: Weakness  Assessment and Plan of Treatment:       SECONDARY DISCHARGE DIAGNOSES  Diagnosis: Hypokalemia  Assessment and Plan of Treatment:      PRINCIPAL DISCHARGE DIAGNOSIS  Diagnosis: Autoimmune encephalitis  Assessment and Plan of Treatment: This was deemed in the setting of pembrolizumab. Please follow up with your oncologist Dr. Pugh and with Epilepsy Attending Dr. Awad for further management. You were advised not to drive for 1 year per [facility] Law for seizure risk.      SECONDARY DISCHARGE DIAGNOSES  Diagnosis: Hypothyroidism  Assessment and Plan of Treatment:     Diagnosis: History of gastroesophageal reflux (GERD)  Assessment and Plan of Treatment:     Diagnosis: Hyperlipidemia  Assessment and Plan of Treatment:     Diagnosis: Adenocarcinoma, lung  Assessment and Plan of Treatment:     Diagnosis: Depression  Assessment and Plan of Treatment:     Diagnosis: Migraine  Assessment and Plan of Treatment:     Diagnosis: COPD without exacerbation  Assessment and Plan of Treatment:

## 2020-09-25 NOTE — PROGRESS NOTE ADULT - SUBJECTIVE AND OBJECTIVE BOX
CARDIOLOGY     PROGRESS  NOTE   ________________________________________________    CHIEF COMPLAINT:Patient is a 65y old  Female who presents with a chief complaint of Possible autoimmune encephalitis (24 Sep 2020 17:40)  no complain.  	  REVIEW OF SYSTEMS:  CONSTITUTIONAL: No fever, weight loss, or fatigue  EYES: No eye pain, visual disturbances, or discharge  ENT:  No difficulty hearing, tinnitus, vertigo; No sinus or throat pain  NECK: No pain or stiffness  RESPIRATORY: No cough, wheezing, chills or hemoptysis; No Shortness of Breath  CARDIOVASCULAR: No chest pain, palpitations, passing out, dizziness, or leg swelling  GASTROINTESTINAL: No abdominal or epigastric pain. No nausea, vomiting, or hematemesis; No diarrhea or constipation. No melena or hematochezia.  GENITOURINARY: No dysuria, frequency, hematuria, or incontinence  NEUROLOGICAL: No headaches, memory loss, loss of strength, numbness, or tremors  SKIN: No itching, burning, rashes, or lesions   LYMPH Nodes: No enlarged glands  ENDOCRINE: No heat or cold intolerance; No hair loss  MUSCULOSKELETAL: No joint pain or swelling; No muscle, back, or extremity pain  PSYCHIATRIC: No depression, anxiety, mood swings, or difficulty sleeping  HEME/LYMPH: No easy bruising, or bleeding gums  ALLERGY AND IMMUNOLOGIC: No hives or eczema	    [ ] All others negative	  [ ] Unable to obtain    PHYSICAL EXAM:  T(C): 36.6 (09-25-20 @ 07:07), Max: 37 (09-24-20 @ 23:18)  HR: 60 (09-25-20 @ 07:07) (59 - 68)  BP: 130/80 (09-25-20 @ 07:07) (118/76 - 135/78)  RR: 18 (09-25-20 @ 07:07) (17 - 18)  SpO2: 95% (09-25-20 @ 07:07) (94% - 97%)  Wt(kg): --  I&O's Summary    24 Sep 2020 07:01  -  25 Sep 2020 07:00  --------------------------------------------------------  IN: 950 mL / OUT: 0 mL / NET: 950 mL        Appearance: Normal	  HEENT:   Normal oral mucosa, PERRL, EOMI	  Lymphatic: No lymphadenopathy  Cardiovascular: Normal S1 S2, No JVD,+ murmurs, No edema  Respiratory: Lungs clear to auscultation	  Psychiatry: A & O x 3, Mood & affect appropriate  Gastrointestinal:  Soft, Non-tender, + BS	  Skin: No rashes, No ecchymoses, No cyanosis	  Neurologic: Non-focal  Extremities: Normal range of motion, No clubbing, cyanosis or edema  Vascular: Peripheral pulses palpable 2+ bilaterally    MEDICATIONS  (STANDING):  ALPRAZolam 0.25 milliGRAM(s) Oral once  atorvastatin 80 milliGRAM(s) Oral at bedtime  dextrose 5%. 1000 milliLiter(s) (50 mL/Hr) IV Continuous <Continuous>  dextrose 50% Injectable 12.5 Gram(s) IV Push once  dextrose 50% Injectable 25 Gram(s) IV Push once  dextrose 50% Injectable 25 Gram(s) IV Push once  enoxaparin Injectable 40 milliGRAM(s) SubCutaneous daily  gabapentin 100 milliGRAM(s) Oral two times a day  insulin lispro (HumaLOG) corrective regimen sliding scale   SubCutaneous three times a day before meals  insulin lispro (HumaLOG) corrective regimen sliding scale   SubCutaneous at bedtime  lacosamide 150 milliGRAM(s) Oral two times a day  lacosamide 150 milliGRAM(s) Oral once  levothyroxine 100 MICROGram(s) Oral daily  methylPREDNISolone sodium succinate IVPB 1000 milliGRAM(s) IV Intermittent daily  pantoprazole    Tablet 40 milliGRAM(s) Oral before breakfast  polyethylene glycol 3350 17 Gram(s) Oral daily  senna 2 Tablet(s) Oral at bedtime  sertraline 150 milliGRAM(s) Oral daily  tiotropium 18 MICROgram(s) Capsule 1 Capsule(s) Inhalation daily  valproic acid 500 milliGRAM(s) Oral once      TELEMETRY: 	    ECG:  	  RADIOLOGY:  OTHER: 	  	  LABS:	 	    CARDIAC MARKERS:                  proBNP: Serum Pro-Brain Natriuretic Peptide: 296 pg/mL (09-23 @ 06:21)  Serum Pro-Brain Natriuretic Peptide: 220 pg/mL (09-22 @ 15:12)    Lipid Profile: Cholesterol 249    HDL 29      HgA1c:   TSH: Thyroid Stimulating Hormone, Serum: 27.00 uIU/mL (09-24 @ 08:46)  Thyroid Stimulating Hormone, Serum: 8.18 uIU/mL (09-23 @ 13:21)  Thyroid Stimulating Hormone, Serum: 29.50 uIU/mL (09-19 @ 04:47)  Thyroid Stimulating Hormone, Serum: 97.90 uIU/mL (09-04 @ 09:20)  Thyroid Stimulating Hormone, Serum: 82.80 uIU/mL (09-04 @ 00:22)          Assessment and plan  ---------------------------      	                    CARDIOLOGY     PROGRESS  NOTE   ________________________________________________    CHIEF COMPLAINT:Patient is a 65y old  Female who presents with a chief complaint of Possible autoimmune encephalitis (24 Sep 2020 17:40)  no complain.  	  REVIEW OF SYSTEMS:  CONSTITUTIONAL: No fever, weight loss, or fatigue  EYES: No eye pain, visual disturbances, or discharge  ENT:  No difficulty hearing, tinnitus, vertigo; No sinus or throat pain  NECK: No pain or stiffness  RESPIRATORY: No cough, wheezing, chills or hemoptysis; No Shortness of Breath  CARDIOVASCULAR: No chest pain, palpitations, passing out, dizziness, or leg swelling  GASTROINTESTINAL: No abdominal or epigastric pain. No nausea, vomiting, or hematemesis; No diarrhea or constipation. No melena or hematochezia.  GENITOURINARY: No dysuria, frequency, hematuria, or incontinence  NEUROLOGICAL: No headaches, memory loss, loss of strength, numbness, or tremors  SKIN: No itching, burning, rashes, or lesions   LYMPH Nodes: No enlarged glands  ENDOCRINE: No heat or cold intolerance; No hair loss  MUSCULOSKELETAL: No joint pain or swelling; No muscle, back, or extremity pain  PSYCHIATRIC: No depression, anxiety, mood swings, or difficulty sleeping  HEME/LYMPH: No easy bruising, or bleeding gums  ALLERGY AND IMMUNOLOGIC: No hives or eczema	    [ ] All others negative	  [ ] Unable to obtain    PHYSICAL EXAM:  T(C): 36.6 (09-25-20 @ 07:07), Max: 37 (09-24-20 @ 23:18)  HR: 60 (09-25-20 @ 07:07) (59 - 68)  BP: 130/80 (09-25-20 @ 07:07) (118/76 - 135/78)  RR: 18 (09-25-20 @ 07:07) (17 - 18)  SpO2: 95% (09-25-20 @ 07:07) (94% - 97%)  Wt(kg): --  I&O's Summary    24 Sep 2020 07:01  -  25 Sep 2020 07:00  --------------------------------------------------------  IN: 950 mL / OUT: 0 mL / NET: 950 mL        Appearance: Normal	  HEENT:   Normal oral mucosa, PERRL, EOMI	  Lymphatic: No lymphadenopathy  Cardiovascular: Normal S1 S2, No JVD,+ murmurs, No edema  Respiratory: Lungs clear to auscultation	  Psychiatry: A & O x 3, Mood & affect appropriate  Gastrointestinal:  Soft, Non-tender, + BS	  Skin: No rashes, No ecchymoses, No cyanosis	  Neurologic: Non-focal  Extremities: Normal range of motion, No clubbing, cyanosis or edema  Vascular: Peripheral pulses palpable 2+ bilaterally    MEDICATIONS  (STANDING):  ALPRAZolam 0.25 milliGRAM(s) Oral once  atorvastatin 80 milliGRAM(s) Oral at bedtime  dextrose 5%. 1000 milliLiter(s) (50 mL/Hr) IV Continuous <Continuous>  dextrose 50% Injectable 12.5 Gram(s) IV Push once  dextrose 50% Injectable 25 Gram(s) IV Push once  dextrose 50% Injectable 25 Gram(s) IV Push once  enoxaparin Injectable 40 milliGRAM(s) SubCutaneous daily  gabapentin 100 milliGRAM(s) Oral two times a day  insulin lispro (HumaLOG) corrective regimen sliding scale   SubCutaneous three times a day before meals  insulin lispro (HumaLOG) corrective regimen sliding scale   SubCutaneous at bedtime  lacosamide 150 milliGRAM(s) Oral two times a day  lacosamide 150 milliGRAM(s) Oral once  levothyroxine 100 MICROGram(s) Oral daily  methylPREDNISolone sodium succinate IVPB 1000 milliGRAM(s) IV Intermittent daily  pantoprazole    Tablet 40 milliGRAM(s) Oral before breakfast  polyethylene glycol 3350 17 Gram(s) Oral daily  senna 2 Tablet(s) Oral at bedtime  sertraline 150 milliGRAM(s) Oral daily  tiotropium 18 MICROgram(s) Capsule 1 Capsule(s) Inhalation daily  valproic acid 500 milliGRAM(s) Oral once      TELEMETRY: 	    ECG:  	  RADIOLOGY:  OTHER: 	  	  LABS:	 	    CARDIAC MARKERS:                  proBNP: Serum Pro-Brain Natriuretic Peptide: 296 pg/mL (09-23 @ 06:21)  Serum Pro-Brain Natriuretic Peptide: 220 pg/mL (09-22 @ 15:12)    Lipid Profile: Cholesterol 249    HDL 29      HgA1c:   TSH: Thyroid Stimulating Hormone, Serum: 27.00 uIU/mL (09-24 @ 08:46)  Thyroid Stimulating Hormone, Serum: 8.18 uIU/mL (09-23 @ 13:21)  Thyroid Stimulating Hormone, Serum: 29.50 uIU/mL (09-19 @ 04:47)  Thyroid Stimulating Hormone, Serum: 97.90 uIU/mL (09-04 @ 09:20)  Thyroid Stimulating Hormone, Serum: 82.80 uIU/mL (09-04 @ 00:22)          Assessment and plan  ---------------------------  65 y old left handed woman with PMHx of COPD, migraines, depressions, seizures and lung adenocarcinoma with R occipito-parietal mets s/p resection, stereotactic radiosurgery, and chemotherapy with recent admission to [facility] (09/18/20) for presumed Depakote toxicity presenting with lethargy, swelling in the fingers of both hands, intermittent numbness/tingling of the bilateral upper extremities, and generalized weakness. Patient states she has felt "off" since her recent discharge and since starting the new medications she was prescribed at that time. On her most recent admission, she was discharged on Vimpat 150MG BID and Depakote 500MG BID. She reports that she lives alone and she is unable to manage taking care of herself and taking her medications without help. She denies HA, LOC, seizures, tremors, tinnitus. She endorses intermittent shortness of breath.  pt well known to me from the last admission with paresthesia and swelling fingers, and sob  echo noted with lvh and diastolic dysfunction, doubt cause  of sob.  covid negative but with chest x ray abnormality  ?repeat echo with hx of pericardial effusion and low voltage qrs but no mention of pericardial effusion on the chest ct  dvt prophylaxis  will adjust meds   LP noted  eeg noted   hypothyroid is improving  continue steroid as per neuro

## 2020-09-25 NOTE — EEG REPORT - NS EEG TEXT BOX
Starting: Day 5      9/25/2020      Time: 08:00 AM Duration: 24 hours     Daily EEG Visual Analysis    FINDINGS:  Limited evaluation for few hours during initial portion due to artifacts. The background was continuous, spontaneously variable and reactive. During wakefulness, the posterior dominant rhythm on the left side consisted of fragments of 7 Hz activity, with amplitude to 30 uV, that attenuated to eye opening.  PDR obscured on the right side by slowing.     Background Slowing:  Excess diffuse theta and polymorphic delta slowing.    Focal Slowing:   Continuous theta/delta slowing in the right posterior quadrant.    Sleep Background:  Drowsiness was characterized by fragmentation, attenuation, and slowing of the background activity.    Sleep was characterized by the presence of vertex waves, symmetric sleep spindles and K-complexes.    Other Non-Epileptiform Findings:  Breach effect in the right posterior quadrant characterized by higher amplitude, sharply contoured waveforms.    Interictal Epileptiform Activity:   Occasional right occipitotemporal  (O2>>P8) spikes.    Events:  No events or seizures recorded.    Artifacts:  Intermittent myogenic and movement artifacts were noted.    ECG:  The heart rate on single channel ECG was predominantly between 60-70BPM.    AEDS  Vimpat 150 q12  Depakene DC’d    EEG Summary:  Abnormal EEG in the awake, drowsy and asleep states.  - Occasional right occipitotemporal (O2>>P8) spikes.  - Continuous theta/delta slowing in the right posterior quadrant.  - mild to moderate generalized slowing  - Breach effect in the right posterior quadrant characterized by higher amplitude, sharply contoured waveforms.    Impression/Clinical Correlate:  Potential epileptogenic focus in the right occipitotemporal region, structural and skull defect in the right posterior quadrant and mild nonspecific diffuse cerebral dysfunction. No event or seizure captured.     Gamaliel Sánchez MD

## 2020-09-25 NOTE — DISCHARGE NOTE PROVIDER - NSDCMRMEDTOKEN_GEN_ALL_CORE_FT
albuterol 90 mcg/inh inhalation aerosol: 2 puff(s) inhaled every 6 hours, As needed, Shortness of Breath and/or Wheezing  ALPRAZolam 0.5 mg oral tablet: 1 tab(s) orally once a day, As Needed  Imitrex 100 mg oral tablet: 1 tab(s) orally once, As Needed  lacosamide 150 mg oral tablet: 1 tab(s) orally 2 times a day  levothyroxine 100 mcg (0.1 mg) oral tablet: 1 tab(s) orally once a day  pantoprazole 40 mg oral delayed release tablet: 1 tab(s) orally once a day (before a meal)  sertraline 50 mg oral tablet: 3 tab(s) orally once a day  tiotropium 18 mcg inhalation capsule: 1 cap(s) inhaled once a day  valproic acid 250 mg oral capsule: 2 cap(s) orally 2 times a day   albuterol 90 mcg/inh inhalation aerosol: 2 puff(s) inhaled every 6 hours, As needed, Shortness of Breath and/or Wheezing  ALPRAZolam 0.5 mg oral tablet: 1 tab(s) orally once a day, As Needed  atorvastatin 80 mg oral tablet: 1 tab(s) orally once a day (at bedtime)  gabapentin 100 mg oral capsule: 1 cap(s) orally 2 times a day  Imitrex 100 mg oral tablet: 1 tab(s) orally once, As Needed  lacosamide 150 mg oral tablet: 1 tab(s) orally 2 times a day  levothyroxine 100 mcg (0.1 mg) oral tablet: 1 tab(s) orally once a day  pantoprazole 40 mg oral delayed release tablet: 1 tab(s) orally once a day (before a meal)  sertraline 50 mg oral tablet: 3 tab(s) orally once a day  tiotropium 18 mcg inhalation capsule: 1 cap(s) inhaled once a day

## 2020-09-25 NOTE — DISCHARGE NOTE PROVIDER - HOSPITAL COURSE
65 y old left handed woman with PMHx of COPD, migraines, depressions, seizures and lung adenocarcinoma with R occipito-parietal mets s/p resection, stereotactic radiosurgery, and chemotherapy with recent admission to [facility] (20) for presumed Depakote toxicity presenting with lethargy, swelling in the fingers of both hands, intermittent numbness/tingling of the bilateral upper extremities, and generalized weakness. Patient states she has felt "off" since her recent discharge and since starting the new medications she was prescribed at that time. On her most recent admission, she was discharged on Vimpat 150MG BID and Depakote 500MG BID. She reports that she lives alone and she is unable to manage taking care of herself and taking her medications without help. She denies HA, LOC, seizures, tremors, tinnitus. She endorses intermittent shortness of breath.    Presumed Seizure Semiology:  Visuospatial disturbances: macropsia, micropsia, disorientation, anxiety/panic that originate from R parietal lobe, likely the TPO carrour    Cancer history:   2016 CARLOTA adenocarcinoma  3/2016 CARLOTA lobectomy  2016 Completed adjuvant chemotherapy: 4 cycles of cisplatin and pemetrexed  3/14/2020 Headache and eye pain, MRI brain 2.2 x 1.6 x 2.5 cm enhancing lesion of R parietal cortical & subcortical region  3/15/2020 R occipital craniectomy Dr. Servin  - pathology c/w metastatic adenocarcinoma from lung which was PD-L1 30%+, BRCA 2+ (germline vs somatic unknown), KRAS G12C+.   2020 stereotactic radiosurgery Dr. Pennington  2020 Pembrolizumab - completed 2020    Hospital course includes EEG monitorin/25:  EEG Summary:  Abnormal EEG in the awake, drowsy and asleep states.  - Occasional right occipitotemporal (O2>>P8) spikes.  - Continuous theta/delta slowing in the right posterior quadrant.  - mild to moderate generalized slowing  - Breach effect in the right posterior quadrant characterized by higher amplitude, sharply contoured waveforms.    Impression/Clinical Correlate:  Potential epileptogenic focus in the right occipitotemporal region, structural and skull defect in the right posterior quadrant and mild nonspecific diffuse cerebral dysfunction. No event or seizure captured.     :  EEG Summary:  Abnormal EEG in the awake, drowsy and asleep states.  - Occasional right occipitotemporal (O2>>P8) spikes.  - Continuous theta/delta slowing in the right posterior quadrant.  - mild to moderate generalized slowing  - Breach effect in the right posterior quadrant characterized by higher amplitude, sharply contoured waveforms.    Impression/Clinical Correlate:  Potential epileptogenic focus in the right occipitotemporal region, structural and skull defect in the right posterior quadrant and mild nonspecific diffuse cerebral dysfunction.   No event or seizure captured.   The occurrence of right occipitotemporal spikes is improved compared to yesterday.     :  EEG Summary:  Abnormal EEG in the awake, drowsy and asleep states.  - Abundant right occipitotemporal (O2>>P8) spikes.  - Continuous theta/delta slowing in the right posterior quadrant.  - mild to moderate generalized slowing  - Breach effect in the right posterior quadrant characterized by higher amplitude, sharply contoured waveforms.    Impression/Clinical Correlate:  Potential epileptogenic focus in the right occipitotemporal region, structural and skull defect in the right posterior quadrant and mild nonspecific diffuse cerebral dysfunction. No event or seizure captured.     :  EEG Summary:  Abnormal EEG in the awake, drowsy and asleep states.  - Abundant right occipitotemporal  (O2>>P8) spikes.  - Continuous theta/delta slowing in the right posterior quadrant.  - Mild to moderate generalized slowing  - Breach effect in the right posterior quadrant characterized by higher amplitude, sharply contoured waveforms.    Impression/Clinical Correlate:  Potential epileptogenic focus in the right occipitotemporal region, structural and skull defect in the right posterior quadrant and mild nonspecific diffuse cerebral dysfunction. No event or seizure    :  EEG Summary:  Abnormal EEG in the awake, drowsy and asleep states.  - Abundant right occipitotemporal  (O2>>P8) spikes.  - Continuous theta/delta slowing in the right posterior quadrant.  - Mild to moderate generalized slowing  - Breach effect in the right posterior quadrant characterized by higher amplitude, sharply contoured waveforms.    Impression/Clinical Correlate:  Potential epileptogenic focus in the right occipitotemporal region, structural and skull defect in the right posterior quadrant and mild nonspecific diffuse cerebral dysfunction. No event or seizure captured.    Hospital course: 65 y old left handed woman with PMHx of COPD, migraines, depressions, seizures and lung adenocarcinoma with R occipito-parietal mets s/p resection, stereotactic radiosurgery, and chemotherapy with recent admission to [facility] (20) for presumed Depakote toxicity presenting with lethargy, swelling in the fingers of both hands, intermittent numbness/tingling of the bilateral upper extremities, and generalized weakness. Patient states she has felt "off" since her recent discharge and since starting the new medications she was prescribed at that time. On her most recent admission, she was discharged on Vimpat 150MG BID and Depakote 500MG BID. She reports that she lives alone and she is unable to manage taking care of herself and taking her medications without help. She denies HA, LOC, seizures, tremors, tinnitus. She endorses intermittent shortness of breath.    Presumed Seizure Semiology:  Visuospatial disturbances: macropsia, micropsia, disorientation, anxiety/panic that originate from R parietal lobe, likely the TPO carrour    Cancer history:   2016 CARLOTA adenocarcinoma  3/2016 CARLOTA lobectomy  2016 Completed adjuvant chemotherapy: 4 cycles of cisplatin and pemetrexed  3/14/2020 Headache and eye pain, MRI brain 2.2 x 1.6 x 2.5 cm enhancing lesion of R parietal cortical & subcortical region  3/15/2020 R occipital craniectomy Dr. Servin  - pathology c/w metastatic adenocarcinoma from lung which was PD-L1 30%+, BRCA 2+ (germline vs somatic unknown), KRAS G12C+.   2020 stereotactic radiosurgery Dr. Pennington  2020 Pembrolizumab - completed 2020    Hospital course includes EEG monitorin/25:  EEG Summary:  Abnormal EEG in the awake, drowsy and asleep states.  - Occasional right occipitotemporal (O2>>P8) spikes.  - Continuous theta/delta slowing in the right posterior quadrant.  - mild to moderate generalized slowing  - Breach effect in the right posterior quadrant characterized by higher amplitude, sharply contoured waveforms.    Impression/Clinical Correlate:  Potential epileptogenic focus in the right occipitotemporal region, structural and skull defect in the right posterior quadrant and mild nonspecific diffuse cerebral dysfunction. No event or seizure captured.     :  EEG Summary:  Abnormal EEG in the awake, drowsy and asleep states.  - Occasional right occipitotemporal (O2>>P8) spikes.  - Continuous theta/delta slowing in the right posterior quadrant.  - mild to moderate generalized slowing  - Breach effect in the right posterior quadrant characterized by higher amplitude, sharply contoured waveforms.    Impression/Clinical Correlate:  Potential epileptogenic focus in the right occipitotemporal region, structural and skull defect in the right posterior quadrant and mild nonspecific diffuse cerebral dysfunction.   No event or seizure captured.   The occurrence of right occipitotemporal spikes is improved compared to yesterday.     :  EEG Summary:  Abnormal EEG in the awake, drowsy and asleep states.  - Abundant right occipitotemporal (O2>>P8) spikes.  - Continuous theta/delta slowing in the right posterior quadrant.  - mild to moderate generalized slowing  - Breach effect in the right posterior quadrant characterized by higher amplitude, sharply contoured waveforms.    Impression/Clinical Correlate:  Potential epileptogenic focus in the right occipitotemporal region, structural and skull defect in the right posterior quadrant and mild nonspecific diffuse cerebral dysfunction. No event or seizure captured.     :  EEG Summary:  Abnormal EEG in the awake, drowsy and asleep states.  - Abundant right occipitotemporal  (O2>>P8) spikes.  - Continuous theta/delta slowing in the right posterior quadrant.  - Mild to moderate generalized slowing  - Breach effect in the right posterior quadrant characterized by higher amplitude, sharply contoured waveforms.    Impression/Clinical Correlate:  Potential epileptogenic focus in the right occipitotemporal region, structural and skull defect in the right posterior quadrant and mild nonspecific diffuse cerebral dysfunction. No event or seizure    :  EEG Summary:  Abnormal EEG in the awake, drowsy and asleep states.  - Abundant right occipitotemporal  (O2>>P8) spikes.  - Continuous theta/delta slowing in the right posterior quadrant.  - Mild to moderate generalized slowing  - Breach effect in the right posterior quadrant characterized by higher amplitude, sharply contoured waveforms.    Impression/Clinical Correlate:  Potential epileptogenic focus in the right occipitotemporal region, structural and skull defect in the right posterior quadrant and mild nonspecific diffuse cerebral dysfunction. No event or seizure captured.      patient underwent EEG monitoring demonstrating epileptogenic focus in right occipitotemporal region, no seizures were captured.     < from: MR Head w/wo IV Cont (20 @ 18:50) > - patient was recently admitted and MRI was obtained at that time    Right occipital craniotomy changes are again noted.    Evolving postoperative changes are again visualized status post resection of right occipital lobe mass. Areas of increased T1 signal involve the surgical bed on the precontrast series, partially limiting evaluation for enhancement. No nodular areas of enhancement are appreciated in or about the surgical cavity with the limitations of this study. Patchy and linear nonnodular areas of enhancement are noted at the surgical cavity which appear stable compared to the 2020 exam. No new areas of abnormal enhancement are present.    Scattered small foci of nonenhancing increased T2 and FLAIR signal are again noted in the white matter, stable, most likely reflecting chronic microvascular ischemic changes given the patient's age. Chronic white matter changes are also again noted within the fritz.    There is no evidence for acute infarct, acute hemorrhage, or hydrocephalus. Mild cerebral volume loss is noted.    IMPRESSION:    No gross evidence for recurrent tumor in or about the right occipital surgical cavity.    No acute intracranial abnormality is noted.       65 y old left handed woman with PMHx of COPD, migraines, depressions, seizures and lung adenocarcinoma with R occipito-parietal mets s/p resection, stereotactic radiosurgery, and chemotherapy with recent admission to [facility] (20) for presumed Depakote toxicity presenting with lethargy, swelling in the fingers of both hands, intermittent numbness/tingling of the bilateral upper extremities, and generalized weakness. Patient states she has felt "off" since her recent discharge and since starting the new medications she was prescribed at that time. On her most recent admission, she was discharged on Vimpat 150MG BID and Depakote 500MG BID. She reports that she lives alone and she is unable to manage taking care of herself and taking her medications without help. She denies HA, LOC, seizures, tremors, tinnitus. She endorses intermittent shortness of breath.    Presumed Seizure Semiology:  Visuospatial disturbances: macropsia, micropsia, disorientation, anxiety/panic that originate from R parietal lobe, likely the TPO carrefour    Cancer history:   2016 CARLOTA adenocarcinoma  3/2016 CARLOTA lobectomy  2016 Completed adjuvant chemotherapy: 4 cycles of cisplatin and pemetrexed  3/14/2020 Headache and eye pain, MRI brain 2.2 x 1.6 x 2.5 cm enhancing lesion of R parietal cortical & subcortical region  3/15/2020 R occipital craniectomy Dr. Servin  - pathology c/w metastatic adenocarcinoma from lung which was PD-L1 30%+, BRCA 2+ (germline vs somatic unknown), KRAS G12C+.   2020 stereotactic radiosurgery Dr. Pnenington  2020 Pembrolizumab - completed 2020    Hospital course includes EEG monitorin/25:    Impression/Clinical Correlate:  Potential epileptogenic focus in the right occipitotemporal region, structural and skull defect in the right posterior quadrant and mild nonspecific diffuse cerebral dysfunction. No event or seizure captured.    < from: MR Head w/wo IV Cont (20 @ 18:50) > - patient was recently admitted and MRI was obtained at that time    Right occipital craniotomy changes are again noted.    Evolving postoperative changes are again visualized status post resection of right occipital lobe mass. Areas of increased T1 signal involve the surgical bed on the precontrast series, partially limiting evaluation for enhancement. No nodular areas of enhancement are appreciated in or about the surgical cavity with the limitations of this study. Patchy and linear nonnodular areas of enhancement are noted at the surgical cavity which appear stable compared to the 2020 exam. No new areas of abnormal enhancement are present.    Scattered small foci of nonenhancing increased T2 and FLAIR signal are again noted in the white matter, stable, most likely reflecting chronic microvascular ischemic changes given the patient's age. Chronic white matter changes are also again noted within the fritz.    There is no evidence for acute infarct, acute hemorrhage, or hydrocephalus. Mild cerebral volume loss is noted.    IMPRESSION:    No gross evidence for recurrent tumor in or about the right occipital surgical cavity.    No acute intracranial abnormality is noted.    patient underwent EEG monitoring demonstrating epileptogenic focus in right occipitotemporal region, no seizures were captured. underwent 5 day course of IV methylprednisolone 1g/day with GI ppx. she will be discharged without steroid taper. patient to be continued on vimpat 150mg bid. given elevated TSH for concern for hypothyroidism likely in setting of use of pembrolizumab, endocrine consulted, who recommended continuing patient on levothyroxine 100mcg daily, which during hospital course appropriately lowered TSH. Patient's home depakote discontinued. Per Medicine recommendations, patient started on atorvastatin for elevated LDL. Patient's oncologist Dr. Pugh was contacted and advised that patient likely does not need to continue on pembrolizumab. patient advised that despite not having formal diagnosis of epilepsy, patient would be at high risk for having seizures and thus was advised not to be driving for 1 year per [facility] Law.     patient deemed neurologically stable for discharge to home with services.

## 2020-09-25 NOTE — PROGRESS NOTE ADULT - SUBJECTIVE AND OBJECTIVE BOX
Patient is a 65y old  Female who presents with a chief complaint of Possible autoimmune encephalitis (25 Sep 2020 10:27)      SUBJECTIVE / OVERNIGHT EVENTS:  No chest pain. No shortness of breath. No complaints. No events overnight.     MEDICATIONS  (STANDING):  ALPRAZolam 0.25 milliGRAM(s) Oral once  atorvastatin 80 milliGRAM(s) Oral at bedtime  dextrose 5%. 1000 milliLiter(s) (50 mL/Hr) IV Continuous <Continuous>  dextrose 50% Injectable 12.5 Gram(s) IV Push once  dextrose 50% Injectable 25 Gram(s) IV Push once  dextrose 50% Injectable 25 Gram(s) IV Push once  enoxaparin Injectable 40 milliGRAM(s) SubCutaneous daily  gabapentin 100 milliGRAM(s) Oral two times a day  insulin lispro (HumaLOG) corrective regimen sliding scale   SubCutaneous three times a day before meals  insulin lispro (HumaLOG) corrective regimen sliding scale   SubCutaneous at bedtime  lacosamide 150 milliGRAM(s) Oral two times a day  lacosamide 150 milliGRAM(s) Oral once  levothyroxine 100 MICROGram(s) Oral daily  methylPREDNISolone sodium succinate IVPB 1000 milliGRAM(s) IV Intermittent daily  pantoprazole    Tablet 40 milliGRAM(s) Oral before breakfast  polyethylene glycol 3350 17 Gram(s) Oral daily  senna 2 Tablet(s) Oral at bedtime  sertraline 150 milliGRAM(s) Oral daily  tiotropium 18 MICROgram(s) Capsule 1 Capsule(s) Inhalation daily  valproic acid 500 milliGRAM(s) Oral once    MEDICATIONS  (PRN):  acetaminophen   Tablet .. 650 milliGRAM(s) Oral every 6 hours PRN Temp greater or equal to 38C (100.4F), Mild Pain (1 - 3), Moderate Pain (4 - 6), Severe Pain (7 - 10)  ALBUTerol    90 MICROgram(s) HFA Inhaler 2 Puff(s) Inhalation every 6 hours PRN Shortness of Breath and/or Wheezing  dextrose 40% Gel 15 Gram(s) Oral once PRN Blood Glucose LESS THAN 70 milliGRAM(s)/deciliter  glucagon  Injectable 1 milliGRAM(s) IntraMuscular once PRN Glucose LESS THAN 70 milligrams/deciliter  lacosamide Injectable 200 milliGRAM(s) IV Push once PRN GTC or seizure activity > 3 min refractory to ativan  LORazepam   Injectable 2 milliGRAM(s) IV Push once PRN GTC or seizure activity > 3 min      Vital Signs Last 24 Hrs  T(C): 36.8 (25 Sep 2020 11:05), Max: 37 (24 Sep 2020 23:18)  T(F): 98.2 (25 Sep 2020 11:05), Max: 98.6 (24 Sep 2020 23:18)  HR: 68 (25 Sep 2020 11:05) (59 - 68)  BP: 127/73 (25 Sep 2020 11:05) (118/76 - 135/78)  BP(mean): --  RR: 18 (25 Sep 2020 11:05) (17 - 18)  SpO2: 96% (25 Sep 2020 11:05) (95% - 97%)  CAPILLARY BLOOD GLUCOSE      POCT Blood Glucose.: 175 mg/dL (25 Sep 2020 12:16)  POCT Blood Glucose.: 90 mg/dL (25 Sep 2020 08:10)  POCT Blood Glucose.: 95 mg/dL (24 Sep 2020 21:31)  POCT Blood Glucose.: 134 mg/dL (24 Sep 2020 17:06)    I&O's Summary    24 Sep 2020 07:01  -  25 Sep 2020 07:00  --------------------------------------------------------  IN: 950 mL / OUT: 0 mL / NET: 950 mL        PHYSICAL EXAM:  GENERAL: NAD, well-developed  HEAD:  Atraumatic, Normocephalic  EYES: EOMI, PERRLA, conjunctiva and sclera clear  NECK: Supple, No JVD  CHEST/LUNG: Clear to auscultation bilaterally; No wheeze  HEART: Regular rate and rhythm; No murmurs, rubs, or gallops  ABDOMEN: Soft, Nontender, Nondistended; Bowel sounds present  EXTREMITIES:  2+ Peripheral Pulses, No clubbing, cyanosis, or edema  PSYCH: AAOx3  NEUROLOGY: non-focal  SKIN: No rashes or lesions    LABS:                    RADIOLOGY & ADDITIONAL TESTS:    Imaging Personally Reviewed:    < from: Transthoracic Echocardiogram (03.15.20 @ 07:35) >  Fractional short: 37 %  EF (B[place]ne Rule): 72 %  ------------------------------------------------------------------------  Observations:  Mitral Valve: Normal mitral valve. Minimal mitral  regurgitation.  Aortic Valve/Aorta: Normal trileaflet aortic valve.  Normal aortic root size. (Ao: 3.7 cm at the sinuses of  Valsalva).  Left Atrium: Normal left atrium.  LA volume index = 26  cc/m2.  Left Ventricle: Normal left ventricular systolic function.  No segmental wall motion abnormalities. Normal left  ventricular internal dimensions and wall thicknesses.  Right Heart: Normal right atrium. Normal right ventricular  size and function. Normal tricuspid valve. Minimal  tricuspid regurgitation. Normal pulmonic valve.  Pericardium/Pleura: Normal pericardium with no pericardial  effusion.  Hemodynamic: Estimated right atrial pressure is 8 mm Hg.  ------------------------------------------------------------------------  Conclusions:  1. Normal mitral valve. Minimal mitral regurgitation.  2. Normal trileaflet aortic valve.  3. Normal left ventricular internal dimensions and wall  thicknesses.  4. Normal left ventricular systolicfunction. No segmental  wall motion abnormalities.  5. Normal right ventricular size and function.  ------------------------------------------------------------------------    < end of copied text >    Consultant(s) Notes Reviewed:      Care Discussed with Consultants/Other Providers:

## 2020-09-25 NOTE — PROGRESS NOTE ADULT - SUBJECTIVE AND OBJECTIVE BOX
Interval History: 66 yo female seen and examined at bedside. A&Ox3, much more alert and engaged today. Less confused. On day 4/5 of Solumedrol IV. To be discharged home tomorrow. No seizures captured on EEG. Denies HA, numbness, seizures, SOB, chest pain.    PAST MEDICAL & SURGICAL HISTORY:  History of antineoplastic chemotherapy    Metastatic adenocarcinoma to brain    Iron deficiency anemia  underwent endoscopy and colonoscopy, reportedly fine    Lung cancer    Migraine    COPD (chronic obstructive pulmonary disease)    History of craniotomy    S/P lobectomy of lung    Benign tumor  left     S/P       FAMILY HISTORY:  FH: kidney disease  mother     FH: diabetes mellitus  father     Social Hx:  Smoker, no drug or alcohol use    Home Medications:  albuterol 90 mcg/inh inhalation aerosol: 2 puff(s) inhaled every 6 hours, As needed, Shortness of Breath and/or Wheezing (04 Sep 2020 06:05)  ALPRAZolam 0.5 mg oral tablet: 1 tab(s) orally once a day, As Needed (04 Sep 2020 06:05)  Imitrex 100 mg oral tablet: 1 tab(s) orally once, As Needed (04 Sep 2020 06:05)  lacosamide 150 mg oral tablet: 1 tab(s) orally 2 times a day (19 Sep 2020 17:56)  sertraline 50 mg oral tablet: 3 tab(s) orally once a day (04 Sep 2020 06:05)  tiotropium 18 mcg inhalation capsule: 1 cap(s) inhaled once a day (04 Sep 2020 06:05)  valproic acid 250 mg oral capsule: 2 cap(s) orally 2 times a day (19 Sep 2020 15:55)    MEDICATIONS  (STANDING):  ALPRAZolam 0.25 milliGRAM(s) Oral once  atorvastatin 80 milliGRAM(s) Oral at bedtime  dextrose 5%. 1000 milliLiter(s) (50 mL/Hr) IV Continuous <Continuous>  dextrose 50% Injectable 12.5 Gram(s) IV Push once  dextrose 50% Injectable 25 Gram(s) IV Push once  dextrose 50% Injectable 25 Gram(s) IV Push once  enoxaparin Injectable 40 milliGRAM(s) SubCutaneous daily  gabapentin 100 milliGRAM(s) Oral two times a day  insulin lispro (HumaLOG) corrective regimen sliding scale   SubCutaneous three times a day before meals  insulin lispro (HumaLOG) corrective regimen sliding scale   SubCutaneous at bedtime  lacosamide 150 milliGRAM(s) Oral two times a day  lacosamide 150 milliGRAM(s) Oral once  levothyroxine 100 MICROGram(s) Oral daily  methylPREDNISolone sodium succinate IVPB 1000 milliGRAM(s) IV Intermittent daily  pantoprazole    Tablet 40 milliGRAM(s) Oral before breakfast  polyethylene glycol 3350 17 Gram(s) Oral daily  senna 2 Tablet(s) Oral at bedtime  sertraline 150 milliGRAM(s) Oral daily  tiotropium 18 MICROgram(s) Capsule 1 Capsule(s) Inhalation daily  valproic acid 500 milliGRAM(s) Oral once    MEDICATIONS  (PRN):  acetaminophen   Tablet .. 650 milliGRAM(s) Oral every 6 hours PRN Temp greater or equal to 38C (100.4F), Mild Pain (1 - 3), Moderate Pain (4 - 6), Severe Pain (7 - 10)  ALBUTerol    90 MICROgram(s) HFA Inhaler 2 Puff(s) Inhalation every 6 hours PRN Shortness of Breath and/or Wheezing  dextrose 40% Gel 15 Gram(s) Oral once PRN Blood Glucose LESS THAN 70 milliGRAM(s)/deciliter  glucagon  Injectable 1 milliGRAM(s) IntraMuscular once PRN Glucose LESS THAN 70 milligrams/deciliter  lacosamide Injectable 200 milliGRAM(s) IV Push once PRN GTC or seizure activity > 3 min refractory to ativan  LORazepam   Injectable 2 milliGRAM(s) IV Push once PRN GTC or seizure activity > 3 min    Allergies  Fish Products (Rash)  No Known Drug Allergies	    ROS: See above.    Vital Signs Last 24 Hrs  T(C): 36.8 (25 Sep 2020 11:05), Max: 37 (24 Sep 2020 23:18)  T(F): 98.2 (25 Sep 2020 11:05), Max: 98.6 (24 Sep 2020 23:18)  HR: 68 (25 Sep 2020 11:05) (59 - 68)  BP: 127/73 (25 Sep 2020 11:05) (119/76 - 135/78)  RR: 18 (25 Sep 2020 11:05) (18 - 18)  SpO2: 96% (25 Sep 2020 11:05) (95% - 97%)    EXAM:  Mental Status: Alert. Oriented to person, place, situation, time.  Follows all commands.   Language: Speech is clear, fluent.   Cranial Nerves: EOMI, no nystagmus. PERRL. V1-3 intact to light touch. Face symmetric.  Motor: 5/5 strength throughout.   Sensation: Symmetric B/L preserved sensation to light touch.   Coordination: No dysmetria on finger-to-nose bilaterally.  Gait: Deferred.    [id number] Chol 249<H> <H> HDL 29<L> Trig 245<H>    Radiology:  - CT H: No acute intracranial hemorrhage. Redemonstration of a right-sided parietal craniotomy and similar-appearing subjacent surgical cavity.  - EEG Report: Potential epileptogenic focus in the right occipitotemporal region, structural and skull defect in the right posterior quadrant and mild nonspecific diffuse cerebral dysfunction. No event or seizure  - EEG Report: Potential epileptogenic focus in the right occipitotemporal region, structural and skull defect in the right posterior quadrant and mild nonspecific diffuse cerebral dysfunction. No event or seizure captured.   - EEG: Potential epileptogenic focus in the right occipitotemporal region, structural and skull defect in the right posterior quadrant and mild nonspecific diffuse cerebral dysfunction. No event or seizure captured. The occurrence of right occipitotemporal spikes is improved compared to yesterday.  - EEG: Potential epileptogenic focus in the right occipitotemporal region, structural and skull defect in the right posterior quadrant and mild nonspecific diffuse cerebral dysfunction. No event or seizure captured.

## 2020-09-26 NOTE — EEG REPORT - NS EEG TEXT BOX
Starting: Day 5      9/25/2020      Time: 08:00 AM Duration: 24 hours     Daily EEG Visual Analysis    FINDINGS:  Limited evaluation for few hours during initial portion due to artifacts. The background was continuous, spontaneously variable and reactive. During wakefulness, the posterior dominant rhythm on the left side consisted of fragments of 7 Hz activity, with amplitude to 30 uV, that attenuated to eye opening.  PDR obscured on the right side by slowing.     Background Slowing:  Excess diffuse theta and polymorphic delta slowing.    Focal Slowing:   Continuous theta/delta slowing in the right posterior quadrant.    Sleep Background:  Drowsiness was characterized by fragmentation, attenuation, and slowing of the background activity.    Sleep was characterized by the presence of vertex waves, symmetric sleep spindles and K-complexes.    Other Non-Epileptiform Findings:  Breach effect in the right posterior quadrant characterized by higher amplitude, sharply contoured waveforms.    Interictal Epileptiform Activity:   Occasional right occipitotemporal  (O2>>P8) spikes.    Events:  No events or seizures recorded.    Artifacts:  Intermittent myogenic and movement artifacts were noted.    ECG:  The heart rate on single channel ECG was predominantly between 60-70BPM.    AEDS  Vimpat 150 q12  Depakene DC’d    EEG Summary:  Abnormal EEG in the awake, drowsy and asleep states.  - Occasional right occipitotemporal (O2>>P8) spikes.  - Continuous theta/delta slowing in the right posterior quadrant.  - mild to moderate generalized slowing  - Breach effect in the right posterior quadrant characterized by higher amplitude, sharply contoured waveforms.    Impression/Clinical Correlate:  Potential epileptogenic focus in the right occipitotemporal region, structural and skull defect in the right posterior quadrant and mild nonspecific diffuse cerebral dysfunction. No event or seizure captured.       Bennett Meek MD  EEG/Epilepsy Attending

## 2020-09-26 NOTE — PROGRESS NOTE ADULT - SUBJECTIVE AND OBJECTIVE BOX
Interval History: 66 yo female seen and examined at bedside. A&Ox3, much more alert and engaged today. Less confused. On day 4/5 of Solumedrol IV. To be discharged home tomorrow. No seizures captured on EEG. Denies HA, numbness, seizures, SOB, chest pain.    PAST MEDICAL & SURGICAL HISTORY:  History of antineoplastic chemotherapy    Metastatic adenocarcinoma to brain    Iron deficiency anemia  underwent endoscopy and colonoscopy, reportedly fine    Lung cancer    Migraine    COPD (chronic obstructive pulmonary disease)    History of craniotomy    S/P lobectomy of lung    Benign tumor  left     S/P       FAMILY HISTORY:  FH: kidney disease  mother     FH: diabetes mellitus  father     Social Hx:  Smoker, no drug or alcohol use    Home Medications:  albuterol 90 mcg/inh inhalation aerosol: 2 puff(s) inhaled every 6 hours, As needed, Shortness of Breath and/or Wheezing (04 Sep 2020 06:05)  ALPRAZolam 0.5 mg oral tablet: 1 tab(s) orally once a day, As Needed (04 Sep 2020 06:05)  Imitrex 100 mg oral tablet: 1 tab(s) orally once, As Needed (04 Sep 2020 06:05)  lacosamide 150 mg oral tablet: 1 tab(s) orally 2 times a day (19 Sep 2020 17:56)  sertraline 50 mg oral tablet: 3 tab(s) orally once a day (04 Sep 2020 06:05)  tiotropium 18 mcg inhalation capsule: 1 cap(s) inhaled once a day (04 Sep 2020 06:05)  valproic acid 250 mg oral capsule: 2 cap(s) orally 2 times a day (19 Sep 2020 15:55)    Allergies  Fish Products (Rash)  No Known Drug Allergies	    ROS: See above.    EXAM:  Mental Status: Alert. Oriented to person, place, situation, time.  Follows all commands.   Language: Speech is clear, fluent.   Cranial Nerves: EOMI, no nystagmus. PERRL. V1-3 intact to light touch. Face symmetric.  Motor: 5/5 strength throughout.   Sensation: Symmetric B/L preserved sensation to light touch.   Coordination: No dysmetria on finger-to-nose bilaterally.  Gait: Deferred.    No seizures on EEG.

## 2020-09-26 NOTE — PROGRESS NOTE ADULT - SUBJECTIVE AND OBJECTIVE BOX
CARDIOLOGY     PROGRESS  NOTE   ________________________________________________    CHIEF COMPLAINT:Patient is a 65y old  Female who presents with a chief complaint of Possible autoimmune encephalitis post checkpoint inhibitors (25 Sep 2020 18:12)  no complain doing well.  	  REVIEW OF SYSTEMS:  CONSTITUTIONAL: No fever, weight loss, or fatigue  EYES: No eye pain, visual disturbances, or discharge  ENT:  No difficulty hearing, tinnitus, vertigo; No sinus or throat pain  NECK: No pain or stiffness  RESPIRATORY: No cough, wheezing, chills or hemoptysis; No Shortness of Breath  CARDIOVASCULAR: No chest pain, palpitations, passing out, dizziness, or leg swelling  GASTROINTESTINAL: No abdominal or epigastric pain. No nausea, vomiting, or hematemesis; No diarrhea or constipation. No melena or hematochezia.  GENITOURINARY: No dysuria, frequency, hematuria, or incontinence  NEUROLOGICAL: No headaches, memory loss, loss of strength, numbness, or tremors  SKIN: No itching, burning, rashes, or lesions   LYMPH Nodes: No enlarged glands  ENDOCRINE: No heat or cold intolerance; No hair loss  MUSCULOSKELETAL: No joint pain or swelling; No muscle, back, or extremity pain  PSYCHIATRIC: No depression, anxiety, mood swings, or difficulty sleeping  HEME/LYMPH: No easy bruising, or bleeding gums  ALLERGY AND IMMUNOLOGIC: No hives or eczema	    [ ] All others negative	  [ ] Unable to obtain    PHYSICAL EXAM:  T(C): 36.7 (09-26-20 @ 07:31), Max: 37.1 (09-26-20 @ 04:16)  HR: 60 (09-26-20 @ 07:31) (54 - 68)  BP: 136/78 (09-26-20 @ 07:31) (119/73 - 136/78)  RR: 18 (09-26-20 @ 07:31) (18 - 20)  SpO2: 97% (09-26-20 @ 07:31) (94% - 97%)  Wt(kg): --  I&O's Summary    25 Sep 2020 07:01  -  26 Sep 2020 07:00  --------------------------------------------------------  IN: 1350 mL / OUT: 0 mL / NET: 1350 mL        Appearance: Normal	  HEENT:   Normal oral mucosa, PERRL, EOMI	  Lymphatic: No lymphadenopathy  Cardiovascular: Normal S1 S2, No JVD, +murmurs, No edema  Respiratory: Lungs clear to auscultation	  Psychiatry: A & O x 3, Mood & affect appropriate  Gastrointestinal:  Soft, Non-tender, + BS	  Skin: No rashes, No ecchymoses, No cyanosis	  Neurologic: Non-focal  Extremities: Normal range of motion, No clubbing, cyanosis or edema  Vascular: Peripheral pulses palpable 2+ bilaterally    MEDICATIONS  (STANDING):  atorvastatin 80 milliGRAM(s) Oral at bedtime  dextrose 5%. 1000 milliLiter(s) (50 mL/Hr) IV Continuous <Continuous>  dextrose 50% Injectable 12.5 Gram(s) IV Push once  dextrose 50% Injectable 25 Gram(s) IV Push once  dextrose 50% Injectable 25 Gram(s) IV Push once  enoxaparin Injectable 40 milliGRAM(s) SubCutaneous daily  gabapentin 100 milliGRAM(s) Oral two times a day  insulin lispro (HumaLOG) corrective regimen sliding scale   SubCutaneous at bedtime  insulin lispro (HumaLOG) corrective regimen sliding scale   SubCutaneous three times a day before meals  lacosamide 150 milliGRAM(s) Oral two times a day  lacosamide 150 milliGRAM(s) Oral once  levothyroxine 100 MICROGram(s) Oral daily  methylPREDNISolone sodium succinate IVPB 1000 milliGRAM(s) IV Intermittent daily  pantoprazole    Tablet 40 milliGRAM(s) Oral before breakfast  polyethylene glycol 3350 17 Gram(s) Oral daily  senna 2 Tablet(s) Oral at bedtime  sertraline 150 milliGRAM(s) Oral daily  tiotropium 18 MICROgram(s) Capsule 1 Capsule(s) Inhalation daily  valproic acid 500 milliGRAM(s) Oral once      TELEMETRY: 	    ECG:  	  RADIOLOGY:  OTHER: 	  	  LABS:	 	    CARDIAC MARKERS:                                11.5   12.93 )-----------( 335      ( 26 Sep 2020 06:38 )             35.5     09-26    140  |  102  |  26<H>  ----------------------------<  79  3.3<L>   |  25  |  0.84    Ca    8.9      26 Sep 2020 06:37      proBNP: Serum Pro-Brain Natriuretic Peptide: 296 pg/mL (09-23 @ 06:21)  Serum Pro-Brain Natriuretic Peptide: 220 pg/mL (09-22 @ 15:12)    Lipid Profile: Cholesterol 249    HDL 29      HgA1c:   TSH: Thyroid Stimulating Hormone, Serum: 27.00 uIU/mL (09-24 @ 08:46)  Thyroid Stimulating Hormone, Serum: 8.18 uIU/mL (09-23 @ 13:21)  Thyroid Stimulating Hormone, Serum: 29.50 uIU/mL (09-19 @ 04:47)  Thyroid Stimulating Hormone, Serum: 97.90 uIU/mL (09-04 @ 09:20)  Thyroid Stimulating Hormone, Serum: 82.80 uIU/mL (09-04 @ 00:22)          Assessment and plan  ---------------------------  65 y old left handed woman with PMHx of COPD, migraines, depressions, seizures and lung adenocarcinoma with R occipito-parietal mets s/p resection, stereotactic radiosurgery, and chemotherapy with recent admission to [facility] (09/18/20) for presumed Depakote toxicity presenting with lethargy, swelling in the fingers of both hands, intermittent numbness/tingling of the bilateral upper extremities, and generalized weakness. Patient states she has felt "off" since her recent discharge and since starting the new medications she was prescribed at that time. On her most recent admission, she was discharged on Vimpat 150MG BID and Depakote 500MG BID. She reports that she lives alone and she is unable to manage taking care of herself and taking her medications without help. She denies HA, LOC, seizures, tremors, tinnitus. She endorses intermittent shortness of breath.  pt well known to me from the last admission with paresthesia and swelling fingers, and sob  echo noted with lvh and diastolic dysfunction, doubt cause  of sob.  covid negative but with chest x ray abnormality  ?repeat echo with hx of pericardial effusion and low voltage qrs but no mention of pericardial effusion on the chest ct  dvt prophylaxis  will adjust meds   LP noted  eeg noted   hypothyroid is improving  continue steroid as per neuro  no objection dc cardiac wise

## 2020-09-26 NOTE — EEG REPORT - NS EEG TEXT BOX
Starting: Day 6      9/26/2020      Time: 08:00 AM Duration: 4 hours 42m    Daily EEG Visual Analysis    FINDINGS:  Limited evaluation for few hours during initial portion due to artifacts. The background was continuous, spontaneously variable and reactive. During wakefulness, the posterior dominant rhythm on the left side consisted of fragments of 7 Hz activity, with amplitude to 30 uV, that attenuated to eye opening.  PDR obscured on the right side by slowing.     Background Slowing:  Excess diffuse theta and polymorphic delta slowing.    Focal Slowing:   Continuous theta/delta slowing in the right posterior quadrant.    Sleep Background:  Drowsiness was characterized by fragmentation, attenuation, and slowing of the background activity.    Sleep was characterized by the presence of vertex waves, symmetric sleep spindles and K-complexes.    Other Non-Epileptiform Findings:  Breach effect in the right posterior quadrant characterized by higher amplitude, sharply contoured waveforms.    Interictal Epileptiform Activity:   Occasional right occipitotemporal  (O2>>P8) spikes.    Events:  No events or seizures recorded.    Artifacts:  Intermittent myogenic and movement artifacts were noted.    ECG:  The heart rate on single channel ECG was predominantly between 60-70BPM.    AEDS  Vimpat 150 q12  Depakene DC’d    EEG Summary:  Abnormal EEG in the awake, drowsy and asleep states.  - Occasional right occipitotemporal (O2>>P8) spikes.  - Continuous theta/delta slowing in the right posterior quadrant.  - mild to moderate generalized slowing  - Breach effect in the right posterior quadrant characterized by higher amplitude, sharply contoured waveforms.    Impression/Clinical Correlate:  Potential epileptogenic focus in the right occipitotemporal region, structural and skull defect in the right posterior quadrant and mild nonspecific diffuse cerebral dysfunction. No event or seizure captured.       Bennett Meek MD  EEG/Epilepsy Attending

## 2020-09-26 NOTE — PROGRESS NOTE ADULT - ASSESSMENT
Assessment:   65 y old left handed woman with PMHx of COPD, migraines, depressions, seizures and lung adenocarcinoma with R occipito-parietal mets s/p resection, stereotactic radiosurgery, and chemotherapy with recent admission to [facility] (09/18/20) for presumed Depakote toxicity now presenting with lethargy, swelling in the fingers of both hands, intermittent numbness/tingling of the bilateral upper extremities, and generalized weakness. Patient states she has felt "off" since her recent discharge and since starting the new medications she was prescribed at that time. On her most recent admission, she was discharged on Vimpat 150MG BID and Depakote 500MG BID. 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 CSF protein elevation suggests AIE.     Plan:  -Decrease VPA from 500 bid to 250 mg BID  -VEEG monitoring  -Continue Solumedrol 1gram IV daily for total of 5 day course (Day 2/5)  -Follow up CSF results  -Dr Baires Medicine recs appreciated  -Started Lovenox for DVT prophylaxis which was on hold yesterday for LP  -Endocrine Consult        Case discussed with Epilepsy attending Dr. Awad.
Assessment: 64 yo left handed woman with PMHx of COPD, migraines, depressions, seizures and lung adenocarcinoma with R occipito-parietal mets s/p resection, stereotactic radiosurgery, and chemotherapy with recent admission to [facility]U (09/18/20) for presumed Depakote toxicity now presenting with lethargy, swelling in the fingers of both hands, intermittent numbness/tingling of the bilateral upper extremities, and generalized weakness. Patient states she has felt "off" since her recent discharge and since starting the new medications she was prescribed at that time. On her most recent admission, she was discharged on Vimpat 150MG BID and Depakote 500MG BID. 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 CSF protein elevation suggests AIE.     Plan:  -To be discharged home today with home health services  -VPA to 150 mg BID  -Finished Solumedrol 1gram IV daily for total of 5 day course - no taper as per weekday team  -Follow up CSF results        Will f/u outpatient with Epilepsy attending Dr. Awad.
Assessment: 66 yo left handed woman with PMHx of COPD, migraines, depressions, seizures and lung adenocarcinoma with R occipito-parietal mets s/p resection, stereotactic radiosurgery, and chemotherapy with recent admission to EMU (09/18/20) for presumed Depakote toxicity now presenting with lethargy, swelling in the fingers of both hands, intermittent numbness/tingling of the bilateral upper extremities, and generalized weakness. Patient states she has felt "off" since her recent discharge and since starting the new medications she was prescribed at that time. On her most recent admission, she was discharged on Vimpat 150MG BID and Depakote 500MG BID. 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 CSF protein elevation suggests AIE.     Plan:  -To be discharged home tomorrow with home health services  -Decrease VPA to 150 mg BID  -vEEG monitoring  -Continue Solumedrol 1gram IV daily for total of 5 day course (Day 4/5)  -Follow up CSF results  -Dr. Baires Medicine recs appreciated  -Lovenox for DVT prophylaxis  -Endocrine Consult: TSH decreasing appropriately, continue current Levothyroxine dose    Case discussed with Epilepsy attending Dr. Awad.
dyspnea pt with  h/o COPD  - CT of chest noted  - PE protocol was not done   - but low suspicion for PE as she is not tachycardic and not hypoxic  - check echo ( echo lab was called to expedite)  - normal BNP and d dimer  - c/w spiriva and prn albuterol    hypokalemia  - supplement k    hypothyroid  - c/w synthroid  - last TSH was29  - will repeat    depression  - c/w zoloft    seizures  - AED as per neurology  - w/u for AIE in progress  - on IV steroids    GERD  - c/w protonix    DVT px  - lovenox 40 qd if ok with neurology        
dyspnea pt with  h/o COPD resolved  - CT of chest noted  -  low suspicion for PE as she is not tachycardic and not hypoxic  - check echo ( echo lab was called to expedite)  - normal BNP and d dimer 245  - c/w spiriva and prn albuterol    hypothyroid  - c/w synthroid  - last TSH was 29  - will repeat    depression  - c/w zoloft    seizures  - AED as per neurology  - w/u for AIE in progress  - on IV steroids    GERD  - c/w protonix    HLD  - consider adding [id number] mg    constipation  - senna and miralax    DVT px  - lovenox 40 qd         
dyspnea pt with  h/o COPD.... resolved  - CT of chest noted  - echo done  - normal BNP and d dimer 245  - c/w spiriva and prn albuterol    hypothyroid  - c/w synthroid  - last TSH was 29  - will repeat    depression  - c/w zoloft    seizures  - AED as per neurology  - w/u for AIE in progress  - on IV steroids    GERD  - c/w protonix    HLD  - consider adding [id number] mg    constipation  - senna and miralax    DVT px  - lovenox 40 qd         
Assessment: 66 yo left handed woman with PMHx of COPD, migraines, depressions, seizures and lung adenocarcinoma with R occipito-parietal mets s/p resection, stereotactic radiosurgery, and chemotherapy with recent admission to EMU (09/18/20) for presumed Depakote toxicity now presenting with lethargy, swelling in the fingers of both hands, intermittent numbness/tingling of the bilateral upper extremities, and generalized weakness. Patient states she has felt "off" since her recent discharge and since starting the new medications she was prescribed at that time. On her most recent admission, she was discharged on Vimpat 150MG BID and Depakote 500MG BID. 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 CSF protein elevation suggests AIE.     Plan:  -c/w  mg BID  -VEEG monitoring  -Continue Solumedrol 1gram IV daily for total of 5 day course (Day 3/5)  -Follow up CSF results  -Dr Baires Medicine recs appreciated  -Lovenox for DVT prophylaxis  -Endocrine Consult    Case discussed with Epilepsy Attending Dr. Awad.

## 2020-09-28 NOTE — ED ADULT TRIAGE NOTE - RESPIRATORY RATE (BREATHS/MIN)
Spoke with patient informed them per Dr Canelo Reece  She can try triamcinolone cream bid and give us a call in a week    Patient states understanding and no further questions at this time.   18

## 2020-10-22 PROBLEM — G04.81 AUTOIMMUNE ENCEPHALITIS: Status: ACTIVE | Noted: 2020-01-01

## 2020-10-22 NOTE — ASSESSMENT
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

## 2020-10-22 NOTE — HISTORY OF PRESENT ILLNESS
[FreeTextEntry1] : \par *** 10/21/2020 *** \par \par DANIELLE ANTONIO is here for follow up. she has a probable diagnosis of AIE following checkpoint inhibitors Keytruda.\par She is much improved after Solu-Medrol and tapering from VPA\par \par SHE IS ONLY IN Vimpat 200 bid, NO SEIZURES AND NO SE\par \par She was approved for IVIG and now she is considering how to adjust it to her schedule\par \par *** 09/18/2020 ***\par \par DANIELLE ANTONIO is a 65 years old with metastatic adenocarcinoma( brain) s/p resection .\par Was recently admitted to [facility] for status epilepticus which required intubation and high dose AEDs. she failed keppra and later she developed side effect( somnolence and mood problems)\par She was switched to  BID and Vimpat 100 bid was later added( unclear why).\par Now she is progressively confused, sleepy and has diffuse abdominal pain.\par \par She lives alone and she is concern that she cannot perform her job.\par She is a special .\par \par further history( FROM EMR)\par \par History of Present Illness:\par 65 y old left handed woman with PMHx of COPD, migraines, depressions, seizures\par and lung adenocarcinoma with R occipito-parietal mets s/p resection,\par stereotactic radiosurgery, and chemotherapy presenting with worsening AMS,\par lethargy, confusion, stomach upset w/ black stools, and pins and needles\par sensation in the toes over the past 10 days following her recent d/c from the\[facility] EMU on 9/6/2020 admitted now to EMU for concern for depakote toxicity and AED\par optimization. Since her discharge on 9/6 the patient states that she has been\par fatigued and unable to keep her head up throughout most of the day. 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 She states that\par she has been feeling "pins and needles" in her toes throughout the week. She\par also reports upset stomach and black stools during this time with no fever or\par chills. \par \par She was admitted to [facility] on 8/25/2020 due to an episode of disorientation\par followed by a seizure with L eye deviation and subsequent GTC. She continued to\par have clinical seizures on admission and was found to have a UTI, was\par subsequently intubated for airway protection and admitted to the MICU. Keppra\par 1000 mg BID was started for seizures and she was subsequently extubated with\par improvement in her EEG and discharged. She followed up outpatient after\par discharge on 9/2 with Dr. Schaefer and her daughter stated at that time she was\par concerned as the patient was persistently confused and altered from baseline.\par Keppra 1000 mg BID was continued and she was given a referral for psychiatry to\par address her ongoing depression. The patient was re-admitted 9/3 due to\par persisting disorientation, confusion, and visual illusions including macropsia\par and micropsia. At that time the patient stated that she was having difficulty\par focusing, constantly asked the same questions, and was often confused and\par disoriented to her location or situation. She was also found to have a UTI and\par hypothyroidism at that time, CT head (September 3rd) was stable with no\par evidence of increasing vasogenic edema or any new intracranial lesions. She was\par discharged on depakote 750 mg BID and vimpat 100 q12h for seizures.\par \par \par Presumed Seizure Semiology:\par visuospatial disturbances: macropsia, micropsia, disorientation, anxiety/panic\par that originate from R parietal lobe, likely the T[employer] [place]\par \par Cancer history:\par 2/2016 CARLOTA adenocarcinoma\par 3/2016 CARLOTA lobectomy\par 6/2016 Completed adjuvant chemotherapy: 4 cycles of cisplatin and pemetrexed\par 3/14/2020 Headache and eye pain, MRI brain 2.2 x 1.6 x 2.5 cm enhancing lesion\par of R parietal cortical & subcortical region\par 3/15/[address] R [address]ip[address] Servin\par - pathology c/w metastatic adenocarcinoma from lung which was PD-L1 30%+, BRCA\par 2+ (germline vs somatic unknown), KRAS G12C+.\par 4/13/[address] Pennington\par 4/23/2020 Pembrolizumab - completed 7/2020\par \par \par \par

## 2020-11-04 NOTE — CONSULT NOTE ADULT - SUBJECTIVE AND OBJECTIVE BOX
HPI:  Natalie Andrea is a 65 year old female with a past medical history of COPD, Migraine, Depression, Iron deficiency anemia, Stage II Lung Adenocarcinoma (diagnosed in ) s/p resection followed by adjuvant chemotherapy with cisplatin and pemetrexed x 4 cycles completed in 2016 (treated at [facility]) presented to the ED after having episodes of suspected but unwitnessed seizures. Collateral history was obtained from the daughter, Janessa [phone]. The patient's first and only reported seizure as per the daughter was in 2020 when the patient was witnessed to be lethargic and not answering questions without any reported focal shaking, bizarre activities, or reported prodromal symptoms. On this current episode, the suspected seizures were unwitnessed but the patient was at a store with workers stating that she was acting bizarrely but the daughter could not elaborate. The patient subsequently came home and was found in the bathroom by the daughter who stated that the patient was answering questions and following commands but appeared tired, confused, and not always making sense with her statements. She was brought to the ED, where suspected seizures continued described as twitching of the face, gaze deviation to the L, and whole body shaking. She had received 1mg of Ativan and continued having episodes while she was in the CT scanner. Was given 1g of Keppra and continued to have episodes and was subsequently intubated after not returning to baseline and having vomiting episodes which compromised the patient's airway.  As per the daughter, the patient was compliant with her Keppra but she was uncertain as she did not visit her often due to COVID. Additionally, the daughter states that the patient had just recently started to take Abilify in the last week.    Oncology history from [employer] as below:      REVIEW OF SYSTEMS    Unable to provide ROS due to intubated and sedated status.     PAST MEDICAL & SURGICAL HISTORY:  History of antineoplastic chemotherapy  Metastatic adenocarcinoma to brain  Iron deficiency anemia: underwent endoscopy and colonoscopy, reportedly fine  Lung cancer  Migraine  COPD (chronic obstructive pulmonary disease)  History of craniotomy  S/P lobectomy of lung  Benign tumor: left   S/P :     FAMILY HISTORY:  No pertinent family history in first degree relatives    SOCIAL HISTORY:     MEDICATIONS (HOME):  Home Medications:  acetaminophen 325 mg oral tablet: 2 tab(s) orally every 6 hours, As needed, Mild Pain (1 - 3) (2020 15:33)  albuterol 90 mcg/inh inhalation aerosol: 2 puff(s) inhaled every 6 hours, As needed, Shortness of Breath and/or Wheezing (2020 15:33)  ALPRAZolam 0.5 mg oral tablet: 1 tab(s) orally every 12 hours, As needed, anxiety (2020 15:33)  sertraline 50 mg oral tablet: 3 tab(s) orally once a day (2020 15:)  tiotropium 18 mcg inhalation capsule: 1 cap(s) inhaled once a day (2020 15:33)    MEDICATIONS  (STANDING):  levETIRAcetam  IVPB 1000 milliGRAM(s) IV Intermittent once  levETIRAcetam  IVPB 1000 milliGRAM(s) IV Intermittent every 12 hours  norepinephrine Infusion 0.05 MICROgram(s)/kG/Min (6.09 mL/Hr) IV Continuous <Continuous>  propofol Infusion 20 MICROgram(s)/kG/Min (7.8 mL/Hr) IV Continuous <Continuous>    MEDICATIONS  (PRN):    ALLERGIES/INTOLERANCES:  Allergies  No Known Allergies    Intolerances    VITALS & EXAMINATION:  Vital Signs Last 24 Hrs  T(C): 37.1 (25 Aug 2020 19:11), Max: 37.1 (25 Aug 2020 19:11)  T(F): 98.7 (25 Aug 2020 19:11), Max: 98.7 (25 Aug 2020 19:11)  HR: 76 (25 Aug 2020 19:11) (76 - 76)  BP: 139/81 (25 Aug 2020 19:11) (139/81 - 139/81)  BP(mean): --  RR: 16 (25 Aug 2020 19:11) (16 - 16)  SpO2: 92% (25 Aug 2020 19:11) (92% - 92%)    General:  Constitutional: Obese Female, appears stated age, in no apparent distress including pain  Head: Normocephalic & atraumatic.  ENT: Patent ear canals, intact TM, mucus membranes moist & pink, neck supple, no lymphadenopathy.   Respiratory: Patent airway. All lung fields are clear to auscultation bilaterally.  Extremities: No cyanosis, clubbing, or edema.  Skin: No rashes, bruising, or discoloration.    Cardiovascular (>2): RRR no murmurs. Carotid pulsations symmetric, no bruits. Normal capillary beds refill, 1-2 seconds or less.     Neurological (>12):  MS: Awake, alert, oriented to person, place, situation, time. Normal affect. Follows all commands.    Language: Speech is clear, fluent with good repetition & comprehension (able to name objects___)    CNs: PERRLA (R = 3mm, L = 3mm). VFF. EOMI no nystagmus, no diplopia. V1-3 intact to LT/pinprick, well developed masseter muscles b/l. No facial asymmetry b/l, full eye closure strength b/l. Hearing grossly normal (rubbing fingers) b/l. Symmetric palate elevation in midline. Gag reflex deferred. Head turning & shoulder shrug intact b/l. Tongue midline, normal movements, no atrophy.    Fundoscopic: pale w/ sharp discs margins No vascular changes.      Motor: Normal muscle bulk & tone. No noticeable tremor or seizure. No pronator drift.              Deltoid	Biceps	Triceps	Wrist	Finger ABd	   R	5	5	5	5	5		5 	  L	5	5	5	5	5		5    	H-Flex	H-Ext	H-ABd	H-ADd	K-Flex	K-Ext	D-Flex	P-Flex  R	5	5	5	5	5	5	5	5 	   L	5	5	5	5	5	5	5	5	     Sensation: Intact to LT/PP/Temp/Vibration/Position b/l throughout.     Cortical: Extinction on DSS (neglect): none    Reflexes:              Biceps(C5)       BR(C6)     Triceps(C7)               Patellar(L4)    Achilles(S1)    Plantar Resp  R	2	          2	             2		        2		    2		Down   L	2	          2	             2		        2		    2		Down     Coordination: intact rapid-alt movements. No dysmetria to FTN/HTS    Gait: Normal Romberg. No postural instability. Normal stance and tandem gait.     LABORATORY:  CBC                       14.8   17.28 )-----------( 257      ( 25 Aug 2020 19:30 )             44.0     Chem 08-25    139  |  101  |  21  ----------------------------<  146<H>  3.5   |  23  |  0.98    Ca    9.5      25 Aug 2020 19:30    TPro  8.0  /  Alb  5.0  /  TBili  0.2  /  DBili  x   /  AST  43<H>  /  ALT  33  /  AlkPhos  74  08-25    LFTs LIVER FUNCTIONS - ( 25 Aug 2020 19:30 )  Alb: 5.0 g/dL / Pro: 8.0 g/dL / ALK PHOS: 74 u/L / ALT: 33 u/L / AST: 43 u/L / GGT: x           Coagulopathy PT/INR - ( 25 Aug 2020 19:30 )   PT: 11.3 SEC;   INR: 0.98          PTT - ( 25 Aug 2020 19:30 )  PTT:27.2 SEC  Lipid Panel   A1c   Cardiac enzymes     U/A Urinalysis Basic - ( 25 Aug 2020 19:30 )    Color: LIGHT YELLOW / Appearance: CLEAR / S.024 / pH: 6.0  Gluc: NEGATIVE / Ketone: NEGATIVE  / Bili: NEGATIVE / Urobili: NORMAL   Blood: TRACE / Protein: 30 / Nitrite: NEGATIVE   Leuk Esterase: NEGATIVE / RBC: 0-2 / WBC 0-2   Sq Epi: OCC / Non Sq Epi: x / Bacteria: NEGATIVE      CSF  Immunological  Other    STUDIES & IMAGING:  Studies (EKG, EEG, EMG, etc):     Radiology (XR, CT, MR, U/S, TTE/PARVEEN): Patient was returning phone call in regards to surgery date.    He can be reached at: [phone]   HPI:  Natalie Andrea is a 65 year old female with a past medical history of COPD, Migraine, Depression, Iron deficiency anemia, Stage II Lung Adenocarcinoma (diagnosed in ) s/p resection followed by adjuvant chemotherapy with cisplatin and pemetrexed x 4 cycles completed in 2016 (treated at [facility]) presented to the ED after having episodes of suspected but unwitnessed seizures. Collateral history was obtained from the daughter, Janessa [phone]. The patient's first and only reported seizure as per the daughter was in 2020 when the patient was witnessed to be lethargic and not answering questions without any reported focal shaking, bizarre activities, or reported prodromal symptoms. On this current episode, the suspected seizures were unwitnessed but the patient was at a store with workers stating that she was acting bizarrely but the daughter could not elaborate. The patient subsequently came home and was found in the bathroom by the daughter who stated that the patient was answering questions and following commands but appeared tired, confused, and not always making sense with her statements. She was brought to the ED, where suspected seizures continued described as twitching of the face, gaze deviation to the L, and whole body shaking. She had received 1mg of Ativan and continued having episodes while she was in the CT scanner. Was given 1g of Keppra and continued to have episodes and was subsequently intubated after not returning to baseline and having vomiting episodes which compromised the patient's airway.  As per the daughter, the patient was compliant with her Keppra but she was uncertain as she did not visit her often due to COVID. Additionally, the daughter states that the patient had just recently started to take Abilify in the last week.    Oncology history from [employer] as below as per Dr. Pugh:  MS. Andrea is a 63 yo woman who is s/p resection of stage II lung cancer followed by adjuvant chemo with cisplatin and pemetrexed x 4 cycles which completed in 2016.   Was enrolled in ALCHEMIST, EGFR and ALK testing of tumor- tumor WT for both. Was offered adjuvant nivolumab as part of the study but declined.   Surveillance scans showed YASMEEN. She just had a CT scan on 11/15 which showed GGO and tree-in-bud findings b/l upper lobes suggestive of PNA and endobronchial spread of infection. She was just seen by Dr. Arora for lower resp tract symptoms and tx with abx (azithromycin), and steroids (currently at 20 mg daily). She is feeling a little better. She still has dry cough. No fever, chills, rigors.     9/3/19: Persistent dyspnea. Hurt her back after rowing last week. She otherwise denies fever, chills, cough, mucus, sore throat, ear pain, chest pain, her nausea from reflux has improved and is no longer vomiting from her reflux. She reports no GERD symptoms. She is no longer smoking.     10/1/19: Pt returns for follow up for discussion following scans. Pt complains of abdominal bloating and back discomfort. No other complaints. Remaining ROS unremarkable    10/31/19: Pt seen today as urgent visit. Called this am stating that she feels terrible. She is c/o sob/ profound fatigue and occasional dizziness. She denies fever/chills. She recently underwent colonoscopy and EGD which did not reveal evidence of bleeding or reason for new worsening anemia.     20: Pt was receiving IV iron on and off with resolution of above mentioned symptoms. However, she presented to ED with headache and eye pain on 20. MRI brain noted heterogeneous enhancing lesion involving the right parietal cortical and subcortical region measuring approximately 2.2 x 1.6 x 2.5 cm.    She underwent right occipital craniotomy for resection of right occipital hemorrhagic brain lesion.on 03/15/20 by Dr. Servin.    Pathology of the right brain occipital tumor on 03/15.20 noted metastatic adenocarcinoma, positive for CK7, TTF-1 and Nasip A, negative for CK20, CDX-2 and PAX-8,.consistent with lung origin. PDL1 was 30%, BRCA 2 mut (unclear if germline or somatic), KRAS G12C amongst others.    Post operative MRI on 20 Interval right parietal occipital craniotomy for resection of a parietal hemorrhagic mass with expected postoperative changes and thin extra-axial fluid collection underlying the craniotomy site compared with 3/14/2020.     20: Pt here for cycle 1 of pembrolizumab. She has completed cranial RT without any AEs. Working from home. Denies any pain, fever, cough, but still has significant fatigue.     Ms. Andrea is back to baseline, working from home and feels well otherwise. She has no pulmonary complaints.    20: Doing well but emotionally still struggling, more so now due to COVID crisis and social distancing, has some alopecia. No irAE    2020: Pt has been on pembrolizumab since 2020. She is tolerating well wo irAE. She has been working at home since COVID pandemic which she finds stressful. She is particularly anxious about her recent imaging and is eager for results. She offers no new complaints. ROS unremarkable.     Disease: lung cancer   Pathology: adenoca   AJCC Stage: II       REVIEW OF SYSTEMS    Unable to provide ROS due to intubated and sedated status.     PAST MEDICAL & SURGICAL HISTORY:  History of antineoplastic chemotherapy  Metastatic adenocarcinoma to brain  Iron deficiency anemia: underwent endoscopy and colonoscopy, reportedly fine  Lung cancer  Migraine  COPD (chronic obstructive pulmonary disease)  History of craniotomy  S/P lobectomy of lung  Benign tumor: left   S/P :     FAMILY HISTORY:  No pertinent family history in first degree relatives    SOCIAL HISTORY:     MEDICATIONS (HOME):  Home Medications:  acetaminophen 325 mg oral tablet: 2 tab(s) orally every 6 hours, As needed, Mild Pain (1 - 3) (2020 15:33)  albuterol 90 mcg/inh inhalation aerosol: 2 puff(s) inhaled every 6 hours, As needed, Shortness of Breath and/or Wheezing (2020 15:33)  ALPRAZolam 0.5 mg oral tablet: 1 tab(s) orally every 12 hours, As needed, anxiety (2020 15:33)  sertraline 50 mg oral tablet: 3 tab(s) orally once a day (2020 15:33)  tiotropium 18 mcg inhalation capsule: 1 cap(s) inhaled once a day (2020 15:33)    MEDICATIONS  (STANDING):  levETIRAcetam  IVPB 1000 milliGRAM(s) IV Intermittent once  levETIRAcetam  IVPB 1000 milliGRAM(s) IV Intermittent every 12 hours  norepinephrine Infusion 0.05 MICROgram(s)/kG/Min (6.09 mL/Hr) IV Continuous <Continuous>  propofol Infusion 20 MICROgram(s)/kG/Min (7.8 mL/Hr) IV Continuous <Continuous>    MEDICATIONS  (PRN):    ALLERGIES/INTOLERANCES:  Allergies  No Known Allergies    Intolerances    VITALS & EXAMINATION:  Vital Signs Last 24 Hrs  T(C): 37.1 (25 Aug 2020 19:11), Max: 37.1 (25 Aug 2020 19:11)  T(F): 98.7 (25 Aug 2020 19:11), Max: 98.7 (25 Aug 2020 19:11)  HR: 76 (25 Aug 2020 19:11) (76 - 76)  BP: 139/81 (25 Aug 2020 19:11) (139/81 - 139/81)  BP(mean): --  RR: 16 (25 Aug 2020 19:11) (16 - 16)  SpO2: 92% (25 Aug 2020 19:11) (92% - 92%)    General:  Constitutional: Intubated and sedated.  Head: Normocephalic & atraumatic.    Neurological (>12):  MS: Intubated and sedated.    CNs: pinpoint pupils, very sluggish to light. No roving eye movements, no nystagmus. Corneal reflex intact. Oculocephalic reflex intact. Gag intact.     Motor: No spontaneous movement or withdrawal noted.     Sensation: No withdrawal to noxious stimuli.     Reflexes: Mute plantars bilaterally. No Garza elicited.    LABORATORY:  CBC                       14.8   17.28 )-----------( 257      ( 25 Aug 2020 19:30 )             44.0     Chem 08-25    139  |  101  |  21  ----------------------------<  146<H>  3.5   |  23  |  0.98    Ca    9.5      25 Aug 2020 19:30    TPro  8.0  /  Alb  5.0  /  TBili  0.2  /  DBili  x   /  AST  43<H>  /  ALT  33  /  AlkPhos  74  08-25    LFTs LIVER FUNCTIONS - ( 25 Aug 2020 19:30 )  Alb: 5.0 g/dL / Pro: 8.0 g/dL / ALK PHOS: 74 u/L / ALT: 33 u/L / AST: 43 u/L / GGT: x           Coagulopathy PT/INR - ( 25 Aug 2020 19:30 )   PT: 11.3 SEC;   INR: 0.98          PTT - ( 25 Aug 2020 19:30 )  PTT:27.2 SEC    U/A Urinalysis Basic - ( 25 Aug 2020 19:30 )    Color: LIGHT YELLOW / Appearance: CLEAR / S.024 / pH: 6.0  Gluc: NEGATIVE / Ketone: NEGATIVE  / Bili: NEGATIVE / Urobili: NORMAL   Blood: TRACE / Protein: 30 / Nitrite: NEGATIVE   Leuk Esterase: NEGATIVE / RBC: 0-2 / WBC 0-2   Sq Epi: OCC / Non Sq Epi: x / Bacteria: NEGATIVE    STUDIES & IMAGING:    Radiology (XR, CT, MR, U/S, TTE/PARVEEN):    < from: CT Head No Cont (20 @ 21:05) >  IMPRESSION:  Right parieto-occipital gliosis with evolving hemorrhage. There is no acute intracranial hemorrhage, mass effect, midline shift or extra axial collections.    < end of copied text > HPI:  Natalie Andrea is a 65 year old female with a past medical history of COPD, Migraine, Depression, Iron deficiency anemia, Stage II Lung Adenocarcinoma (diagnosed in ) s/p resection followed by adjuvant chemotherapy with cisplatin and pemetrexed x 4 cycles completed in 2016 (treated at [facility]) presented to the ED after having episodes of suspected but unwitnessed seizures. Collateral history was obtained from the daughter, Janessa [phone]. The patient's first and only reported seizure as per the daughter was in 2020 when the patient was witnessed to be lethargic and not answering questions without any reported focal shaking, bizarre activities, or reported prodromal symptoms. On this current episode, the suspected seizures were unwitnessed but the patient was at a store with workers stating that she was acting bizarrely but the daughter could not elaborate. The patient subsequently came home and was found in the bathroom by the daughter who stated that the patient was answering questions and following commands but appeared tired, confused, and not always making sense with her statements. She was brought to the ED, where suspected seizures continued described as twitching of the face, gaze deviation to the L, and whole body shaking. She had received 1mg of Ativan and continued having episodes while she was in the CT scanner. Was given 1g of Keppra and continued to have episodes and was subsequently intubated after not returning to baseline and having vomiting episodes which compromised the patient's airway.  As per the daughter, the patient was compliant with her Keppra but she was uncertain as she did not visit her often due to COVID. Additionally, the daughter states that the patient had just recently started to take Abilify in the last week.    Oncology history from [employer] as below as per Dr. Pugh:  MS. Andrea is a 65 yo woman who is s/p resection of stage II lung cancer followed by adjuvant chemo with cisplatin and pemetrexed x 4 cycles which completed in 2016.   Was enrolled in ALCHEMIST, EGFR and ALK testing of tumor- tumor WT for both. Was offered adjuvant nivolumab as part of the study but declined.   Surveillance scans showed YASMEEN. She just had a CT scan on 11/15 which showed GGO and tree-in-bud findings b/l upper lobes suggestive of PNA and endobronchial spread of infection. She was just seen by Dr. Arora for lower resp tract symptoms and tx with abx (azithromycin), and steroids (currently at 20 mg daily). She is feeling a little better. She still has dry cough. No fever, chills, rigors.     9/3/19: Persistent dyspnea. Hurt her back after rowing last week. She otherwise denies fever, chills, cough, mucus, sore throat, ear pain, chest pain, her nausea from reflux has improved and is no longer vomiting from her reflux. She reports no GERD symptoms. She is no longer smoking.     10/1/19: Pt returns for follow up for discussion following scans. Pt complains of abdominal bloating and back discomfort. No other complaints. Remaining ROS unremarkable    10/31/19: Pt seen today as urgent visit. Called this am stating that she feels terrible. She is c/o sob/ profound fatigue and occasional dizziness. She denies fever/chills. She recently underwent colonoscopy and EGD which did not reveal evidence of bleeding or reason for new worsening anemia.     20: Pt was receiving IV iron on and off with resolution of above mentioned symptoms. However, she presented to ED with headache and eye pain on 20. MRI brain noted heterogeneous enhancing lesion involving the right parietal cortical and subcortical region measuring approximately 2.2 x 1.6 x 2.5 cm.    She underwent right occipital craniotomy for resection of right occipital hemorrhagic brain lesion.on 03/15/20 by Dr. Servin.    Pathology of the right brain occipital tumor on 03/15.20 noted metastatic adenocarcinoma, positive for CK7, TTF-1 and Nasip A, negative for CK20, CDX-2 and PAX-8,.consistent with lung origin. PDL1 was 30%, BRCA 2 mut (unclear if germline or somatic), KRAS G12C amongst others.    Post operative MRI on 20 Interval right parietal occipital craniotomy for resection of a parietal hemorrhagic mass with expected postoperative changes and thin extra-axial fluid collection underlying the craniotomy site compared with 3/14/2020.     20: Pt here for cycle 1 of pembrolizumab. She has completed cranial RT without any AEs. Working from home. Denies any pain, fever, cough, but still has significant fatigue.     Ms. Andrea is back to baseline, working from home and feels well otherwise. She has no pulmonary complaints.    20: Doing well but emotionally still struggling, more so now due to COVID crisis and social distancing, has some alopecia. No irAE    2020: Pt has been on pembrolizumab since 2020. She is tolerating well wo irAE. She has been working at home since COVID pandemic which she finds stressful. She is particularly anxious about her recent imaging and is eager for results. She offers no new complaints. ROS unremarkable.     Disease: lung cancer   Pathology: adenoca   AJCC Stage: II     She is s/p GK mid-April.   Follow up MRI reviewed with patient. C/W post surgical changes and treatment effect   PET/CT done end of  reviewed personally with patient. Findings completely unremarkable. 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 US of thyroid showed a BIRAD 4 lesion- very small. will follow up. TSH low, T4/T3 normal  Continue treatment with Keytruda on q6w interval dosing.       REVIEW OF SYSTEMS    Unable to provide ROS due to intubated and sedated status.     PAST MEDICAL & SURGICAL HISTORY:  History of antineoplastic chemotherapy  Metastatic adenocarcinoma to brain  Iron deficiency anemia: underwent endoscopy and colonoscopy, reportedly fine  Lung cancer  Migraine  COPD (chronic obstructive pulmonary disease)  History of craniotomy  S/P lobectomy of lung  Benign tumor: left   S/P :     FAMILY HISTORY:  No pertinent family history in first degree relatives    SOCIAL HISTORY:     MEDICATIONS (HOME):  Home Medications:  acetaminophen 325 mg oral tablet: 2 tab(s) orally every 6 hours, As needed, Mild Pain (1 - 3) (2020 15:33)  albuterol 90 mcg/inh inhalation aerosol: 2 puff(s) inhaled every 6 hours, As needed, Shortness of Breath and/or Wheezing (2020 15:33)  ALPRAZolam 0.5 mg oral tablet: 1 tab(s) orally every 12 hours, As needed, anxiety (2020 15:33)  sertraline 50 mg oral tablet: 3 tab(s) orally once a day (2020 15:33)  tiotropium 18 mcg inhalation capsule: 1 cap(s) inhaled once a day (2020 15:33)    MEDICATIONS  (STANDING):  levETIRAcetam  IVPB 1000 milliGRAM(s) IV Intermittent once  levETIRAcetam  IVPB 1000 milliGRAM(s) IV Intermittent every 12 hours  norepinephrine Infusion 0.05 MICROgram(s)/kG/Min (6.09 mL/Hr) IV Continuous <Continuous>  propofol Infusion 20 MICROgram(s)/kG/Min (7.8 mL/Hr) IV Continuous <Continuous>    MEDICATIONS  (PRN):    ALLERGIES/INTOLERANCES:  Allergies  No Known Allergies    Intolerances    VITALS & EXAMINATION:  Vital Signs Last 24 Hrs  T(C): 37.1 (25 Aug 2020 19:11), Max: 37.1 (25 Aug 2020 19:11)  T(F): 98.7 (25 Aug 2020 19:11), Max: 98.7 (25 Aug 2020 19:11)  HR: 76 (25 Aug 2020 19:11) (76 - 76)  BP: 139/81 (25 Aug 2020 19:11) (139/81 - 139/81)  BP(mean): --  RR: 16 (25 Aug 2020 19:11) (16 - 16)  SpO2: 92% (25 Aug 2020 19:11) (92% - 92%)    General:  Constitutional: Intubated and sedated.  Head: Normocephalic & atraumatic.    Neurological (>12):  MS: Intubated and sedated.    CNs: pinpoint pupils, very sluggish to light. No roving eye movements, no nystagmus. Corneal reflex intact. Oculocephalic reflex intact. Gag intact.     Motor: No spontaneous movement or withdrawal noted.     Sensation: No withdrawal to noxious stimuli.     Reflexes: Mute plantars bilaterally. No Garza elicited.    LABORATORY:  CBC                       14.8   17.28 )-----------( 257      ( 25 Aug 2020 19:30 )             44.0     Chem 08-25    139  |  101  |  21  ----------------------------<  146<H>  3.5   |  23  |  0.98    Ca    9.5      25 Aug 2020 19:30    TPro  8.0  /  Alb  5.0  /  TBili  0.2  /  DBili  x   /  AST  43<H>  /  ALT  33  /  AlkPhos  74  08-25    LFTs LIVER FUNCTIONS - ( 25 Aug 2020 19:30 )  Alb: 5.0 g/dL / Pro: 8.0 g/dL / ALK PHOS: 74 u/L / ALT: 33 u/L / AST: 43 u/L / GGT: x           Coagulopathy PT/INR - ( 25 Aug 2020 19:30 )   PT: 11.3 SEC;   INR: 0.98          PTT - ( 25 Aug 2020 19:30 )  PTT:27.2 SEC    U/A Urinalysis Basic - ( 25 Aug 2020 19:30 )    Color: LIGHT YELLOW / Appearance: CLEAR / S.024 / pH: 6.0  Gluc: NEGATIVE / Ketone: NEGATIVE  / Bili: NEGATIVE / Urobili: NORMAL   Blood: TRACE / Protein: 30 / Nitrite: NEGATIVE   Leuk Esterase: NEGATIVE / RBC: 0-2 / WBC 0-2   Sq Epi: OCC / Non Sq Epi: x / Bacteria: NEGATIVE    STUDIES & IMAGING:    Radiology (XR, CT, MR, U/S, TTE/PARVEEN):    < from: CT Head No Cont (20 @ 21:05) >  IMPRESSION:  Right parieto-occipital gliosis with evolving hemorrhage. There is no acute intracranial hemorrhage, mass effect, midline shift or extra axial collections.    < end of copied text >

## 2020-11-06 ENCOUNTER — OUTPATIENT (OUTPATIENT)
Dept: OUTPATIENT SERVICES | Facility: HOSPITAL | Age: 65
LOS: 1 days | Discharge: HOME | End: 2020-11-06

## 2020-11-06 DIAGNOSIS — C34.92 MALIGNANT NEOPLASM OF UNSPECIFIED PART OF LEFT BRONCHUS OR LUNG: ICD-10-CM

## 2020-11-06 DIAGNOSIS — Z90.2 ACQUIRED ABSENCE OF LUNG [PART OF]: Chronic | ICD-10-CM

## 2020-11-06 DIAGNOSIS — D36.9 BENIGN NEOPLASM, UNSPECIFIED SITE: Chronic | ICD-10-CM

## 2020-11-06 DIAGNOSIS — Z98.890 OTHER SPECIFIED POSTPROCEDURAL STATES: Chronic | ICD-10-CM

## 2020-11-06 DIAGNOSIS — Z98.89 OTHER SPECIFIED POSTPROCEDURAL STATES: Chronic | ICD-10-CM

## 2020-11-11 LAB
CULTURE RESULTS: SIGNIFICANT CHANGE UP
SPECIMEN SOURCE: SIGNIFICANT CHANGE UP

## 2020-11-13 ENCOUNTER — APPOINTMENT (OUTPATIENT)
Dept: HEMATOLOGY ONCOLOGY | Facility: CLINIC | Age: 65
End: 2020-11-13

## 2020-12-16 PROBLEM — J06.9 ACUTE URI: Status: RESOLVED | Noted: 2018-11-12 | Resolved: 2020-12-16

## 2021-07-26 NOTE — PHYSICAL THERAPY INITIAL EVALUATION ADULT - FOLLOWS COMMANDS/ANSWERS QUESTIONS, REHAB EVAL
[Time Spent: ___ minutes] : I have spent [unfilled] minutes of time on the encounter. 100% of the time/able to follow single-step instructions

## 2021-08-05 NOTE — ED ADULT NURSE NOTE - PAIN RATING/NUMBER SCALE (0-10): REST
What Is The Reason For Today's Visit?: Full Body Skin Examination
What Is The Reason For Today's Visit? (Being Monitored For X): surveillance against the recurrence of atypical nevi
0

## 2022-07-11 NOTE — ED ADULT TRIAGE NOTE - AS HEIGHT TYPE
Patient left without being seen after triage. Patient was called for a room three times with no response.
stated

## 2022-07-14 NOTE — H&P ADULT - ASSESSMENT
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 s/p R occipital craniotomy of hemorrhagic brain lesion 3/15/2020, migraine, and COPD admitted for AMS with 3 episodes of seizures within the ED with concern for status epilepticus, currently sedated and intubated for airway protection in setting of seizures and 1 episode of NBNB emesis    #neuro  - continue propofol infusion  - continue keprra 1g BID for ppx  - EEG in AM  - q1h neuro checks  - f/u MRI with and without contrast  - f/u neuro and neurosurgery recs    #CV  - sBP ranging between 130s-150s  - levo ordered if needed  - EKG showing sinus rhythm at 84, normal axis, no ST elevations or depression    #pulmonary  - intubated: 14/450/5/40 with SaO2 100%  - ABG pH 7.38/pCO2 41/HCO3 24/ pO2 82    #GI  - NPO; consider starting tube feeds tomorrow; OG tube in place    #renal  - Na 139, K 3.5; f/u lytes and replete as needed  - f/u Cr    #ID  - fever of 100.7, concern for aspiration PNA in setting of emesis and seizures  - vanc and zosyn  - f/u BCx    #endo  - glu ranging between 120s-140s    #PPX  - DVT: SCD in setting of evolving hemorrhage

## 2022-11-29 NOTE — PHYSICAL THERAPY INITIAL EVALUATION ADULT - PLANNED THERAPY INTERVENTIONS, PT EVAL
Patient has a mammogram appointment tomorrow 11/30/22 at 4pm. She is requesting a US referral.    Patient called again due to needing a referral for a breast US. Patient reports that she has lumps on the right breast for the past month.  Denies pain, redness, warmth in the breast. No fevers and no nipple discharge.     Patient has Hx of breast CA on dads side.     Roya SMITH RN, BSN   [facility]      Stair Goal: Pt will be able to negotiate at least 2 flights of stairs independently with no AD, handrail assist within 3-4 weeks to improve functional mobility/gait training/balance training/bed mobility training/transfer training

## 2023-01-09 NOTE — PATIENT PROFILE ADULT - HOW PATIENT ADDRESSED, PROFILE
Natalie Oral Minoxidil Counseling- I discussed with the patient the risks of oral minoxidil including but not limited to shortness of breath, swelling of the feet or ankles, dizziness, lightheadedness, unwanted hair growth and allergic reaction.  The patient verbalized understanding of the proper use and possible adverse effects of oral minoxidil.  All of the patient's questions and concerns were addressed.

## 2023-06-28 NOTE — CHART NOTE - NSCHARTNOTEFT_GEN_A_CORE
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 Pt sedated and intubated in ED for airway protection in setting of seizures and 1 episode of NBNB emesis. 8/26, in MICU, patient was monitored for seizure activity by EEG, and remained on Keppra 1g BID for prophylaxis. no epileptiform pattern or seizure was seen throughout stay. Patient's BP was maintained with 0.05 levophed and was gradually weaned off. Urine cultures positive for ESBL E. coli, and vancomycin and zosyn were continued for empiric treatment of aspiration PNA in the setting of emesis and seizures. Propofol gradually weaned off, transitioned to Precedex. Patient self-extubated on 8/27, is off propofol and Precedex. Keppra continued for ppx. Neuro cleared patient for transfer to floor, will need MRI once on floors.    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     #neuro  - self extubated, off Precedex and propofol  - continue Keppra 1g BID for ppx  - EEG report 8/27:  1. Moderate to severe nonspecific diffuse or multifocal cerebral dysfunction.   2. Skull defect max in the left posterior quadrant.  3. No epileptiform pattern or seizure seen.  - C/w EEG monitoring  - f/u MRI brain with and without contrast (ordered)  - f/u neuro recs    #CV  - no active issues    #pulmonary  - saturating normally on RA    #ID  - concern for aspiration PNA in setting of emesis and seizures  - urine cultures positive for E. coli  - c/w vanc and zosyn  - BCx 8/26 NGTD    #endo  - glu ranging between 120s-140s    #PPX  - DVT: SCD in setting of possible hemorrhage    #Ethics  - per ED note, ED provider discussed with daughter Janessa - [phone] - patient is FULL CODE    #Other  - Daughter Janessa notified of transfer on 8/27    For Followup:  [ ] 8/27PM vanc held (trough elevated). next vanc due @ 6AM; Please check level (ordered) prior to next dose.  [ ] follow up neurology recs  [ ] MRI brain ordered  [ ] advise pt to see her oncologist, follows Dr. Pugh.

## 2023-07-25 NOTE — OCCUPATIONAL THERAPY INITIAL EVALUATION ADULT - PLANNED THERAPY INTERVENTIONS, OT EVAL
Presenting for BP check. 1 week ago BP was  152/84 . Today BP is 122/62. She did start Spirolactone and is back on HCTZ. Advised to keep same doses. I will call if Johan Amina wants to make any changes. strengthening/IADL retraining/cognitive, visual perceptual/transfer training/ADL retraining/balance training/bed mobility training

## 2024-02-15 NOTE — PATIENT PROFILE ADULT - NSPROMUTANXFEARADDRESSFT_GEN_A_NUR
28yo F with PMH significant for obesity, HTN, and DM who presented from group home after multiple episodes of emesis of gastric contents on day of presentation, admitted with suspected cholecystitis.       Problem/Plan - 1:  ·  Problem: Acute cholecystitis.   ·  Plan: - ruled out for acute cholecystitis per surgery  - per surgery, low concern for acute cholecystitis at this time, and no acute surgical intervention  - RUQ US: cholelithiasis with associated gallbladder wall thickening  - CTAP: mild gallbladder wall edema  - HIDA scan: significantly delayed gallbladder filling may be see with acute/subacute cholecystitis  - hepatitis w/u, LFT downtrending, alk ph os 163, ast/alt 39/151  f/u serologies, can f/up result as outpt, so far hepatitis panel neg  -  stop zosyn on DC  - avoid hepatotoxins - surgery input appreciated– no acute surgical intervention as suspicion low for acute cholecystitis (prior to HIDA scan)  -  - elevated LFT, check hepatitis panel, CMV/EBV PCR, PHONG, ASMA, anti-LKM1, dc tylenol/lisinopril/depakote as they can both potentially cause elevated LFT/transaminitis  check Valproic acid level in AM  consult psych to help medication management now she's taken off depakote.     Problem/Plan - 2:  ·  Problem: Preoperative examination.   ·  Plan: - no plan for cholecystectomy as surgery doesn't think she has acute cholecystitis, no ruq pain.     Problem/Plan - 3:  ·  Problem: T2DM (type 2 diabetes mellitus).   ·  Plan: - home regimen: metformin 1g po BID  - f/s qachs – goal 140-180 while admitted; ISS qachs for now  -A1c 5.7%.     Problem/Plan - 4:  ·  Problem: Acute cystitis.   ·  Plan: - UA: cloudy, 15 ketones, small bilirubin, small LE, 2 WBCs, 52 RBCs, mod bacteria, 8 epithelial cells  - CTAP: mild circumferential urinary bladder wall thickening compatible with cystitis  - UCx (2/11) pansensitive E. coli (50k-99k)  - empirically covered with zosyn, wbc normal and nontoxic, stop abx on discharge.     Problem/Plan - 5:  ·  Problem: HTN (hypertension).   ·  Plan: hold home lisinopril 20 mg qd, ACEI can be associated with elevated LFT/transaminitis  - bp controlled.     Problem/Plan - 6:  ·  Problem: Medication management.   ·  Plan: - continue home meds: divalproex ER 500mg po BID, folate, MVI  - clarify indications particularly for desmopressin 0.2mg po qhs (held for now), divalproex ER 500mg po BID, and Seroquel 150mg po BID  - hold depakote for transaminitis  - psych consulted, cleared for DC, no new psych meds, cont to hold depakote  - VPA level 18.1.   n/a

## 2024-03-14 NOTE — STROKE CODE NOTE - NIH STROKE SCALE: 6A. MOTOR LEG, LEFT, QM
(0) No drift; leg holds 30 degree position for full 5 secs Angi: Patient with dizziness.  neuro consult and symptoamtic control.  MRI negative for acute pathology.  cleared by neurology for outppatient f/u. stable for dsicharge.     I performed a history and physical exam of the patient and discussed their management with the Advanced Care Practitioner. I reviewed the ACP's note and agree with the documented findings and plan of care. My medical decision making and observations are found above.

## 2024-05-17 NOTE — ED ADULT NURSE NOTE - FINAL NURSING ELECTRONIC SIGNATURE
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 She has been afebrile, normal WBC, SCr normal, repeat GI PCR neg, CDIFF test not repeated due to recent positive. She was started on vanc 125mg PO q6h.    Patient reports diarrhea is improving - now more solid, but still loose. Only 1 episode today. Had some nausea/vomiting but abdomen exam benign, KUB neg for ileus.    # CDI, non-severe, first recurrence  - Continue vancomycin 125mg PO QID with plan for prolonged vancomycin PO taper:  -- 125 mg orally 4 times daily for 14 days, then   -- 125 mg orally 2 times daily for 7 days, then   -- 125 mg orally once daily for 7 days, then   -- 125 mg orally every 2 days for 2 weeks  - If patient receives systemic antibiotics in the next year she should receive secondary CDI prophylaxis.    ID Team 1 will sign off. Please call back if nausea/vomiting worsens or if there is concern for ileus, for consideration of IV flagyl and MS vanc - no signs of this currently.   18-Sep-2020 21:41

## 2024-05-22 NOTE — OCCUPATIONAL THERAPY INITIAL EVALUATION ADULT - ORIENTATION, REHAB EVAL
Unable to determine.
oriented to person, place, time and situation
Alert and oriented to person, place and time

## 2025-04-09 NOTE — H&P ADULT - NSHPPOACENTRALVENOUSCATHETER_GEN_ALL_CORE
[MRI] : MRI [Cervical Spine] : cervical spine [Report was reviewed and noted in the chart] : The report was reviewed and noted in the chart [I reviewed the films/CD] : I reviewed the films/CD no